# Patient Record
Sex: FEMALE | Race: WHITE | NOT HISPANIC OR LATINO | Employment: OTHER | URBAN - METROPOLITAN AREA
[De-identification: names, ages, dates, MRNs, and addresses within clinical notes are randomized per-mention and may not be internally consistent; named-entity substitution may affect disease eponyms.]

---

## 2018-03-18 ENCOUNTER — HOSPITAL ENCOUNTER (EMERGENCY)
Facility: HOSPITAL | Age: 43
Discharge: HOME | End: 2018-03-18
Attending: EMERGENCY MEDICINE
Payer: MEDICARE

## 2018-03-18 ENCOUNTER — APPOINTMENT (EMERGENCY)
Dept: RADIOLOGY | Facility: HOSPITAL | Age: 43
End: 2018-03-18
Payer: MEDICARE

## 2018-03-18 VITALS
OXYGEN SATURATION: 99 % | TEMPERATURE: 97.8 F | HEIGHT: 66 IN | RESPIRATION RATE: 16 BRPM | HEART RATE: 71 BPM | WEIGHT: 250 LBS | BODY MASS INDEX: 40.18 KG/M2 | SYSTOLIC BLOOD PRESSURE: 111 MMHG | DIASTOLIC BLOOD PRESSURE: 59 MMHG

## 2018-03-18 DIAGNOSIS — N39.0 URINARY TRACT INFECTION WITHOUT HEMATURIA, SITE UNSPECIFIED: Primary | ICD-10-CM

## 2018-03-18 LAB
ALBUMIN SERPL-MCNC: 4 G/DL (ref 3.4–5)
ALP SERPL-CCNC: 65 IU/L (ref 35–126)
ALT SERPL-CCNC: 17 IU/L (ref 11–54)
ANION GAP SERPL CALC-SCNC: 6 MEQ/L (ref 3–15)
AST SERPL-CCNC: 18 IU/L (ref 15–41)
B-HCG UR QL: NEGATIVE
BACTERIA URNS QL MICRO: 1 /UL
BASOPHILS # BLD: 0.04 K/UL (ref 0.01–0.1)
BASOPHILS NFR BLD: 0.7 %
BILIRUB SERPL-MCNC: 0.4 MG/DL (ref 0.3–1.2)
BILIRUB UR QL STRIP.AUTO: NEGATIVE MG/DL
BUN SERPL-MCNC: 13 MG/DL (ref 8–20)
CALCIUM SERPL-MCNC: 9.3 MG/DL (ref 8.9–10.3)
CHLORIDE SERPL-SCNC: 105 MMOL/L (ref 98–109)
CLARITY UR REFRACT.AUTO: ABNORMAL
CO2 SERPL-SCNC: 25 MMOL/L (ref 22–32)
COLOR UR AUTO: YELLOW
CREAT SERPL-MCNC: 0.7 MG/DL (ref 0.6–1.1)
EOSINOPHIL # BLD: 0.13 K/UL (ref 0.04–0.36)
EOSINOPHIL NFR BLD: 2.2 %
ERYTHROCYTE [DISTWIDTH] IN BLOOD BY AUTOMATED COUNT: 12.8 % (ref 11.7–14.4)
GFR SERPL CREATININE-BSD FRML MDRD: >60 ML/MIN/1.73M*2
GLUCOSE SERPL-MCNC: 88 MG/DL (ref 70–99)
GLUCOSE UR STRIP.AUTO-MCNC: NEGATIVE MG/DL
HCT VFR BLDCO AUTO: 43.8 % (ref 35–45)
HGB BLD-MCNC: 14.5 G/DL (ref 11.8–15.7)
HGB UR QL STRIP.AUTO: 1
HYALINE CASTS #/AREA URNS LPF: ABNORMAL /LPF
IMM GRANULOCYTES # BLD AUTO: 0.03 K/UL (ref 0–0.08)
IMM GRANULOCYTES NFR BLD AUTO: 0.5 %
KETONES UR STRIP.AUTO-MCNC: NEGATIVE MG/DL
LEUKOCYTE ESTERASE UR QL STRIP.AUTO: 1
LYMPHOCYTES # BLD: 1.68 K/UL (ref 1.2–3.5)
LYMPHOCYTES NFR BLD: 28.3 %
MCH RBC QN AUTO: 29.4 PG (ref 28–33.2)
MCHC RBC AUTO-ENTMCNC: 33.1 G/DL (ref 32.2–35.5)
MCV RBC AUTO: 88.8 FL (ref 83–98)
MONOCYTES # BLD: 0.5 K/UL (ref 0.28–0.8)
MONOCYTES NFR BLD: 8.4 %
NEUTROPHILS # BLD: 3.56 K/UL (ref 1.7–7)
NEUTS SEG NFR BLD: 59.9 %
NITRITE UR QL STRIP.AUTO: NEGATIVE
NRBC BLD-RTO: 0 %
PDW BLD AUTO: 10 FL (ref 9.4–12.3)
PH UR STRIP.AUTO: 6 [PH]
PLATELET # BLD AUTO: 200 K/UL (ref 150–369)
POTASSIUM SERPL-SCNC: 4.3 MMOL/L (ref 3.6–5.1)
PROT SERPL-MCNC: 7.3 G/DL (ref 6–8.2)
PROT UR QL STRIP.AUTO: ABNORMAL
RBC # BLD AUTO: 4.93 M/UL (ref 3.93–5.22)
RBC #/AREA URNS HPF: ABNORMAL /HPF
SODIUM SERPL-SCNC: 136 MMOL/L (ref 136–144)
SP GR UR REFRACT.AUTO: 1.03
SQUAMOUS URNS QL MICRO: 2 /HPF
UROBILINOGEN UR STRIP-ACNC: 0.2 EU/DL
WBC # BLD AUTO: 5.94 K/UL (ref 3.8–10.5)
WBC #/AREA URNS HPF: ABNORMAL /HPF

## 2018-03-18 PROCEDURE — 87077 CULTURE AEROBIC IDENTIFY: CPT | Performed by: PHYSICIAN ASSISTANT

## 2018-03-18 PROCEDURE — 96375 TX/PRO/DX INJ NEW DRUG ADDON: CPT

## 2018-03-18 PROCEDURE — 3E033GC INTRODUCTION OF OTHER THERAPEUTIC SUBSTANCE INTO PERIPHERAL VEIN, PERCUTANEOUS APPROACH: ICD-10-PCS | Performed by: EMERGENCY MEDICINE

## 2018-03-18 PROCEDURE — 87086 URINE CULTURE/COLONY COUNT: CPT | Performed by: PHYSICIAN ASSISTANT

## 2018-03-18 PROCEDURE — 36415 COLL VENOUS BLD VENIPUNCTURE: CPT | Performed by: PHYSICIAN ASSISTANT

## 2018-03-18 PROCEDURE — 81003 URINALYSIS AUTO W/O SCOPE: CPT | Performed by: PHYSICIAN ASSISTANT

## 2018-03-18 PROCEDURE — 63600000 HC DRUGS/DETAIL CODE: Performed by: PHYSICIAN ASSISTANT

## 2018-03-18 PROCEDURE — 25800000 HC PHARMACY IV SOLUTIONS: Performed by: PHYSICIAN ASSISTANT

## 2018-03-18 PROCEDURE — 99285 EMERGENCY DEPT VISIT HI MDM: CPT | Mod: 25

## 2018-03-18 PROCEDURE — 85025 COMPLETE CBC W/AUTO DIFF WBC: CPT | Performed by: PHYSICIAN ASSISTANT

## 2018-03-18 PROCEDURE — 80053 COMPREHEN METABOLIC PANEL: CPT | Performed by: PHYSICIAN ASSISTANT

## 2018-03-18 PROCEDURE — 74176 CT ABD & PELVIS W/O CONTRAST: CPT

## 2018-03-18 PROCEDURE — 96374 THER/PROPH/DIAG INJ IV PUSH: CPT | Mod: 59

## 2018-03-18 RX ORDER — ARIPIPRAZOLE 20 MG/1
30 TABLET ORAL DAILY
Refills: 1 | COMMUNITY
Start: 2018-03-06 | End: 2020-05-06 | Stop reason: HOSPADM

## 2018-03-18 RX ORDER — ONDANSETRON HYDROCHLORIDE 2 MG/ML
4 INJECTION, SOLUTION INTRAVENOUS ONCE
Status: COMPLETED | OUTPATIENT
Start: 2018-03-18 | End: 2018-03-18

## 2018-03-18 RX ORDER — KETOROLAC TROMETHAMINE 30 MG/ML
30 INJECTION, SOLUTION INTRAMUSCULAR; INTRAVENOUS ONCE
Status: COMPLETED | OUTPATIENT
Start: 2018-03-18 | End: 2018-03-18

## 2018-03-18 RX ORDER — DIVALPROEX SODIUM 250 MG/1
1000 TABLET, DELAYED RELEASE ORAL DAILY
Refills: 1 | COMMUNITY
Start: 2018-02-24 | End: 2020-05-06 | Stop reason: HOSPADM

## 2018-03-18 RX ORDER — SULFAMETHOXAZOLE AND TRIMETHOPRIM 800; 160 MG/1; MG/1
1 TABLET ORAL 2 TIMES DAILY
Qty: 20 TABLET | Refills: 0 | Status: SHIPPED | OUTPATIENT
Start: 2018-03-18 | End: 2018-03-28

## 2018-03-18 RX ORDER — ONDANSETRON 4 MG/1
4 TABLET, ORALLY DISINTEGRATING ORAL EVERY 8 HOURS PRN
Qty: 10 TABLET | Refills: 0 | Status: SHIPPED | OUTPATIENT
Start: 2018-03-18 | End: 2020-03-22

## 2018-03-18 RX ORDER — IBUPROFEN 600 MG/1
600 TABLET ORAL EVERY 6 HOURS PRN
Qty: 30 TABLET | Refills: 0 | Status: SHIPPED | OUTPATIENT
Start: 2018-03-18 | End: 2018-03-28

## 2018-03-18 RX ADMIN — SODIUM CHLORIDE 1000 ML: 9 INJECTION, SOLUTION INTRAVENOUS at 12:33

## 2018-03-18 RX ADMIN — ONDANSETRON 4 MG: 2 INJECTION INTRAMUSCULAR; INTRAVENOUS at 12:34

## 2018-03-18 RX ADMIN — KETOROLAC TROMETHAMINE 30 MG: 30 INJECTION, SOLUTION INTRAMUSCULAR at 12:34

## 2018-03-18 ASSESSMENT — ENCOUNTER SYMPTOMS
SHORTNESS OF BREATH: 0
ABDOMINAL PAIN: 1
FEVER: 0
LIGHT-HEADEDNESS: 0
FREQUENCY: 1
CHILLS: 0
NAUSEA: 0
DIARRHEA: 0
DYSURIA: 1
HEADACHES: 0
DIZZINESS: 0
BACK PAIN: 1
VOMITING: 0

## 2018-03-18 NOTE — ED ATTESTATION NOTE
The patient was evaluated and managed by the physician assistant / nurse practitioner.       Rex Mccord MD  03/18/18 6729

## 2018-03-18 NOTE — ED PROVIDER NOTES
HPI     Chief Complaint   Patient presents with   • Flank Pain       42-year-old female past medical history of bipolar, cholecystectomy presents emergency department for evaluation of right-sided low back pain.  Patient reports approximately 3 weeks ago she was started on Cipro for 5 days for urinary tract infection.  Patient reports she has not really had any improvements in urinary frequency and urgency since.  Patient reports she has some radiation of pain to suprapubic abdominal region and now feels pain in bilateral low back.  Patient endorses mild nausea.  Patient denies fevers, chills, vomiting, diarrhea, vaginal discharge, vaginal bleeding.  Patient has not attempted any palliative factor.             Patient History     Past Medical History:   Diagnosis Date   • Bipolar 1 disorder (CMS/HCC) (HCC)        Past Surgical History:   Procedure Laterality Date   • ANAL FISSURECTOMY     • CHOLECYSTECTOMY         History reviewed. No pertinent family history.    Social History   Substance Use Topics   • Smoking status: Current Every Day Smoker     Packs/day: 0.25     Types: Cigarettes   • Smokeless tobacco: Never Used   • Alcohol use No       Systems Reviewed from Nursing Triage:          Review of Systems     Review of Systems   Constitutional: Negative for chills and fever.   Respiratory: Negative for shortness of breath.    Gastrointestinal: Positive for abdominal pain. Negative for diarrhea, nausea and vomiting.   Genitourinary: Positive for dysuria, frequency and urgency. Negative for vaginal bleeding, vaginal discharge and vaginal pain.   Musculoskeletal: Positive for back pain.   Skin: Negative for rash.   Neurological: Negative for dizziness, light-headedness and headaches.        Physical Exam     ED Triage Vitals [03/18/18 1105]   Temp Heart Rate Resp BP SpO2   36.7 °C (98 °F) 97 16 125/72 100 %      Temp Source Heart Rate Source Patient Position BP Location FiO2 (%) (Set)   Oral -- Sitting Left upper arm  "--                     Patient Vitals for the past 24 hrs:   BP Temp Temp src Pulse Resp SpO2 Height Weight   03/18/18 1105 125/72 36.7 °C (98 °F) Oral 97 16 100 % 1.676 m (5' 6\") 113 kg (250 lb)           Physical Exam   Constitutional: She is oriented to person, place, and time. She appears well-developed and well-nourished.   HENT:   Head: Normocephalic.   Cardiovascular: Normal rate and regular rhythm.    Pulmonary/Chest: Effort normal and breath sounds normal.   Abdominal: Soft. There is tenderness (mild suprapubic).   Neurological: She is alert and oriented to person, place, and time.   Psychiatric: She has a normal mood and affect.            Procedures    ED Course & MDM     Labs Reviewed   UA REFLEX CULTURE (MACROSCOPIC) - Abnormal        Result Value    Clarity, Urine Cloudy (*)     Leukocyte Esterase +1 (*)     Protein, Urine Trace (*)     Blood, Urine +1 (*)     Color, Urine Yellow      Specific Gravity, Urine 1.026      pH, Urine 6.0      Nitrite, Urine Negative      Glucose, Urine Negative      Ketones, Urine Negative      Urobilinogen, Urine 0.2      Bilirubin, Urine Negative     UA MICROSCOPIC - Abnormal     RBC, Urine 10 TO 19 (*)     WBC, Urine 10 TO 20 (*)     Squamous Epithelial +2 (*)     Hyaline Cast 3 TO 9 (*)     Bacteria, Urine +1 (*)    CBC - Normal    WBC 5.94      RBC 4.93      Hemoglobin 14.5      Hematocrit 43.8      MCV 88.8      MCH 29.4      MCHC 33.1      RDW 12.8      Platelets 200      MPV 10.0     POCT BHCG, URINE, QUAL (BEAKER) - Normal    POCT BhCG, Urine Qual Negative     URINE CULTURE   URINALYSIS REFLEX CULTURE    Narrative:     The following orders were created for panel order Urinalysis with Reflex Culture.  Procedure                               Abnormality         Status                     ---------                               -----------         ------                     UA Reflex to Culture (Mac...[[id number]]  Abnormal            Final result               UA " Microscopic[[id number]]                Abnormal            Final result                 Please view results for these tests on the individual orders.   CBC AND DIFFERENTIAL    Narrative:     The following orders were created for panel order CBC and differential.  Procedure                               Abnormality         Status                     ---------                               -----------         ------                     CBC[[id number]]                           Normal              Final result               Diff Count[[id number]]                                        Final result                 Please view results for these tests on the individual orders.   DIFF COUNT    nRBC 0.0      Immature Granulocytes 0.5      Neutrophils 59.9      Lymphocytes 28.3      Monocytes 8.4      Eosinophils 2.2      Basophils 0.7      Immature Granulocytes, Absolute 0.03      Neutrophils, Absolute 3.56      Lymphocytes, Absolute 1.68      Monocytes, Absolute 0.50      Eosinophils, Absolute 0.13      Basophils, Absolute 0.04     COMPREHENSIVE METABOLIC PANEL   POCT PREGNANCY, URINE       No orders to display           MDM         ED Course as of Mar 18 1527   Sun Mar 18, 2018   1503 CT ABDOMEN PELVIS WITHOUT IV CONTRAST [SS]   1527 Reviewed results with patient, reports she is feeling good, will follow up with her doctor  [SS]      ED Course User Index  [SS] DELIA Davis         Clinical Impressions as of Mar 18 1527   Urinary tract infection without hematuria, site unspecified     Disposition:       DELIA Davis  03/18/18 1238       DELIA Davis  03/18/18 1513       DELIA Davis  03/18/18 1527
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
## 2018-03-20 LAB
BACTERIA UR CULT: ABNORMAL

## 2018-10-11 PROCEDURE — 88304 TISSUE EXAM BY PATHOLOGIST: CPT | Performed by: SURGERY

## 2018-10-12 ENCOUNTER — LAB REQUISITION (OUTPATIENT)
Dept: LAB | Facility: HOSPITAL | Age: 43
End: 2018-10-12
Attending: SURGERY
Payer: MEDICARE

## 2018-10-12 DIAGNOSIS — L72.3 SEBACEOUS CYST: ICD-10-CM

## 2018-10-15 LAB
CASE RPRT: NORMAL
CLINICAL INFO: NORMAL
PATH REPORT.FINAL DX SPEC: NORMAL
PATH REPORT.GROSS SPEC: NORMAL

## 2019-01-09 ENCOUNTER — APPOINTMENT (OUTPATIENT)
Dept: LAB | Facility: HOSPITAL | Age: 44
End: 2019-01-09
Attending: PSYCHIATRY & NEUROLOGY
Payer: MEDICARE

## 2019-01-09 ENCOUNTER — TRANSCRIBE ORDERS (OUTPATIENT)
Dept: LAB | Facility: HOSPITAL | Age: 44
End: 2019-01-09

## 2019-01-09 DIAGNOSIS — Z79.899 OTHER LONG TERM (CURRENT) DRUG THERAPY: ICD-10-CM

## 2019-01-09 DIAGNOSIS — Z79.899 OTHER LONG TERM (CURRENT) DRUG THERAPY: Primary | ICD-10-CM

## 2019-01-09 LAB
ALBUMIN SERPL-MCNC: 3.4 G/DL (ref 3.4–5)
ALP SERPL-CCNC: 67 IU/L (ref 35–126)
ALT SERPL-CCNC: 18 IU/L (ref 11–54)
ANION GAP SERPL CALC-SCNC: 8 MEQ/L (ref 3–15)
AST SERPL-CCNC: 16 IU/L (ref 15–41)
BASOPHILS # BLD: 0.03 K/UL (ref 0.01–0.1)
BASOPHILS NFR BLD: 0.5 %
BILIRUB SERPL-MCNC: 0.5 MG/DL (ref 0.3–1.2)
BUN SERPL-MCNC: 9 MG/DL (ref 8–20)
CALCIUM SERPL-MCNC: 9.1 MG/DL (ref 8.9–10.3)
CHLORIDE SERPL-SCNC: 107 MEQ/L (ref 98–109)
CHOLEST SERPL-MCNC: 263 MG/DL
CO2 SERPL-SCNC: 23 MEQ/L (ref 22–32)
CREAT SERPL-MCNC: 0.6 MG/DL
DIFFERENTIAL METHOD BLD: ABNORMAL
EOSINOPHIL # BLD: 0.13 K/UL (ref 0.04–0.36)
EOSINOPHIL NFR BLD: 2.1 %
ERYTHROCYTE [DISTWIDTH] IN BLOOD BY AUTOMATED COUNT: 13.4 % (ref 11.7–14.4)
EST. AVERAGE GLUCOSE BLD GHB EST-MCNC: 108 MG/DL
GFR SERPL CREATININE-BSD FRML MDRD: >60 ML/MIN/1.73M*2
GLUCOSE SERPL-MCNC: 87 MG/DL (ref 70–99)
HBA1C MFR BLD HPLC: 5.4 %
HCT VFR BLDCO AUTO: 41 %
HDLC SERPL-MCNC: 32 MG/DL
HDLC SERPL: 8.2 {RATIO}
HGB BLD-MCNC: 13.6 G/DL
IMM GRANULOCYTES # BLD AUTO: 0.03 K/UL (ref 0–0.08)
IMM GRANULOCYTES NFR BLD AUTO: 0.5 %
LDLC SERPL CALC-MCNC: 187 MG/DL
LYMPHOCYTES # BLD: 1.17 K/UL (ref 1.2–3.5)
LYMPHOCYTES NFR BLD: 19.1 %
MCH RBC QN AUTO: 28.5 PG (ref 28–33.2)
MCHC RBC AUTO-ENTMCNC: 33.2 G/DL (ref 32.2–35.5)
MCV RBC AUTO: 86 FL (ref 83–98)
MONOCYTES # BLD: 0.46 K/UL (ref 0.28–0.8)
MONOCYTES NFR BLD: 7.5 %
NEUTROPHILS # BLD: 4.3 K/UL (ref 1.7–7)
NEUTS SEG NFR BLD: 70.3 %
NONHDLC SERPL-MCNC: 231 MG/DL
NRBC BLD-RTO: 0 %
PDW BLD AUTO: 10.1 FL (ref 9.4–12.3)
PLATELET # BLD AUTO: 201 K/UL
POTASSIUM SERPL-SCNC: 4.6 MEQ/L (ref 3.6–5.1)
PROT SERPL-MCNC: 6.1 G/DL (ref 6–8.2)
RBC # BLD AUTO: 4.77 M/UL (ref 3.93–5.22)
SODIUM SERPL-SCNC: 138 MEQ/L (ref 136–144)
TRIGL SERPL-MCNC: 222 MG/DL (ref 30–149)
VALPROATE SERPL-MCNC: 43.5 UG/ML (ref 50–100)
WBC # BLD AUTO: 6.12 K/UL

## 2019-01-09 PROCEDURE — 80053 COMPREHEN METABOLIC PANEL: CPT

## 2019-01-09 PROCEDURE — 36415 COLL VENOUS BLD VENIPUNCTURE: CPT

## 2019-01-09 PROCEDURE — 80164 ASSAY DIPROPYLACETIC ACD TOT: CPT

## 2019-01-09 PROCEDURE — 83036 HEMOGLOBIN GLYCOSYLATED A1C: CPT

## 2019-01-09 PROCEDURE — 80061 LIPID PANEL: CPT

## 2019-01-09 PROCEDURE — 85025 COMPLETE CBC W/AUTO DIFF WBC: CPT

## 2019-01-10 PROCEDURE — 88304 TISSUE EXAM BY PATHOLOGIST: CPT | Performed by: SURGERY

## 2019-01-11 ENCOUNTER — LAB REQUISITION (OUTPATIENT)
Dept: LAB | Facility: HOSPITAL | Age: 44
End: 2019-01-11
Attending: SURGERY
Payer: MEDICARE

## 2019-01-11 DIAGNOSIS — D48.7 NEOPLASM OF UNCERTAIN BEHAVIOR OF OTHER SPECIFIED SITES: ICD-10-CM

## 2019-01-21 PROCEDURE — 88304 TISSUE EXAM BY PATHOLOGIST: CPT | Performed by: SURGERY

## 2019-01-22 ENCOUNTER — LAB REQUISITION (OUTPATIENT)
Dept: LAB | Facility: HOSPITAL | Age: 44
End: 2019-01-22
Attending: SURGERY
Payer: MEDICARE

## 2019-01-22 DIAGNOSIS — L72.3 SEBACEOUS CYST: ICD-10-CM

## 2019-02-13 PROCEDURE — 88305 TISSUE EXAM BY PATHOLOGIST: CPT | Performed by: SURGERY

## 2019-02-14 ENCOUNTER — LAB REQUISITION (OUTPATIENT)
Dept: LAB | Facility: HOSPITAL | Age: 44
End: 2019-02-14
Attending: SURGERY
Payer: MEDICARE

## 2019-02-14 DIAGNOSIS — D48.7 NEOPLASM OF UNCERTAIN BEHAVIOR OF OTHER SPECIFIED SITES: ICD-10-CM

## 2019-03-18 PROCEDURE — 88305 TISSUE EXAM BY PATHOLOGIST: CPT | Performed by: SURGERY

## 2019-03-19 ENCOUNTER — LAB REQUISITION (OUTPATIENT)
Dept: LAB | Facility: HOSPITAL | Age: 44
End: 2019-03-19
Attending: SURGERY
Payer: MEDICARE

## 2019-03-19 DIAGNOSIS — D48.7 NEOPLASM OF UNCERTAIN BEHAVIOR OF OTHER SPECIFIED SITES: ICD-10-CM

## 2020-03-22 ENCOUNTER — HOSPITAL ENCOUNTER (INPATIENT)
Facility: HOSPITAL | Age: 45
LOS: 45 days | Discharge: HOME | DRG: 885 | End: 2020-05-06
Attending: EMERGENCY MEDICINE | Admitting: PSYCHIATRY & NEUROLOGY
Payer: MEDICARE

## 2020-03-22 DIAGNOSIS — F31.9 BIPOLAR 1 DISORDER (CMS/HCC): Primary | ICD-10-CM

## 2020-03-22 DIAGNOSIS — F22 DELUSION (CMS/HCC): ICD-10-CM

## 2020-03-22 PROBLEM — R60.0 LOWER EXTREMITY EDEMA: Status: ACTIVE | Noted: 2020-03-22

## 2020-03-22 PROBLEM — G47.33 OSA (OBSTRUCTIVE SLEEP APNEA): Status: ACTIVE | Noted: 2020-03-22

## 2020-03-22 PROBLEM — F29 PSYCHOSIS (CMS/HCC): Status: ACTIVE | Noted: 2020-03-22

## 2020-03-22 PROBLEM — M54.50 CHRONIC LOW BACK PAIN: Status: ACTIVE | Noted: 2020-03-22

## 2020-03-22 PROBLEM — I48.0 PAROXYSMAL A-FIB (CMS/HCC): Status: ACTIVE | Noted: 2020-03-22

## 2020-03-22 PROBLEM — G89.29 CHRONIC LOW BACK PAIN: Status: ACTIVE | Noted: 2020-03-22

## 2020-03-22 LAB
AMPHET UR QL SCN: NORMAL
ANION GAP SERPL CALC-SCNC: 11 MEQ/L (ref 3–15)
APAP SERPL-MCNC: <10 UG/ML (ref 10–30)
APTT PPP: 29 SEC (ref 23–35)
BARBITURATES UR QL SCN: NORMAL
BENZODIAZ UR QL SCN: NORMAL
BUN SERPL-MCNC: 16 MG/DL (ref 8–20)
CALCIUM SERPL-MCNC: 9.3 MG/DL (ref 8.9–10.3)
CANNABINOIDS UR QL SCN: NORMAL
CHLORIDE SERPL-SCNC: 108 MEQ/L (ref 98–109)
CO2 SERPL-SCNC: 21 MEQ/L (ref 22–32)
COCAINE UR QL SCN: NORMAL
CREAT SERPL-MCNC: 0.7 MG/DL (ref 0.6–1.1)
ERYTHROCYTE [DISTWIDTH] IN BLOOD BY AUTOMATED COUNT: 13.7 % (ref 11.7–14.4)
ETHANOL SERPL-MCNC: <5 MG/DL
GFR SERPL CREATININE-BSD FRML MDRD: >60 ML/MIN/1.73M*2
GLUCOSE SERPL-MCNC: 118 MG/DL (ref 70–99)
HCG UR QL: NEGATIVE
HCT VFR BLDCO AUTO: 41.9 % (ref 35–45)
HGB BLD-MCNC: 13.8 G/DL (ref 11.8–15.7)
INR PPP: 1 INR
MCH RBC QN AUTO: 29.8 PG (ref 28–33.2)
MCHC RBC AUTO-ENTMCNC: 32.9 G/DL (ref 32.2–35.5)
MCV RBC AUTO: 90.5 FL (ref 83–98)
OPIATES UR QL SCN: NORMAL
PCP UR QL SCN: NORMAL
PDW BLD AUTO: 10.5 FL (ref 9.4–12.3)
PLATELET # BLD AUTO: 264 K/UL (ref 150–369)
POTASSIUM SERPL-SCNC: 3.9 MEQ/L (ref 3.6–5.1)
PROTHROMBIN TIME: 12.5 SEC (ref 12.2–14.5)
RBC # BLD AUTO: 4.63 M/UL (ref 3.93–5.22)
SALICYLATES SERPL-MCNC: <4 MG/DL
SODIUM SERPL-SCNC: 140 MEQ/L (ref 136–144)
TROPONIN I SERPL-MCNC: <0.02 NG/ML
WBC # BLD AUTO: 7.56 K/UL (ref 3.8–10.5)

## 2020-03-22 PROCEDURE — 83036 HEMOGLOBIN GLYCOSYLATED A1C: CPT | Performed by: PSYCHIATRY & NEUROLOGY

## 2020-03-22 PROCEDURE — 84703 CHORIONIC GONADOTROPIN ASSAY: CPT | Performed by: EMERGENCY MEDICINE

## 2020-03-22 PROCEDURE — 84443 ASSAY THYROID STIM HORMONE: CPT | Performed by: PSYCHIATRY & NEUROLOGY

## 2020-03-22 PROCEDURE — 200200 PR NO CHARGE: Performed by: HOSPITALIST

## 2020-03-22 PROCEDURE — 85610 PROTHROMBIN TIME: CPT | Performed by: EMERGENCY MEDICINE

## 2020-03-22 PROCEDURE — 80048 BASIC METABOLIC PNL TOTAL CA: CPT | Performed by: EMERGENCY MEDICINE

## 2020-03-22 PROCEDURE — 85027 COMPLETE CBC AUTOMATED: CPT | Performed by: EMERGENCY MEDICINE

## 2020-03-22 PROCEDURE — 84484 ASSAY OF TROPONIN QUANT: CPT | Performed by: EMERGENCY MEDICINE

## 2020-03-22 PROCEDURE — 12400000 HC ROOM AND CARE SEMIPRIVATE PSYCH

## 2020-03-22 PROCEDURE — 82248 BILIRUBIN DIRECT: CPT | Performed by: PSYCHIATRY & NEUROLOGY

## 2020-03-22 PROCEDURE — 36415 COLL VENOUS BLD VENIPUNCTURE: CPT | Performed by: PHYSICIAN ASSISTANT

## 2020-03-22 PROCEDURE — 80061 LIPID PANEL: CPT | Performed by: PSYCHIATRY & NEUROLOGY

## 2020-03-22 PROCEDURE — 82746 ASSAY OF FOLIC ACID SERUM: CPT | Performed by: PSYCHIATRY & NEUROLOGY

## 2020-03-22 PROCEDURE — 85730 THROMBOPLASTIN TIME PARTIAL: CPT | Performed by: EMERGENCY MEDICINE

## 2020-03-22 PROCEDURE — 63700000 HC SELF-ADMINISTRABLE DRUG: Performed by: PSYCHIATRY & NEUROLOGY

## 2020-03-22 PROCEDURE — 80307 DRUG TEST PRSMV CHEM ANLYZR: CPT | Performed by: EMERGENCY MEDICINE

## 2020-03-22 PROCEDURE — 99285 EMERGENCY DEPT VISIT HI MDM: CPT

## 2020-03-22 PROCEDURE — 93005 ELECTROCARDIOGRAM TRACING: CPT | Performed by: EMERGENCY MEDICINE

## 2020-03-22 PROCEDURE — 82607 VITAMIN B-12: CPT | Performed by: PSYCHIATRY & NEUROLOGY

## 2020-03-22 PROCEDURE — G0480 DRUG TEST DEF 1-7 CLASSES: HCPCS | Performed by: EMERGENCY MEDICINE

## 2020-03-22 RX ORDER — FLUTICASONE PROPIONATE 50 MCG
2 SPRAY, SUSPENSION (ML) NASAL DAILY
Status: DISCONTINUED | OUTPATIENT
Start: 2020-03-23 | End: 2020-05-06 | Stop reason: HOSPADM

## 2020-03-22 RX ORDER — IBUPROFEN 400 MG/1
400 TABLET ORAL EVERY 6 HOURS PRN
Status: DISCONTINUED | OUTPATIENT
Start: 2020-03-22 | End: 2020-04-30

## 2020-03-22 RX ORDER — HALOPERIDOL 5 MG/1
5 TABLET ORAL EVERY 6 HOURS PRN
Status: DISCONTINUED | OUTPATIENT
Start: 2020-03-22 | End: 2020-03-23

## 2020-03-22 RX ORDER — ACETAMINOPHEN 325 MG/1
650 TABLET ORAL EVERY 4 HOURS PRN
Status: DISCONTINUED | OUTPATIENT
Start: 2020-03-22 | End: 2020-05-06 | Stop reason: HOSPADM

## 2020-03-22 RX ORDER — ASPIRIN 81 MG/1
81 TABLET ORAL DAILY
Status: DISCONTINUED | OUTPATIENT
Start: 2020-03-23 | End: 2020-03-30

## 2020-03-22 RX ORDER — DIPHENHYDRAMINE HCL 25 MG
25 CAPSULE ORAL EVERY 6 HOURS PRN
Status: DISCONTINUED | OUTPATIENT
Start: 2020-03-22 | End: 2020-05-06 | Stop reason: HOSPADM

## 2020-03-22 RX ORDER — FLUTICASONE PROPIONATE 50 MCG
2 SPRAY, SUSPENSION (ML) NASAL DAILY
COMMUNITY
Start: 2019-11-11

## 2020-03-22 RX ORDER — LORAZEPAM 2 MG/ML
2 INJECTION INTRAMUSCULAR EVERY 4 HOURS PRN
Status: DISCONTINUED | OUTPATIENT
Start: 2020-03-22 | End: 2020-04-06

## 2020-03-22 RX ORDER — OLANZAPINE 10 MG/1
10 TABLET, ORALLY DISINTEGRATING ORAL ONCE
Status: DISPENSED | OUTPATIENT
Start: 2020-03-22 | End: 2020-03-23

## 2020-03-22 RX ORDER — ALUMINUM HYDROXIDE, MAGNESIUM HYDROXIDE, AND SIMETHICONE 1200; 120; 1200 MG/30ML; MG/30ML; MG/30ML
30 SUSPENSION ORAL EVERY 4 HOURS PRN
Status: DISCONTINUED | OUTPATIENT
Start: 2020-03-22 | End: 2020-05-06 | Stop reason: HOSPADM

## 2020-03-22 RX ORDER — ARIPIPRAZOLE 15 MG/1
30 TABLET ORAL NIGHTLY
Status: DISCONTINUED | OUTPATIENT
Start: 2020-03-22 | End: 2020-03-26

## 2020-03-22 RX ORDER — DILTIAZEM HYDROCHLORIDE 180 MG/1
180 CAPSULE, COATED, EXTENDED RELEASE ORAL DAILY
Status: DISCONTINUED | OUTPATIENT
Start: 2020-03-23 | End: 2020-05-03

## 2020-03-22 RX ORDER — DIVALPROEX SODIUM 250 MG/1
1000 TABLET, DELAYED RELEASE ORAL NIGHTLY
Status: DISCONTINUED | OUTPATIENT
Start: 2020-03-22 | End: 2020-03-23

## 2020-03-22 RX ORDER — FUROSEMIDE 40 MG/1
20 TABLET ORAL DAILY
Status: COMPLETED | OUTPATIENT
Start: 2020-03-23 | End: 2020-03-24

## 2020-03-22 RX ORDER — LORAZEPAM 1 MG/1
1 TABLET ORAL EVERY 6 HOURS PRN
Status: DISCONTINUED | OUTPATIENT
Start: 2020-03-22 | End: 2020-04-06

## 2020-03-22 RX ORDER — HALOPERIDOL 5 MG/ML
5 INJECTION INTRAMUSCULAR EVERY 4 HOURS PRN
Status: DISCONTINUED | OUTPATIENT
Start: 2020-03-22 | End: 2020-03-23

## 2020-03-22 RX ORDER — DIPHENHYDRAMINE HCL 50 MG/ML
50 VIAL (ML) INJECTION EVERY 4 HOURS PRN
Status: DISCONTINUED | OUTPATIENT
Start: 2020-03-22 | End: 2020-05-06 | Stop reason: HOSPADM

## 2020-03-22 RX ORDER — DILTIAZEM HYDROCHLORIDE 180 MG/1
CAPSULE, COATED, EXTENDED RELEASE ORAL
COMMUNITY
Start: 2020-03-18 | End: 2020-05-06 | Stop reason: HOSPADM

## 2020-03-22 RX ORDER — ASPIRIN 81 MG/1
TABLET ORAL
COMMUNITY
Start: 2020-02-21 | End: 2023-07-14 | Stop reason: HOSPADM

## 2020-03-22 RX ORDER — ONDANSETRON 4 MG/1
4 TABLET, ORALLY DISINTEGRATING ORAL EVERY 8 HOURS PRN
Status: DISCONTINUED | OUTPATIENT
Start: 2020-03-22 | End: 2020-05-06 | Stop reason: HOSPADM

## 2020-03-22 RX ORDER — DOCUSATE SODIUM 100 MG/1
100 CAPSULE, LIQUID FILLED ORAL 2 TIMES DAILY
Status: DISCONTINUED | OUTPATIENT
Start: 2020-03-22 | End: 2020-05-06 | Stop reason: HOSPADM

## 2020-03-22 RX ADMIN — ARIPIPRAZOLE 30 MG: 15 TABLET ORAL at 23:19

## 2020-03-22 RX ADMIN — DIVALPROEX SODIUM 1000 MG: 250 TABLET, DELAYED RELEASE ORAL at 23:19

## 2020-03-22 SDOH — HEALTH STABILITY: MENTAL HEALTH: HOW OFTEN DO YOU HAVE A DRINK CONTAINING ALCOHOL?: NEVER

## 2020-03-22 SDOH — HEALTH STABILITY: MENTAL HEALTH: HOW OFTEN DO YOU HAVE SIX OR MORE DRINKS ON ONE OCCASION?: NEVER

## 2020-03-22 ASSESSMENT — COGNITIVE AND FUNCTIONAL STATUS - GENERAL
JUDGEMENT: IMPAIRED, SEVERELY
IMPULSE CONTROL: NORMAL
PSYCHOMOTOR FUNCTIONING: RESTLESS
TOILETING: 4 - NONE
DRESSING REGULAR LOWER BODY CLOTHING: 4 - NONE
APPEARANCE: WELL GROOMED
HELP NEEDED FOR PERSONAL GROOMING: 4 - NONE
CLIMB 3 TO 5 STEPS WITH RAILING: 4 - NONE
MOVING TO AND FROM BED TO CHAIR: 4 - NONE
AFFECT: FLAT;TENSE
WALKING IN HOSPITAL ROOM: 4 - NONE
HELP NEEDED FOR BATHING: 4 - NONE
THOUGHT_CONTENT: OTHER:
MOOD: ANXIOUS
INSIGHT: IMPAIRED SEVERELY
DRESSING REGULAR UPPER BODY CLOTHING: 4 - NONE
EATING MEALS: 4 - NONE
ORIENTATION: FULLY ORIENTED
SPEECH: REGULAR
PERCEPTUAL FUNCTION: AUDITORY HALLUCINATIONS;VISUAL HALLUCINATIONS
STANDING UP FROM CHAIR USING ARMS: 4 - NONE

## 2020-03-22 ASSESSMENT — ENCOUNTER SYMPTOMS
HEADACHES: 0
COUGH: 0
FEVER: 0
NAUSEA: 0
DIZZINESS: 0
BACK PAIN: 0
WOUND: 0
BIZARRE BEHAVIOR: 1
SHORTNESS OF BREATH: 0
HALLUCINATIONS: 1
VOMITING: 0
ABDOMINAL PAIN: 0
CHILLS: 0

## 2020-03-22 NOTE — ED ATTESTATION NOTE
I have personally seen, evaluated,and participated in the management of Jillian Acosta.  I reviewed and agree with the PA/NP's assessment and plan of care with the following exceptions: None    My examination, assessment, and plan of care for Jillian Acosta:  44-year-old female with history of bipolar 1 disorder supposedly history of recent diagnosis of atrial fibrillation only on aspirin presented with worsening paranoia insomnia hyperactivity and delusional thoughts.  No SI but has had intermittent thoughts about being disgruntled and angry her dad.  Patient recently had Abilify increased 2 weeks ago from 20 mg a day to 30 mg a day.  Exam:  Aa, normal gait, no resp distress.  Moving all 4 extremities spontaneously and purposefully.  Intermittent inappropriate laughter.  Delusional.  Plan:  ekg  Labs  1:1  crisis       Silvio Vyas MD  03/22/20 5234
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
## 2020-03-23 LAB
ALBUMIN SERPL-MCNC: 4 G/DL (ref 3.4–5)
ALP SERPL-CCNC: 67 IU/L (ref 35–126)
ALT SERPL-CCNC: 28 IU/L (ref 11–54)
AST SERPL-CCNC: 25 IU/L (ref 15–41)
ATRIAL RATE: 84
BILIRUB DIRECT SERPL-MCNC: 0.1 MG/DL
BILIRUB SERPL-MCNC: 0.5 MG/DL (ref 0.3–1.2)
CHOLEST SERPL-MCNC: 236 MG/DL
FOLATE SERPL-MCNC: 14.2 NG/ML
HDLC SERPL-MCNC: 42 MG/DL
HDLC SERPL: 5.6 {RATIO}
LDLC SERPL CALC-MCNC: 156 MG/DL
NONHDLC SERPL-MCNC: 194 MG/DL
P AXIS: 70
PR INTERVAL: 160
PROT SERPL-MCNC: 7.3 G/DL (ref 6–8.2)
QRS DURATION: 86
QT INTERVAL: 386
QTC CALCULATION(BAZETT): 456
R AXIS: 56
T WAVE AXIS: 43
TRIGL SERPL-MCNC: 189 MG/DL (ref 30–149)
TSH SERPL DL<=0.05 MIU/L-ACNC: 1.77 MIU/L (ref 0.34–5.6)
VENTRICULAR RATE: 84
VIT B12 SERPL-MCNC: 414 PG/ML (ref 180–914)

## 2020-03-23 PROCEDURE — 63700000 HC SELF-ADMINISTRABLE DRUG: Performed by: PSYCHIATRY & NEUROLOGY

## 2020-03-23 PROCEDURE — 12400000 HC ROOM AND CARE SEMIPRIVATE PSYCH

## 2020-03-23 PROCEDURE — 63700000 HC SELF-ADMINISTRABLE DRUG: Performed by: HOSPITALIST

## 2020-03-23 PROCEDURE — 90792 PSYCH DIAG EVAL W/MED SRVCS: CPT | Performed by: PSYCHIATRY & NEUROLOGY

## 2020-03-23 RX ORDER — HALOPERIDOL 5 MG/1
10 TABLET ORAL EVERY 8 HOURS PRN
Status: DISCONTINUED | OUTPATIENT
Start: 2020-03-23 | End: 2020-04-08

## 2020-03-23 RX ORDER — DIVALPROEX SODIUM 250 MG/1
1250 TABLET, DELAYED RELEASE ORAL NIGHTLY
Status: DISCONTINUED | OUTPATIENT
Start: 2020-03-23 | End: 2020-03-24

## 2020-03-23 RX ORDER — HALOPERIDOL 5 MG/ML
10 INJECTION INTRAMUSCULAR EVERY 6 HOURS PRN
Status: DISCONTINUED | OUTPATIENT
Start: 2020-03-23 | End: 2020-04-08

## 2020-03-23 RX ADMIN — HALOPERIDOL 5 MG: 5 TABLET ORAL at 09:29

## 2020-03-23 RX ADMIN — DILTIAZEM HYDROCHLORIDE 180 MG: 180 CAPSULE, COATED, EXTENDED RELEASE ORAL at 11:21

## 2020-03-23 RX ADMIN — ARIPIPRAZOLE 30 MG: 15 TABLET ORAL at 21:14

## 2020-03-23 RX ADMIN — DIPHENHYDRAMINE HYDROCHLORIDE 25 MG: 25 CAPSULE ORAL at 09:29

## 2020-03-23 RX ADMIN — FUROSEMIDE 20 MG: 40 TABLET ORAL at 09:29

## 2020-03-23 RX ADMIN — DIVALPROEX SODIUM 1250 MG: 250 TABLET, DELAYED RELEASE ORAL at 21:14

## 2020-03-23 RX ADMIN — DOCUSATE SODIUM 100 MG: 100 CAPSULE, LIQUID FILLED ORAL at 09:29

## 2020-03-23 RX ADMIN — FLUTICASONE PROPIONATE 2 SPRAY: 50 SPRAY, METERED NASAL at 09:31

## 2020-03-23 RX ADMIN — ASPIRIN 81 MG: 81 TABLET, COATED ORAL at 09:29

## 2020-03-23 RX ADMIN — LORAZEPAM 1 MG: 1 TABLET ORAL at 09:29

## 2020-03-23 RX ADMIN — DOCUSATE SODIUM 100 MG: 100 CAPSULE, LIQUID FILLED ORAL at 17:32
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
## 2020-03-23 NOTE — NURSING NOTE
"Jillian did not want to be on the CPAP anymore so it was stopped at 0300.  Night supervisor aware.  Edema noted in bilateral hands, +1.   Jillian had lotion all over her hands and feet, not rubbed in.  Would not allow RN to wipe some of the lotion off her hands.  Asked RN to rub her foot and back.  Tangential, disorganized, FOI, laughing loudly at times.  Every time RN passed doing rounds she always said \"can I ask you a question?\"  Asked this RN if first to do an , who was the first to do an .  Stated her foot hurts, that it is broken.  Offered her prn Ativan, but she refused stating she can't take benzos, she had them when she had her hysterectomy.  Also offered prn for pain (foot, back) which she also refused.  Encouraged her to lie down and elevate her legs to which she complied.  Will continue to monitor.    "

## 2020-03-23 NOTE — PLAN OF CARE
Problem: Mood Impairment (Manic/Hypomanic Signs/Symptoms)  Goal: Improved Mood Symptoms (Manic/Hypomanic Signs/Symptoms)  Outcome: Progressing  Flowsheets (Taken 3/22/2020 2107)  Individual Goal: Patient will display increase in hours of sleep.  Short Term Goal Established: 3/25/2020  Goal Outcome: progressing  [place] Goal: identifies personal treatment goal

## 2020-03-23 NOTE — PLAN OF CARE
"  Problem: Adult Behavioral Health Plan of Care  Goal: Plan of Care Review  Outcome: Progressing  Flowsheets (Taken 3/23/2020 1440)  Progress: no change  Plan of Care Reviewed With: patient  Patient Agreement with Plan of Care: agrees  Outcome Summary: Jlilian is visible on the unit. Easily agitated. Labile mood. Religiously preoccupied. Given prns this am with some relief from agitation. No insight into illness. Spoke of not needing medications \" They don't help me\". Did accept meds when given. Needs a lot of reassurance. Will continue to monitor on q 15 minute checks.  Intervention(s): Monitor q 15 minute checks, Offer emotional support as needed, Monior effectiveness of medications.     Problem: Mood Impairment (Manic/Hypomanic Signs/Symptoms)  Goal: Improved Mood Symptoms (Manic/Hypomanic Signs/Symptoms)  Outcome: Progressing  Flowsheets (Taken 3/23/2020 1440)  Individual Goal: Pt will be compliant with medication.  Goal Outcome: progressing  [place] Goal: identifies personal treatment goal     "

## 2020-03-23 NOTE — ASSESSMENT & PLAN NOTE
Medical records from  outpatient reviewed and appears paitient has chronic low back pain  Recommend prn tylenol if needed for pain control   Consider heating pad to area if pain does not resolve or becomes not tolerable
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
## 2020-03-23 NOTE — ASSESSMENT & PLAN NOTE
Ordered lasix po for 2 days but no improvement in bl upper + lower ext edema.   Cardiology to eval pt. ? Need further lasix/duration/CV eval. Trend BMP

## 2020-03-24 PROBLEM — R60.9 EDEMA: Status: ACTIVE | Noted: 2020-03-22

## 2020-03-24 LAB
ATRIAL RATE: 74
EST. AVERAGE GLUCOSE BLD GHB EST-MCNC: 105 MG/DL
HBA1C MFR BLD HPLC: 5.3 %
P AXIS: 70
PR INTERVAL: 164
QRS DURATION: 102
QT INTERVAL: 402
QTC CALCULATION(BAZETT): 446
R AXIS: 42
T WAVE AXIS: 37
VALPROATE SERPL-MCNC: 73 UG/ML (ref 50–100)
VENTRICULAR RATE: 74

## 2020-03-24 PROCEDURE — 63700000 HC SELF-ADMINISTRABLE DRUG: Performed by: PSYCHIATRY & NEUROLOGY

## 2020-03-24 PROCEDURE — 63700000 HC SELF-ADMINISTRABLE DRUG: Performed by: HOSPITALIST

## 2020-03-24 PROCEDURE — 99232 SBSQ HOSP IP/OBS MODERATE 35: CPT | Performed by: HOSPITALIST

## 2020-03-24 PROCEDURE — 99231 SBSQ HOSP IP/OBS SF/LOW 25: CPT | Performed by: PSYCHIATRY & NEUROLOGY

## 2020-03-24 PROCEDURE — 36415 COLL VENOUS BLD VENIPUNCTURE: CPT | Performed by: PSYCHIATRY & NEUROLOGY

## 2020-03-24 PROCEDURE — 12400000 HC ROOM AND CARE SEMIPRIVATE PSYCH

## 2020-03-24 PROCEDURE — 80164 ASSAY DIPROPYLACETIC ACD TOT: CPT | Performed by: PSYCHIATRY & NEUROLOGY

## 2020-03-24 PROCEDURE — 93005 ELECTROCARDIOGRAM TRACING: CPT | Performed by: PSYCHIATRY & NEUROLOGY

## 2020-03-24 RX ORDER — QUETIAPINE FUMARATE 50 MG/1
50 TABLET, FILM COATED ORAL NIGHTLY
Status: DISCONTINUED | OUTPATIENT
Start: 2020-03-24 | End: 2020-03-25

## 2020-03-24 RX ORDER — DIVALPROEX SODIUM 250 MG/1
1500 TABLET, DELAYED RELEASE ORAL NIGHTLY
Status: DISCONTINUED | OUTPATIENT
Start: 2020-03-24 | End: 2020-03-26

## 2020-03-24 RX ADMIN — HALOPERIDOL 10 MG: 5 TABLET ORAL at 04:11

## 2020-03-24 RX ADMIN — QUETIAPINE FUMARATE 50 MG: 50 TABLET ORAL at 20:57

## 2020-03-24 RX ADMIN — DIVALPROEX SODIUM 1500 MG: 250 TABLET, DELAYED RELEASE ORAL at 20:57

## 2020-03-24 RX ADMIN — DILTIAZEM HYDROCHLORIDE 180 MG: 180 CAPSULE, COATED, EXTENDED RELEASE ORAL at 08:44

## 2020-03-24 RX ADMIN — DOCUSATE SODIUM 100 MG: 100 CAPSULE, LIQUID FILLED ORAL at 08:43

## 2020-03-24 RX ADMIN — ARIPIPRAZOLE 30 MG: 15 TABLET ORAL at 20:57

## 2020-03-24 RX ADMIN — ASPIRIN 81 MG: 81 TABLET, COATED ORAL at 08:43

## 2020-03-24 RX ADMIN — FUROSEMIDE 20 MG: 40 TABLET ORAL at 08:44

## 2020-03-24 RX ADMIN — DOCUSATE SODIUM 100 MG: 100 CAPSULE, LIQUID FILLED ORAL at 17:56

## 2020-03-24 RX ADMIN — LORAZEPAM 1 MG: 1 TABLET ORAL at 04:11

## 2020-03-24 NOTE — ASSESSMENT & PLAN NOTE
Currently in sinus rhythm, and sinus rhythm on admission EKG  [id number] of 1 for female (to our best knowledge)    PLAN:   Cardizem--> decreased to 120 daily for low bp   ASA increased to 325mg daily   Please call/page us if further questions, patient goes back into atrial fibrillation, or if needed

## 2020-03-24 NOTE — NURSING NOTE
ADD: Spoke HMS  at 5140 concerning B/L edema of hands and feet and they reported they are aware and no new orders.

## 2020-03-24 NOTE — PROGRESS NOTES
Hospital Medicine Service -  Daily Progress Note       SUBJECTIVE   Interval History: pt seen and examined. Asked to f/u w pt for edema. Resting in bed. No chest pain/dyspnea. B/l upper and lower ext edema not improved w lasix.      OBJECTIVE      Vital signs in last 24 hours:  Temp:  [36.6 °C (97.9 °F)-37.1 °C (98.7 °F)] 36.6 °C (97.9 °F)  Heart Rate:  [] 87  Resp:  [16] 16  BP: (113-140)/(56-77) 113/66  No intake or output data in the 24 hours ending 03/24/20 1402    PHYSICAL EXAMINATION      Physical Exam   Constitutional: No distress.   Eyes: EOM are normal. No scleral icterus.   Cardiovascular: Regular rhythm. Exam reveals no gallop and no friction rub.   Pulmonary/Chest: Effort normal and breath sounds normal. No stridor. No respiratory distress. She has no wheezes. She has no rales. She exhibits no tenderness.   Abdominal: Soft. Bowel sounds are normal. She exhibits no distension. There is no tenderness. There is no guarding.   Musculoskeletal: She exhibits edema.   B/l upper and lower extremity edema.    Neurological: She is alert.   Skin: Skin is warm. She is not diaphoretic.   Psychiatric: Judgment normal.      LINES, CATHETERS, DRAINS, AIRWAYS, AND WOUNDS   Lines, Drains, Airways, Wounds:       Comments:      LABS / IMAGING / TELE      Labs  Results from last 7 days   Lab Units 03/22/20  1708   WBC K/uL 7.56   HEMOGLOBIN g/dL 13.8   HEMATOCRIT % 41.9   PLATELETS K/uL 264     Results from last 7 days   Lab Units 03/22/20  1708   SODIUM mEQ/L 140   POTASSIUM mEQ/L 3.9   CHLORIDE mEQ/L 108   CO2 mEQ/L 21*   BUN mg/dL 16   CREATININE mg/dL 0.7   CALCIUM mg/dL 9.3   ALBUMIN g/dL 4.0   BILIRUBIN TOTAL mg/dL 0.5   ALK PHOS IU/L 67   ALT IU/L 28   AST IU/L 25   GLUCOSE mg/dL 118*       IMAGING  No results found.    ASSESSMENT AND PLAN      AMINA (obstructive sleep apnea)  Assessment & Plan  C/w home CPAP      Edema  Assessment & Plan  Ordered lasix po for 2 days but no improvement in bl upper + lower ext  edema.   Cardiology to eval pt. ? Need further lasix/duration/CV eval. Trend BMP      Paroxysmal A-fib (CMS/[facility])  Assessment & Plan  Currently in sinus rhythm  C/w cardizem  [id number]   C/w ASA  Cardiology to eval pt.     Bipolar 1 disorder (CMS/[facility])  Assessment & Plan  Plan as per psychiatry            Code Status: Full Code  Estimated Discharge Date: 3/24/2020     Mick Maciel DO  3/24/2020

## 2020-03-24 NOTE — ASSESSMENT & PLAN NOTE
Pt with b/l upper and lower extremity edema noted, did not respond to Lasix  Unclear how long it has been present  Could be multifactorial-obesity, poor diet, AMINA, R sided HF??    PLAN:  Needs outpatient echocardiogram with primary cardiologist, which is already ordered per their office  Compression stockings  Elevated LE as able  Continue low sodium diet  Bumex increased from 1 mg BID to 2 mg BID beginning 4/27 (labs reviewed 4/28) - 5/5- decrease to daily

## 2020-03-24 NOTE — NURSING NOTE
"Jillian to nurses station at approximately 0015 c/o eyes burning.  Eyes red.  She stated she put stuff from \"blue bottle\" in her eyes.  RN thought it was mouthwash, but 1:1 stated it was the liquid soap that is given to patients.  When asked why she put it in her eyes, she stated she was having an argument with others earlier mentioning God and the devil.  HMS on unit to see new admit so explained situation and asked for prn drops for eyes.  HMS also stated Jillian should flush eyes with water and RN should notify HMS if any changes.   Jillian was instructed to flush eyes with water and RN would be in with drops as soon as available.  Since that time Jillian has been asleep.  RN did not wake Jillian for administration.  Will continue to monitor.   "
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
## 2020-03-24 NOTE — PLAN OF CARE
Problem: Adult Behavioral Health Plan of Care  Goal: Plan of Care Review  Outcome: Progressing  Flowsheets  Taken 3/23/2020 2126  Progress: no change  Outcome Summary: Jillian has been visible on the unit. Mood is labile. Pt is +KAREEM,+FOI and disorganized. Very suspicious and guarded with staff and peers.  Easily agitated. First refused HS meds because meds were not Brand meds only generic. Pt with much encouragement accepted all meds. Pt is delusional and believes her father is the devil. Pt denies S/HI,A/VH and self harm. Pt contracts for safety at this time. Pt noted with B/L edema in hands and feet. Feet are +1-2. Hands are red and swollen. Pt will be monitored on Q15 checks.  Intervention(s): Encouraged pt to come to staff if feeling unsafe,increased anxiety and agitation. Provide therapeutic communication and emotional support.  Taken 3/23/2020 7284  Plan of Care Reviewed With: patient  Patient Agreement with Plan of Care: agrees

## 2020-03-24 NOTE — NURSING NOTE
Jillian awake in room, reading Bible out loud to 1:1.  She stated she did not want to go back to sleep.  Stated eyes are feeling better.  Religiously preoccupied, disorganized, loud.  Prn Haldol 10 mg and Ativan 1 mg administered.  Will continue to monitor.

## 2020-03-24 NOTE — PROGRESS NOTES
"Psychiatry Progress Note    Chief Complaint/Reason for follow-up:   BPAD, manic with psychosis    Interval History: Discussed treatment plan and patient's progress with nursing staff and allied therapists in treatment team meeting this morning.  Per nursing, patient was awake and singing loudly. Suspicious of staff. B/l edema in hands and feet. HMS aware. Patient also put liquid soap in her eyes  \"I don't want to see you\"  Patient was seen in her room. She was laying in bed, on her side, wearing a knit cap.  She pulled the cap over her eyes during the interview.  She reported sleeping ok and that she was having no physical complaints. She denied pain in her eyes.  She stated \"I don't feel like talking\" and ended the interview    Review of Systems:   Sleep:  Poor, Appetite: Fair and GI: No complaints    Vital Signs for the last 24 hours:  Temp:  [36.6 °C (97.9 °F)-37.1 °C (98.7 °F)] 36.6 °C (97.9 °F)  Heart Rate:  [] 101  Resp:  [16] 16  BP: (120-140)/(65-77) 140/77      Current Facility-Administered Medications:   •  acetaminophen (TYLENOL) tablet 650 mg, 650 mg, oral, q4h PRN, Mami Angela MD  •  alum-mag hydroxide-simeth (MAALOX) 200-200-20 mg/5 mL suspension 30 mL, 30 mL, oral, q4h PRN, Mami Angela MD  •  ARIPiprazole (ABILIFY) tablet 30 mg, 30 mg, oral, Nightly, Mami Angela MD, 30 mg at 03/23/20 2114  •  aspirin enteric coated tablet 81 mg, 81 mg, oral, Daily, Zoya Schmitz DO, 81 mg at 03/23/20 0929  •  dextran 70-hypromellose (NATURAL TEARS) 0.1-0.3 % ophthalmic drops 1 drop, 1 drop, Both Eyes, PRN, Nasrin Salamanca MD  •  dilTIAZem CD (CARDIZEM CD) 24 hr ER capsule 180 mg, 180 mg, oral, Daily, Zoya Schmitz DO, 180 mg at 03/23/20 1121  •  diphenhydrAMINE (BENADRYL) capsule 25 mg, 25 mg, oral, q6h PRN, Mami Angela MD, 25 mg at 03/23/20 0929  •  diphenhydrAMINE (BENADRYL) injection 50 mg, 50 mg, intramuscular, q4h PRN, Mami Angela MD  •  " "divalproex (DEPAKOTE) tablet,delayed release (DR/EC) 1,250 mg, 1,250 mg, oral, Nightly, Hanh Francois MD, 1,250 mg at 03/23/20 2114  •  docusate sodium (COLACE) capsule 100 mg, 100 mg, oral, BID, Mami Angela MD, 100 mg at 03/23/20 1732  •  fluticasone propionate (FLONASE) 50 mcg/actuation nasal spray 2 spray, 2 spray, Each Nostril, Daily, Mami Angela MD, 2 spray at 03/23/20 0931  •  furosemide (LASIX) tablet 20 mg, 20 mg, oral, Daily, Zoya Schmitz DO, 20 mg at 03/23/20 0929  •  haloperidoL (HALDOL) tablet 10 mg, 10 mg, oral, q8h PRN, 10 mg at 03/24/20 0411 **OR** haloperidol lactate (HALDOL) injection 10 mg, 10 mg, intramuscular, q6h PRN, Hanh Francois MD  •  ibuprofen (MOTRIN) tablet 400 mg, 400 mg, oral, q6h PRN, Mami Angela MD  •  LORazepam (ATIVAN) tablet 1 mg, 1 mg, oral, q6h PRN, 1 mg at 03/24/20 0411 **OR** LORazepam (ATIVAN) injection 2 mg, 2 mg, intramuscular, q4h PRN, Mami Angela MD  •  ondansetron ODT (ZOFRAN-ODT) disintegrating tablet 4 mg, 4 mg, oral, q8h PRN, Mami Angela MD    Labs:  Results from last 7 days   Lab Units 03/22/20  1708   HEMOGLOBIN g/dL 13.8   WBC K/uL 7.56   PLATELETS K/uL 264   POTASSIUM mEQ/L 3.9   SODIUM mEQ/L 140   CREATININE mg/dL 0.7     VPA: 73  repeat EKG: QTc: 446    Mental Status Evaluation:  Appearance: obese woman, laying in bed, wearing knit cap pulled over her eyes  Behavior: uncooperative, no eye contact, irritable  Speech: short answers  Mood: \"I don't feel like talking\"  Affect: irritable  Thought process: illogical  Thought content: short answers unable to assess paranoia/delusions, SI, HI and AVH  Insight: poor  Judgement: limited-poor        Assessment/Plan         Jillian Acosta is a 44 y.o. female with a history of bipolar disorder who was brought in to the [facility] ED by her sister for a manic episode. She was admitted on a 201.     BPAD, manic with psychosis     Continue abilify at 30mg qday  Increase depakote to " 1500mg qhs.   Start seroquel 50mg qhs, will likely need to increase to 100mg  Continue haldol PRN, ativan PRN    Hanh Francois MD  3/24/2020   7:36 AM  Time spent with patient: 15 minutes

## 2020-03-25 LAB
ANION GAP SERPL CALC-SCNC: 10 MEQ/L (ref 3–15)
BUN SERPL-MCNC: 14 MG/DL (ref 8–20)
CALCIUM SERPL-MCNC: 9 MG/DL (ref 8.9–10.3)
CHLORIDE SERPL-SCNC: 106 MEQ/L (ref 98–109)
CO2 SERPL-SCNC: 24 MEQ/L (ref 22–32)
CREAT SERPL-MCNC: 0.7 MG/DL (ref 0.6–1.1)
GFR SERPL CREATININE-BSD FRML MDRD: >60 ML/MIN/1.73M*2
GLUCOSE SERPL-MCNC: 121 MG/DL (ref 70–99)
POTASSIUM SERPL-SCNC: 4.1 MEQ/L (ref 3.6–5.1)
SODIUM SERPL-SCNC: 140 MEQ/L (ref 136–144)

## 2020-03-25 PROCEDURE — 12400000 HC ROOM AND CARE SEMIPRIVATE PSYCH

## 2020-03-25 PROCEDURE — 63700000 HC SELF-ADMINISTRABLE DRUG: Performed by: PSYCHIATRY & NEUROLOGY

## 2020-03-25 PROCEDURE — 99231 SBSQ HOSP IP/OBS SF/LOW 25: CPT | Performed by: PSYCHIATRY & NEUROLOGY

## 2020-03-25 PROCEDURE — 80048 BASIC METABOLIC PNL TOTAL CA: CPT | Performed by: HOSPITALIST

## 2020-03-25 PROCEDURE — 63700000 HC SELF-ADMINISTRABLE DRUG: Performed by: HOSPITALIST

## 2020-03-25 PROCEDURE — 63700000 HC SELF-ADMINISTRABLE DRUG: Performed by: PHYSICIAN ASSISTANT

## 2020-03-25 PROCEDURE — 36415 COLL VENOUS BLD VENIPUNCTURE: CPT | Performed by: HOSPITALIST

## 2020-03-25 PROCEDURE — 200200 PR NO CHARGE: Performed by: HOSPITALIST

## 2020-03-25 RX ORDER — QUETIAPINE FUMARATE 100 MG/1
100 TABLET, FILM COATED ORAL NIGHTLY
Status: DISCONTINUED | OUTPATIENT
Start: 2020-03-25 | End: 2020-03-26

## 2020-03-25 RX ORDER — FUROSEMIDE 40 MG/1
40 TABLET ORAL DAILY
Status: DISCONTINUED | OUTPATIENT
Start: 2020-03-25 | End: 2020-03-30

## 2020-03-25 RX ADMIN — ARIPIPRAZOLE 30 MG: 15 TABLET ORAL at 22:43

## 2020-03-25 RX ADMIN — DIVALPROEX SODIUM 1500 MG: 250 TABLET, DELAYED RELEASE ORAL at 22:43

## 2020-03-25 RX ADMIN — QUETIAPINE 100 MG: 100 TABLET, FILM COATED ORAL at 22:43

## 2020-03-25 RX ADMIN — LORAZEPAM 1 MG: 1 TABLET ORAL at 02:56

## 2020-03-25 RX ADMIN — FUROSEMIDE 40 MG: 40 TABLET ORAL at 08:41

## 2020-03-25 RX ADMIN — DILTIAZEM HYDROCHLORIDE 180 MG: 180 CAPSULE, COATED, EXTENDED RELEASE ORAL at 08:38

## 2020-03-25 RX ADMIN — DOCUSATE SODIUM 100 MG: 100 CAPSULE, LIQUID FILLED ORAL at 08:38

## 2020-03-25 RX ADMIN — FLUTICASONE PROPIONATE 2 SPRAY: 50 SPRAY, METERED NASAL at 08:43

## 2020-03-25 RX ADMIN — ASPIRIN 81 MG: 81 TABLET, COATED ORAL at 08:38

## 2020-03-25 RX ADMIN — DOCUSATE SODIUM 100 MG: 100 CAPSULE, LIQUID FILLED ORAL at 17:31

## 2020-03-25 NOTE — PROGRESS NOTES
"Psychiatry Progress Note    Chief Complaint/Reason for follow-up:   BPAD, manic with psychosis    Interval History: Discussed treatment plan and patient's progress with nursing staff and allied therapists in treatment team meeting this morning.  Per nursing, patient was awake at 2:45 AM and did not want to wear CPA. Religiously preoccupied. Remains labile, disorganized and intrusive. Bizarre appearance  Seen by cardiology  \"I feel fine\"  Patient was seen walking around the unit with headphones on.  She was elevated occasionally singing with the music on the headphones. She was not as irritable today and reported sleeping well. She did acknowledge the trouble with her mask last night.   She denied SI, HI and AVH.  She was somewhat religiously preoccupied but not as spontaneous about it. She talked about Sara cullen and said she prayed for her prior to her death.    Review of Systems:   Sleep:  Fair, Appetite: Fair and GI: No complaints    Vital Signs for the last 24 hours:  Temp:  [36.3 °C (97.4 °F)-36.8 °C (98.3 °F)] 36.3 °C (97.4 °F)  Heart Rate:  [] 84  Resp:  [18] 18  BP: (105-128)/(65-79) 128/65      Current Facility-Administered Medications:   •  acetaminophen (TYLENOL) tablet 650 mg, 650 mg, oral, q4h PRN, Mami Angela MD  •  alum-mag hydroxide-simeth (MAALOX) 200-200-20 mg/5 mL suspension 30 mL, 30 mL, oral, q4h PRN, Mami Angela MD  •  ARIPiprazole (ABILIFY) tablet 30 mg, 30 mg, oral, Nightly, Mami Angela MD, 30 mg at 03/24/20 2057  •  aspirin enteric coated tablet 81 mg, 81 mg, oral, Daily, Zoya Schmitz DO, 81 mg at 03/24/20 0843  •  dextran 70-hypromellose (NATURAL TEARS) 0.1-0.3 % ophthalmic drops 1 drop, 1 drop, Both Eyes, PRN, Nasrin Salamanca MD  •  dilTIAZem CD (CARDIZEM CD) 24 hr ER capsule 180 mg, 180 mg, oral, Daily, Zoya Schmitz, DO, 180 mg at 03/24/20 0844  •  diphenhydrAMINE (BENADRYL) capsule 25 mg, 25 mg, oral, q6h PRN, Mami Angela, " "MD, 25 mg at 03/23/20 0929  •  diphenhydrAMINE (BENADRYL) injection 50 mg, 50 mg, intramuscular, q4h PRN, Mami Angela MD  •  divalproex (DEPAKOTE) tablet,delayed release (DR/EC) 1,500 mg, 1,500 mg, oral, Nightly, Hanh Francois MD, 1,500 mg at 03/24/20 2057  •  docusate sodium (COLACE) capsule 100 mg, 100 mg, oral, BID, Mami Angela MD, 100 mg at 03/24/20 1756  •  fluticasone propionate (FLONASE) 50 mcg/actuation nasal spray 2 spray, 2 spray, Each Nostril, Daily, Mami Angela MD, 2 spray at 03/23/20 0931  •  haloperidoL (HALDOL) tablet 10 mg, 10 mg, oral, q8h PRN, 10 mg at 03/24/20 0411 **OR** haloperidol lactate (HALDOL) injection 10 mg, 10 mg, intramuscular, q6h PRN, Hanh Francois MD  •  ibuprofen (MOTRIN) tablet 400 mg, 400 mg, oral, q6h PRN, Mami Angela MD  •  LORazepam (ATIVAN) tablet 1 mg, 1 mg, oral, q6h PRN, 1 mg at 03/25/20 0256 **OR** LORazepam (ATIVAN) injection 2 mg, 2 mg, intramuscular, q4h PRN, Mami Angela MD  •  ondansetron ODT (ZOFRAN-ODT) disintegrating tablet 4 mg, 4 mg, oral, q8h PRN, Mami Angela MD  •  QUEtiapine (SEROquel) tablet 50 mg, 50 mg, oral, Nightly, Hanh Francois MD, 50 mg at 03/24/20 2057    Labs:  Results from last 7 days   Lab Units 03/22/20  1708   HEMOGLOBIN g/dL 13.8   WBC K/uL 7.56   PLATELETS K/uL 264   POTASSIUM mEQ/L 3.9   SODIUM mEQ/L 140   CREATININE mg/dL 0.7       Mental Status Evaluation:  Appearance: obese woman, disheveled, wearing knit cap and earphones  Behavior: elevated, cooperative, staring eye contact  Speech: talkative  Mood: \"I feel fine\"  Affect: elevated  Thought process: illogical  Thought content: denied SI, HI and AVH but remains religiously focused  Insight: poor  Judgement: limited-poor        Assessment/Plan         Jillian Acosta is a 44 y.o. female with a history of bipolar disorder who was brought in to the [facility] ED by her sister for a manic episode. She was admitted on a 201.     BPAD, manic with " psychosis     Continue abilify at 30mg qday  continue depakote to 1500mg qhs. will recheck level in 2 days  inicrease seroquel to 100mg qhs  Continue haldol PRN, ativan PRN  Recheck EKG    Hanh Francois MD  3/25/2020   7:25 AM  Time spent with patient: 15 minutes

## 2020-03-25 NOTE — PLAN OF CARE
Problem: Adult Behavioral Health Plan of Care  Goal: Plan of Care Review  Outcome: Progressing  Flowsheets  Taken 3/25/2020 1822  Progress: no change  Outcome Summary: Jillian was visible on unit this shift continuing to be intrusive with community members and dancing to music. Her appearance is bizarre and speech rambled. Her mood is labile/manic. Her thoughts continue to be disorganized and suspicious with Restorationist preoccupation. She attended dinner and ate 100% of her meal and then ate an extra tray as well. +2 edema in present in hands and feet. +1 edema present in wrists and ankles. Will continue to monitor and offer support on unit  Intervention(s): Monitoring patient q15min for safety, offering emotional support as needed,  and monitoring for treatment plan compliance/effectiveness.  Taken 3/25/2020 1642  Plan of Care Reviewed With: patient  Patient Agreement with Plan of Care: agrees

## 2020-03-25 NOTE — PLAN OF CARE
Problem: Adult Behavioral Health Plan of Care  Goal: Develops/Participates in Therapeutic [place] to Support Successful Transition  Outcome: Progressing  Flowsheets (Taken 3/25/2020 1127)  Individual Goal: Patient will attend social work group 1x per week to address d/c planning needs.     Problem: Adult Behavioral Health Plan of Care  Goal: Optimized Coping Skills in Response to Life Stressors  Outcome: Progressing  Flowsheets (Taken 3/25/2020 1127)  Individual Goal:  and patient will identify a partial progarm before d/c from unit.

## 2020-03-25 NOTE — NURSING NOTE
Patient up 0245, did not want to wear her mask for the c-pap because her face was getting big and small and that's what happens at home. It was God's way of telling her to take it off. C/o difficulty sleeping, had to void and did so. Given ativan 1 mg po at 0300 for anxiety. Explained the reasons it was important to wear her mask and she did comply. Quickly returned to sleep.

## 2020-03-25 NOTE — PROGRESS NOTES
Labs from today reviewed and looks stable.   Lasix adjusted per cardiology.   Monitor pt's response/edema w lasix.

## 2020-03-25 NOTE — PLAN OF CARE
Problem: Adult Behavioral Health Plan of Care  Goal: Plan of Care Review  Outcome: Progressing  Flowsheets  Taken 3/24/2020 2039  Progress: no change  Outcome Summary: Jillian was visible on unit this shift intrusive with community members and dancing to music. Her appearance is bizarre and speech rambled. Her mood is labile/manic. Her thoughts are disorganized and suspicious with [employer]ar[employer] preoccupation. She attended dinner and ate 100% of her meal. C/o feeling hungry and encouraged patient to drink fluids. She continues to have +2 edema in hands and feet. Education was provided on treatment. She attended wrap up group and participated appropriately. Will continue to monitor and offer support on unit  Intervention(s): Monitoring patient q15min for safety, offering emotional support as needed, administering prn medications and monitoring for treatment plan compliance/effectiveness.  Taken 3/24/2020 1942  Plan of Care Reviewed With: patient  Patient Agreement with Plan of Care: agrees

## 2020-03-25 NOTE — PLAN OF CARE
"  Problem: Adult Behavioral Health Plan of Care  Goal: Plan of Care Review  Outcome: Not progressing  Flowsheets (Taken 3/25/2020 1107)  Progress: no change  Plan of Care Reviewed With: patient  Patient Agreement with Plan of Care: agrees  Outcome Summary: Pt remains visible in the milieu, intrusive & poor boundaries, restless, labile, rambling, disorganized, religiously preoccupied \"did you find my blue Bible,\" suspicious, S/S mari. Pt seen throwing board game box across room; when asked why, pt states \"because I wanted to.\" Pt heard singing, using headphones/music, seeking staff frequently. Med compliant. BMP collected. BLLE & UE edema persists, unchanged; cards PA paged for 40mg lasix order, given, pt denies lightheadedness/dizziness, WCTM. Pt denies A/D/SI/AVH, CFS. Pt denies issues c sleep or appetite. Around 1245, pt appeared agitated, loud, attempting to throw marker at other pt; pt refused ativan, accepted meditation tape & calm now.  Intervention(s): Provide redirection, reassurance, and encouragement as needed.     Problem: Mood Impairment (Manic/Hypomanic Signs/Symptoms)  Goal: Improved Mood Symptoms (Manic/Hypomanic Signs/Symptoms)  Outcome: Progressing  Flowsheets (Taken 3/25/2020 1107)  Individual Goal: Jillian will remain medication compliant and verbalize one benefit of remaining in control this shift.  Short Term Goal Established: 3/25/2020  Goal Outcome: progressing  [place] Goal: identifies personal treatment goal     "

## 2020-03-26 PROCEDURE — 63700000 HC SELF-ADMINISTRABLE DRUG: Performed by: PSYCHIATRY & NEUROLOGY

## 2020-03-26 PROCEDURE — 63700000 HC SELF-ADMINISTRABLE DRUG: Performed by: PHYSICIAN ASSISTANT

## 2020-03-26 PROCEDURE — 63700000 HC SELF-ADMINISTRABLE DRUG: Performed by: HOSPITALIST

## 2020-03-26 PROCEDURE — 93005 ELECTROCARDIOGRAM TRACING: CPT | Performed by: PSYCHIATRY & NEUROLOGY

## 2020-03-26 PROCEDURE — 12400000 HC ROOM AND CARE SEMIPRIVATE PSYCH

## 2020-03-26 PROCEDURE — 99232 SBSQ HOSP IP/OBS MODERATE 35: CPT | Performed by: PSYCHIATRY & NEUROLOGY

## 2020-03-26 RX ORDER — DIVALPROEX SODIUM 250 MG/1
1500 TABLET, DELAYED RELEASE ORAL NIGHTLY
Status: DISCONTINUED | OUTPATIENT
Start: 2020-03-26 | End: 2020-05-06 | Stop reason: HOSPADM

## 2020-03-26 RX ORDER — DIVALPROEX SODIUM 250 MG/1
1750 TABLET, DELAYED RELEASE ORAL NIGHTLY
Status: DISCONTINUED | OUTPATIENT
Start: 2020-03-26 | End: 2020-03-26

## 2020-03-26 RX ORDER — QUETIAPINE FUMARATE 200 MG/1
200 TABLET, FILM COATED ORAL NIGHTLY
Status: DISCONTINUED | OUTPATIENT
Start: 2020-03-26 | End: 2020-03-26

## 2020-03-26 RX ORDER — LORAZEPAM 2 MG/1
2 TABLET ORAL ONCE
Status: COMPLETED | OUTPATIENT
Start: 2020-03-26 | End: 2020-03-27

## 2020-03-26 RX ORDER — LORAZEPAM 1 MG/1
1 TABLET ORAL NIGHTLY
Status: DISCONTINUED | OUTPATIENT
Start: 2020-03-26 | End: 2020-04-01

## 2020-03-26 RX ORDER — DIVALPROEX SODIUM 250 MG/1
250 TABLET, DELAYED RELEASE ORAL DAILY
Status: DISCONTINUED | OUTPATIENT
Start: 2020-03-26 | End: 2020-04-01

## 2020-03-26 RX ORDER — HALOPERIDOL 5 MG/1
10 TABLET ORAL NIGHTLY
Status: DISCONTINUED | OUTPATIENT
Start: 2020-03-26 | End: 2020-03-27

## 2020-03-26 RX ORDER — HALOPERIDOL 5 MG/1
5 TABLET ORAL NIGHTLY
Status: DISCONTINUED | OUTPATIENT
Start: 2020-03-26 | End: 2020-03-26

## 2020-03-26 RX ADMIN — DOCUSATE SODIUM 100 MG: 100 CAPSULE, LIQUID FILLED ORAL at 08:57

## 2020-03-26 RX ADMIN — LORAZEPAM 1 MG: 1 TABLET ORAL at 01:19

## 2020-03-26 RX ADMIN — LORAZEPAM 1 MG: 1 TABLET ORAL at 22:38

## 2020-03-26 RX ADMIN — DIVALPROEX SODIUM 250 MG: 250 TABLET, DELAYED RELEASE ORAL at 13:34

## 2020-03-26 RX ADMIN — DIPHENHYDRAMINE HYDROCHLORIDE 25 MG: 25 CAPSULE ORAL at 14:56

## 2020-03-26 RX ADMIN — LORAZEPAM 1 MG: 1 TABLET ORAL at 14:56

## 2020-03-26 RX ADMIN — DILTIAZEM HYDROCHLORIDE 180 MG: 180 CAPSULE, COATED, EXTENDED RELEASE ORAL at 08:57

## 2020-03-26 RX ADMIN — ARIPIPRAZOLE 25 MG: 20 TABLET ORAL at 22:37

## 2020-03-26 RX ADMIN — DOCUSATE SODIUM 100 MG: 100 CAPSULE, LIQUID FILLED ORAL at 16:56

## 2020-03-26 RX ADMIN — DIVALPROEX SODIUM 1500 MG: 250 TABLET, DELAYED RELEASE ORAL at 22:37

## 2020-03-26 RX ADMIN — ACETAMINOPHEN 650 MG: 325 TABLET ORAL at 01:24

## 2020-03-26 RX ADMIN — ASPIRIN 81 MG: 81 TABLET, COATED ORAL at 08:56

## 2020-03-26 RX ADMIN — HALOPERIDOL 10 MG: 5 TABLET ORAL at 14:56

## 2020-03-26 RX ADMIN — FUROSEMIDE 40 MG: 40 TABLET ORAL at 08:57

## 2020-03-26 NOTE — PLAN OF CARE
Problem: Adult Behavioral Health Plan of Care  Goal: Plan of Care Review  Outcome: Progressing  Flowsheets (Taken 3/25/2020 2152)  Progress: no change  Plan of Care Reviewed With: patient  Patient Agreement with Plan of Care: agrees  Outcome Summary: Jillian continues to be disorganized, labile, and manic. She remains religisouly preoccupied, still looking for her bible and makes bizzare comments about being on the cross. She used headphones and was dancing around for a majority of the later part of evening shift. Staff will conitnue to monitor.  Intervention(s): Conitnue to encourage patient to attend groups and comply with medication regimen
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
## 2020-03-26 NOTE — NURSING NOTE
"A loud noise heard earlier in the night around 1:30 a.m. Upon assessment, pt's CPAP was off and her CPAP machine was on the floor. Pt spoke of \"snake\" and wanting the mask to be off. She is disorganized with FOI. She spoke of \"my sisters put me here\" and sisters being \"little humans.\" She also states \"my family is out to get me!\" SPO2 checked 94%on RA. When RN returned to her room with medication, pt was easily startled and yelled out \"why are you yelling at me?!\" Pt was redirected. Pt kept yelling \"I don't wanna be strapped down\" and \"I'm not lying.\" Staffs reassured pt that she is not or will be strapped down and that staffs believes her. She also c/o  \"burning\" in her LEs. She eventually took PRN PO Ativan and Tylenol with encouragement. She was able to calm down, walked out to the DR/LR area. She asked this writer's name and responded \"I used to be Korina Lizama\" and that Korina Lizama taught \"Hailey Ruiz\" She eventually return to bed, compliant with reapplication of CPAP.     Around 5:45a.m. She woke up again, taking of her CPAP. She states she would like it to be off, despite her dozing off to sleep. RN reminded pt to reapply CPAP if pt would like to go back to sleep. A cup of ice water provided per pt's request 2/2 \"I have leg cramp! I need water and Potassium!\" Pt only took sips of water, requesting lemon in her water, and since the unit does not have one, pt returned to sleep with her CPAP back on.     B/L LE and UE edemas noted. Legs elevated in bed overnight. Will relay to day shift if fluid restriction should be ordered/follow up with HMS  "
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
## 2020-03-26 NOTE — PLAN OF CARE
"  Problem: Adult Behavioral Health Plan of Care  Goal: Plan of Care Review  Outcome: Progressing  Flowsheets (Taken 3/26/2020 1436)  Progress: no change  Plan of Care Reviewed With: patient  Patient Agreement with Plan of Care: agrees  Outcome Summary: Jillian continue to be disorganized. Attends groups. Singing out loud in hallway at times.. Bizarre dressed. Refused EKG. Pt stated my heart is\" fine\" and getting louder telling staff. Irritable, Able to maintain control. Needs a lot of reassurance. Will continue to monitor on q 15 minute checks.  Intervention(s): to express her feelings as needed, Comply with meds.     Problem: Mood Impairment (Manic/Hypomanic Signs/Symptoms)  Goal: Improved Mood Symptoms (Manic/Hypomanic Signs/Symptoms)  Outcome: Progressing  Flowsheets (Taken 3/26/2020 1436)  Individual Goal: Jillian will continue to accept meds.  Goal Outcome: progressing  [place] Goal: identifies personal treatment goal     "

## 2020-03-27 LAB
ALBUMIN SERPL-MCNC: 3.6 G/DL (ref 3.4–5)
ALP SERPL-CCNC: 66 IU/L (ref 35–126)
ALT SERPL-CCNC: 24 IU/L (ref 11–54)
AST SERPL-CCNC: 16 IU/L (ref 15–41)
BILIRUB DIRECT SERPL-MCNC: 0.1 MG/DL
BILIRUB SERPL-MCNC: 0.8 MG/DL (ref 0.3–1.2)
PROT SERPL-MCNC: 6.8 G/DL (ref 6–8.2)
VALPROATE SERPL-MCNC: 98.8 UG/ML (ref 50–100)

## 2020-03-27 PROCEDURE — 36415 COLL VENOUS BLD VENIPUNCTURE: CPT | Performed by: PSYCHIATRY & NEUROLOGY

## 2020-03-27 PROCEDURE — 63700000 HC SELF-ADMINISTRABLE DRUG: Performed by: HOSPITALIST

## 2020-03-27 PROCEDURE — 63700000 HC SELF-ADMINISTRABLE DRUG: Performed by: PSYCHIATRY & NEUROLOGY

## 2020-03-27 PROCEDURE — 80076 HEPATIC FUNCTION PANEL: CPT | Performed by: PSYCHIATRY & NEUROLOGY

## 2020-03-27 PROCEDURE — 99231 SBSQ HOSP IP/OBS SF/LOW 25: CPT | Performed by: PSYCHIATRY & NEUROLOGY

## 2020-03-27 PROCEDURE — 63700000 HC SELF-ADMINISTRABLE DRUG: Performed by: PHYSICIAN ASSISTANT

## 2020-03-27 PROCEDURE — 12400000 HC ROOM AND CARE SEMIPRIVATE PSYCH

## 2020-03-27 PROCEDURE — 80164 ASSAY DIPROPYLACETIC ACD TOT: CPT | Performed by: PSYCHIATRY & NEUROLOGY

## 2020-03-27 RX ORDER — ARIPIPRAZOLE 20 MG/1
20 TABLET ORAL NIGHTLY
Status: DISCONTINUED | OUTPATIENT
Start: 2020-03-27 | End: 2020-04-01

## 2020-03-27 RX ORDER — SENNOSIDES 8.6 MG/1
1 TABLET ORAL 2 TIMES DAILY PRN
Status: DISCONTINUED | OUTPATIENT
Start: 2020-03-27 | End: 2020-05-01

## 2020-03-27 RX ORDER — HALOPERIDOL 5 MG/1
10 TABLET ORAL EVERY 12 HOURS
Status: COMPLETED | OUTPATIENT
Start: 2020-03-28 | End: 2020-03-30

## 2020-03-27 RX ORDER — POLYETHYLENE GLYCOL 3350 17 G/17G
17 POWDER, FOR SOLUTION ORAL DAILY
Status: DISCONTINUED | OUTPATIENT
Start: 2020-03-27 | End: 2020-05-06 | Stop reason: HOSPADM

## 2020-03-27 RX ADMIN — ARIPIPRAZOLE 20 MG: 20 TABLET ORAL at 21:57

## 2020-03-27 RX ADMIN — LORAZEPAM 2 MG: 2 TABLET ORAL at 00:16

## 2020-03-27 RX ADMIN — LORAZEPAM 1 MG: 1 TABLET ORAL at 13:54

## 2020-03-27 RX ADMIN — ASPIRIN 81 MG: 81 TABLET, COATED ORAL at 08:09

## 2020-03-27 RX ADMIN — HALOPERIDOL 10 MG: 5 TABLET ORAL at 16:34

## 2020-03-27 RX ADMIN — DIVALPROEX SODIUM 250 MG: 250 TABLET, DELAYED RELEASE ORAL at 08:43

## 2020-03-27 RX ADMIN — DOCUSATE SODIUM 100 MG: 100 CAPSULE, LIQUID FILLED ORAL at 08:09

## 2020-03-27 RX ADMIN — POLYETHYLENE GLYCOL 3350 17 G: 17 POWDER, FOR SOLUTION ORAL at 08:10

## 2020-03-27 RX ADMIN — LORAZEPAM 1 MG: 1 TABLET ORAL at 21:56

## 2020-03-27 RX ADMIN — HALOPERIDOL 10 MG: 5 TABLET ORAL at 00:16

## 2020-03-27 RX ADMIN — DILTIAZEM HYDROCHLORIDE 180 MG: 180 CAPSULE, COATED, EXTENDED RELEASE ORAL at 08:09

## 2020-03-27 RX ADMIN — DIVALPROEX SODIUM 1500 MG: 250 TABLET, DELAYED RELEASE ORAL at 21:56

## 2020-03-27 RX ADMIN — FUROSEMIDE 40 MG: 40 TABLET ORAL at 08:09

## 2020-03-27 RX ADMIN — HALOPERIDOL 10 MG: 5 TABLET ORAL at 08:12
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
## 2020-03-27 NOTE — PROGRESS NOTES
"Daily Progress Note    Subjective    Interval History: Patient has no complaints regarding her breathing. States legs are really swollen \"above where I was singing Radha's girl\". Extremely tangential today. However, says she is doing \"with the lasix\".    Objective  Vital signs in last 24 hours:  Temp:  [36.4 °C (97.5 °F)-36.4 °C (97.6 °F)] 36.4 °C (97.5 °F)  Heart Rate:  [] 100  Resp:  [16-20] 16  BP: (115-140)/(65-73) 115/73    No intake or output data in the 24 hours ending 03/27/20 1517    Physical Exam:  Visit Vitals  /73 (Patient Position: Standing)   Pulse 100   Temp 36.4 °C (97.5 °F) (Oral)   Resp 16   Ht 1.664 m (5' 5.5\")   Wt 120 kg (264 lb 6.4 oz)   LMP  (LMP Unknown)   SpO2 98%   Breastfeeding No   BMI 43.33 kg/m²           Gen:  HEENT: AA, NAD     No JVD, No carotid bruits, No obvious thyromegaly     Lungs:   Clear to auscultation bilaterally, respirations unlabored   Chest wall:  No tenderness or deformity   Heart:  Regular rate and rhythm, S1 and S2 normal, no murmur, rub   or gallop   Abdomen:   Extremities: Soft, non-tender, normal bowel sounds  2+ B/L LE and UE edema, unchanged, perhaps some slight tenderness, though difficult to evaluate with patient being tangential, warm    Neuro:  Grossly nonfocal           Scheduled Inpatient Medications:    • ARIPiprazole  20 mg oral Nightly   • aspirin  81 mg oral Daily   • dilTIAZem CD  180 mg oral Daily   • divalproex  1,500 mg oral Nightly   • divalproex  250 mg oral Daily   • docusate sodium  100 mg oral BID   • fluticasone propionate  2 spray Each Nostril Daily   • furosemide  40 mg oral Daily   • [START ON 3/28/2020] haloperidoL  10 mg oral q12h AVIS   • LORazepam  1 mg oral Nightly   • polyethylene glycol  17 g oral Daily       Scheduled Inpatient Infusions:          PRN Medications    •  acetaminophen  •  alum-mag hydroxide-simeth  •  diphenhydrAMINE  •  diphenhydrAMINE  •  haloperidoL **OR** haloperidol lactate  •  ibuprofen  •  LORazepam " **OR** LORazepam  •  ondansetron ODT  •  senna    Labs   Results from last 7 days   Lab Units 03/22/20  1708   WBC K/uL 7.56   HEMOGLOBIN g/dL 13.8   HEMATOCRIT % 41.9   PLATELETS K/uL 264     Results from last 7 days   Lab Units 03/27/20  0842 03/25/20  1015 03/22/20  1708   SODIUM mEQ/L  --  140 140   POTASSIUM mEQ/L  --  4.1 3.9   CHLORIDE mEQ/L  --  106 108   CO2 mEQ/L  --  24 21*   BUN mg/dL  --  14 16   CREATININE mg/dL  --  0.7 0.7   CALCIUM mg/dL  --  9.0 9.3   ALBUMIN g/dL 3.6  --  4.0   BILIRUBIN TOTAL mg/dL 0.8  --  0.5   ALK PHOS IU/L 66  --  67   ALT IU/L 24  --  28   AST IU/L 16  --  25   GLUCOSE mg/dL  --  121* 118*     Results from last 7 days   Lab Units 03/22/20  1708   TROPONIN I ng/mL <0.02     Results from last 7 days   Lab Units 03/22/20  1708   INR INR 1.0         Results from last 7 days   Lab Units 03/22/20  1708   CHOLESTEROL mg/dL 236*   TRIGLYCERIDES mg/dL 189*   HDL mg/dL 42*   LDL CALC mg/dL 156*       Imaging  No results found.    ECG   I have personally reviewed ECG      Telemetry      Echocardiogram       Assessment & Plan    Edema  Assessment & Plan  Pt with b/l upper and lower extremity edema noted, unimproved with lasix 20 x2. No change since earlier in the week.  Non tender edema, and unclear how long it has been present  Could be multifactorial-obesity, poor diet, AMINA, R sided HF??  Now on low sodium diet per Dr. Francois    PLAN:  Needs outpatient echocardiogram with primary cardiologist, which is already ordered per their office  Continue Lasix 40mg PO daily  Continue low sodium diet  Monitor labs, e-lytes  Please call or page with any questions or if further needed    Paroxysmal A-fib (CMS/HCC)  Assessment & Plan  Currently in sinus rhythm, and sinus rhythm on admission EKG  [id number] of 1 for female (to our best knowledge)    PLAN:  Continue cardizem 180  Would increase ASA to 325mg daily   Please call/page us if further questions, patient goes back into atrial fibrillation, or  if needed        This is Sherrell Benavides PA-C acting as scribe for Dr. Kareem Sofia.  DELIA Perla  3/27/2020  Pager [phone]  Office [phone]

## 2020-03-27 NOTE — PLAN OF CARE
"  Problem: Adult Behavioral Health Plan of Care  Goal: Plan of Care Review  Outcome: Progressing  Flowsheets  Taken 3/26/2020 2049  Progress: declining  Outcome Summary: Jillian was increasingly agitated, suspicious, delusional, religiously and sexually preoccupied as well as disorganized at beginning of shift. She was loud and threatening towards staff. She also threatened to elope. Security was contacted for assistance. Her appearance is bizarre and speech loud and rambling. She attended dinner and ate 100% of her meal. Refused EKG stating \"I'm good\" and would not elaborate. She continues to have +2 edema in hands and feet and +1 in wrists and ankles. She slept from after dinner until 2030, then utilized the head phones for music. Will continue to monitor and offer support on unit  Intervention(s): Monitoring patient q15min for safety, offering emotional support as needed, and monitoring for treatment plan compliance/effectiveness.  Taken 3/26/2020 1900  Plan of Care Reviewed With: patient  Patient Agreement with Plan of Care: disagrees (describe) (wants to leave)     "

## 2020-03-27 NOTE — PLAN OF CARE
"  Problem: Adult Behavioral Health Plan of Care  Goal: Plan of Care Review  Outcome: Progressing  Flowsheets (Taken 3/27/2020 1357)  Progress: no change  Outcome Summary: Jillian was visible on the unit. She utilized the headphones and listened to music for the majority of the shift. She continues to have disorganized thought pattern. She was open to taking prn haldol in the beginning of the shift and prn ativan towards the end of the shift when he behavior became somewhat more labile. She had no violent outbursts this shift. She denies SI/HI. She states \"I hear multiple voices. But they're not voices in my head\". Staff will continue to offer support as needed.  Intervention(s): Staff monitored for safety with Q15 minute observations, notating any marked changes in behavior. Staff offered 1:1 emotional     Problem: Mood Impairment (Manic/Hypomanic Signs/Symptoms)  Goal: Improved Mood Symptoms (Manic/Hypomanic Signs/Symptoms)  Outcome: Progressing  Flowsheets (Taken 3/27/2020 1357)  Individual Goal: Jillian will show improved mood as evidenced by compliance with medication regimen.  Goal Outcome: progressing  [place] Goal: identifies thought distortion; verbalizes increased insight     "

## 2020-03-28 PROCEDURE — 99232 SBSQ HOSP IP/OBS MODERATE 35: CPT | Performed by: PSYCHIATRY & NEUROLOGY

## 2020-03-28 PROCEDURE — 63700000 HC SELF-ADMINISTRABLE DRUG: Performed by: PSYCHIATRY & NEUROLOGY

## 2020-03-28 PROCEDURE — 63700000 HC SELF-ADMINISTRABLE DRUG: Performed by: HOSPITALIST

## 2020-03-28 PROCEDURE — 99232 SBSQ HOSP IP/OBS MODERATE 35: CPT | Performed by: HOSPITALIST

## 2020-03-28 PROCEDURE — 12400000 HC ROOM AND CARE SEMIPRIVATE PSYCH

## 2020-03-28 PROCEDURE — 63700000 HC SELF-ADMINISTRABLE DRUG: Performed by: PHYSICIAN ASSISTANT

## 2020-03-28 RX ADMIN — FUROSEMIDE 40 MG: 40 TABLET ORAL at 08:47

## 2020-03-28 RX ADMIN — DIVALPROEX SODIUM 250 MG: 250 TABLET, DELAYED RELEASE ORAL at 08:47

## 2020-03-28 RX ADMIN — ASPIRIN 81 MG: 81 TABLET, COATED ORAL at 08:47

## 2020-03-28 RX ADMIN — DILTIAZEM HYDROCHLORIDE 180 MG: 180 CAPSULE, COATED, EXTENDED RELEASE ORAL at 08:47

## 2020-03-28 RX ADMIN — FLUTICASONE PROPIONATE 2 SPRAY: 50 SPRAY, METERED NASAL at 08:48

## 2020-03-28 RX ADMIN — DIPHENHYDRAMINE HYDROCHLORIDE 25 MG: 25 CAPSULE ORAL at 14:30

## 2020-03-28 RX ADMIN — ARIPIPRAZOLE 20 MG: 20 TABLET ORAL at 21:24

## 2020-03-28 RX ADMIN — LORAZEPAM 1 MG: 1 TABLET ORAL at 14:30

## 2020-03-28 RX ADMIN — DOCUSATE SODIUM 100 MG: 100 CAPSULE, LIQUID FILLED ORAL at 08:47

## 2020-03-28 RX ADMIN — HALOPERIDOL 10 MG: 5 TABLET ORAL at 21:24

## 2020-03-28 RX ADMIN — HALOPERIDOL 10 MG: 5 TABLET ORAL at 14:30

## 2020-03-28 RX ADMIN — LORAZEPAM 1 MG: 1 TABLET ORAL at 10:04

## 2020-03-28 RX ADMIN — DOCUSATE SODIUM 100 MG: 100 CAPSULE, LIQUID FILLED ORAL at 16:55

## 2020-03-28 RX ADMIN — POLYETHYLENE GLYCOL 3350 17 G: 17 POWDER, FOR SOLUTION ORAL at 08:47

## 2020-03-28 RX ADMIN — HALOPERIDOL 10 MG: 5 TABLET ORAL at 08:47

## 2020-03-28 RX ADMIN — DIVALPROEX SODIUM 1500 MG: 250 TABLET, DELAYED RELEASE ORAL at 21:24

## 2020-03-28 NOTE — ASSESSMENT & PLAN NOTE
bl upper + lower ext edema somewhat improved. Cardiology eval + follow up noted.   Continuing w higher dose lasix.   Check BMP soon.   [place] [facility] [place] or [place].

## 2020-03-28 NOTE — PROGRESS NOTES
Hospital Medicine Service -  Daily Progress Note       SUBJECTIVE   Interval History: pt seen and examined. Pt currently denies any chest pain/dyspnea. No HEBERT.      OBJECTIVE      Vital signs in last 24 hours:  Temp:  [36.4 °C (97.6 °F)-36.7 °C (98 °F)] 36.7 °C (98 °F)  Heart Rate:  [80-95] 95  Resp:  [18] 18  BP: (110-126)/(57-67) 110/64  No intake or output data in the 24 hours ending 03/28/20 1355    PHYSICAL EXAMINATION      Physical Exam   Constitutional: She is oriented to person, place, and time.   Eyes: EOM are normal.   Cardiovascular: Regular rhythm. Exam reveals no gallop and no friction rub.   Pulmonary/Chest: Effort normal and breath sounds normal. No stridor. No respiratory distress. She has no wheezes. She has no rales. She exhibits no tenderness.   Abdominal: Soft. Bowel sounds are normal. She exhibits no distension. There is no tenderness. There is no guarding.   Musculoskeletal: She exhibits edema.   +1 b/l lower and upper extremity edema. Looks improved compared to previous exam.   Neurological: She is alert and oriented to person, place, and time.      LINES, CATHETERS, DRAINS, AIRWAYS, AND WOUNDS   Lines, Drains, Airways, Wounds:       Comments:      LABS / IMAGING / TELE      Labs  Results from last 7 days   Lab Units 03/22/20  1708   WBC K/uL 7.56   HEMOGLOBIN g/dL 13.8   HEMATOCRIT % 41.9   PLATELETS K/uL 264     Results from last 7 days   Lab Units 03/27/20  0842 03/25/20  1015   SODIUM mEQ/L  --  140   POTASSIUM mEQ/L  --  4.1   CHLORIDE mEQ/L  --  106   CO2 mEQ/L  --  24   BUN mg/dL  --  14   CREATININE mg/dL  --  0.7   CALCIUM mg/dL  --  9.0   ALBUMIN g/dL 3.6  --    BILIRUBIN TOTAL mg/dL 0.8  --    ALK PHOS IU/L 66  --    ALT IU/L 24  --    AST IU/L 16  --    GLUCOSE mg/dL  --  121*       IMAGING  No results found.    ASSESSMENT AND PLAN      Psychosis (CMS/HCC)  Assessment & Plan  Further management per psych    Edema  Assessment & Plan  bl upper + lower ext edema somewhat improved.  Cardiology eval + follow up noted.   Continuing w higher dose lasix.   Check BMP soon.   [place] [facility] [place] or [place].     Paroxysmal A-fib (CMS/HCC)  Assessment & Plan  C/w cardizem  [id number]   C/w ASA  Cardiology on board.          Code Status: Full Code  Estimated Discharge Date: 3/31/2020     Mick Maciel DO  3/28/2020

## 2020-03-28 NOTE — PLAN OF CARE
Problem: Adult Behavioral Health Plan of Care  Goal: Plan of Care Review  Flowsheets (Taken 3/28/2020 1434)  Progress: no change  Plan of Care Reviewed With: patient  Patient Agreement with Plan of Care: agrees  Outcome Summary: Rec'd Jillian visible in the community, wearing a hat, listening to music via blue tooth headphones, Jillian is hyperverbal & pressured, nonsensical at times. Compliant with meds & meals, unable to sit through groups due to her loud rambling disorganized TP, + KAREEM, + FOI, no insight into illness.  1015-Medicated with Ativan 1 mg po with calming effect. Jillian is extremely labile, swings between being calm to becoming loud & agitated needing PRN medication, difficult to re-direct at times.  Frequently at nurses station, intrusive & needy.   1300-Seen by [facility] [place], pt with generalized edema, face, hands & feet, Will have BMP draw on 3/30, staff to notify HMS with abnormal results.   1430-Spoke with Dr. butler aloud that she wants to see a therapists b/c no one listens to her, continued yelling needing, threatening to hit the wall, unable to focus on staff redirection, offered PRN po medication, yelling & refusing medication. Security called for [employer], pt complied with po Haldol 10 mg/ Ativan 1 mg/Benadryl 25 mg po after placing the med's in the cup of water then drinking them. Focused on discharge. 1500-signed a 72 hr notice at 1500 hours, placed on chart.   Will continue to offer Jillian support & monitor her on Q 15 min safety checks.     Intervention(s): Jillian was given meds as per orders, encouraged to attend groups, alert staff when feeling anxious.     Problem: Mood Impairment (Manic/Hypomanic Signs/Symptoms)  Goal: Improved Mood Symptoms (Manic/Hypomanic Signs/Symptoms)  Flowsheets (Taken 3/28/2020 1434)  Individual Goal: Jillian will maintain control and attend groups when in control.  Goal Outcome: progressing  [place] Goal: identifies thought distortion

## 2020-03-28 NOTE — PROGRESS NOTES
"Psychiatry Progress Note    Chief Complaint: \" I am fine, I want to go home\"    Interval History: Nursing reports that patient was putting color ointment onto her face yesterday, she has childish behaviors, redirectable.  She wants to go home to be with her dog, \" I need to leave, I am more intelligent than others in the hospital, I took meds because of devil\"    Vital Signs for the last 24 hours:  Temp:  [36.4 °C (97.6 °F)-36.7 °C (98 °F)] 36.7 °C (98 °F)  Heart Rate:  [80-95] 95  Resp:  [18] 18  BP: (110-126)/(57-67) 110/64    Labs:   No new lab test results today    Mental Status Exam:  Appearance: overweight, lying in bed  Body movements: no EPS noticed  Speech: clear fluent, normal tone  Mood: \" I am fine\"  Affect: labile  Associations: illogical  Thought Process: tangential  Thought Content: + delusional, no active hallucinations  Suicidality/Homicidality: denied  Judgement/Insight: limited, but takes medications, no agitation today  Orientation: x 3  Memory: recent memory intact  Attention: fair  Knowledge: average  Language: no word finding difficulties       Assessment and Plan: Patient still has delusion, poor insight, no agitation today, we do not know her baseline function as she has borderline intellectual functioning with childish behaviors someitmes, will continue current medications, monitor mood and behaviors.      Kareem Padilla MD  [phone]  "

## 2020-03-29 PROCEDURE — 12400000 HC ROOM AND CARE SEMIPRIVATE PSYCH

## 2020-03-29 PROCEDURE — 63700000 HC SELF-ADMINISTRABLE DRUG: Performed by: PSYCHIATRY & NEUROLOGY

## 2020-03-29 PROCEDURE — 99232 SBSQ HOSP IP/OBS MODERATE 35: CPT | Performed by: PSYCHIATRY & NEUROLOGY

## 2020-03-29 PROCEDURE — 63700000 HC SELF-ADMINISTRABLE DRUG: Performed by: HOSPITALIST

## 2020-03-29 PROCEDURE — 63700000 HC SELF-ADMINISTRABLE DRUG: Performed by: PHYSICIAN ASSISTANT

## 2020-03-29 RX ADMIN — LORAZEPAM 1 MG: 1 TABLET ORAL at 13:04

## 2020-03-29 RX ADMIN — DIVALPROEX SODIUM 250 MG: 250 TABLET, DELAYED RELEASE ORAL at 08:17

## 2020-03-29 RX ADMIN — ARIPIPRAZOLE 20 MG: 20 TABLET ORAL at 20:04

## 2020-03-29 RX ADMIN — HALOPERIDOL 10 MG: 5 TABLET ORAL at 20:05

## 2020-03-29 RX ADMIN — LORAZEPAM 1 MG: 1 TABLET ORAL at 20:06

## 2020-03-29 RX ADMIN — POLYETHYLENE GLYCOL 3350 17 G: 17 POWDER, FOR SOLUTION ORAL at 08:17

## 2020-03-29 RX ADMIN — ASPIRIN 81 MG: 81 TABLET, COATED ORAL at 08:16

## 2020-03-29 RX ADMIN — DILTIAZEM HYDROCHLORIDE 180 MG: 180 CAPSULE, COATED, EXTENDED RELEASE ORAL at 08:16

## 2020-03-29 RX ADMIN — DOCUSATE SODIUM 100 MG: 100 CAPSULE, LIQUID FILLED ORAL at 08:18

## 2020-03-29 RX ADMIN — HALOPERIDOL 10 MG: 5 TABLET ORAL at 13:55

## 2020-03-29 RX ADMIN — FUROSEMIDE 40 MG: 40 TABLET ORAL at 08:17

## 2020-03-29 RX ADMIN — DIVALPROEX SODIUM 1500 MG: 250 TABLET, DELAYED RELEASE ORAL at 20:04

## 2020-03-29 RX ADMIN — DOCUSATE SODIUM 100 MG: 100 CAPSULE, LIQUID FILLED ORAL at 16:18

## 2020-03-29 RX ADMIN — HALOPERIDOL 10 MG: 5 TABLET ORAL at 08:17

## 2020-03-29 RX ADMIN — FLUTICASONE PROPIONATE 2 SPRAY: 50 SPRAY, METERED NASAL at 08:16

## 2020-03-29 NOTE — PROGRESS NOTES
"Psychiatry Progress Note    Chief Complaint/ Reason for inpatient treatment: psychosis    Interval History: Nursing reports that patient was intrusive and loud yesterday afternoon, he securities were called in, patient calm down after PRN medications were given. This morning, she is better, listening to music through headphone, she still has poor insight and disorganized thoughts, unpredictable behavior.    Today she talked about [employer] music, and singers she liked, she was smiling, she had inappropriate behavior by trying to sitting next to a patient when the doctor is interviewing that patient. She is redirectable, Ativan PRN was given after lunch time as she become demanding and loud.        Vital Signs for the last 24 hours:  Temp:  [36.5 °C (97.7 °F)-36.9 °C (98.5 °F)] 36.9 °C (98.5 °F)  Heart Rate:  [87-98] 98  Resp:  [16-18] 18  BP: ()/(57-78) 95/57    Labs:  No new lab test results today.    Mental Status Exam:  Appearance: overweight, sitting in a chair  Body movements: no EPS noticed  Speech: clear fluent, normal tone  Mood: \" I am fine\"  Affect: labile  Associations: more logical better than yesterday  Thought Process: tangential  Thought Content: + delusional, no active hallucinations  Suicidality/Homicidality: denied  Judgement/Insight: limited, but takes medications, no agitation today  Orientation: x 3  Memory: recent memory intact  Attention: fair  Knowledge: average  Language: no word finding difficulties       Assessment and Plan: Patient is improving today slightly, still has intrusive, inappropriate behavior, Patient is still delusional, poor insight, no agitation today, will continue current medications, monitor mood and behaviors.         Kareem Padilla MD  [phone]  "

## 2020-03-29 NOTE — PLAN OF CARE
"  Problem: Adult Behavioral Health Plan of Care  Goal: Plan of Care Review  Flowsheets (Taken 3/29/2020 0143)  Plan of Care Reviewed With: patient  Patient Agreement with Plan of Care: agrees  Outcome Summary: Jillian has been visable on the unit this evening mostly listening to music on the headphones. Pt occasionally singing or talking loudly. Pt childlike with disorganized and rambling speech. Pt was agreeable to to take HS but refused scheduled ativan. Stated, \"I dont want to take benzos. You can get addicted\". Pt on 1:1 at night for CPAP   Intervention(s): Jillian will reamin in control and take all prescibed medications.     "

## 2020-03-29 NOTE — PLAN OF CARE
Problem: Adult Behavioral Health Plan of Care  Goal: Plan of Care Review  Outcome: Progressing  Flowsheets (Taken 3/29/2020 1209)  Progress: improving  Plan of Care Reviewed With: patient  Patient Agreement with Plan of Care: agrees  Outcome Summary: Jillian was visible, bright & pleasant today. Wearing hat and headphones listening to music, at times singing out loud, compliant with meds, meals & groups. Appears a little more organized, less KAREEM, still hyperverbal & pressured. In control & easily redirectable today. Denies AH/SI & + CFS. Continues with generalized edema, unable to wear ID bands due to edema, 8 am VS sitting- 98.5-95-/64, no c/o CP or SOB. Encouraged to allow EKG, refused. Continue to offer support & monitor on Q 15 min safety checks/Fall prec. (72 hr notice still in place 3 pm 3/28)  Intervention(s): 1:1 with RN, encouraged to attend groups, medicaton administration,   Q 15 min safety checks.  1300-Pt became more disorganized and loud, Pt encouraged to comply with Ativan prn, given without incident for increased psychosis with + calming effect.  1400- Pt continues with psychotic behavior, poor boundaries, irritating other patients, given Haldol 10 mg po with calming effect.     Problem: Mood Impairment (Manic/Hypomanic Signs/Symptoms)  Goal: Improved Mood Symptoms (Manic/Hypomanic Signs/Symptoms)  Outcome: Progressing  Flowsheets (Taken 3/29/2020 1209)  Individual Goal: Jillian will maintain control and attend groups wihout disrupting others.  Short Term Goal Established: 3/29/2020  Goal Outcome: progressing  [place] Goal: engages in physical activity

## 2020-03-30 LAB
ANION GAP SERPL CALC-SCNC: 10 MEQ/L (ref 3–15)
ATRIAL RATE: 84
BUN SERPL-MCNC: 15 MG/DL (ref 8–20)
CALCIUM SERPL-MCNC: 9.2 MG/DL (ref 8.9–10.3)
CHLORIDE SERPL-SCNC: 104 MEQ/L (ref 98–109)
CO2 SERPL-SCNC: 25 MEQ/L (ref 22–32)
CREAT SERPL-MCNC: 0.7 MG/DL (ref 0.6–1.1)
GFR SERPL CREATININE-BSD FRML MDRD: >60 ML/MIN/1.73M*2
GLUCOSE SERPL-MCNC: 117 MG/DL (ref 70–99)
P AXIS: 69
POTASSIUM SERPL-SCNC: 4.5 MEQ/L (ref 3.6–5.1)
PR INTERVAL: 152
QRS DURATION: 96
QT INTERVAL: 386
QTC CALCULATION(BAZETT): 456
R AXIS: 41
SODIUM SERPL-SCNC: 139 MEQ/L (ref 136–144)
T WAVE AXIS: 30
VENTRICULAR RATE: 84

## 2020-03-30 PROCEDURE — 99232 SBSQ HOSP IP/OBS MODERATE 35: CPT | Performed by: PSYCHIATRY & NEUROLOGY

## 2020-03-30 PROCEDURE — 12400000 HC ROOM AND CARE SEMIPRIVATE PSYCH

## 2020-03-30 PROCEDURE — 63700000 HC SELF-ADMINISTRABLE DRUG: Performed by: PSYCHIATRY & NEUROLOGY

## 2020-03-30 PROCEDURE — 80048 BASIC METABOLIC PNL TOTAL CA: CPT | Performed by: HOSPITALIST

## 2020-03-30 PROCEDURE — 63700000 HC SELF-ADMINISTRABLE DRUG: Performed by: PHYSICIAN ASSISTANT

## 2020-03-30 PROCEDURE — 63700000 HC SELF-ADMINISTRABLE DRUG: Performed by: HOSPITALIST

## 2020-03-30 PROCEDURE — 200200 PR NO CHARGE: Performed by: HOSPITALIST

## 2020-03-30 PROCEDURE — 36415 COLL VENOUS BLD VENIPUNCTURE: CPT | Performed by: HOSPITALIST

## 2020-03-30 RX ORDER — HALOPERIDOL 5 MG/1
10 TABLET ORAL 3 TIMES DAILY
Status: DISCONTINUED | OUTPATIENT
Start: 2020-03-31 | End: 2020-04-02

## 2020-03-30 RX ORDER — HALOPERIDOL 5 MG/1
5 TABLET ORAL DAILY
Status: DISCONTINUED | OUTPATIENT
Start: 2020-03-30 | End: 2020-03-30

## 2020-03-30 RX ORDER — ASPIRIN 325 MG
325 TABLET, DELAYED RELEASE (ENTERIC COATED) ORAL DAILY
Status: DISCONTINUED | OUTPATIENT
Start: 2020-03-30 | End: 2020-05-06 | Stop reason: HOSPADM

## 2020-03-30 RX ORDER — BUMETANIDE 1 MG/1
1 TABLET ORAL
Status: DISCONTINUED | OUTPATIENT
Start: 2020-03-30 | End: 2020-04-27

## 2020-03-30 RX ORDER — HALOPERIDOL 5 MG/1
10 TABLET ORAL DAILY
Status: DISCONTINUED | OUTPATIENT
Start: 2020-03-31 | End: 2020-03-30

## 2020-03-30 RX ADMIN — HALOPERIDOL 10 MG: 5 TABLET ORAL at 14:13

## 2020-03-30 RX ADMIN — DOCUSATE SODIUM 100 MG: 100 CAPSULE, LIQUID FILLED ORAL at 08:24

## 2020-03-30 RX ADMIN — HALOPERIDOL 10 MG: 5 TABLET ORAL at 08:25

## 2020-03-30 RX ADMIN — ASPIRIN 325 MG: 325 TABLET, DELAYED RELEASE ORAL at 08:24

## 2020-03-30 RX ADMIN — HALOPERIDOL 10 MG: 5 TABLET ORAL at 21:55

## 2020-03-30 RX ADMIN — ARIPIPRAZOLE 20 MG: 20 TABLET ORAL at 21:55

## 2020-03-30 RX ADMIN — DIVALPROEX SODIUM 250 MG: 250 TABLET, DELAYED RELEASE ORAL at 08:24

## 2020-03-30 RX ADMIN — LORAZEPAM 1 MG: 1 TABLET ORAL at 14:12

## 2020-03-30 RX ADMIN — DOCUSATE SODIUM 100 MG: 100 CAPSULE, LIQUID FILLED ORAL at 16:51

## 2020-03-30 RX ADMIN — FUROSEMIDE 40 MG: 40 TABLET ORAL at 08:24

## 2020-03-30 RX ADMIN — LORAZEPAM 1 MG: 1 TABLET ORAL at 21:55

## 2020-03-30 RX ADMIN — FLUTICASONE PROPIONATE 2 SPRAY: 50 SPRAY, METERED NASAL at 08:26

## 2020-03-30 RX ADMIN — BUMETANIDE 1 MG: 1 TABLET ORAL at 16:50

## 2020-03-30 RX ADMIN — DILTIAZEM HYDROCHLORIDE 180 MG: 180 CAPSULE, COATED, EXTENDED RELEASE ORAL at 08:24

## 2020-03-30 RX ADMIN — DIVALPROEX SODIUM 1500 MG: 250 TABLET, DELAYED RELEASE ORAL at 21:55

## 2020-03-30 NOTE — PROGRESS NOTES
Bmp reviewed. Bmp stable on current dose of lasix. Cont w current dose of lasix. Please notify cardiology or hms if any further questions regarding lasix dose.

## 2020-03-30 NOTE — PROGRESS NOTES
"Psychiatry Progress Note    Chief Complaint/Reason for follow-up:   BPAD, manic with psychosis    Interval History: Discussed treatment plan and patient's progress with nursing staff and allied therapists in treatment team meeting this morning.  Per nursing and weekend coverage, patient remains disorganized, intrusive and labile. At times, easier to redirect. Continued to refuse EKG  \"I'm feeling much better\"  Patient had signed a 72 hour notice on 3/28/2020.  We discussed her changing medication regimen and that in this writers opinion, she was not ready for discharge yet.  The patient was agreeable to rescinding her 72 hour notice and did so.  Patient was more pleasant and agreeable this AM. She remains disorganized and loose.  She jumped from topic to topic talking about her sister being jealous of her to Dr. Paul being \"my uncle but he does not know it yet\" to loving Dr. Paul or the patient in the room next door. She was able to stay on topic a little better and was interruptable.   She expressed some paranoia that the other patients might not like her but we discussed her speaking more loudly at them when she is wearing headphones and that it may be upsetting to them.  She accepted that.  We discussed her trial of Lithium in the past and she stated she \"escalated\" as well as \"I drank a lot of water all the time. Juice, water, everything. I was so thirsty\".  She denied SI, HI.  She denied AVH but stated \"god is in me\" as well as \"I have really good vision and I can see things\".  She did not share what she could see but denied seeing spirits, the devil or god.  She also referenced the unit being \"the [place]\" and began to quote the lyrics of the soundtrack.   Spoke with patient about her foot and ankle swelling.  She denied pain and said she was seen by cardiology.  Patient was told she was on a low sodium diet and then asked if she could have [employer].  She did get an EKG.    Review of Systems: "   Sleep:  Fair, Appetite: Good and GI: No complaints    Vital Signs for the last 24 hours:  Temp:  [36.6 °C (97.8 °F)-36.9 °C (98.5 °F)] 36.6 °C (97.8 °F)  Heart Rate:  [91-98] 96  Resp:  [18] 18  BP: ()/(47-64) 113/47      Current Facility-Administered Medications:   •  acetaminophen (TYLENOL) tablet 650 mg, 650 mg, oral, q4h PRN, Mami Angela MD, 650 mg at 03/26/20 0124  •  alum-mag hydroxide-simeth (MAALOX) 200-200-20 mg/5 mL suspension 30 mL, 30 mL, oral, q4h PRN, Mami Angela MD  •  ARIPiprazole (ABILIFY) tablet 20 mg, 20 mg, oral, Nightly, Hanh Francois MD, 20 mg at 03/29/20 2004  •  aspirin enteric coated tablet 81 mg, 81 mg, oral, Daily, Zoya Schmitz DO, 81 mg at 03/29/20 0816  •  dilTIAZem CD (CARDIZEM CD) 24 hr ER capsule 180 mg, 180 mg, oral, Daily, Zoya Schmitz DO, 180 mg at 03/29/20 0816  •  diphenhydrAMINE (BENADRYL) capsule 25 mg, 25 mg, oral, q6h PRN, Mami Angela MD, 25 mg at 03/28/20 1430  •  diphenhydrAMINE (BENADRYL) injection 50 mg, 50 mg, intramuscular, q4h PRN, Mami Angela MD  •  divalproex (DEPAKOTE) tablet,delayed release (DR/EC) 1,500 mg, 1,500 mg, oral, Nightly, Hanh Francois MD, 1,500 mg at 03/29/20 2004  •  divalproex (DEPAKOTE) tablet,delayed release (DR/EC) 250 mg, 250 mg, oral, Daily, Hanh Francois MD, 250 mg at 03/29/20 0817  •  docusate sodium (COLACE) capsule 100 mg, 100 mg, oral, BID, Mami Angela MD, 100 mg at 03/29/20 1618  •  fluticasone propionate (FLONASE) 50 mcg/actuation nasal spray 2 spray, 2 spray, Each Nostril, Daily, Mami Angela MD, 2 spray at 03/29/20 0816  •  furosemide (LASIX) tablet 40 mg, 40 mg, oral, Daily, Sherrell Benavides PA C, 40 mg at 03/29/20 0817  •  haloperidoL (HALDOL) tablet 10 mg, 10 mg, oral, q8h PRN, 10 mg at 03/29/20 1355 **OR** haloperidol lactate (HALDOL) injection 10 mg, 10 mg, intramuscular, q6h PRN, Hanh Francois MD  •  haloperidoL (HALDOL) tablet 10 mg, 10 mg, oral, q12h  "AVIS, Hanh Francois MD, 10 mg at 03/29/20 2005  •  ibuprofen (MOTRIN) tablet 400 mg, 400 mg, oral, q6h PRN, Mami Angela MD  •  LORazepam (ATIVAN) tablet 1 mg, 1 mg, oral, q6h PRN, 1 mg at 03/29/20 1304 **OR** LORazepam (ATIVAN) injection 2 mg, 2 mg, intramuscular, q4h PRN, Mami Angela MD  •  LORazepam (ATIVAN) tablet 1 mg, 1 mg, oral, Nightly, Hanh Francois MD, 1 mg at 03/29/20 2006  •  ondansetron ODT (ZOFRAN-ODT) disintegrating tablet 4 mg, 4 mg, oral, q8h PRN, Mami Agnela MD  •  polyethylene glycol (MIRALAX) 17 gram packet 17 g, 17 g, oral, Daily, Hanh Francois MD, 17 g at 03/29/20 0817  •  senna (SENOKOT) tablet 1 tablet, 1 tablet, oral, 2x daily PRN, Hanh Francois MD    Labs:  Results from last 7 days   Lab Units 03/25/20  1015   POTASSIUM mEQ/L 4.1   SODIUM mEQ/L 140   CREATININE mg/dL 0.7     EKG: NSR, QTc: 456    Mental Status Evaluation:  Appearance: obese woman, disheveled, wearing knit cap and earphones  Behavior: calm, cooperative, staring eye contact  Speech: talkative, pressured but interruptable  Mood: \"I feel much better\"  Affect: elevated but calmer  Thought process: illogical, disorganized, loose. Is able to stay on topic more  Thought content: denied SI, HI and AVH but remains religiously focused. ? AVH  Insight: poor  Judgement: limited-poor        Assessment/Plan         Jillian Acosta is a 44 y.o. female with a history of bipolar disorder who was brought in to the [facility] ED by her sister for a manic episode. She was admitted on a 201.     BPAD, manic with psychosis     Patient rescinded 72 hour notice  continue abilify 20mg qday  Continue depakote to 250mg qam, 1500mg qhs VPA: 98.8  Continue haldol 10mg BID  Start haldol 5mg qPM  continue ativan 1mg qhs  Continue haldol PRN, ativan PRN  appreciate cardiology input; changed ASA to 325mg daily per recommedations/      Hanh Francois MD  3/30/2020   7:38 AM  Time spent with patient: 25 minutes with >50% of time in " "counseling, discussing treatment plan  Spoke with sister for 15 minutes.  She felt the patient was doing better but not at baseline.  Patient has not been on haldol before and sister felt it has been helpful. She agrees the patient's mood continues to fluctuate but overall she has been calling family less and has not been saying as much \"out of reality\".   "

## 2020-03-30 NOTE — PLAN OF CARE
"  Problem: Adult Behavioral Health Plan of Care  Goal: Plan of Care Review  Flowsheets (Taken 3/30/2020 1200)  Progress: improving  Plan of Care Reviewed With: patient  Patient Agreement with Plan of Care: agrees  Outcome Summary: Jillian has been visible on unit. Remains labile but in control. Uses headphones, singing and dancing. Remains religiously preoccupied, did get upset when told to lower her voice during group, \"You cant tell me how to prayer to my Lord\".  This slight outburst was short lived, was able to get back in control within a minute. Denies SI, anxiety and depression. Rescinded 72 hour notice this AM without issue. Med compliant. Walks around unit, keeps to self mostly. Will continue to monitor and offer support when agitated. Pt at 1400 started to rev up, threatening to throw up on Pharmacy tech, arguing with unit clerk stating she was her hairdresser and she stole money from her, angry tone. Haldol 10mg, ativan 1mg given, pt took without issue, resting in bed. Asked if MD would be coming to look at her edema. Edema BLLE, legs tight, pitting, pt called out in discomfort when assessed for pitting. Pt tearful about legs. Will continue to offer support. Dr Chaudhry responded through Secure Chat, Dr Maciel would be calling the unit for follow-up since he has knowledge of this pt per last weeks assessment. Spoke with Dr. Maciel regarding edema, Dr Maciel stated that Cardiology will be following pt and making changes to her diuretics  Intervention(s): Encouraged pt to remain in control offered headphones     "

## 2020-03-30 NOTE — PLAN OF CARE
Problem: Adult Behavioral Health Plan of Care  Goal: Plan of Care Review  Flowsheets  Taken 3/29/2020 1209 by Yudelka Deng RN  Progress: improving  Taken 3/30/2020 0000 by Sarah Beth Govea RN  Plan of Care Reviewed With: patient  Patient Agreement with Plan of Care: agrees  Taken 3/30/2020 0051 by Sarah Beth Govea RN  Outcome Summary: Pt visible on the unit. Pt listening to music and pacing the unit.  Pt agitated suddenly slapping the walls, RN gave pt hs meds.  Pt did begin to calm down after that. Pt behavior unpredictable.  Pt with 1:1 while wearing cpap at night.    Problem: Adult Behavioral Health Plan of Care  Goal: Plan of Care Review  Flowsheets  Taken 3/29/2020 1209 by Yudelka Deng RN  Progress: improving  Taken 3/30/2020 0000 by Sarah Beth Govea RN  Plan of Care Reviewed With: patient  Patient Agreement with Plan of Care: agrees  Taken 3/30/2020 0051 by Sarah Beth Govea RN  Outcome Summary: Pt  Intervention(s): Encouraged pt to listen to music     Intervention(s): Encouraged pt to listen to music

## 2020-03-31 LAB
ATRIAL RATE: 78
P AXIS: 63
PR INTERVAL: 148
QRS DURATION: 102
QT INTERVAL: 386
QTC CALCULATION(BAZETT): 440
R AXIS: 45
T WAVE AXIS: 29
VENTRICULAR RATE: 78

## 2020-03-31 PROCEDURE — 63700000 HC SELF-ADMINISTRABLE DRUG: Performed by: PHYSICIAN ASSISTANT

## 2020-03-31 PROCEDURE — 99231 SBSQ HOSP IP/OBS SF/LOW 25: CPT | Performed by: PSYCHIATRY & NEUROLOGY

## 2020-03-31 PROCEDURE — 63700000 HC SELF-ADMINISTRABLE DRUG: Performed by: PSYCHIATRY & NEUROLOGY

## 2020-03-31 PROCEDURE — 93005 ELECTROCARDIOGRAM TRACING: CPT | Performed by: PSYCHIATRY & NEUROLOGY

## 2020-03-31 PROCEDURE — 12400000 HC ROOM AND CARE SEMIPRIVATE PSYCH

## 2020-03-31 PROCEDURE — 63700000 HC SELF-ADMINISTRABLE DRUG: Performed by: HOSPITALIST

## 2020-03-31 RX ORDER — LORAZEPAM 2 MG/1
2 TABLET ORAL ONCE
Status: COMPLETED | OUTPATIENT
Start: 2020-03-31 | End: 2020-03-31

## 2020-03-31 RX ORDER — LORAZEPAM 2 MG/1
TABLET ORAL
Status: DISPENSED
Start: 2020-03-31 | End: 2020-04-01

## 2020-03-31 RX ORDER — CALCIUM CARBONATE 200(500)MG
500 TABLET,CHEWABLE ORAL DAILY PRN
Status: DISCONTINUED | OUTPATIENT
Start: 2020-03-31 | End: 2020-05-06 | Stop reason: HOSPADM

## 2020-03-31 RX ADMIN — DILTIAZEM HYDROCHLORIDE 180 MG: 180 CAPSULE, COATED, EXTENDED RELEASE ORAL at 08:22

## 2020-03-31 RX ADMIN — DIVALPROEX SODIUM 1500 MG: 250 TABLET, DELAYED RELEASE ORAL at 21:58

## 2020-03-31 RX ADMIN — HALOPERIDOL 10 MG: 5 TABLET ORAL at 08:22

## 2020-03-31 RX ADMIN — DOCUSATE SODIUM 100 MG: 100 CAPSULE, LIQUID FILLED ORAL at 08:22

## 2020-03-31 RX ADMIN — ARIPIPRAZOLE 20 MG: 20 TABLET ORAL at 21:58

## 2020-03-31 RX ADMIN — BUMETANIDE 1 MG: 1 TABLET ORAL at 08:27

## 2020-03-31 RX ADMIN — LORAZEPAM 2 MG: 2 TABLET ORAL at 12:14

## 2020-03-31 RX ADMIN — FLUTICASONE PROPIONATE 2 SPRAY: 50 SPRAY, METERED NASAL at 08:22

## 2020-03-31 RX ADMIN — ASPIRIN 325 MG: 325 TABLET, DELAYED RELEASE ORAL at 08:22

## 2020-03-31 RX ADMIN — DIVALPROEX SODIUM 250 MG: 250 TABLET, DELAYED RELEASE ORAL at 08:23

## 2020-03-31 NOTE — PROGRESS NOTES
Jillian was awake a couple of times throughout the night.  Disorganized.  Continues to have ideas of reference.  Rambling on to this writer, as if writer is a long time friend.  Talking loudly and laughing inappropriately.  Awake for the day at approximately 0530, yelling at times at her 1:1 sitter.  Pt out to nurses station and requested headphones.  Singing. Easy to redirect.

## 2020-03-31 NOTE — PROGRESS NOTES
"Psychiatry Progress Note    Chief Complaint/Reason for follow-up:   BPAD, manic with psychosis    Interval History: Discussed treatment plan and patient's progress with nursing staff and allied therapists in treatment team meeting this morning.  Per nursing, patient was awake periodically through the night and disorganize. Yelling at 1:1. Easy to redirect  \"I'm good\"  Patient reported cardiology changed her medications and that she thinks she is going to the bathroom more. Her hands are still swollen but she did not complain of pain in her hands and feet.  She was more organized at times but then decompensated.  She was loose talking about her crushes and then about her sister.  She became tearful when talking about her sister and the stress her sister was under financially. She was redirectable.  She had moments of clarity in which she said she did not know what had happened but then spoke of connections between people who are unrelated (Bharath in the ED and Bharath the SW on the unit being connected).   She denied AVH but stated \"I am aware of the devil's schemes\". When asked about what that meant, she derailed into talking about food.  No SI, no HI, denied AVH but may be having them. Did not appear to be RIS during interview.    Review of Systems:   Sleep:  Fair, Appetite: Good and GI: No complaints    Vital Signs for the last 24 hours:  Temp:  [36.6 °C (97.9 °F)-36.9 °C (98.4 °F)] 36.9 °C (98.4 °F)  Heart Rate:  [85-94] 85  Resp:  [18] 18  BP: (105-140)/(56-81) 105/56      Current Facility-Administered Medications:   •  acetaminophen (TYLENOL) tablet 650 mg, 650 mg, oral, q4h PRN, Mami Angela MD, 650 mg at 03/26/20 0124  •  alum-mag hydroxide-simeth (MAALOX) 200-200-20 mg/5 mL suspension 30 mL, 30 mL, oral, q4h PRN, Mami Angela MD  •  ARIPiprazole (ABILIFY) tablet 20 mg, 20 mg, oral, Nightly, Hanh Francois MD, 20 mg at 03/30/20 2355  •  aspirin EC tablet 325 mg, 325 mg, oral, Daily, Hanh Francois " MD NO, 325 mg at 03/30/20 0824  •  bumetanide (BUMEX) tablet 1 mg, 1 mg, oral, BID (am, 4p), Sherrell Benavides PA C, 1 mg at 03/30/20 1650  •  dilTIAZem CD (CARDIZEM CD) 24 hr ER capsule 180 mg, 180 mg, oral, Daily, Zoya Schmitz DO, 180 mg at 03/30/20 0824  •  diphenhydrAMINE (BENADRYL) capsule 25 mg, 25 mg, oral, q6h PRN, Mami Angela MD, 25 mg at 03/28/20 1430  •  diphenhydrAMINE (BENADRYL) injection 50 mg, 50 mg, intramuscular, q4h PRN, Mami Angela MD  •  divalproex (DEPAKOTE) tablet,delayed release (DR/EC) 1,500 mg, 1,500 mg, oral, Nightly, Hanh Francois MD, 1,500 mg at 03/30/20 2155  •  divalproex (DEPAKOTE) tablet,delayed release (DR/EC) 250 mg, 250 mg, oral, Daily, Hanh Francois MD, 250 mg at 03/30/20 0824  •  docusate sodium (COLACE) capsule 100 mg, 100 mg, oral, BID, Mami Angela MD, 100 mg at 03/30/20 1651  •  fluticasone propionate (FLONASE) 50 mcg/actuation nasal spray 2 spray, 2 spray, Each Nostril, Daily, Mami Angela MD, 2 spray at 03/30/20 0826  •  haloperidoL (HALDOL) tablet 10 mg, 10 mg, oral, q8h PRN, 10 mg at 03/30/20 1413 **OR** haloperidol lactate (HALDOL) injection 10 mg, 10 mg, intramuscular, q6h PRN, Hanh Francois MD  •  haloperidoL (HALDOL) tablet 10 mg, 10 mg, oral, TID, Hanh Francois MD  •  ibuprofen (MOTRIN) tablet 400 mg, 400 mg, oral, q6h PRN, Mami Angela MD  •  LORazepam (ATIVAN) tablet 1 mg, 1 mg, oral, q6h PRN, 1 mg at 03/30/20 1412 **OR** LORazepam (ATIVAN) injection 2 mg, 2 mg, intramuscular, q4h PRN, Mami Angela MD  •  LORazepam (ATIVAN) tablet 1 mg, 1 mg, oral, Nightly, Hanh Francois MD, 1 mg at 03/30/20 2155  •  ondansetron ODT (ZOFRAN-ODT) disintegrating tablet 4 mg, 4 mg, oral, q8h PRN, Mami Angela MD  •  polyethylene glycol (MIRALAX) 17 gram packet 17 g, 17 g, oral, Daily, Hanh Francois MD, 17 g at 03/29/20 0817  •  senna (SENOKOT) tablet 1 tablet, 1 tablet, oral, 2x daily PRN, Hanh Francois,  "MD    Labs:  Results from last 7 days   Lab Units 03/30/20  0815 03/25/20  1015   POTASSIUM mEQ/L 4.5 4.1   SODIUM mEQ/L 139 140   CREATININE mg/dL 0.7 0.7       Mental Status Evaluation:  Appearance: obese woman, disheveled, wearing knit cap and earphones  Behavior: calm, cooperative, staring eye contact  Speech: less pressured  Mood: \"I'm good\"  Affect: elevated but calmer, irritable at times  Thought process: illogical, disorganized, loose. Is able to stay on topic more  Thought content: denied SI, HI and AVH but remains religiously focused. ? AVH  Insight: poor  Judgement: limited-poor        Assessment/Plan         Jillian Acosta is a 44 y.o. female with a history of bipolar disorder who was brought in to the [facility] ED by her sister for a manic episode. She was admitted on a 201.     BPAD, manic with psychosis     Patient rescinded 72 hour notice  continue abilify 20mg qday  Continue depakote to 250mg qam, 1500mg qhs VPA: 98.8  increased haldol 10mg TID as patient becomes more irritable in evenings  continue ativan 1mg qhs  Continue haldol PRN, ativan PRN  Will contact cardiology re: edema    Hanh Francois MD  3/31/2020   7:35 AM  Time spent with patient: 15 minutes  "

## 2020-03-31 NOTE — PROGRESS NOTES
"[facility] DAILY SOAP NOTE    SUBJECTIVE  Jillian Acosta is a 44 y.o. year-old female admitted on 3/22/2020 with Delusion (CMS/[facility]) [F22]  Bipolar 1 disorder (CMS/HCC) [F31.9]  Psychosis (CMS/HCC) [F29].     Patient urinating more with diuretic use. No pain in extremities, though they remain edematous.       OBJECTIVE  Vitals:   Visit Vitals  /72 (BP Location: Right upper arm, Patient Position: Standing)   Pulse 89   Temp 36.4 °C (97.6 °F) (Oral)   Resp 16   Ht 1.664 m (5' 5.5\")   Wt 120 kg (264 lb 6.4 oz)   LMP  (LMP Unknown)   SpO2 98%   Breastfeeding No   BMI 43.33 kg/m²     Tmax (24h): Temp (24hrs), Av.7 °C (98 °F), Min:36.4 °C (97.6 °F), Max:36.9 °C (98.4 °F)    I/Os:No intake or output data in the 24 hours ending 20 1142    Physical Exam:  Physical Exam  Gen: NAD  Extremities: 2+ pitting edema in b/l LE. UE remain edematous. No tenderness to palpation. Erythematous b/l shins.  Neurologic: Grossly intact, patient moves all four extremities  Psych: tangential     DATA  Imaging personally reviewed (does not include unread studies):  No results found.    Telemetry/ECGs: Personally reviewed    Labs (personally reviewed):  Results from last 7 days   Lab Units 20  0815   SODIUM mEQ/L 139   POTASSIUM mEQ/L 4.5   CHLORIDE mEQ/L 104   CO2 mEQ/L 25   BUN mg/dL 15   CREATININE mg/dL 0.7   GLUCOSE mg/dL 117*   CALCIUM mg/dL 9.2                         Microbiology Data (personally reviewed):  Microbiology Results     ** No results found for the last 720 hours. **          ASSESSMENT AND PLAN  Edema  Assessment & Plan  Pt with b/l upper and lower extremity edema noted, unimproved with lasix 20 x2. Unimproved with lasix 40mg.  Non tender edema, and unclear how long it has been present  Could be multifactorial-obesity, poor diet, AMINA, R sided HF??  Now on low sodium diet per Dr. Francois  Potassium WNL    PLAN:  Needs outpatient echocardiogram with primary cardiologist, which is already ordered " per their office  Switched to Bumex 1mg PO BID, continue   Continue low sodium diet  Monitor labs, e-lytes  Please call or page with any questions or if further needed    Paroxysmal A-fib (CMS/HCC)  Assessment & Plan  Currently in sinus rhythm, and sinus rhythm on admission EKG  [id number] of 1 for female (to our best knowledge)    PLAN:  Continue cardizem 180  Would increase ASA to 325mg daily   Please call/page us if further questions, patient goes back into atrial fibrillation, or if needed        Code Status: Full Code  VTE Assessment: Padua    VTE Prophylaxis:   Estimated discharge date: 3/31/2020      This is Sherrell Benavides PA-C, acting as dictating scribe for Dr. Chelsea Birmingham.  DELIA Perla  3/31/2020 11:42 AM   Pager [phone]  Office [phone]

## 2020-03-31 NOTE — PLAN OF CARE
Problem: Adult Behavioral Health Plan of Care  Goal: Plan of Care Review  Outcome: Progressing  Flowsheets (Taken 3/30/2020 2146)  Progress: improving  Plan of Care Reviewed With: patient  Patient Agreement with Plan of Care: agrees  Outcome Summary: Jillian has been visible on the unit and social with peers. Denies SI/HI and auditory hallucinations. Remains religiously preoccupied. Pt is disorganized, tangential, and hyperverbal. Has some flight of ideas and ideas of reference. She is restless. Pt less labile and remained in control. Listened to headphones this shift. Does have edema in b/l LE. She went to wrap up group. Staff will continue to monitor for safety and provide pt with support.  Intervention(s): Jillian was encouraged to use positive coping skills this shift.

## 2020-03-31 NOTE — PLAN OF CARE
"  Problem: Adult Behavioral Health Plan of Care  Goal: Plan of Care Review  Outcome: Progressing  Flowsheets (Taken 3/31/2020 1413)  Progress: improving  Outcome Summary: Jillian was visible on the unit today, utilizing the headphones for the majority of the shift. She made some delusional comments such as stating \"I saw your Facebook post on depression cause you're friends with my mom\" and thinking that the unit clerk was her hairdresser. She continues to make [employer] comments to staff. She was hyperfocused on another peer, knocking on her door and giving her mean faces and making threatening comments. Staff provided redirection numerous times. She ate well and has a bizarre appearance wearing numerous layers of clothes and a winter hat. She did not attend unit activities today, Staff will continue to monitor for safety.  Intervention(s): Staff provided redirection when needed. Staff encouraged Jillian to attend unit activities.     Problem: Mood Impairment (Manic/Hypomanic Signs/Symptoms)  Goal: Improved Mood Symptoms (Manic/Hypomanic Signs/Symptoms)  Outcome: Progressing  Flowsheets (Taken 3/31/2020 1413)  Individual Goal: Jillian will attend one of the groups today.  Goal Outcome: progressing  [place] Goal: verbalizes increased insight; engages in physical activity     "

## 2020-04-01 LAB — VALPROATE SERPL-MCNC: 100.5 UG/ML (ref 50–100)

## 2020-04-01 PROCEDURE — 63700000 HC SELF-ADMINISTRABLE DRUG: Performed by: PHYSICIAN ASSISTANT

## 2020-04-01 PROCEDURE — 80164 ASSAY DIPROPYLACETIC ACD TOT: CPT | Performed by: PSYCHIATRY & NEUROLOGY

## 2020-04-01 PROCEDURE — 12400000 HC ROOM AND CARE SEMIPRIVATE PSYCH

## 2020-04-01 PROCEDURE — 63700000 HC SELF-ADMINISTRABLE DRUG: Performed by: PSYCHIATRY & NEUROLOGY

## 2020-04-01 PROCEDURE — 99233 SBSQ HOSP IP/OBS HIGH 50: CPT | Performed by: PSYCHIATRY & NEUROLOGY

## 2020-04-01 PROCEDURE — 63700000 HC SELF-ADMINISTRABLE DRUG: Performed by: HOSPITALIST

## 2020-04-01 PROCEDURE — 36415 COLL VENOUS BLD VENIPUNCTURE: CPT | Performed by: PSYCHIATRY & NEUROLOGY

## 2020-04-01 RX ORDER — ARIPIPRAZOLE 15 MG/1
30 TABLET ORAL NIGHTLY
Status: DISCONTINUED | OUTPATIENT
Start: 2020-04-01 | End: 2020-04-05

## 2020-04-01 RX ORDER — CHLORPROMAZINE HYDROCHLORIDE 50 MG/1
50 TABLET, FILM COATED ORAL 3 TIMES DAILY PRN
Status: DISCONTINUED | OUTPATIENT
Start: 2020-04-01 | End: 2020-04-01

## 2020-04-01 RX ORDER — DIVALPROEX SODIUM 125 MG/1
125 TABLET, DELAYED RELEASE ORAL DAILY
Status: DISCONTINUED | OUTPATIENT
Start: 2020-04-02 | End: 2020-05-06 | Stop reason: HOSPADM

## 2020-04-01 RX ADMIN — HALOPERIDOL 10 MG: 5 TABLET ORAL at 21:17

## 2020-04-01 RX ADMIN — DOCUSATE SODIUM 100 MG: 100 CAPSULE, LIQUID FILLED ORAL at 08:24

## 2020-04-01 RX ADMIN — HALOPERIDOL 10 MG: 5 TABLET ORAL at 13:17

## 2020-04-01 RX ADMIN — DOCUSATE SODIUM 100 MG: 100 CAPSULE, LIQUID FILLED ORAL at 17:32

## 2020-04-01 RX ADMIN — ARIPIPRAZOLE 30 MG: 15 TABLET ORAL at 21:02

## 2020-04-01 RX ADMIN — ANTACID TABLETS 500 MG: 500 TABLET, CHEWABLE ORAL at 21:02

## 2020-04-01 RX ADMIN — DIVALPROEX SODIUM 250 MG: 250 TABLET, DELAYED RELEASE ORAL at 09:30

## 2020-04-01 RX ADMIN — DILTIAZEM HYDROCHLORIDE 180 MG: 180 CAPSULE, COATED, EXTENDED RELEASE ORAL at 11:28

## 2020-04-01 RX ADMIN — DIVALPROEX SODIUM 1500 MG: 250 TABLET, DELAYED RELEASE ORAL at 21:02

## 2020-04-01 RX ADMIN — IBUPROFEN 400 MG: 400 TABLET ORAL at 12:24

## 2020-04-01 RX ADMIN — BUMETANIDE 1 MG: 1 TABLET ORAL at 11:28

## 2020-04-01 RX ADMIN — ASPIRIN 325 MG: 325 TABLET, DELAYED RELEASE ORAL at 08:22

## 2020-04-01 NOTE — PROGRESS NOTES
"Jillian refused HS ativan and haldol stating \"I didn't take those medicines before coming, I'm not taking them now.\" Medication education provided. Patient adamantly refused. Will continue to monitor.   "

## 2020-04-01 NOTE — PROGRESS NOTES
"Psychiatry Progress Note    Chief Complaint/Reason for follow-up:   BPAD, manic with psychosis    Interval History: Discussed treatment plan and patient's progress with nursing staff and allied therapists in treatment team meeting this morning.  Per nursing, patient was labile and disorganized. She remained religiously preoccupied as well as increasingly agitated.  \"antihistamines make me heart race\"  Patient was irritable and loud this AM.  She was pressured and spoke about calling the pharmacist and yelling at him. She stated she would not be taking haldol or ativan anymore \"because I don't want to. I was on it a [facility]any and it made me crazy. I will take the depakote and abilify but nothing else.  No risperdal, no zyprexa, no geodon. Nothing else.\"  She then ended the interview  Called sister and Left message  Spoke with sister for 25 minutes. Explained to sister that patient has refused haldol and ativan and that the patient is more irritable and agitated.  She said yesterday AM patient was happier sounding though \"still not in reality\". However, by 7:30 PM, the patient called their mother and was agitated and cursing. We discussed at length the involuntary commitment process and medication over objection.  She was concerned about the patient's escalating irritability and her aggressive behaviors. She also understood the possible need for IM medications as well as the use of restraints should the patient become a danger to herself or others and not be redirectable.  She hoped this would not happen with the patient but feels this may have been the situation at  when the patient had been put in restraints.  Sister said she would call their other sister and both would try to encourage patient to take medications.  She did understand that involuntary commitment would be likely given the patient's non compliance and decompensation.    Review of Systems:   Sleep:  Fair, Appetite: Good and GI: No " complaints    Vital Signs for the last 24 hours:  Temp:  [36.8 °C (98.2 °F)-37.1 °C (98.7 °F)] 37.1 °C (98.7 °F)  Heart Rate:  [86-91] 91  Resp:  [18] 18  BP: (110-115)/(53-58) 115/58      Current Facility-Administered Medications:   •  acetaminophen (TYLENOL) tablet 650 mg, 650 mg, oral, q4h PRN, Mami Angela MD, 650 mg at 03/26/20 0124  •  alum-mag hydroxide-simeth (MAALOX) 200-200-20 mg/5 mL suspension 30 mL, 30 mL, oral, q4h PRN, Mami Angela MD  •  ARIPiprazole (ABILIFY) tablet 20 mg, 20 mg, oral, Nightly, Hanh Francois MD, 20 mg at 03/31/20 2158  •  aspirin EC tablet 325 mg, 325 mg, oral, Daily, Hanh Francois MD, 325 mg at 03/31/20 0822  •  bumetanide (BUMEX) tablet 1 mg, 1 mg, oral, BID (am, 4p), Sherrell Benavides PA C, 1 mg at 03/31/20 0827  •  calcium carbonate (TUMS) chewable tablet 500 mg, 500 mg, oral, Daily PRN, Hanh Francois MD  •  dilTIAZem CD (CARDIZEM CD) 24 hr ER capsule 180 mg, 180 mg, oral, Daily, Zoya Schmitz DO, 180 mg at 03/31/20 0822  •  diphenhydrAMINE (BENADRYL) capsule 25 mg, 25 mg, oral, q6h PRN, Mami Angela MD, 25 mg at 03/28/20 1430  •  diphenhydrAMINE (BENADRYL) injection 50 mg, 50 mg, intramuscular, q4h PRN, Mami Angela MD  •  divalproex (DEPAKOTE) tablet,delayed release (DR/EC) 1,500 mg, 1,500 mg, oral, Nightly, aHnh Francois MD, 1,500 mg at 03/31/20 2158  •  divalproex (DEPAKOTE) tablet,delayed release (DR/EC) 250 mg, 250 mg, oral, Daily, Hanh Francois MD, 250 mg at 03/31/20 0823  •  docusate sodium (COLACE) capsule 100 mg, 100 mg, oral, BID, Mami Angela MD, 100 mg at 03/31/20 0822  •  fluticasone propionate (FLONASE) 50 mcg/actuation nasal spray 2 spray, 2 spray, Each Nostril, Daily, Mami Angela MD, 2 spray at 03/31/20 0822  •  haloperidoL (HALDOL) tablet 10 mg, 10 mg, oral, q8h PRN, 10 mg at 03/30/20 1413 **OR** haloperidol lactate (HALDOL) injection 10 mg, 10 mg, intramuscular, q6h PRN, Hanh Francois MD  •   "haloperidoL (HALDOL) tablet 10 mg, 10 mg, oral, TID, Hanh Francois MD, 10 mg at 03/31/20 0822  •  ibuprofen (MOTRIN) tablet 400 mg, 400 mg, oral, q6h PRN, Mami Angela MD  •  LORazepam (ATIVAN) tablet 1 mg, 1 mg, oral, q6h PRN, 1 mg at 03/30/20 1412 **OR** LORazepam (ATIVAN) injection 2 mg, 2 mg, intramuscular, q4h PRN, Mami Angela MD  •  LORazepam (ATIVAN) tablet 1 mg, 1 mg, oral, Nightly, Hanh Francois MD, 1 mg at 03/30/20 2155  •  ondansetron ODT (ZOFRAN-ODT) disintegrating tablet 4 mg, 4 mg, oral, q8h PRN, Mami Angela MD  •  polyethylene glycol (MIRALAX) 17 gram packet 17 g, 17 g, oral, Daily, Hanh Francois MD, 17 g at 03/29/20 0817  •  senna (SENOKOT) tablet 1 tablet, 1 tablet, oral, 2x daily PRN, Hanh Francois MD    Labs:  Results from last 7 days   Lab Units 03/30/20  0815 03/25/20  1015   POTASSIUM mEQ/L 4.5 4.1   SODIUM mEQ/L 139 140   CREATININE mg/dL 0.7 0.7     VPA: 100.5 (H)      Mental Status Evaluation:  Appearance: obese woman, disheveled  Behavior: irritable, loud, staring eye contact.  Speech: loud, pressured  Mood: \"I'm not taking any haldol or ativan\"  Affect: irritable  Thought process: illogical, disorganized, loose. Perseverative on meds  Thought content: medication focused  Insight: poor  Judgement: poor        Assessment/Plan         Jillian Acosta is a 44 y.o. female with a history of bipolar disorder who was brought in to the [facility] ED by her sister for a manic episode. She was admitted on a 201.     BPAD, manic with psychosis     To consider involuntarily committing patient as she is refusing medications and becoming less stable. Will discuss with sister and see if sister can encourage medication compliance  increased abilify to 30mg qday as patient is currently refusing haldol  decrease depakote to 150mg qam and continue 1500mg qhs as VPA level was slightly supratherapeutic at 100.5  continue haldol 10mg TID as patient becomes more irritable in " evenings  continue ativan 1mg qhs  Continue haldol PRN, ativan PRN  Appreciate cardiology input    Hanh Francois MD  4/1/2020   7:43 AM  Time spent with patient: 15 minutes  Time spent on phone with sister: 25 minutes    Met with patient to discuss treatment plan. Patient stated she had spoken with her sisters and that she knew she should take her medications.  She did not want to take ativan and was agreeable to taking the haldol three times a day without ativan. We discussed the possibility of an involuntary commitment because she had been non compliant and she said she did not want that and would take the medication. Will continue increased dose of abilify for now as patient is very willing to take it; if patient takes haldol consistently will decreased dose.

## 2020-04-01 NOTE — PLAN OF CARE
Problem: Adult Behavioral Health Plan of Care  Goal: Develops/Participates in Therapeutic Curtice to Support Successful Transition  Outcome: Progressing  Flowsheets (Taken 4/1/2020 0941)  Individual Goal: Patient will continue to attend social work group 1x per week to address d/c planning needs.     Problem: Adult Behavioral Health Plan of Care  Goal: Optimized Coping Skills in Response to Life Stressors  Outcome: Progressing  Flowsheets (Taken 4/1/2020 0941)  Individual Goal:  and patient will identify two strategies to assist with compliance with outpatient follow-up.

## 2020-04-01 NOTE — PLAN OF CARE
Problem: Adult Behavioral Health Plan of Care  Goal: Plan of Care Review  Outcome: Progressing  Flowsheets (Taken 4/1/2020 1407)  Progress: no change  Plan of Care Reviewed With: patient  Patient Agreement with Plan of Care: agrees  Outcome Summary: Jillian is visible on the unit. No insight into her illness. Refused Haldol this am. Religiously and sexually preoccupied. Did accept Haldol 10 mg po at 1300 and stated she was on the phone but wouldn't specify who she was talking to. Rambling speech. Denies si. Psychotic. Spoke of this nurse being a . Will continue to monitor on q 15 minute checks.  Intervention(s): Offer emotional support as needed, encourage pt to express her feelings to staff.     Problem: Mood Impairment (Manic/Hypomanic Signs/Symptoms)  Goal: Improved Mood Symptoms (Manic/Hypomanic Signs/Symptoms)  Outcome: Progressing  Flowsheets (Taken 4/1/2020 1407)  Individual Goal: Jillian will be compliant with all her medications.  Goal Outcome: progressing  [place] Goal: verbalizes increased insight

## 2020-04-01 NOTE — PLAN OF CARE
"  Problem: Adult Behavioral Health Plan of Care  Goal: Plan of Care Review  Outcome: Progressing  Flowsheets  Taken 4/1/2020 1855  Progress: no change  Outcome Summary: Jillian was visible among community members this shift. Her mood is elevated, suspicious, delusional, and religiously preoccupied. She is disorganized in her thought process. She was loud and rambling at times as well; appearance bizarre. She attended dinner and ate 100% of her meal. Refused 1700 Haldol stating she spoke with psychiatrist and agreed upon am and pm dosing only. Blood pressure was low and patient was encouraged to increase fluids, Bumex held as well. Signed a 72 hour notice at 1855 stating she is \"ready to go.\" Will continue to monitor and offer support on unit  Intervention(s): Monitoring patient q15min for safety, offering emotional support as needed, and monitoring for treatment plan compliance/effectiveness.  Taken 4/1/2020 1850  Plan of Care Reviewed With: patient  Patient Agreement with Plan of Care: agrees     "

## 2020-04-01 NOTE — PLAN OF CARE
Problem: Adult Behavioral Health Plan of Care  Goal: Plan of Care Review  Outcome: Progressing  Flowsheets  Taken 3/31/2020 2115  Progress: no change  Outcome Summary: Jillian was visible among community members this shift. Her mood is labile, becoming increasingly agitated at times, suspicious, delusional, and religiously  preoccupied. She is disorganized in her though process. She was loud and rambling at times as well; appearance bizarre. She attended dinner and ate 100% of her meal. Refused 1700 medications stating she was not taking any more medications after prn Ativan this afternoon being too sedating. Blood pressure was low and patient was encouraged to increase fluids. Will continue to monitor and offer support on unit  Intervention(s): Monitoring patient q15min for safety, offering emotional support as needed, and monitoring for treatment plan compliance/effectiveness.  Taken 3/31/2020 1918  Plan of Care Reviewed With: patient  Patient Agreement with Plan of Care: agrees

## 2020-04-02 PROCEDURE — 63700000 HC SELF-ADMINISTRABLE DRUG: Performed by: HOSPITALIST

## 2020-04-02 PROCEDURE — 12400000 HC ROOM AND CARE SEMIPRIVATE PSYCH

## 2020-04-02 PROCEDURE — 63700000 HC SELF-ADMINISTRABLE DRUG: Performed by: PSYCHIATRY & NEUROLOGY

## 2020-04-02 PROCEDURE — 63700000 HC SELF-ADMINISTRABLE DRUG: Performed by: PHYSICIAN ASSISTANT

## 2020-04-02 PROCEDURE — 99232 SBSQ HOSP IP/OBS MODERATE 35: CPT | Performed by: PSYCHIATRY & NEUROLOGY

## 2020-04-02 RX ORDER — HALOPERIDOL 5 MG/1
15 TABLET ORAL 2 TIMES DAILY
Status: DISCONTINUED | OUTPATIENT
Start: 2020-04-02 | End: 2020-04-07

## 2020-04-02 RX ADMIN — ANTACID TABLETS 500 MG: 500 TABLET, CHEWABLE ORAL at 12:47

## 2020-04-02 RX ADMIN — DILTIAZEM HYDROCHLORIDE 180 MG: 180 CAPSULE, COATED, EXTENDED RELEASE ORAL at 08:29

## 2020-04-02 RX ADMIN — DIVALPROEX SODIUM 125 MG: 125 TABLET, DELAYED RELEASE ORAL at 08:29

## 2020-04-02 RX ADMIN — HALOPERIDOL 15 MG: 5 TABLET ORAL at 17:14

## 2020-04-02 RX ADMIN — ASPIRIN 325 MG: 325 TABLET, DELAYED RELEASE ORAL at 08:29

## 2020-04-02 RX ADMIN — BUMETANIDE 1 MG: 1 TABLET ORAL at 08:29

## 2020-04-02 RX ADMIN — DIVALPROEX SODIUM 1500 MG: 250 TABLET, DELAYED RELEASE ORAL at 21:18

## 2020-04-02 RX ADMIN — ARIPIPRAZOLE 30 MG: 15 TABLET ORAL at 21:18

## 2020-04-02 RX ADMIN — IBUPROFEN 400 MG: 400 TABLET ORAL at 00:58

## 2020-04-02 RX ADMIN — BUMETANIDE 1 MG: 1 TABLET ORAL at 17:14

## 2020-04-02 RX ADMIN — DOCUSATE SODIUM 100 MG: 100 CAPSULE, LIQUID FILLED ORAL at 08:29

## 2020-04-02 RX ADMIN — HALOPERIDOL 15 MG: 5 TABLET ORAL at 08:29
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
## 2020-04-02 NOTE — PLAN OF CARE
"  Problem: Adult Behavioral Health Plan of Care  Goal: Plan of Care Review  Flowsheets (Taken 4/1/2020 2028)  Plan of Care Reviewed With: patient  Patient Agreement with Plan of Care: agrees    Outcome Summary: Jillian is visible on the unit and somewhat engaged with peers. Frequently up at the nursing station this shift. Mood is less labile but elevated. Appears suspicious, paranoid at times and religiously preoccupied. B/l edema +1 in hands and feet. C/o of pain in L foot. Listening to Scientologist music throughout the shift.  Med compliant.     2045: pt was irritable, appeared paranoid of other patients. Stated \"I ain't fucking with this shit.\" When this RN asked pt what happened, pt stated, \"I know what's going on.\"  Provided pt with music. Will ctm.      Intervention(s): Encourage pt to be safe/visible on the unit and compliant with treatment plan this shift.     "
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
## 2020-04-02 NOTE — PLAN OF CARE
Problem: Adult Behavioral Health Plan of Care  Goal: Plan of Care Review  Flowsheets (Taken 4/2/2020 142)  Progress: no change  Plan of Care Reviewed With: other (see comments)  Outcome Summary: Spoke to nursing and pt is doing good with eating, no concerns at this time.     Problem: Adult Behavioral Health Plan of Care  Goal: Plan of Care Review  Flowsheets (Taken 4/2/2020 1425)  Progress: no change  Plan of Care Reviewed With: other (see comments)  Outcome Summary: Per [facility] COVID pt visitation restrictions, RD not able to enter room.  Spoke to nursing and pt is in her room most of the time. Pt is doing good with eating and with no nutrition concerns.  Goals:  Po intake will continue to meet % of needs  Recommendations:  Agree with diet as ordered  Will remain available as needed

## 2020-04-02 NOTE — NURSING NOTE
"01:00: pt woke up and refused CPAP despite encouragement. 1:1 removed from bedside. Pt appears religiously preoccupied, sitting up in bed reading bible passages to this RN. Reads a passage and then states, \"I'm  to God.\" Pt also points to stain on bed and states, \"See, I had a baby.\"  Pt also c/o of pain in R foot, given PRN motrin.  Will ctm.   "

## 2020-04-03 PROCEDURE — 99232 SBSQ HOSP IP/OBS MODERATE 35: CPT | Performed by: PSYCHIATRY & NEUROLOGY

## 2020-04-03 PROCEDURE — 12400000 HC ROOM AND CARE SEMIPRIVATE PSYCH

## 2020-04-03 PROCEDURE — 63700000 HC SELF-ADMINISTRABLE DRUG: Performed by: PSYCHIATRY & NEUROLOGY

## 2020-04-03 PROCEDURE — 63700000 HC SELF-ADMINISTRABLE DRUG: Performed by: HOSPITALIST

## 2020-04-03 PROCEDURE — 63700000 HC SELF-ADMINISTRABLE DRUG: Performed by: PHYSICIAN ASSISTANT

## 2020-04-03 RX ORDER — HALOPERIDOL 5 MG/ML
5 INJECTION INTRAMUSCULAR ONCE AS NEEDED
Status: DISCONTINUED | OUTPATIENT
Start: 2020-04-03 | End: 2020-04-09

## 2020-04-03 RX ORDER — LORAZEPAM 2 MG/1
2 TABLET ORAL ONCE AS NEEDED
Status: COMPLETED | OUTPATIENT
Start: 2020-04-03 | End: 2020-04-05

## 2020-04-03 RX ORDER — HALOPERIDOL 5 MG/1
5 TABLET ORAL EVERY EVENING
Status: DISCONTINUED | OUTPATIENT
Start: 2020-04-03 | End: 2020-04-05

## 2020-04-03 RX ADMIN — ARIPIPRAZOLE 30 MG: 15 TABLET ORAL at 22:29

## 2020-04-03 RX ADMIN — DOCUSATE SODIUM 100 MG: 100 CAPSULE, LIQUID FILLED ORAL at 08:09

## 2020-04-03 RX ADMIN — DIPHENHYDRAMINE HYDROCHLORIDE 25 MG: 25 CAPSULE ORAL at 11:01

## 2020-04-03 RX ADMIN — ASPIRIN 325 MG: 325 TABLET, DELAYED RELEASE ORAL at 08:09

## 2020-04-03 RX ADMIN — HALOPERIDOL 10 MG: 5 TABLET ORAL at 11:01

## 2020-04-03 RX ADMIN — DIVALPROEX SODIUM 125 MG: 125 TABLET, DELAYED RELEASE ORAL at 08:10

## 2020-04-03 RX ADMIN — LORAZEPAM 1 MG: 1 TABLET ORAL at 11:01

## 2020-04-03 RX ADMIN — HALOPERIDOL 15 MG: 5 TABLET ORAL at 17:06

## 2020-04-03 RX ADMIN — DILTIAZEM HYDROCHLORIDE 180 MG: 180 CAPSULE, COATED, EXTENDED RELEASE ORAL at 08:10

## 2020-04-03 RX ADMIN — BUMETANIDE 1 MG: 1 TABLET ORAL at 08:09

## 2020-04-03 RX ADMIN — DIVALPROEX SODIUM 1500 MG: 250 TABLET, DELAYED RELEASE ORAL at 22:30

## 2020-04-03 RX ADMIN — HALOPERIDOL 15 MG: 5 TABLET ORAL at 08:09

## 2020-04-03 RX ADMIN — BUMETANIDE 1 MG: 1 TABLET ORAL at 17:05

## 2020-04-03 RX ADMIN — SENNOSIDES 1 TABLET: 8.6 TABLET, FILM COATED ORAL at 04:36
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
## 2020-04-03 NOTE — PLAN OF CARE
"  Problem: Adult Behavioral Health Plan of Care  Goal: Plan of Care Review  Outcome: Not progressing  Flowsheets (Taken 4/3/2020 1113)  Progress: no change  Plan of Care Reviewed With: patient  Patient Agreement with Plan of Care: agrees  Outcome Summary: Pt is visible on the unit. Compliant with meals and meds. + ah Latter-day and paranoid. Felt the devil was in the corner talking to her. Easily agitated and restless. Throwing this in there air at times and objects falling on the floor. Did go for 303 hearing today with security present. Able to maintain control during hearing and was inappropriately laughing after hearing. Pt was standing behind another male pt and was posturing. Was holding her arms out. Pt stated she \"was going to get him before he gets her\". Bizarre intense stare. Security was notified and pt was given Haldol, Ativan and Benadryl po without resistance. Explained to pt that we are trying to help her to come to staff when feeling agitated. Stomping her foot in recliner at times. Given a lot of reassurance and support. Will continue to monitor.   Intervention(s): Oriented pt to reality based thinking, Monitor for safety and agitation, offer support and reassurance.     Problem: Mood Impairment (Manic/Hypomanic Signs/Symptoms)  Goal: Improved Mood Symptoms (Manic/Hypomanic Signs/Symptoms)  Outcome: Not progressing  Flowsheets (Taken 4/3/2020 1113)  Individual Goal: Jillian will continue taking her medications and prns when offered.  Goal Outcome: not progressing  [place] Goal: other (see comments) (pt is having Latter-day and paranoid halluciantions.)     "

## 2020-04-03 NOTE — PLAN OF CARE
Problem: Adult Behavioral Health Plan of Care  Goal: Plan of Care Review  Outcome: Progressing  Flowsheets (Taken 4/3/2020 1044)  Plan of Care Reviewed With: patient  Patient Agreement with Plan of Care: agrees  Outcome Summary: Patient had 302 303 with [facility] mr on 4/3/20.  Patient was given up to 20 days of inpatient treatment.
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
## 2020-04-03 NOTE — PLAN OF CARE
Problem: Adult Behavioral Health Plan of Care  Goal: Plan of Care Review  Flowsheets (Taken 4/2/2020 1970)  Progress: improving  Plan of Care Reviewed With: patient  Patient Agreement with Plan of Care: agrees  Outcome Summary: Jillian is visible in the milieu and compliant with medications. She remains very labile. She is quick to anger with frequent misperceptions. Banged trash can, attempted to lift dining room table, and threw paper into nurses station. She is easily redirectable, and responds quickly to interventions. She remains religiously preoccupied, delusional and tangential. She endorses feeling sad and homesick. Tearful at times. Denies SI.   Intervention(s): Offered reassurance; Oriented to reality.

## 2020-04-03 NOTE — NURSING NOTE
Patient given senekot 1 tab po at 0430 at her request for hard stools.     [] : No [0] : 2) Feeling down, depressed, or hopeless: Not at all (0) [de-identified] : endocrino [[id number]] : o

## 2020-04-04 PROCEDURE — 63700000 HC SELF-ADMINISTRABLE DRUG: Performed by: PSYCHIATRY & NEUROLOGY

## 2020-04-04 PROCEDURE — 63700000 HC SELF-ADMINISTRABLE DRUG: Performed by: PHYSICIAN ASSISTANT

## 2020-04-04 PROCEDURE — 99232 SBSQ HOSP IP/OBS MODERATE 35: CPT | Performed by: PSYCHIATRY & NEUROLOGY

## 2020-04-04 PROCEDURE — 12400000 HC ROOM AND CARE SEMIPRIVATE PSYCH

## 2020-04-04 PROCEDURE — 63700000 HC SELF-ADMINISTRABLE DRUG: Performed by: HOSPITALIST

## 2020-04-04 RX ADMIN — ARIPIPRAZOLE 30 MG: 15 TABLET ORAL at 21:52

## 2020-04-04 RX ADMIN — DILTIAZEM HYDROCHLORIDE 180 MG: 180 CAPSULE, COATED, EXTENDED RELEASE ORAL at 08:28

## 2020-04-04 RX ADMIN — DIVALPROEX SODIUM 1500 MG: 250 TABLET, DELAYED RELEASE ORAL at 21:53

## 2020-04-04 RX ADMIN — BUMETANIDE 1 MG: 1 TABLET ORAL at 08:28

## 2020-04-04 RX ADMIN — HALOPERIDOL 5 MG: 5 TABLET ORAL at 15:01

## 2020-04-04 RX ADMIN — BUMETANIDE 1 MG: 1 TABLET ORAL at 15:01

## 2020-04-04 RX ADMIN — HALOPERIDOL 15 MG: 5 TABLET ORAL at 08:28

## 2020-04-04 RX ADMIN — DOCUSATE SODIUM 100 MG: 100 CAPSULE, LIQUID FILLED ORAL at 17:19

## 2020-04-04 RX ADMIN — HALOPERIDOL 15 MG: 5 TABLET ORAL at 17:19

## 2020-04-04 RX ADMIN — DOCUSATE SODIUM 100 MG: 100 CAPSULE, LIQUID FILLED ORAL at 08:28

## 2020-04-04 RX ADMIN — DIVALPROEX SODIUM 125 MG: 125 TABLET, DELAYED RELEASE ORAL at 08:28

## 2020-04-04 RX ADMIN — ASPIRIN 325 MG: 325 TABLET, DELAYED RELEASE ORAL at 08:28

## 2020-04-04 NOTE — PLAN OF CARE
Problem: Adult Behavioral Health Plan of Care  Goal: Plan of Care Review  Flowsheets (Taken 4/3/2020 2121)  Progress: no change  Plan of Care Reviewed With: patient  Patient Agreement with Plan of Care: agrees    Outcome Summary: Pt is visible on the unit and frequently up at the nursing station.  Labile, elevated and excitable. Laughing and giggling inappropriately. Disorganized, tangential and rambling at times. Did not endorse Hinduism/paranoid delusions at this time but appears preoccupied at times.  Listening to music.  Will ctm.   Intervention(s): Encouraged pt to be visible and to practice coping skills.
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
## 2020-04-04 NOTE — PLAN OF CARE
Problem: Adult Behavioral Health Plan of Care  Goal: Plan of Care Review  Flowsheets (Taken 4/4/2020 1627)  Progress: improving  Plan of Care Reviewed With: patient  Patient Agreement with Plan of Care: agrees  Outcome Summary: Jillian was visible and social this shift.  Accepted medications as ordered.  No aggressive outbursts this shift.  Remains bizarre and delusional.  Continues to states this writer and other patients on the unit are people she associates with outside of the hospital  Laughing loudly.  Edema in bilateral hands and feet noted.  Intervention(s): 1:1 support given, encouraged Jillian to accept medications     Problem: Mood Impairment (Manic/Hypomanic Signs/Symptoms)  Goal: Improved Mood Symptoms (Manic/Hypomanic Signs/Symptoms)  Flowsheets (Taken 4/4/2020 1622)  Individual Goal: Jillian will accept medications as scheduled  Goal Outcome: progressing  [place] Goal: verbalizes increased insight     Problem: Mood Impairment (Manic/Hypomanic Signs/Symptoms)  Goal: Improved Mood Symptoms (Manic/Hypomanic Signs/Symptoms)  Flowsheets (Taken 4/4/2020 1622)  Individual Goal: Jillian will accept medications as scheduled  Goal Outcome: progressing  [place] Goal: verbalizes increased insight
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
## 2020-04-05 PROCEDURE — 63700000 HC SELF-ADMINISTRABLE DRUG: Performed by: PSYCHIATRY & NEUROLOGY

## 2020-04-05 PROCEDURE — 63700000 HC SELF-ADMINISTRABLE DRUG: Performed by: HOSPITALIST

## 2020-04-05 PROCEDURE — 12400000 HC ROOM AND CARE SEMIPRIVATE PSYCH

## 2020-04-05 PROCEDURE — 99232 SBSQ HOSP IP/OBS MODERATE 35: CPT | Performed by: PSYCHIATRY & NEUROLOGY

## 2020-04-05 PROCEDURE — 63700000 HC SELF-ADMINISTRABLE DRUG: Performed by: PHYSICIAN ASSISTANT

## 2020-04-05 RX ORDER — HALOPERIDOL 5 MG/1
10 TABLET ORAL EVERY EVENING
Status: DISCONTINUED | OUTPATIENT
Start: 2020-04-05 | End: 2020-04-07

## 2020-04-05 RX ORDER — ARIPIPRAZOLE 20 MG/1
20 TABLET ORAL NIGHTLY
Status: DISCONTINUED | OUTPATIENT
Start: 2020-04-05 | End: 2020-04-06

## 2020-04-05 RX ADMIN — HALOPERIDOL 15 MG: 5 TABLET ORAL at 08:18

## 2020-04-05 RX ADMIN — BUMETANIDE 1 MG: 1 TABLET ORAL at 17:02

## 2020-04-05 RX ADMIN — HALOPERIDOL 10 MG: 5 TABLET ORAL at 17:02

## 2020-04-05 RX ADMIN — ARIPIPRAZOLE 20 MG: 20 TABLET ORAL at 23:48

## 2020-04-05 RX ADMIN — DILTIAZEM HYDROCHLORIDE 180 MG: 180 CAPSULE, COATED, EXTENDED RELEASE ORAL at 08:18

## 2020-04-05 RX ADMIN — DOCUSATE SODIUM 100 MG: 100 CAPSULE, LIQUID FILLED ORAL at 08:18

## 2020-04-05 RX ADMIN — DOCUSATE SODIUM 100 MG: 100 CAPSULE, LIQUID FILLED ORAL at 17:02

## 2020-04-05 RX ADMIN — DIVALPROEX SODIUM 125 MG: 125 TABLET, DELAYED RELEASE ORAL at 08:18

## 2020-04-05 RX ADMIN — HALOPERIDOL 15 MG: 5 TABLET ORAL at 23:47

## 2020-04-05 RX ADMIN — LORAZEPAM 2 MG: 2 TABLET ORAL at 08:18

## 2020-04-05 RX ADMIN — BUMETANIDE 1 MG: 1 TABLET ORAL at 08:19

## 2020-04-05 RX ADMIN — ASPIRIN 325 MG: 325 TABLET, DELAYED RELEASE ORAL at 08:18

## 2020-04-05 RX ADMIN — DIVALPROEX SODIUM 1500 MG: 250 TABLET, DELAYED RELEASE ORAL at 23:47
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
## 2020-04-05 NOTE — PLAN OF CARE
Problem: Adult Behavioral Health Plan of Care  Goal: Plan of Care Review  Outcome: Progressing  Flowsheets (Taken 4/5/2020 1856)  Progress: improving  Plan of Care Reviewed With: patient  Patient Agreement with Plan of Care: agrees  Outcome Summary: Jillian remains labile, had no loud outburst from 3p-7p, laughs to self, RIS, disorganized, requires frequent verbal redirection, lacks insight, impulsive, took med's with a great deal of encouragement, insists the med's are making her drool (no drooling noted).  Ate 100% of her dinner.  Will continue to monitor and offer support.  Intervention(s): Jillian will comply with taking prescribed medications and remain in control.     Problem: Mood Impairment (Manic/Hypomanic Signs/Symptoms)  Goal: Improved Mood Symptoms (Manic/Hypomanic Signs/Symptoms)  Outcome: Progressing  Flowsheets (Taken 4/5/2020 1856)  Goal Outcome: progressing  [place] Goal: acknowledges progress

## 2020-04-05 NOTE — PLAN OF CARE
Problem: Adult Behavioral Health Plan of Care  Goal: Plan of Care Review  Outcome: Progressing  Flowsheets (Taken 4/4/2020 2305)  Progress: improving  Plan of Care Reviewed With: patient  Patient Agreement with Plan of Care: agrees  Outcome Summary: Jillian was visible on the unit, frequently seeking out staff, still very disorganized and labile. She was med compliant worried about the 1:1 that has been here before who she has talked at length too before. She was sound asleep and did become upset when her cpap was brought in and began to cry and carry on bc I did not knock on her door first. Patient was able to calm down. She showered tonight and was med compliant and stated she was tired and going to bed. Will continue to monitor for safety and needs.  Intervention(s): Jillian encouraged to let her needs be known to nursing and to get a good night of sleep.

## 2020-04-05 NOTE — PLAN OF CARE
Problem: Adult Behavioral Health Plan of Care  Goal: Plan of Care Review  Flowsheets (Taken 4/5/2020 1420)  Progress: no change  Plan of Care Reviewed With: patient  Patient Agreement with Plan of Care: agrees  Outcome Summary: Jillian was visible this shift.  She was highly labile, threatening peers, and posturing toward them this AM.  Delusional thinking.  Calling peers her family members and yelling at them about issues she has with these family members.  Cursing.  Restless and Difficult to redirect.  Making phone calls, yelling at her parents.  Accepted all scheduled medications.  PRN Ativan given with good result.  Jillian slept off and off this shift, irritable when she would awaken.  Eating full meals.  No complaints of pain.  Denies SI  Intervention(s): 1:1 support given, encouraged patient to remain in control, encouraged to rest in room     Problem: Mood Impairment (Manic/Hypomanic Signs/Symptoms)  Goal: Improved Mood Symptoms (Manic/Hypomanic Signs/Symptoms)  Flowsheets  Taken 4/4/2020 1622  Individual Goal: Jillian will accept medications as scheduled  Taken 4/5/2020 1420  Goal Outcome: progressing  [place] Goal: identifies thought distortion
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
## 2020-04-05 NOTE — NURSING NOTE
Jillian complained many times tonight about smelling smoke and mariajuana and saying that she could not breathe if there was a fire or mariajuana she was assured that there was neither on the unit.

## 2020-04-06 PROCEDURE — 63700000 HC SELF-ADMINISTRABLE DRUG: Performed by: PSYCHIATRY & NEUROLOGY

## 2020-04-06 PROCEDURE — 12400000 HC ROOM AND CARE SEMIPRIVATE PSYCH

## 2020-04-06 PROCEDURE — 63700000 HC SELF-ADMINISTRABLE DRUG: Performed by: HOSPITALIST

## 2020-04-06 PROCEDURE — 93005 ELECTROCARDIOGRAM TRACING: CPT | Performed by: STUDENT IN AN ORGANIZED HEALTH CARE EDUCATION/TRAINING PROGRAM

## 2020-04-06 PROCEDURE — 63700000 HC SELF-ADMINISTRABLE DRUG: Performed by: STUDENT IN AN ORGANIZED HEALTH CARE EDUCATION/TRAINING PROGRAM

## 2020-04-06 PROCEDURE — 63700000 HC SELF-ADMINISTRABLE DRUG: Performed by: PHYSICIAN ASSISTANT

## 2020-04-06 RX ORDER — LORAZEPAM 2 MG/1
2 TABLET ORAL EVERY 6 HOURS PRN
Status: DISCONTINUED | OUTPATIENT
Start: 2020-04-06 | End: 2020-05-06 | Stop reason: HOSPADM

## 2020-04-06 RX ORDER — ZIPRASIDONE HYDROCHLORIDE 20 MG/1
20 CAPSULE ORAL 2 TIMES DAILY WITH MEALS
Status: DISCONTINUED | OUTPATIENT
Start: 2020-04-06 | End: 2020-04-07

## 2020-04-06 RX ORDER — LORAZEPAM 2 MG/ML
2 INJECTION INTRAMUSCULAR EVERY 4 HOURS PRN
Status: DISCONTINUED | OUTPATIENT
Start: 2020-04-06 | End: 2020-05-06 | Stop reason: HOSPADM

## 2020-04-06 RX ADMIN — ASPIRIN 325 MG: 325 TABLET, DELAYED RELEASE ORAL at 08:27

## 2020-04-06 RX ADMIN — BUMETANIDE 1 MG: 1 TABLET ORAL at 08:27

## 2020-04-06 RX ADMIN — LORAZEPAM 2 MG: 2 TABLET ORAL at 23:33

## 2020-04-06 RX ADMIN — IBUPROFEN 400 MG: 400 TABLET ORAL at 03:40

## 2020-04-06 RX ADMIN — LORAZEPAM 2 MG: 2 TABLET ORAL at 11:53

## 2020-04-06 RX ADMIN — DIVALPROEX SODIUM 125 MG: 125 TABLET, DELAYED RELEASE ORAL at 08:27

## 2020-04-06 RX ADMIN — DILTIAZEM HYDROCHLORIDE 180 MG: 180 CAPSULE, COATED, EXTENDED RELEASE ORAL at 08:27

## 2020-04-06 RX ADMIN — ZIPRASIDONE HYDROCHLORIDE 20 MG: 20 CAPSULE ORAL at 17:58

## 2020-04-06 RX ADMIN — DIVALPROEX SODIUM 1500 MG: 250 TABLET, DELAYED RELEASE ORAL at 20:46

## 2020-04-06 RX ADMIN — BUMETANIDE 1 MG: 1 TABLET ORAL at 17:25

## 2020-04-06 RX ADMIN — HALOPERIDOL 15 MG: 5 TABLET ORAL at 08:27

## 2020-04-06 RX ADMIN — HALOPERIDOL 15 MG: 5 TABLET ORAL at 23:39

## 2020-04-06 RX ADMIN — FLUTICASONE PROPIONATE 2 SPRAY: 50 SPRAY, METERED NASAL at 08:30

## 2020-04-06 NOTE — PROGRESS NOTES
"Psychiatry Progress Note    Chief Complaint/Reason for follow-up: Psychosis    Interval History: Still irritable. Poor sleep, 1.5 hours. Complains of drooling but refuses cogentin. Altercations and intrusive relationships with other patients. Required PRN ativan due to intrusiveness, attempting to elope from unit.     On exam, approaches me and asks \"are you my new psychiatrist?.\" Odd affect, disorganized behavior. Tries to do interview with karely noise-cancelling headphones on with music playing. Does not seem to understand that this could be a distraction. Says she is doing better. When asked how she says she wants to switch to Geodon because Abilify \"makes me slobber.\" Rambles with loose associations \"skittles, blue sugar antioxidants, blueberries.\"  Endorses that things are good. Talks about another patient, who she said she got confused with a best friend at home. Denies medication side effects.    I called patient's sisters and mother to update them, no response. Left message for call back.,     Review of Systems:   Sleep:  Fair, Appetite: Fair and GI: No complaints    Vital Signs for the last 24 hours:  Temp:  [36.3 °C (97.3 °F)-36.8 °C (98.2 °F)] 36.8 °C (98.2 °F)  Heart Rate:  [62-93] 93  Resp:  [18-20] 18  BP: (109-120)/(58-72) 109/72    Labs:  I have reviewed the patient's labs.  Current labs are within normal limits.  .5     Mental Status Exam:  Appearance: unkempt, disheveled, inappropriate attire and obese  Gait and Motor: no abnormal movements  Speech: excessive  Mood: 'good'  Affect: expansive and incongruent  Associations: illogical and loose  Thought Process: flight of ideas, tangential and circumstantial  Thought Content: grandiosity, hyperreligiosity and delusions  Suicidality/Homicidality: denies  Judgement/Insight: minimizes severity level of illness and poor barriers  Orientation: month, year and type of place  Memory: recalls recent events and recalls remote events  Attention: " distracted  Knowledge: normal  Language: normal    Assessment/Plan      45 y/o female pphx: bipolar disorder who was admitted through the ED in manic episode. Admitted on a 201, now on a 303 commitment. Continues to have reduced need for sleep, mood lability, delusional thinking and limited insight/judgement.     Bipolar Disorder, current episode manic w/ psychosis  -D/C abilify 20 mg daily- cross-tapering from Abilify to Geodon  -Start Geodon 20 mg BID w/ meals  -Continue depakote to 150mg qam and continue 1500mg qhs. Level is 100.5 on 4/6/20  -Continue haldol 15mg Qam and 25 mg QHS  -Continue haldol PRN, ativan PRN increased to 2 mg   - Will recheck QTc given need for 2 antipsychotics, T/C thorazine PRN if continued agitation  Appreciate cardiology input

## 2020-04-06 NOTE — NURSING NOTE
Jillian decided to sleep in the day room this evening and refused her CPAP. Will continue to monitor.

## 2020-04-06 NOTE — PLAN OF CARE
Problem: Adult Behavioral Health Plan of Care  Goal: Plan of Care Review  Flowsheets (Taken 4/6/2020 6052)  Progress: improving  Plan of Care Reviewed With: patient  Patient Agreement with Plan of Care: agrees  Outcome Summary: Jillian has been visible on unit. First half of shift Jillian was able to keep in control. At 1150 Jillian began getting increasingly agitated, banging things, yelling out. Pt was given Ativan 2mg at this time. Security was called just for presence on unit while encouraging pt to take po meds. Jillian was compliant with the Ativan. Remains labile but redirectable. Attended about 20 minutes of afternoon group. Showered. Pleasant cooperative  Intervention(s): Encouraged pt to remain medciatins compliant

## 2020-04-06 NOTE — PLAN OF CARE
Problem: Adult Behavioral Health Plan of Care  Goal: Plan of Care Review  Outcome: Progressing  Flowsheets  Taken 4/6/2020 1948  Progress: improving  Outcome Summary: Jillian was visible among community members this earlier shift. Her mood is labile. She remains suspicious, delusional, and religiously preoccupied. She also continues to be disorganized in her thought process. She was loud and hyper verbal with slurred speech. Her appearance is bizarre. She attended dinner and ate 100% of her meal. Refused 1700 Haldol stating she was too sedated. Also refused Colace. Cooperative with staff this evening. Went to room to sleep early. Will continue to monitor and offer support on unit  Intervention(s): Monitoring patient q15min for safety, offering emotional support as needed, and monitoring for treatment plan compliance/effectiveness.  Taken 4/6/2020 1827  Plan of Care Reviewed With: patient  Patient Agreement with Plan of Care: agrees

## 2020-04-06 NOTE — PLAN OF CARE
Problem: Adult Behavioral Health Plan of Care  Goal: Plan of Care Review  Outcome: Progressing  Flowsheets (Taken 4/5/2020 2156)  Progress: improving  Plan of Care Reviewed With: patient  Patient Agreement with Plan of Care: agrees  Outcome Summary: Jillian is visible on the unit, remains labile, loud, disorganized and still constantly seeking out staff. She has been relatively calm so far this shift and seems to be intrigued by new admit rm 13. She is med compliant. Will continue to monitor for safety and needs.  Intervention(s): Jillian was encouraged to enjoy her music without signing loudly and disrupting the rest of the community.

## 2020-04-07 LAB
ANION GAP SERPL CALC-SCNC: 9 MEQ/L (ref 3–15)
ATRIAL RATE: 84
BUN SERPL-MCNC: 16 MG/DL (ref 8–20)
CALCIUM SERPL-MCNC: 8.8 MG/DL (ref 8.9–10.3)
CHLORIDE SERPL-SCNC: 103 MEQ/L (ref 98–109)
CO2 SERPL-SCNC: 28 MEQ/L (ref 22–32)
CREAT SERPL-MCNC: 0.7 MG/DL (ref 0.6–1.1)
GFR SERPL CREATININE-BSD FRML MDRD: >60 ML/MIN/1.73M*2
GLUCOSE SERPL-MCNC: 108 MG/DL (ref 70–99)
MAGNESIUM SERPL-MCNC: 2 MG/DL (ref 1.8–2.5)
P AXIS: 58
POTASSIUM SERPL-SCNC: 4.2 MEQ/L (ref 3.6–5.1)
PR INTERVAL: 158
QRS DURATION: 98
QT INTERVAL: 390
QTC CALCULATION(BAZETT): 460
R AXIS: 36
SODIUM SERPL-SCNC: 140 MEQ/L (ref 136–144)
T WAVE AXIS: 32
VALPROATE SERPL-MCNC: 80.3 UG/ML (ref 50–100)
VENTRICULAR RATE: 84

## 2020-04-07 PROCEDURE — 63700000 HC SELF-ADMINISTRABLE DRUG: Performed by: STUDENT IN AN ORGANIZED HEALTH CARE EDUCATION/TRAINING PROGRAM

## 2020-04-07 PROCEDURE — 63700000 HC SELF-ADMINISTRABLE DRUG: Performed by: PSYCHIATRY & NEUROLOGY

## 2020-04-07 PROCEDURE — 83735 ASSAY OF MAGNESIUM: CPT | Performed by: PHYSICIAN ASSISTANT

## 2020-04-07 PROCEDURE — 80164 ASSAY DIPROPYLACETIC ACD TOT: CPT | Performed by: PSYCHIATRY & NEUROLOGY

## 2020-04-07 PROCEDURE — 36415 COLL VENOUS BLD VENIPUNCTURE: CPT | Performed by: PSYCHIATRY & NEUROLOGY

## 2020-04-07 PROCEDURE — 63700000 HC SELF-ADMINISTRABLE DRUG: Performed by: HOSPITALIST

## 2020-04-07 PROCEDURE — 63700000 HC SELF-ADMINISTRABLE DRUG: Performed by: PHYSICIAN ASSISTANT

## 2020-04-07 PROCEDURE — 80048 BASIC METABOLIC PNL TOTAL CA: CPT | Performed by: PHYSICIAN ASSISTANT

## 2020-04-07 PROCEDURE — 99232 SBSQ HOSP IP/OBS MODERATE 35: CPT | Mod: 95 | Performed by: PSYCHIATRY & NEUROLOGY

## 2020-04-07 PROCEDURE — 12400000 HC ROOM AND CARE SEMIPRIVATE PSYCH

## 2020-04-07 RX ORDER — HALOPERIDOL 5 MG/1
10 TABLET ORAL 2 TIMES DAILY
Status: DISCONTINUED | OUTPATIENT
Start: 2020-04-07 | End: 2020-04-09

## 2020-04-07 RX ORDER — HYDROCORTISONE 1 %
CREAM (GRAM) TOPICAL 2 TIMES DAILY PRN
Status: DISCONTINUED | OUTPATIENT
Start: 2020-04-07 | End: 2020-05-06 | Stop reason: HOSPADM

## 2020-04-07 RX ORDER — LORAZEPAM 2 MG/1
2 TABLET ORAL NIGHTLY
Status: DISCONTINUED | OUTPATIENT
Start: 2020-04-07 | End: 2020-04-10

## 2020-04-07 RX ORDER — ZIPRASIDONE HYDROCHLORIDE 40 MG/1
40 CAPSULE ORAL 2 TIMES DAILY WITH MEALS
Status: DISCONTINUED | OUTPATIENT
Start: 2020-04-07 | End: 2020-04-09

## 2020-04-07 RX ADMIN — DOCUSATE SODIUM 100 MG: 100 CAPSULE, LIQUID FILLED ORAL at 08:32

## 2020-04-07 RX ADMIN — HALOPERIDOL 10 MG: 5 TABLET ORAL at 22:49

## 2020-04-07 RX ADMIN — DIVALPROEX SODIUM 1500 MG: 250 TABLET, DELAYED RELEASE ORAL at 22:10

## 2020-04-07 RX ADMIN — DILTIAZEM HYDROCHLORIDE 180 MG: 180 CAPSULE, COATED, EXTENDED RELEASE ORAL at 08:32

## 2020-04-07 RX ADMIN — ASPIRIN 325 MG: 325 TABLET, DELAYED RELEASE ORAL at 08:32

## 2020-04-07 RX ADMIN — BUMETANIDE 1 MG: 1 TABLET ORAL at 17:47

## 2020-04-07 RX ADMIN — ZIPRASIDONE HYDROCHLORIDE 20 MG: 20 CAPSULE ORAL at 08:32

## 2020-04-07 RX ADMIN — HALOPERIDOL 15 MG: 5 TABLET ORAL at 08:31

## 2020-04-07 RX ADMIN — LORAZEPAM 2 MG: 2 TABLET ORAL at 22:49

## 2020-04-07 RX ADMIN — DIVALPROEX SODIUM 125 MG: 125 TABLET, DELAYED RELEASE ORAL at 09:00

## 2020-04-07 RX ADMIN — ZIPRASIDONE HYDROCHLORIDE 40 MG: 40 CAPSULE ORAL at 16:16

## 2020-04-07 RX ADMIN — BUMETANIDE 1 MG: 1 TABLET ORAL at 08:36

## 2020-04-07 NOTE — NURSING NOTE
Patient up at start of shift, refusing to wear C-pap, overly bright,, manic, asked for something to sleep, given haldol 15mg po her HS dose she refused and ativan 2 mg po at 2340. Patient calmed down and did let c-pap to be put on about 0015.

## 2020-04-07 NOTE — ASSESSMENT & PLAN NOTE
D/C geodon 80 mg BID for ineffectiveness  Lithium 300 md BID  Seroquel 200 mg HS doer mood stability  Depakote 1625mg for mood stability.  D/C Tegretol 200 mg BID for concern for worsening edema  Ativan for anxiety.

## 2020-04-07 NOTE — PROGRESS NOTES
"Psychiatry Progress Note    Chief Complaint/Reason for follow-up: Manic Episode w/ psychosis    Interval History: Mood labile. Remains suspicious. Loud and hyperverbal. Good appetite. Refused 1700 Haldol. Ativan 2 mg PO at 2340. Refused CPAP then resumed 0015.     On exam, initially sleeping in her room. Did not want to be disturbed. Says she is feeling better because she is resting more. Less delusional content during conversation. More logical discussion about getting better so she is able to return to caring for her parents. Worries about them while she is in the hospital. Happy with change in medications to reduce haldol/abilify and start Geodon.     Review of Systems:   Sleep:  Good, Appetite: Fair and GI: No complaints    Vital Signs for the last 24 hours:  Temp:  [36.3 °C (97.4 °F)-36.8 °C (98.2 °F)] 36.6 °C (97.8 °F)  Heart Rate:  [85-96] 94  Resp:  [16-18] 16  BP: (101-148)/(54-72) 101/54    Labs:  I have reviewed the patient's labs.  Current labs are within normal limits.     EKG 4/6/20: QTc 460 w/ HR 84    Mental Status Exam:  Appearance: unkempt, disheveled, inappropriate attire and obese  Gait and Motor: no abnormal movements  Speech: normal rate, slightly slurred  Mood: \"better\"  Affect: mood congruent, bizarre at times  Associations: illogical   Thought Process: tangential and circumstantial  Thought Content:  hyperreligiosity and delusions  Suicidality/Homicidality: denies  Judgement/Insight: minimizes severity level of illness and poor barriers  Orientation: month, year and type of place  Memory: recalls recent events and recalls remote events  Attention: distracted  Knowledge: normal  Language: normal    VTE Assessment: I have reassessed and the patient's VTE risk and treatment plan is appropriate.    Assessment/Plan    43 y/o female pphx: bipolar disorder who was admitted through the ED in manic episode. Admitted on a 201, now on a 303 commitment. Still with mood lability, delusional thinking and " limited insight/judgement. Less intrusive and disorganized since change from abilify to geodon. Sleep is improved and calmer on unit.       Bipolar Disorder, current episode manic w/ psychosis  -D/C abilify 20 mg daily- cross-tapering from Abilify to Geodon  -Increase Geodon to 40 mg BID with meals  -Continue depakote to 150mg qam and continue 1500mg qhs. Level is 100.5 on 4/6/20  -Decrease Haldol to 10 mg BID  -Start ativan 2 mg QHS for sleep  -Continue haldol PRN, ativan PRN increased to 2 mg   - QTc 460 with HR of 84 on 4/6/20 , T/C thorazine PRN if continued agitation  Appreciate cardiology input    Disposition  -on a 20 day commitment from 4/3/20  -will work with family for ultimate disposition, independent at baseline  -would benefit from family involvement for support

## 2020-04-07 NOTE — PLAN OF CARE
Problem: Adult Behavioral Health Plan of Care  Goal: Plan of Care Review  Outcome: Progressing  Flowsheets (Taken 4/7/2020 1518)  Plan of Care Reviewed With: patient  Patient Agreement with Plan of Care: agrees  Outcome Summary: Jillian is visible on the unit. Pressured and loud at times but less irritable. Spoke of sleeping better. Compliant with meds and meals. Resting in room at times. Adl's need improvement but bizarre dress. Religiously preoccupied. Will continue to monitor on q 15 minute checks.   Intervention(s): Offer emotional support as needed, Monitor for sfatey.     Problem: Mood Impairment (Manic/Hypomanic Signs/Symptoms)  Goal: Improved Mood Symptoms (Manic/Hypomanic Signs/Symptoms)  Outcome: Progressing  Flowsheets (Taken 4/7/2020 1518)  Individual Goal: Jillian will be compliant with medications.  Goal Outcome: progressing  [place] Goal: acknowledges progress

## 2020-04-07 NOTE — PROGRESS NOTES
TELEHEALTH SERVICE  This exam was done through synchronous audio-video services.  The patient consented to the telehealth exam.  The provider was located in [place], [place] and the patient was located in : [facility]  Dept: [phone]  The following other people were present during the exam: LESLEY Greco      PSYCHIATRIC PROGRESS NOTE    Chief Complaint/Reason for follow-up: Radha w/ psychosis    Interval History: Dr. Leon asked that patient be seen for another opinion regarding medications over objection if needed. Pt admitted on 3/22/2020. Pt now on Haldol 10 BID, VPA 1625, Ativan 2mg HS, Geodon 40mg BID w/ meals. Pt was on Abilify but no response to this medication.  She has at times refused medications, and is currently on 303.  She said her mood is good. TP - disorganzied. She had delusions about the devil, and told story about buying a carton on cigarettes.  Worried about her parents. She denies any side effects to medications. She feels Geodon has been helpful. She denied SI/HI. Fair sleep.     Vital Signs for the last 24 hours:  Temp:  [36.3 °C (97.4 °F)-36.6 °C (97.8 °F)] 36.3 °C (97.4 °F)  Heart Rate:  [82-96] 87  Resp:  [16] 16  BP: (101-148)/(52-73) 124/73    Scheduled Meds:  • aspirin  325 mg oral Daily   • bumetanide  1 mg oral BID (am, 4p)   • dilTIAZem CD  180 mg oral Daily   • divalproex  1,500 mg oral Nightly   • divalproex  125 mg oral Daily   • docusate sodium  100 mg oral BID   • fluticasone propionate  2 spray Each Nostril Daily   • haloperidoL  10 mg oral BID   • LORazepam  2 mg oral Nightly   • polyethylene glycol  17 g oral Daily   • ziprasidone  40 mg oral BID with meals       Labs:  Results from last 7 days   Lab Units 04/07/20  0842   POTASSIUM mEQ/L 4.2   SODIUM mEQ/L 140   CREATININE mg/dL 0.7       MENTAL STATUS EXAM  Pt w/ fair grooming, obese, causal dress. Speech was fluent and nl volume. Mood 'good'. Affect - smiles throughout interview in nervous way.  TP -  disorganziation.  TC - talked about devils, Caodaism topics, authority.  Denies SI/HI.  Oriented to hospital.    Assessment/Plan  * Bipolar 1 disorder (CMS/[facility])  Assessment & Plan  Agree w Attending/Resident plan as below  -D/C abilify 20 mg daily- cross-tapering from Abilify to Geodon  -Increase Geodon to 40 mg BID with meals  -Continue depakote to 150mg qam and continue 1500mg qhs. Level is 100.5 on 4/6/20  -Decrease Haldol to 10 mg BID  -Start ativan 2 mg QHS for sleep  -Continue haldol PRN, ativan PRN increased to 2 mg   - QTc 460 with HR of 84 on 4/6/20 , T/C thorazine PRN if continued agitation  Appreciate cardiology input.  Follow QTc    If patient refuses to take medications, recommend staff encouragement and education about treatment.  IF patient continues to refuse, then Haldol and/or Geodon can be given IM; monitor for excess sedation if given and follow QTc as appropriate.

## 2020-04-08 LAB
ATRIAL RATE: 82
P AXIS: 70
PR INTERVAL: 156
QRS DURATION: 106
QT INTERVAL: 382
QTC CALCULATION(BAZETT): 446
R AXIS: 37
T WAVE AXIS: 34
VENTRICULAR RATE: 82

## 2020-04-08 PROCEDURE — 63700000 HC SELF-ADMINISTRABLE DRUG: Performed by: HOSPITALIST

## 2020-04-08 PROCEDURE — 63700000 HC SELF-ADMINISTRABLE DRUG: Performed by: PHYSICIAN ASSISTANT

## 2020-04-08 PROCEDURE — 93005 ELECTROCARDIOGRAM TRACING: CPT | Performed by: PSYCHIATRY & NEUROLOGY

## 2020-04-08 PROCEDURE — 99232 SBSQ HOSP IP/OBS MODERATE 35: CPT | Performed by: PSYCHIATRY & NEUROLOGY

## 2020-04-08 PROCEDURE — 63700000 HC SELF-ADMINISTRABLE DRUG: Performed by: PSYCHIATRY & NEUROLOGY

## 2020-04-08 PROCEDURE — 12400000 HC ROOM AND CARE SEMIPRIVATE PSYCH

## 2020-04-08 PROCEDURE — 63700000 HC SELF-ADMINISTRABLE DRUG: Performed by: STUDENT IN AN ORGANIZED HEALTH CARE EDUCATION/TRAINING PROGRAM

## 2020-04-08 RX ORDER — CHLORPROMAZINE HCI 25 MG/ML
12.5 INJECTION INTRAMUSCULAR 2 TIMES DAILY PRN
Status: DISCONTINUED | OUTPATIENT
Start: 2020-04-08 | End: 2020-04-10

## 2020-04-08 RX ORDER — ZIPRASIDONE MESYLATE 20 MG/ML
10 INJECTION, POWDER, LYOPHILIZED, FOR SOLUTION INTRAMUSCULAR 2 TIMES DAILY PRN
Status: DISCONTINUED | OUTPATIENT
Start: 2020-04-08 | End: 2020-04-10

## 2020-04-08 RX ORDER — CHLORPROMAZINE HYDROCHLORIDE 50 MG/1
25 TABLET, FILM COATED ORAL 2 TIMES DAILY PRN
Status: DISCONTINUED | OUTPATIENT
Start: 2020-04-08 | End: 2020-04-10

## 2020-04-08 RX ADMIN — DOCUSATE SODIUM 100 MG: 100 CAPSULE, LIQUID FILLED ORAL at 09:06

## 2020-04-08 RX ADMIN — BUMETANIDE 1 MG: 1 TABLET ORAL at 16:32

## 2020-04-08 RX ADMIN — ZIPRASIDONE HYDROCHLORIDE 40 MG: 40 CAPSULE ORAL at 16:53

## 2020-04-08 RX ADMIN — DIVALPROEX SODIUM 1500 MG: 250 TABLET, DELAYED RELEASE ORAL at 22:03

## 2020-04-08 RX ADMIN — ANTACID TABLETS 500 MG: 500 TABLET, CHEWABLE ORAL at 11:56

## 2020-04-08 RX ADMIN — DOCUSATE SODIUM 100 MG: 100 CAPSULE, LIQUID FILLED ORAL at 16:32

## 2020-04-08 RX ADMIN — DIVALPROEX SODIUM 125 MG: 125 TABLET, DELAYED RELEASE ORAL at 09:05

## 2020-04-08 RX ADMIN — ZIPRASIDONE HYDROCHLORIDE 40 MG: 40 CAPSULE ORAL at 09:04

## 2020-04-08 RX ADMIN — HALOPERIDOL 10 MG: 5 TABLET ORAL at 09:04

## 2020-04-08 RX ADMIN — BUMETANIDE 1 MG: 1 TABLET ORAL at 09:19

## 2020-04-08 RX ADMIN — DILTIAZEM HYDROCHLORIDE 180 MG: 180 CAPSULE, COATED, EXTENDED RELEASE ORAL at 09:19

## 2020-04-08 RX ADMIN — ASPIRIN 325 MG: 325 TABLET, DELAYED RELEASE ORAL at 09:05

## 2020-04-08 RX ADMIN — HALOPERIDOL 10 MG: 5 TABLET ORAL at 13:15

## 2020-04-08 NOTE — NURSING NOTE
0400: pt woke up and refused CPAP for the rest of the night. Pt went back to bed at 0430. Will ctm.

## 2020-04-08 NOTE — ASSESSMENT & PLAN NOTE
geodon 80 mg BID for psychosis and mood stability.  Increase as tolerated and necessary.  Haldol 7.5 mg BID for psychosis and mood stability.  Depakote 1625mg for mood stability.
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
## 2020-04-08 NOTE — PLAN OF CARE
Problem: Adult Behavioral Health Plan of Care  Goal: Develops/Participates in Therapeutic [place] to Support Successful Transition  Outcome: Progressing  Flowsheets (Taken 4/8/2020 1053)  Individual Goal: Patient will continue to attend social work group 1x per week to address d/c plannig needs.     Problem: Adult Behavioral Health Plan of Care  Goal: Optimized Coping Skills in Response to Life Stressors  Outcome: Progressing  Flowsheets (Taken 4/8/2020 1053)  Individual Goal:  and patient will identify two strategies to assist with compliance with medication after d/c from unit.

## 2020-04-08 NOTE — PROGRESS NOTES
"Psychiatry Progress Note    Chief Complaint/Reason for follow-up: Radha with psychosis.      Interval History: Patient appears calm and more organized in the morning, talks about her craving for salty food and her own belief that applying mustard to her skin is a good thing.  She appears to have slept reasonably well last night and was calm for much of the day until she became disruptive and agitated in group and slammed her door.  She was given as needed medication of Ativan and Haldol.  I would increase Geodon to 60 mg twice daily beginning tomorrow as the patient has had limited response to Haldol and is already receiving 20 mg total daily dose.    Review of Systems:   Sleep:  Good, Appetite: Good and GI: No complaints    Vital Signs for the last 24 hours:  Temp:  [36.4 °C (97.6 °F)-36.6 °C (97.9 °F)] 36.6 °C (97.9 °F)  Heart Rate:  [] 91  Resp:  [18] 18  BP: (114-155)/(47-90) 140/79    Medications  Scheduled  • aspirin  325 mg oral Daily   • bumetanide  1 mg oral BID (am, 4p)   • dilTIAZem CD  180 mg oral Daily   • divalproex  1,500 mg oral Nightly   • divalproex  125 mg oral Daily   • docusate sodium  100 mg oral BID   • fluticasone propionate  2 spray Each Nostril Daily   • haloperidoL  10 mg oral BID   • LORazepam  2 mg oral Nightly   • polyethylene glycol  17 g oral Daily   • ziprasidone  40 mg oral BID with meals     PRN  •  acetaminophen  •  alum-mag hydroxide-simeth  •  calcium carbonate  •  chlorproMAZINE  •  chlorproMAZINE  •  diphenhydrAMINE  •  diphenhydrAMINE  •  haloperidol lactate  •  hydrocortisone  •  ibuprofen  •  LORazepam **OR** LORazepam  •  ondansetron ODT  •  senna  •  ziprasidone    Mental Status Exam:  Appearance: unkempt, disheveled, inappropriate attire and obese  Gait and Motor: no abnormal movements  Speech: normal rate, slightly slurred  Mood: \"ok\"  Affect: smiling, mood congruent, bizarre at times  Associations: illogical   Thought Process: tangential and " circumstantial  Thought Content:  hyperreligiosity and delusions  Suicidality/Homicidality: denies  Judgement/Insight: minimizes severity level of illness and poor barriers  Orientation: month, year and type of place  Memory: recalls recent events and recalls remote events  Attention: distracted  Knowledge: normal  Language: normal      Assessment/Plan  * Bipolar 1 disorder (CMS/[facility])  Assessment & Plan  Agree w Attending/Resident plan as below  -D/C abilify 20 mg daily- cross-tapering from Abilify to Geodon  -Increase Geodon to 40 mg BID with meals  -Continue depakote to 150mg qam and continue 1500mg qhs. Level is 100.5 on 4/6/20  -Decrease Haldol to 10 mg BID  -Start ativan 2 mg QHS for sleep  -Continue haldol PRN, ativan PRN increased to 2 mg   - QTc 460 with HR of 84 on 4/6/20 , T/C thorazine PRN if continued agitation  Appreciate cardiology input.  Follow QTc    If patient refuses to take medications, recommend staff encouragement and education about treatment.  IF patient continues to refuse, then Haldol and/or Geodon can be given IM; monitor for excess sedation if given and follow QTc as appropriate.    I discussed the treatment plan and the patient's progress with nursing staff and Allied therapists in the treatment team meeting this morning.  Patient is seen and evaluated for approximately 25 minutes in therapy with greater than 50% of time spent in direct face-to-face counseling, coordination of care and review of the medical record.    Kareem Leon MD  4/8/2020

## 2020-04-08 NOTE — PLAN OF CARE
Problem: Adult Behavioral Health Plan of Care  Goal: Plan of Care Review  Flowsheets (Taken 4/8/2020 0247)  Progress: improving  Plan of Care Reviewed With: patient  Patient Agreement with Plan of Care: agrees  Outcome Summary: Jillian is visible on the unit.  Loud, pressured and elevated at times. Less irritable.  More organized.  Religiously preoccupied and appears paranoid at times. Has +AH at times. Med compliant this shift.  Became visibly upset at about 2300 and went to bed with CPAP 0030. Will ctm.   Intervention(s): Encouraged pt to be visible on the unit and med compliant this shift.

## 2020-04-08 NOTE — PLAN OF CARE
This SW put together a packet of therapuetic materials for Jillian to work on as an alternative to Jillian going to group. SW provided the packet to Jillian's nurses to give to her during group time. Jillian has been exhibiting disruptive behavior during group (interrupting speaker, tangential, loud, distracting other group members, sometimes making inappropriate/aggressive comments) due to mari. As such she still remains too disorganized to participate in group at the moment. Packet materials including meditative adult coloring pages, mental health word searches, journaling prompts, and worksheets regarding mental health recovery.

## 2020-04-09 PROCEDURE — 99232 SBSQ HOSP IP/OBS MODERATE 35: CPT | Performed by: PSYCHIATRY & NEUROLOGY

## 2020-04-09 PROCEDURE — 63700000 HC SELF-ADMINISTRABLE DRUG: Performed by: PSYCHIATRY & NEUROLOGY

## 2020-04-09 PROCEDURE — 63700000 HC SELF-ADMINISTRABLE DRUG: Performed by: FAMILY MEDICINE

## 2020-04-09 PROCEDURE — 93005 ELECTROCARDIOGRAM TRACING: CPT | Performed by: PSYCHIATRY & NEUROLOGY

## 2020-04-09 PROCEDURE — 63700000 HC SELF-ADMINISTRABLE DRUG: Performed by: PHYSICIAN ASSISTANT

## 2020-04-09 PROCEDURE — 63700000 HC SELF-ADMINISTRABLE DRUG: Performed by: HOSPITALIST

## 2020-04-09 PROCEDURE — 12400000 HC ROOM AND CARE SEMIPRIVATE PSYCH

## 2020-04-09 PROCEDURE — 63700000 HC SELF-ADMINISTRABLE DRUG: Performed by: STUDENT IN AN ORGANIZED HEALTH CARE EDUCATION/TRAINING PROGRAM

## 2020-04-09 RX ORDER — HALOPERIDOL 5 MG/1
5 TABLET ORAL 2 TIMES DAILY
Status: DISCONTINUED | OUTPATIENT
Start: 2020-04-09 | End: 2020-04-10

## 2020-04-09 RX ORDER — CHLORPROMAZINE HYDROCHLORIDE 50 MG/1
50 TABLET, FILM COATED ORAL 2 TIMES DAILY
Status: DISCONTINUED | OUTPATIENT
Start: 2020-04-09 | End: 2020-04-10

## 2020-04-09 RX ORDER — LORAZEPAM 1 MG/1
1 TABLET ORAL
Status: DISCONTINUED | OUTPATIENT
Start: 2020-04-09 | End: 2020-04-10

## 2020-04-09 RX ADMIN — ZIPRASIDONE HYDROCHLORIDE 40 MG: 40 CAPSULE ORAL at 08:38

## 2020-04-09 RX ADMIN — BUMETANIDE 1 MG: 1 TABLET ORAL at 16:32

## 2020-04-09 RX ADMIN — ZIPRASIDONE HYDROCHLORIDE 60 MG: 20 CAPSULE ORAL at 17:13

## 2020-04-09 RX ADMIN — DIVALPROEX SODIUM 1500 MG: 250 TABLET, DELAYED RELEASE ORAL at 22:56

## 2020-04-09 RX ADMIN — DOCUSATE SODIUM 100 MG: 100 CAPSULE, LIQUID FILLED ORAL at 16:32

## 2020-04-09 RX ADMIN — DOCUSATE SODIUM 100 MG: 100 CAPSULE, LIQUID FILLED ORAL at 08:38

## 2020-04-09 RX ADMIN — BUMETANIDE 1 MG: 1 TABLET ORAL at 08:39

## 2020-04-09 RX ADMIN — DIVALPROEX SODIUM 125 MG: 125 TABLET, DELAYED RELEASE ORAL at 08:39

## 2020-04-09 RX ADMIN — DILTIAZEM HYDROCHLORIDE 180 MG: 180 CAPSULE, COATED, EXTENDED RELEASE ORAL at 08:39

## 2020-04-09 RX ADMIN — FLUTICASONE PROPIONATE 2 SPRAY: 50 SPRAY, METERED NASAL at 08:38

## 2020-04-09 RX ADMIN — ASPIRIN 325 MG: 325 TABLET, DELAYED RELEASE ORAL at 08:40

## 2020-04-09 RX ADMIN — HYDROCORTISONE: 1 CREAM TOPICAL at 06:38

## 2020-04-09 RX ADMIN — LORAZEPAM 1 MG: 1 TABLET ORAL at 13:41

## 2020-04-09 RX ADMIN — HALOPERIDOL 10 MG: 5 TABLET ORAL at 08:38

## 2020-04-09 NOTE — PLAN OF CARE
"  Problem: Adult Behavioral Health Plan of Care  Goal: Plan of Care Review  Flowsheets (Taken 4/9/2020 1451)  Progress: no change  Plan of Care Reviewed With: patient  Patient Agreement with Plan of Care: agrees  Outcome Summary: Jillian was visible this shift.  Slightly less labile and elevated.  Med compliant in the AM.  Remains delusional.  Caling staff and other patients different names and believing they are different people. She was confused and questioning new afternoon med orders. Refused Thorazine.  Continues to state \"I don't want anything that is going tomake me sleep.\"  Pleasant at times.  Listening to music on headphones.  Denies SI.  Intervention(s): 1:1 support given, encouraged to inform staff of increased anxiety and agitation     Problem: Mood Impairment (Manic/Hypomanic Signs/Symptoms)  Goal: Improved Mood Symptoms (Manic/Hypomanic Signs/Symptoms)  Flowsheets (Taken 4/9/2020 1451)  Individual Goal: Jillian will inform staff of increased agitation  Goal Outcome: progressing  [place] Goal: acknowledges progress     "

## 2020-04-09 NOTE — PLAN OF CARE
"  Problem: Adult Behavioral Health Plan of Care  Goal: Plan of Care Review  Outcome: Progressing  Flowsheets  Taken 4/8/2020 2105  Progress: no change  Outcome Summary: Jillian was visible among community members this shift, socially engaged. Her mood is remains labile. She is suspicious of new admissions calling them rapists. She is sexually and religiously preoccupied. She continues to be disorganized in her thought process. She was hyper verbal and rambling in her speech. Medication had patient mildly sedated so she was encouraged to sit in Veronika-chair and listen to music. Her appearance is bizarre and unclean. She attended dinner and ate 100% of her meal. Attended wrap up group.  Cooperative with staff this evening. Refused HS Ativan and Haldol, stating \"I can't take that, I already had that today.\" Education provided to patient, but still declined. Will continue to monitor and offer support on unit.  Intervention(s): Monitoring patient q15min for safety, offering emotional support as needed, and monitoring for treatment plan compliance/effectiveness.  Taken 4/8/2020 7190  Plan of Care Reviewed With: patient  Patient Agreement with Plan of Care: agrees     "

## 2020-04-09 NOTE — PLAN OF CARE
Problem: Adult Behavioral Health Plan of Care  Goal: Plan of Care Review  Outcome: Progressing  Flowsheets  Taken 4/9/2020 1918  Progress: no change  Outcome Summary: Jillian was visible among community members this shift, socially engaged. Her mood is remains labile. She is sexually preoccupied by male staff. She continues to be disorganized in her thought process. She was hyper verbal and rambling in her speech. She showered and washed her clothing this evening. She attended dinner and ate 100% of her meal plus additional snacks. Attended wrap up group.  Cooperative with staff this evening. EKG completed and qtc = 473. Reported to psychiatrist who advised holding scheduled Haldol and Thorazine this evening. Will continue to monitor and offer support on unit.  Intervention(s): Monitoring patient q15min for safety, offering emotional support as needed, and monitoring for treatment plan compliance/effectiveness.  Taken 4/9/2020 1742  Plan of Care Reviewed With: patient  Patient Agreement with Plan of Care: agrees

## 2020-04-09 NOTE — PROGRESS NOTES
"Psychiatry Progress Note    Chief Complaint/Reason for follow-up: Manic Episode w/ psychosis    Interval History: Visible among community members. Socially engaged. Delusional thoughts about other patients. Disorganized thought process. Bizarre and unclean appearance. Refused HS ativan.     On exam, wandering milieu and seems slightly psychomotor agitated. Intruding into boundaries of other patients. Ok with the meeting, initially hesitates to sit down. Calms down during conversation, however slightly distracted. Mentions that she ordered \"lunch for my father because I love him.\" Hyper-Mandaen, referring to me as if I'm a biblical character. States \" I know how to fight the devil to win for my dad.\"     Ok with addition of Geodon, states \"I'm more logical.\" Reports that lithium in the past caused her to have increased H2O intake. Understands ultimate goal of reducing Haldol, increasing Geodon and starting thorazine. Denies medication side effects.     Review of Systems:   Sleep:  Good, Appetite: Fair and GI: No complaints    Vital Signs for the last 24 hours:  Temp:  [36.6 °C (97.9 °F)-36.9 °C (98.4 °F)] 36.9 °C (98.4 °F)  Heart Rate:  [90-98] 90  Resp:  [18] 18  BP: (121-146)/(57-90) 146/70    Labs:  I have reviewed the patient's labs.  Current labs are within normal limits.     EKG 4/8/20: QTc 446 w/ HR 82    Mental Status Exam:  Appearance: unkempt, disheveled, inappropriate attire and obese  Gait and Motor: no abnormal movements, slightly agitated  Speech: normal rate, slightly slurred  Mood: \"more logical\"  Affect: mood congruent, bizarre at times  Associations: illogical   Thought Process: tangential and circumstantial  Thought Content:  hyperreligiosity and delusions  Suicidality/Homicidality: denies  Judgement/Insight: minimizes severity level of illness and poor barriers  Orientation: month, year and type of place  Memory: recalls recent events and recalls remote events  Attention: distracted  Knowledge: " normal  Language: normal    VTE Assessment: I have reassessed and the patient's VTE risk and treatment plan is appropriate.    Assessment/Plan    45 y/o female pphx: bipolar disorder who was admitted through the ED in manic episode. Admitted on a 201, now on a 303 commitment. Still with mood lability, delusional thinking and limited insight/judgement. Less disorganized since change from abilify to geodon. Sleep is improved and calmer on unit. Still becomes agitated in early afternoon and intrusive with other patients.     Bipolar Disorder, current episode manic w/ psychosis  -involuntary on 303 with [facility] second opinion as of 4/7/20  -D/C abilify 20 mg daily- cross-tapering from Abilify to Geodon  -Increase Geodon to 60 mg BID with meals  -Continue depakote to 150mg qam and continue 1500mg qhs. Level is 100.5 on 4/6/20  -Decrease Haldol to 10 mg BID -> 5 mg BID  -Start ativan 2 mg QHS for sleep and add 1 mg   -Start Thorazine 50 mg  and   -Continue Thorazine PRN, ativan PRN increased to 2 mg   - QTc 446 with HR of 82 on 4/8/20 ,   Appreciate cardiology input, will follow QTc    Disposition  -on a 20 day commitment from 4/3/20  -will work with family for ultimate disposition, independent at baseline  -would benefit from family involvement for support

## 2020-04-10 LAB
ATRIAL RATE: 82
P AXIS: 62
PR INTERVAL: 154
QRS DURATION: 94
QT INTERVAL: 394
QTC CALCULATION(BAZETT): 460
R AXIS: 27
T WAVE AXIS: 27
VENTRICULAR RATE: 82

## 2020-04-10 PROCEDURE — 12400000 HC ROOM AND CARE SEMIPRIVATE PSYCH

## 2020-04-10 PROCEDURE — 63700000 HC SELF-ADMINISTRABLE DRUG: Performed by: PSYCHIATRY & NEUROLOGY

## 2020-04-10 PROCEDURE — 93005 ELECTROCARDIOGRAM TRACING: CPT | Performed by: PSYCHIATRY & NEUROLOGY

## 2020-04-10 PROCEDURE — 99232 SBSQ HOSP IP/OBS MODERATE 35: CPT | Performed by: PSYCHIATRY & NEUROLOGY

## 2020-04-10 PROCEDURE — 63700000 HC SELF-ADMINISTRABLE DRUG: Performed by: PHYSICIAN ASSISTANT

## 2020-04-10 PROCEDURE — 63700000 HC SELF-ADMINISTRABLE DRUG: Performed by: HOSPITALIST

## 2020-04-10 PROCEDURE — 63700000 HC SELF-ADMINISTRABLE DRUG: Performed by: STUDENT IN AN ORGANIZED HEALTH CARE EDUCATION/TRAINING PROGRAM

## 2020-04-10 RX ORDER — HALOPERIDOL 5 MG/1
10 TABLET ORAL EVERY 12 HOURS
Status: DISCONTINUED | OUTPATIENT
Start: 2020-04-10 | End: 2020-04-13

## 2020-04-10 RX ORDER — LORAZEPAM 1 MG/1
1 TABLET ORAL
Status: DISCONTINUED | OUTPATIENT
Start: 2020-04-10 | End: 2020-04-28

## 2020-04-10 RX ORDER — LORAZEPAM 2 MG/1
2 TABLET ORAL NIGHTLY
Status: DISCONTINUED | OUTPATIENT
Start: 2020-04-10 | End: 2020-05-01

## 2020-04-10 RX ORDER — HALOPERIDOL 5 MG/1
10 TABLET ORAL 2 TIMES DAILY
Status: DISCONTINUED | OUTPATIENT
Start: 2020-04-10 | End: 2020-04-10

## 2020-04-10 RX ORDER — ZIPRASIDONE MESYLATE 20 MG/ML
20 INJECTION, POWDER, LYOPHILIZED, FOR SOLUTION INTRAMUSCULAR 2 TIMES DAILY PRN
Status: DISCONTINUED | OUTPATIENT
Start: 2020-04-10 | End: 2020-04-10

## 2020-04-10 RX ORDER — LORAZEPAM 2 MG/ML
2 INJECTION INTRAMUSCULAR NIGHTLY
Status: DISCONTINUED | OUTPATIENT
Start: 2020-04-10 | End: 2020-05-01

## 2020-04-10 RX ORDER — LORAZEPAM 2 MG/ML
1 INJECTION INTRAMUSCULAR
Status: DISCONTINUED | OUTPATIENT
Start: 2020-04-10 | End: 2020-04-28

## 2020-04-10 RX ORDER — HALOPERIDOL 5 MG/ML
10 INJECTION INTRAMUSCULAR EVERY 12 HOURS
Status: DISCONTINUED | OUTPATIENT
Start: 2020-04-10 | End: 2020-04-13

## 2020-04-10 RX ADMIN — ZIPRASIDONE HYDROCHLORIDE 60 MG: 20 CAPSULE ORAL at 16:20

## 2020-04-10 RX ADMIN — ZIPRASIDONE HYDROCHLORIDE 60 MG: 20 CAPSULE ORAL at 08:22

## 2020-04-10 RX ADMIN — HYDROCORTISONE: 1 CREAM TOPICAL at 00:06

## 2020-04-10 RX ADMIN — DIVALPROEX SODIUM 1500 MG: 250 TABLET, DELAYED RELEASE ORAL at 21:53

## 2020-04-10 RX ADMIN — HALOPERIDOL 10 MG: 5 TABLET ORAL at 21:52

## 2020-04-10 RX ADMIN — LORAZEPAM 2 MG: 2 TABLET ORAL at 12:24

## 2020-04-10 RX ADMIN — DILTIAZEM HYDROCHLORIDE 180 MG: 180 CAPSULE, COATED, EXTENDED RELEASE ORAL at 08:21

## 2020-04-10 RX ADMIN — ASPIRIN 325 MG: 325 TABLET, DELAYED RELEASE ORAL at 08:24

## 2020-04-10 RX ADMIN — BUMETANIDE 1 MG: 1 TABLET ORAL at 08:22

## 2020-04-10 RX ADMIN — LORAZEPAM 2 MG: 2 TABLET ORAL at 21:53

## 2020-04-10 RX ADMIN — DIVALPROEX SODIUM 125 MG: 125 TABLET, DELAYED RELEASE ORAL at 08:21

## 2020-04-10 RX ADMIN — BUMETANIDE 1 MG: 1 TABLET ORAL at 16:19

## 2020-04-10 RX ADMIN — HALOPERIDOL 10 MG: 5 TABLET ORAL at 10:45

## 2020-04-10 RX ADMIN — DOCUSATE SODIUM 100 MG: 100 CAPSULE, LIQUID FILLED ORAL at 08:21

## 2020-04-10 RX ADMIN — FLUTICASONE PROPIONATE 2 SPRAY: 50 SPRAY, METERED NASAL at 07:12

## 2020-04-10 NOTE — PROGRESS NOTES
"Daily Progress Note    Subjective    Interval History: recalled to see PT re QTc-  D/w  Nursing . On Geodon(conditional risk TdP) and Haldol (known risk)- standing dose.  Has not been getting prn.  QTC  Yesterday 470, standing haldol held last night, this am qtc 460 . Meds reviewed, not on any other QTc prolonging meds.     Pt currently without complaint.  Still with edema- on bumex 1 bid (since at least 4/1), lytes checked 4/7-  k 4.7, mg 2.    Objective  Vital signs in last 24 hours:  Temp:  [36.5 °C (97.7 °F)-37 °C (98.6 °F)] 36.5 °C (97.7 °F)  Heart Rate:  [80-91] 88  Resp:  [17-20] 20  BP: (110-140)/(66-74) 140/70    No intake or output data in the 24 hours ending 04/10/20 1621    Physical Exam:  Visit Vitals  /70 (Patient Position: Standing)   Pulse 88   Temp 36.5 °C (97.7 °F) (Oral)   Resp 20   Ht 1.664 m (5' 5.5\")   Wt 120 kg (265 lb 3.2 oz)   LMP  (LMP Unknown)   SpO2 98%   Breastfeeding No   BMI 43.46 kg/m²           Gen:  HEENT: AAO x3, NAD     No JVD, No carotid bruits, No obvious thyromegaly     Lungs:   Clear to auscultation bilaterally, respirations unlabored   Chest wall:  No tenderness or deformity   Heart:  Regular rate and rhythm, S1 and S2 normal, no murmur, rub   or gallop   Abdomen:   Extremities: Soft, non-tender, normal bowel sounds  ++ edema seen bilaterally, warm    Neuro:  Grossly nonfocal           Scheduled Inpatient Medications:    • aspirin  325 mg oral Daily   • bumetanide  1 mg oral BID (am, 4p)   • dilTIAZem CD  180 mg oral Daily   • divalproex  1,500 mg oral Nightly   • divalproex  125 mg oral Daily   • docusate sodium  100 mg oral BID   • fluticasone propionate  2 spray Each Nostril Daily   • haloperidoL  10 mg oral q12h AVIS    Or   • haloperidol lactate  10 mg intramuscular q12h AVIS   • LORazepam  1 mg oral Daily (1p)    Or   • LORazepam  1 mg intramuscular Daily (1p)   • LORazepam  2 mg oral Nightly    Or   • LORazepam  2 mg intramuscular Nightly   • polyethylene glycol  " 17 g oral Daily   • ziprasidone  60 mg oral BID with meals       Scheduled Inpatient Infusions:          PRN Medications    •  acetaminophen  •  alum-mag hydroxide-simeth  •  calcium carbonate  •  diphenhydrAMINE  •  diphenhydrAMINE  •  hydrocortisone  •  ibuprofen  •  LORazepam **OR** LORazepam  •  ondansetron ODT  •  senna    Labs       Results from last 7 days   Lab Units 04/07/20  0842   SODIUM mEQ/L 140   POTASSIUM mEQ/L 4.2   CHLORIDE mEQ/L 103   CO2 mEQ/L 28   BUN mg/dL 16   CREATININE mg/dL 0.7   CALCIUM mg/dL 8.8*   GLUCOSE mg/dL 108*                       Imaging  No results found.    ECG   I have personally reviewed ECG  From 4/9 and 4/10     Telemetry  I have personally reviewed telemetry  No tele     Echocardiogram  Na      Assessment & Plan    * Bipolar 1 disorder (CMS/HCC)  Assessment & Plan  Per psych  QTC top normal on geodon and haldol- if these medicines are of benefit reasonable to continue.      Follow Qtc with weekly EKG, check lytes (BMP and Mg) weekls   Avoid other QTc prolonging meds     Edema  Assessment & Plan  Pt with b/l upper and lower extremity edema noted, unimproved with lasix 20 x2. Unimproved with lasix 40mg.  Non tender edema, and unclear how long it has been present  Could be multifactorial-obesity, poor diet, AMINA, R sided HF??  Now on low sodium diet per Dr. Francois  Potassium WNL    PLAN:  Needs outpatient echocardiogram with primary cardiologist, which is already ordered per their office  Bumex, elevate as able     Paroxysmal A-fib (CMS/[facility])  Assessment & Plan  Currently in sinus rhythm, and sinus rhythm on admission EKG  [id number] of 1 for female (to our best knowledge)    PLAN:  Continue cardizem 180  Would increase ASA to 325mg daily   Please call/page us if further questions, patient goes back into atrial fibrillation, or if needed        This is Abena Armstrong PA-C acting as scribe for DELIA Deleon  4/10/2020

## 2020-04-10 NOTE — NURSING NOTE
At request of Dr. Leon, Notified by text page Cardiology (DELIA Boykin) of prolonged QT and asked about  Recommendation regarding high dose antipsychotics. Waiting for call back.

## 2020-04-10 NOTE — ASSESSMENT & PLAN NOTE
Per psych  QTC top normal on geodon and haldol- if these medicines are of benefit reasonable to continue.      Follow Qtc with weekly EKG, check lytes (BMP and Mg) weeks- PENDING TODAY   Avoid other QTc prolonging meds

## 2020-04-10 NOTE — PLAN OF CARE
"  Problem: Adult Behavioral Health Plan of Care  Goal: Plan of Care Review  Flowsheets  Taken 4/10/2020 1452 by Adrianne Siddiqi RN  Progress: no change  Plan of Care Reviewed With: patient  Patient Agreement with Plan of Care: agrees  Outcome Summary: Jillian was visible and social in the beginning of the shift.  Remains delusional and paranoid.  Suspicious of staff and patients.  Became more agitated and labile as the day went own.  Screaming loudly at some patients and staff, threatening.  Telling people she \"was going to take them down.\"  Difficult to redirect.  Was med compliant.  Required PRN Ativan.  She slept briefly.  Awoke and was irritable.  Demanding to have own cell phone and take it to her room.  Continues to be loud, intrusive and have poor boundaries with peers.  Taken 4/9/2020 1918 by Petra Tran, RN  Intervention(s): Monitoring patient q15min for safety, offering emotional support as needed, and monitoring for treatment plan compliance/effectiveness.     Problem: Mood Impairment (Manic/Hypomanic Signs/Symptoms)  Goal: Improved Mood Symptoms (Manic/Hypomanic Signs/Symptoms)  Flowsheets (Taken 4/10/2020 1452)  Individual Goal: jillian will attend groups and inform staff of increased agitation  Goal Outcome: not progressing  [place] Goal: identifies personal treatment goal     "

## 2020-04-10 NOTE — NURSING NOTE
Pt labile, crying then laughing, religiously preoccupied. Staff observed & heard pt hit DR table c hand, becoming increasingly irritable and argumentative c other pts. Pt offered & compliant c PRN ativan when offered c snack/candy.

## 2020-04-10 NOTE — PROGRESS NOTES
"Psychiatry Progress Note    Chief Complaint/Reason for follow-up: Manic Episode w/ psychosis    Interval History: Hyperverbal, sexually preoccupied. QTc 473, evening thorazine and haldol held.     On exam, intrusive in the am. Religiously preoccupied and not respecting personal boundaries. Disorganized, stating \"plenty of sunshine\" and says to turn off lights then decides to keep them on. States \"the devil is still about.\" Refers to other patient as her boyfriend and when I tell her that everyone here is either patients or staff, she states \"oh that's why I may have been confused.\" Denies medication side effects, does endorse some ankle pain/swelling. Advised to elevate legs and wear shoes.     Review of Systems:   Sleep:  Good, Appetite: Fair and GI: No complaints    Vital Signs for the last 24 hours:  Temp:  [36.8 °C (98.3 °F)-37 °C (98.6 °F)] 37 °C (98.6 °F)  Heart Rate:  [81-91] 86  Resp:  [16-17] 17  BP: (110-140)/(53-74) 137/70    Labs:  I have reviewed the patient's labs.  Current labs are within normal limits.     EKG 4/8/20: QTc 446 w/ HR 82    Mental Status Exam:  Appearance: unkempt, disheveled, inappropriate attire and obese  Gait and Motor: no abnormal movements, slightly agitated, pacing a lot on unit.   Speech: normal rate, slightly slurred, yelling at times  Mood: \"plenty of sunshine\"  Affect: labile, bizarre at times  Associations: illogical, some clanging  Thought Process: tangential and circumstantial  Thought Content:  hyperreligiosity and delusions  Suicidality/Homicidality: denies  Judgement/Insight: minimizes severity level of illness and poor barriers  Orientation: month, year and type of place  Memory: recalls recent events and recalls remote events  Attention: distracted  Knowledge: normal  Language: normal    VTE Assessment: I have reassessed and the patient's VTE risk and treatment plan is appropriate.    Assessment/Plan    45 y/o female pphx: bipolar disorder who was admitted through the " ED in manic episode. Admitted on a 201, now on a 303 commitment. Still with mood lability, delusional thinking and limited insight/judgement. Unable to get evening medications due to worry about prolonged QTc. More intrusive today, disorganized. Required PRN ativan for episode of yelling/paranoia about other patient's in the milieu.     Bipolar Disorder, current episode manic w/ psychosis  -involuntary on 303 with [facility] second opinion as of 4/7/20  -D/C abilify 20 mg daily- cross-tapering from Abilify to Geodon  -Continue Geodon to 60 mg BID with meals  -Continue depakote to 150mg qam and continue 1500mg qhs. Level is 100.5 on 4/6/20  -Haldol back to 10 mg BID from 5 mg BID  -D/C Thorazine 50 mg  and  as patient refusing and seemed better on more haldol  -Start ativan 2 mg QHS for sleep and add 1 mg   -Ativan PRN increased to 2 mg, will prioritize this over antipsychotics due to QTc  - QTc 460 with HR of 82 on 4/10/20 ,   Appreciate cardiology input, will follow QTc    LE Edema  -Bumex 1 mg BID  -worse in last 1-2 days, will monitor  -Management per medicine, appreciate recs    Disposition  -on a 20 day commitment from 4/3/20  -will work with family for ultimate disposition, independent at baseline  -would benefit from family involvement for support

## 2020-04-11 PROCEDURE — 63700000 HC SELF-ADMINISTRABLE DRUG: Performed by: PSYCHIATRY & NEUROLOGY

## 2020-04-11 PROCEDURE — 63700000 HC SELF-ADMINISTRABLE DRUG: Performed by: HOSPITALIST

## 2020-04-11 PROCEDURE — 63700000 HC SELF-ADMINISTRABLE DRUG: Performed by: STUDENT IN AN ORGANIZED HEALTH CARE EDUCATION/TRAINING PROGRAM

## 2020-04-11 PROCEDURE — 63700000 HC SELF-ADMINISTRABLE DRUG: Performed by: PHYSICIAN ASSISTANT

## 2020-04-11 PROCEDURE — 99231 SBSQ HOSP IP/OBS SF/LOW 25: CPT | Mod: 95 | Performed by: PSYCHIATRY & NEUROLOGY

## 2020-04-11 PROCEDURE — 12400000 HC ROOM AND CARE SEMIPRIVATE PSYCH

## 2020-04-11 RX ADMIN — FLUTICASONE PROPIONATE 2 SPRAY: 50 SPRAY, METERED NASAL at 07:11

## 2020-04-11 RX ADMIN — LORAZEPAM 2 MG: 2 TABLET ORAL at 21:19

## 2020-04-11 RX ADMIN — DOCUSATE SODIUM 100 MG: 100 CAPSULE, LIQUID FILLED ORAL at 09:51

## 2020-04-11 RX ADMIN — BUMETANIDE 1 MG: 1 TABLET ORAL at 09:49

## 2020-04-11 RX ADMIN — ASPIRIN 325 MG: 325 TABLET, DELAYED RELEASE ORAL at 09:49

## 2020-04-11 RX ADMIN — LORAZEPAM 1 MG: 1 TABLET ORAL at 13:06

## 2020-04-11 RX ADMIN — IBUPROFEN 400 MG: 400 TABLET ORAL at 11:36

## 2020-04-11 RX ADMIN — BUMETANIDE 1 MG: 1 TABLET ORAL at 16:54

## 2020-04-11 RX ADMIN — DIVALPROEX SODIUM 125 MG: 125 TABLET, DELAYED RELEASE ORAL at 09:50

## 2020-04-11 RX ADMIN — HYDROCORTISONE 1 APPLICATION.: 1 CREAM TOPICAL at 03:48

## 2020-04-11 RX ADMIN — HALOPERIDOL 10 MG: 5 TABLET ORAL at 09:57

## 2020-04-11 RX ADMIN — DIVALPROEX SODIUM 1500 MG: 250 TABLET, DELAYED RELEASE ORAL at 21:18

## 2020-04-11 RX ADMIN — ZIPRASIDONE HYDROCHLORIDE 60 MG: 20 CAPSULE ORAL at 09:51

## 2020-04-11 RX ADMIN — DILTIAZEM HYDROCHLORIDE 180 MG: 180 CAPSULE, COATED, EXTENDED RELEASE ORAL at 09:50

## 2020-04-11 RX ADMIN — ZIPRASIDONE HYDROCHLORIDE 60 MG: 20 CAPSULE ORAL at 16:56

## 2020-04-11 RX ADMIN — HALOPERIDOL 10 MG: 5 TABLET ORAL at 21:20

## 2020-04-11 RX ADMIN — DOCUSATE SODIUM 100 MG: 100 CAPSULE, LIQUID FILLED ORAL at 16:54

## 2020-04-11 NOTE — NURSING NOTE
Jillian to nurses station, appeared very somnolent, stating that she was still mad about being given Haldol and Ativan earlier. Mentioned how she almost was in restraints with IM's.   Upset because psychiatrist stated he would stop the meds.  RN encouraged her to revisit topic with psychiatrist.  She stated he doesn't listen to me.  She stated something like he wants me, but he can't have me.  She does not like the way the medications make her feel.  Will continue to monitor.

## 2020-04-11 NOTE — PLAN OF CARE
"  Problem: Adult Behavioral Health Plan of Care  Goal: Plan of Care Review  Flowsheets (Taken 4/10/2020 9415)  Progress: no change  Plan of Care Reviewed With: patient  Patient Agreement with Plan of Care: agrees with comment (describe)  Outcome Summary: Jillian was visible in the milieu all shift, listening to headphones, engaged with staff members. Minimal interactions with peers, remaining in control. Still quite loose, disorganized and delusional. Showered and ate well. She reports increased appetite. Later in the evening Jillian requested her HS medications. Inquired about the haldol and ativan, became upset, and refused both medications. Jillian became increasingly agitated when it was explained she would receive IM injection for PO refusal. She began pushing on door, yelling and cursing. Security called for support. Escorted Jillian to her room where she remained angry and agitated, posturing. She eventually agreed to take all the medications by mouth. She was very tearful and apologetic, continuing to state the MD told her the haldol and ativan would be discontinued. She states the haldol \" doesn't work\" and that ativan has a paradoxical effect \"Makes me feel more agitated \" Jillian was reassured that her concerns would be relayed to MD, and she would have opportunity to discuss in the morning.   Intervention(s)  Offered reassurance.   "

## 2020-04-11 NOTE — PROGRESS NOTES
Psychiatry Progress Note  This was done due to telemedicine, confidentially reviewed  Patient is at [facility] psychiatry  Provider is off-site of [facility] psych unit  Dr. Deion Avery is the psychiatrist and Jillian Acosta is the patient, nurse/presenter was also present    Chief Complaint/Reason for follow-up:   Manic with psychosis    Interval History:   Yesterday evening she was agitated on the unit and initially refused her po medication when informed that she would be given IM if she continued to be agitated as she was yellling and pushing the door and cursing.  She agreed to taker her  meds orally.     She saw Cardiologist, which recommended to minimize prn doses of haldol due to qtc.  Recommended to continue oral bumex twice a day for LE edema, qtc was 473ms when cardiology was consulted by psych. Concern for geodon and haldol.     She discussed not liking the medications. She stated that she uses a cpap.  She denies si/hi/plan/intent.       Review of Systems:   Appetite is good, no gi complaints, sleep is poor to fair    Vital Signs for the last 24 hours:  Temp:  [36.4 °C (97.5 °F)-36.7 °C (98.1 °F)] 36.7 °C (98.1 °F)  Heart Rate:  [] 101  Resp:  [18-20] 20  BP: (113-140)/(60-70) 131/68       Mental Status Exam:   Appearance: appropriately dressed and groomed, obese   Speech: normal rate,  No pressure and no paucity  Mood: agitated  Affect: mildly exansive  Associations: illogical, some clanging  Thought Process: tangential and circumstantial  Thought Content:  hyperreligiosity and delusions  Suicidality/Homicidality: denies  Judgement/Insight: minimizes severity level of illness and poor barriers  Orientation: month, year and type of place  Memory: recalls recent events and recalls remote events  Attention: distracted  Knowledge: normal  Language: normal     Assessment/Plan   45 y/o female pphx: bipolar disorder who was admitted through the ED in manic episode. Admitted on a 201, now on a 303 commitment. Still  with mood lability, delusional thinking and limited insight/judgement. cardiology consulted due to elevated QTc. Initially last evening refused meds during agitation,, yelling and cursing on the unit and then took them orally.         Bipolar Disorder, current episode manic w/ psychosis  -involuntary on 303 with [facility] second opinion as of 4/7/20   -Continue Geodon to 60 mg BID with meals  -Continue depakote to 150mg qam and continue 1500mg qhs. Level is 100.5 on 4/6/20  -Haldol back to 10 mg BID from 5 mg BID,  Limit any prn haldol for agitation per cardiology due to qtc prolngation   -continue  ativan 2 mg QHS for sleep and add 1 mg   -Ativan PRN increased to 2 mg, will prioritize this over antipsychotics due to QTc  - QTc 460 with HR of 82 on 4/10/20 ,   Appreciate cardiology input, will follow QTc, consider change of geodon to another atypical antipsychotic that would not affect qtc as much like latuda     LE Edema  -Bumex 1 mg BID  -worse in last 1-2 days, will monitor  -Management per medicine, appreciate recs     Disposition  -on a 20 day commitment from 4/3/20  -will work with family for ultimate disposition, independent at baseline  -would benefit from family involvement for support

## 2020-04-11 NOTE — PLAN OF CARE
"  Problem: Adult Behavioral Health Plan of Care  Goal: Plan of Care Review  Outcome: Progressing  Flowsheets (Taken 4/11/2020 1443)  Progress: improving  Plan of Care Reviewed With: patient  Patient Agreement with Plan of Care: agrees  Outcome Summary: Jillian is labile, disorganized, flirtatious with males, denied SI/HI and hallucinations, made threats to \"break out of here\" this am and was cursing loudly, she is receptive to staff with verbal redirection but can be argumentative.  She did speak to the resident MD regarding her medications.  Jillian came up to RN and asked for her medications, took them willingly this am without any issues.  Jillian was calmer this afternoon.  Religiously preoccupied-reading scripture, delusional, fixated on looking for her sister, listens to music.  Will continue to monitor and offer support.   Intervention(s): Jillian was encouraged to remain in control and comply with taking prescribed medications.     "

## 2020-04-12 PROCEDURE — 12400000 HC ROOM AND CARE SEMIPRIVATE PSYCH

## 2020-04-12 PROCEDURE — 63700000 HC SELF-ADMINISTRABLE DRUG: Performed by: HOSPITALIST

## 2020-04-12 PROCEDURE — 63700000 HC SELF-ADMINISTRABLE DRUG: Performed by: PSYCHIATRY & NEUROLOGY

## 2020-04-12 PROCEDURE — 63700000 HC SELF-ADMINISTRABLE DRUG: Performed by: STUDENT IN AN ORGANIZED HEALTH CARE EDUCATION/TRAINING PROGRAM

## 2020-04-12 PROCEDURE — 63700000 HC SELF-ADMINISTRABLE DRUG: Performed by: PHYSICIAN ASSISTANT

## 2020-04-12 PROCEDURE — 99231 SBSQ HOSP IP/OBS SF/LOW 25: CPT | Mod: 95 | Performed by: PSYCHIATRY & NEUROLOGY

## 2020-04-12 RX ADMIN — LORAZEPAM 2 MG: 2 TABLET ORAL at 21:38

## 2020-04-12 RX ADMIN — ZIPRASIDONE HYDROCHLORIDE 60 MG: 20 CAPSULE ORAL at 17:01

## 2020-04-12 RX ADMIN — LORAZEPAM 1 MG: 1 TABLET ORAL at 13:25

## 2020-04-12 RX ADMIN — BUMETANIDE 1 MG: 1 TABLET ORAL at 17:00

## 2020-04-12 RX ADMIN — HALOPERIDOL 10 MG: 5 TABLET ORAL at 21:35

## 2020-04-12 RX ADMIN — ASPIRIN 325 MG: 325 TABLET, DELAYED RELEASE ORAL at 10:03

## 2020-04-12 RX ADMIN — DOCUSATE SODIUM 100 MG: 100 CAPSULE, LIQUID FILLED ORAL at 17:01

## 2020-04-12 RX ADMIN — DOCUSATE SODIUM 100 MG: 100 CAPSULE, LIQUID FILLED ORAL at 10:04

## 2020-04-12 RX ADMIN — DIVALPROEX SODIUM 1500 MG: 250 TABLET, DELAYED RELEASE ORAL at 21:39

## 2020-04-12 RX ADMIN — DIVALPROEX SODIUM 125 MG: 125 TABLET, DELAYED RELEASE ORAL at 10:04

## 2020-04-12 RX ADMIN — DILTIAZEM HYDROCHLORIDE 180 MG: 180 CAPSULE, COATED, EXTENDED RELEASE ORAL at 10:04

## 2020-04-12 RX ADMIN — ANTACID TABLETS 500 MG: 500 TABLET, CHEWABLE ORAL at 17:02

## 2020-04-12 RX ADMIN — ZIPRASIDONE HYDROCHLORIDE 60 MG: 20 CAPSULE ORAL at 06:56

## 2020-04-12 RX ADMIN — HALOPERIDOL 10 MG: 5 TABLET ORAL at 10:06

## 2020-04-12 RX ADMIN — FLUTICASONE PROPIONATE 2 SPRAY: 50 SPRAY, METERED NASAL at 10:07

## 2020-04-12 RX ADMIN — SENNOSIDES 1 TABLET: 8.6 TABLET, FILM COATED ORAL at 17:01

## 2020-04-12 RX ADMIN — BUMETANIDE 1 MG: 1 TABLET ORAL at 10:20

## 2020-04-12 NOTE — NURSING NOTE
RN Note (3p-7p)- Jillian became agitated while on the phone, slammed the  down, started yelling and cursing about her sister being abused by a boyfriend-paranoid her sister's life is in jeopardy, she threw the headphones into the LR area, also attempted to flip a table in the DR.  Staff intervened and was able to verbally redirect her.  She was able to calm down as she processed through her thoughts with her nurse.  Will continue to monitor and offer support.

## 2020-04-12 NOTE — PLAN OF CARE
"  Problem: Adult Behavioral Health Plan of Care  Goal: Plan of Care Review  Outcome: Progressing  Flowsheets (Taken 4/12/2020 1204)  Progress: improving  Plan of Care Reviewed With: patient  Patient Agreement with Plan of Care: agrees  Outcome Summary: Jillian is still labile, easier to verbally redirect, less agitated, denied SI/HI, hasn't had any loud outbursts so far today, still is delusional and religiously preoccupied, rambles about God, stated,\"Rebel Roach is coming back as a roaring lion...Benedict got saved and I prayed to the Lord,\" told RN another patient on the unit is the devil, disorganized, showers and changes her clothing frequently, compliant with taking prescribed medications, gets distracted and leaves groups early.  Will continue to monitor and offer support.   Intervention(s): Jillian will attend at least 2 groups without any disruptions and comply with taking prescribed medications.     Problem: Mood Impairment (Manic/Hypomanic Signs/Symptoms)  Goal: Improved Mood Symptoms (Manic/Hypomanic Signs/Symptoms)  Outcome: Progressing  Flowsheets (Taken 4/12/2020 1204)  Individual Goal: Jillian will attend at least 2 groups without any disruptions and comply with taking prescribed medications.  Short Term Goal Established: 4/12/2020  Goal Outcome: progressing  [place] Goal: acknowledges progress     "

## 2020-04-12 NOTE — PLAN OF CARE
Problem: Adult Behavioral Health Plan of Care  Goal: Plan of Care Review  Outcome: Not progressing  Flowsheets  Taken 4/11/2020 2315  Progress: no change  Outcome Summary: Jillian has been visible on the unit. Mood is labile. Pt is easily agitated and lacks reasoning skills. No insight. Hostile and very paranoid. Speech is loud and pressured. Flirtatious with male staff and select peers. Grandiose and intrusive. Hard to redirect. Denies S/HI,A/VH and feelings of self -harm. Accepted meds with much encouragement. Will cont to monitor on q15 checks with assault awareness.  Intervention(s): Encouraged Jillian to express feelings and thoughts and thoughts. Report to staff increased anxiety and anger.  Taken 4/11/2020 2200  Plan of Care Reviewed With: patient  Patient Agreement with Plan of Care: agrees

## 2020-04-12 NOTE — PROGRESS NOTES
"Psychiatry Progress Note  This was done due to telemedicine, confidentially reviewed  Patient is at [facility] psychiatry  Provider is off-site of [facility] psych unit  Dr. Deion Avery is the psychiatrist and Jillian Acosta is the patient, nurse/presenter was also present     Chief Complaint/Reason for follow-up:   Manic with psychosis    Interval History  She just woke up prior to being seen.  She took medications, discussed how another patient claimed to be the messiah and how another pt she was glad that she prayed for \"and did not have sex with\"  She denies si/hi/plan/intent, \"I think Dr. Leon is the devil.\"  Has delusions of reference from other people, no ah/vh    She saw Cardiologist  2 days ago, which recommended to minimize prn doses of haldol due to qtc.  Recommended to continue oral bumex twice a day for LE edema, qtc was 473ms when cardiology was consulted by psych. Concern for geodon and haldol.        Review of Systems:   Sleep is improved and appetite is good, no gi complaits    Vital Signs for the last 24 hours:  Temp:  [36.4 °C (97.6 °F)-36.8 °C (98.3 °F)] 36.8 °C (98.3 °F)  Heart Rate:  [] 95  Resp:  [18] 18  BP: (111-134)/(65-83) 126/65      Mental Status Exam:  Appearance: appropriately dressed and groomed, obese   Speech: normal rate,  No pressure and no paucity  Behavior: mildly sedate  Mood: ok  Affect: mildly exansive  Associations: illogical, perservative  Thought Process: tangential and circumstantial  Thought Content:  hyperreligiosity and delusions of reference  Suicidality/Homicidality: denies  Judgement/Insight: minimizes severity level of illness and poor barriers  Orientation: month, year and type of place  Memory: recalls recent events and recalls remote events  Attention: distracted  Knowledge: normal  Language: normal     Assessment/Plan  45 y/o female pphx: bipolar disorder who was admitted through the ED in manic episode. Admitted on a 201, now on a 303 commitment. Still with mood " lability, delusional thinking and limited insight/judgement. cardiology consulted due to elevated QTc.       Bipolar Disorder, current episode manic w/ psychosis  -involuntary on 303 with MOO second opinion as of 4/7/20   -Continue Geodon to 60 mg BID with meals  -Continue depakote to 150mg qam and continue 1500mg qhs. Level is 100.5 on 4/6/20  -Haldol back to 10 mg BID from 5 mg BID,  Limit any prn haldol for agitation per cardiology due to qtc prolngation   -continue  ativan 2 mg QHS for sleep and add 1 mg   -Ativan PRN increased to 2 mg, will prioritize this over antipsychotics due to QTc  - QTc 460 with HR of 82 on 4/10/20 ,   Appreciate cardiology input, will follow QTc, consider change of geodon to another atypical antipsychotic that would not affect qtc as much like latuda     LE Edema  -Bumex 1 mg BID  -worse in last 1-2 days, will monitor  -Management per medicine, appreciate recs     Disposition  -on a 20 day commitment from 4/3/20  -will work with family for ultimate disposition, independent at baseline  -would benefit from family involvement for support

## 2020-04-13 PROCEDURE — 93005 ELECTROCARDIOGRAM TRACING: CPT | Performed by: STUDENT IN AN ORGANIZED HEALTH CARE EDUCATION/TRAINING PROGRAM

## 2020-04-13 PROCEDURE — 99232 SBSQ HOSP IP/OBS MODERATE 35: CPT | Performed by: PSYCHIATRY & NEUROLOGY

## 2020-04-13 PROCEDURE — 63700000 HC SELF-ADMINISTRABLE DRUG: Performed by: PSYCHIATRY & NEUROLOGY

## 2020-04-13 PROCEDURE — 12400000 HC ROOM AND CARE SEMIPRIVATE PSYCH

## 2020-04-13 PROCEDURE — 63700000 HC SELF-ADMINISTRABLE DRUG: Performed by: STUDENT IN AN ORGANIZED HEALTH CARE EDUCATION/TRAINING PROGRAM

## 2020-04-13 PROCEDURE — 63700000 HC SELF-ADMINISTRABLE DRUG: Performed by: HOSPITALIST

## 2020-04-13 PROCEDURE — 63700000 HC SELF-ADMINISTRABLE DRUG: Performed by: PHYSICIAN ASSISTANT

## 2020-04-13 RX ORDER — LORAZEPAM 1 MG/1
1 TABLET ORAL
Status: DISCONTINUED | OUTPATIENT
Start: 2020-04-14 | End: 2020-04-20

## 2020-04-13 RX ORDER — ZIPRASIDONE HYDROCHLORIDE 80 MG/1
80 CAPSULE ORAL 2 TIMES DAILY WITH MEALS
Status: DISCONTINUED | OUTPATIENT
Start: 2020-04-13 | End: 2020-04-29

## 2020-04-13 RX ORDER — LORAZEPAM 1 MG/1
1 TABLET ORAL ONCE
Status: COMPLETED | OUTPATIENT
Start: 2020-04-13 | End: 2020-04-13

## 2020-04-13 RX ORDER — HALOPERIDOL 5 MG/ML
7.5 INJECTION INTRAMUSCULAR EVERY 12 HOURS
Status: DISCONTINUED | OUTPATIENT
Start: 2020-04-13 | End: 2020-04-16

## 2020-04-13 RX ADMIN — LORAZEPAM 2 MG: 2 TABLET ORAL at 12:47

## 2020-04-13 RX ADMIN — DIVALPROEX SODIUM 125 MG: 125 TABLET, DELAYED RELEASE ORAL at 08:04

## 2020-04-13 RX ADMIN — ASPIRIN 325 MG: 325 TABLET, DELAYED RELEASE ORAL at 08:04

## 2020-04-13 RX ADMIN — FLUTICASONE PROPIONATE 2 SPRAY: 50 SPRAY, METERED NASAL at 08:06

## 2020-04-13 RX ADMIN — HALOPERIDOL 7.5 MG: 5 TABLET ORAL at 22:40

## 2020-04-13 RX ADMIN — LORAZEPAM 2 MG: 2 TABLET ORAL at 22:40

## 2020-04-13 RX ADMIN — LORAZEPAM 1 MG: 1 TABLET ORAL at 11:15

## 2020-04-13 RX ADMIN — DIVALPROEX SODIUM 1500 MG: 250 TABLET, DELAYED RELEASE ORAL at 22:40

## 2020-04-13 RX ADMIN — ZIPRASIDONE HYDROCHLORIDE 80 MG: 80 CAPSULE ORAL at 16:58

## 2020-04-13 RX ADMIN — DOCUSATE SODIUM 100 MG: 100 CAPSULE, LIQUID FILLED ORAL at 16:58

## 2020-04-13 RX ADMIN — BUMETANIDE 1 MG: 1 TABLET ORAL at 08:04

## 2020-04-13 RX ADMIN — DOCUSATE SODIUM 100 MG: 100 CAPSULE, LIQUID FILLED ORAL at 08:06

## 2020-04-13 RX ADMIN — BUMETANIDE 1 MG: 1 TABLET ORAL at 16:58

## 2020-04-13 RX ADMIN — ZIPRASIDONE HYDROCHLORIDE 60 MG: 20 CAPSULE ORAL at 08:06

## 2020-04-13 RX ADMIN — DILTIAZEM HYDROCHLORIDE 180 MG: 180 CAPSULE, COATED, EXTENDED RELEASE ORAL at 08:06

## 2020-04-13 RX ADMIN — HALOPERIDOL 10 MG: 5 TABLET ORAL at 08:04

## 2020-04-13 NOTE — PLAN OF CARE
Problem: Adult Behavioral Health Plan of Care  Goal: Plan of Care Review  Flowsheets (Taken 4/12/2020 7335)  Plan of Care Reviewed With: patient  Patient Agreement with Plan of Care: agrees  Outcome Summary: Jillian remains labile and intrusive with both staff and peers. Was more easily redirected tonight. Mood is elevated. Was compliant with all scheduled medication. She attend evening group activity and displayed appropriate behavior.  Intervention(s): Jillian will take all scheduled medications

## 2020-04-13 NOTE — PLAN OF CARE
"  Problem: Adult Behavioral Health Plan of Care  Goal: Plan of Care Review  Outcome: Progressing  Flowsheets (Taken 4/13/2020 1430)  Progress: improving  Plan of Care Reviewed With: patient  Patient Agreement with Plan of Care: agrees  Outcome Summary: Jillian is visible on the unit. Compliant with meals and meds. Loud at times. Religiously preoccupied. Easily agitated and irritable. Poor boundaries and needs frequent redirection. Pt did have an outburst and threw a bible on the floor. Able to get pt to accept Ativan 2 mg po with some resistance. Pt was brought to her room to talk. Inappropriately focused on discharge. Spoke of being angry with her sister. Feels everyone is jealous of her on the unit. \" I just want to be able to go to heaven\". Very preoccupied. Encouraging pt to stay in better control. Pt did rest and take a nap in her room. Will continue to monitor on q 15 minute checks.   Intervention(s): Jillian will express concerns to staff, get adequate sleep, able to maintain control.     Problem: Mood Impairment (Manic/Hypomanic Signs/Symptoms)  Goal: Improved Mood Symptoms (Manic/Hypomanic Signs/Symptoms)  Outcome: Progressing  Flowsheets (Taken 4/13/2020 1430)  Individual Goal: Jillian will contniue to be compliant with medications.  Goal Outcome: progressing  [place] Goal: acknowledges progress     "

## 2020-04-13 NOTE — PROGRESS NOTES
"Psychiatry Progress Note    Chief Complaint/Reason for follow-up: Manic Episode w/ psychosis    Interval History: Medication Compliant. Agitated on 4/12; slamming phone and attempting to flip table. Still with delusional content.    On exam, focused on discharge. Minimizes severity of illness and states she needs to get her medications to go home today. Still hyper-Oriental orthodox and illogical with statements. Ok with current medications but would like to stop the haldol. Understands that medication changes take time and that is why we would like her to stay in the hospital longer.     Denies physical complaints.     I spoke with DELIA Mata from Cardiology. Ok to increase Geodon to 80 mg BID and decrease Haldol to 7.5 mg BID. Check EKG daily.      Review of Systems:   Sleep:  Good, Appetite: Fair and GI: No complaints    Vital Signs for the last 24 hours:  Temp:  [36.5 °C (97.7 °F)-36.6 °C (97.8 °F)] 36.6 °C (97.8 °F)  Heart Rate:  [] 68  Resp:  [16] 16  BP: (118-155)/(63-71) 118/63    Labs:  I have reviewed the patient's labs.  Current labs are within normal limits.     EKG 4/8/20: QTc 446 w/ HR 82    Mental Status Exam:  Appearance: unkempt, disheveled, inappropriate attire and obese  Gait and Motor: PMAed wandering, pacing in room when talking to her  Speech: faster than usual rate.  Mood: \"Dr. Leon has to send me home\"  Affect: labile, bizarre at times, irritable  Associations: illogical  Thought Process: tangential and circumstantial  Thought Content:  hyperreligiosity and delusions  Suicidality/Homicidality: denies  Judgement/Insight: minimizes severity level of illness and poor barriers  Orientation: month, year and type of place  Memory: recalls recent events and recalls remote events  Attention: distracted  Knowledge: normal  Language: normal    VTE Assessment: I have reassessed and the patient's VTE risk and treatment plan is appropriate.    Assessment/Plan    43 y/o female pphx: bipolar disorder " who was admitted through the ED in manic episode. Admitted on a 201, now on a 303 commitment. Still with mood lability, delusional thinking and limited insight/judgement. Agitated at times over the weekend. Still not at baseline with delusional content and would benefit from further medication changes     Bipolar Disorder, current episode manic w/ psychosis  -involuntary on 303 with MOO second opinion as of 4/7/20  -D/C abilify 20 mg daily- cross-tapering from Abilify to Geodon  -Increase Geodon to 80 mg BID with meals   -Continue depakote to 150mg qam and continue 1500mg qhs. Level is 100.5 on 4/6/20  -Haldol back to 7.5 mg BID  -D/C Thorazine 50 mg  and  as patient refusing and seemed better on more haldol  -Start ativan 2 mg QHS for sleep and add 1 mg   -Ativan PRN increased to 2 mg, will prioritize this over antipsychotics due to QTc  - QTc 460 with HR of 82 on 4/10/20 ,   Appreciate cardiology input, will follow QTc Daily while increasing Geodon    LE Edema  -Bumex 1 mg BID  -appears to have plateaued, will monitor  -Management per medicine, appreciate recs    Disposition  -on a 20 day commitment from 4/3/20  -will work with family for ultimate disposition, independent at baseline  -would benefit from family involvement for support

## 2020-04-14 PROCEDURE — 99232 SBSQ HOSP IP/OBS MODERATE 35: CPT | Performed by: PSYCHIATRY & NEUROLOGY

## 2020-04-14 PROCEDURE — 12400000 HC ROOM AND CARE SEMIPRIVATE PSYCH

## 2020-04-14 PROCEDURE — 63700000 HC SELF-ADMINISTRABLE DRUG: Performed by: HOSPITALIST

## 2020-04-14 PROCEDURE — 63700000 HC SELF-ADMINISTRABLE DRUG: Performed by: PSYCHIATRY & NEUROLOGY

## 2020-04-14 PROCEDURE — 63700000 HC SELF-ADMINISTRABLE DRUG: Performed by: PHYSICIAN ASSISTANT

## 2020-04-14 PROCEDURE — 63700000 HC SELF-ADMINISTRABLE DRUG: Performed by: STUDENT IN AN ORGANIZED HEALTH CARE EDUCATION/TRAINING PROGRAM

## 2020-04-14 PROCEDURE — 93005 ELECTROCARDIOGRAM TRACING: CPT | Performed by: STUDENT IN AN ORGANIZED HEALTH CARE EDUCATION/TRAINING PROGRAM

## 2020-04-14 RX ADMIN — HYDROCORTISONE: 1 CREAM TOPICAL at 05:31

## 2020-04-14 RX ADMIN — LORAZEPAM 2 MG: 2 TABLET ORAL at 22:04

## 2020-04-14 RX ADMIN — LORAZEPAM 1 MG: 1 TABLET ORAL at 08:09

## 2020-04-14 RX ADMIN — LORAZEPAM 1 MG: 1 TABLET ORAL at 13:48

## 2020-04-14 RX ADMIN — HALOPERIDOL 7.5 MG: 5 TABLET ORAL at 08:09

## 2020-04-14 RX ADMIN — DIVALPROEX SODIUM 125 MG: 125 TABLET, DELAYED RELEASE ORAL at 08:08

## 2020-04-14 RX ADMIN — DOCUSATE SODIUM 100 MG: 100 CAPSULE, LIQUID FILLED ORAL at 08:09

## 2020-04-14 RX ADMIN — DIVALPROEX SODIUM 1500 MG: 250 TABLET, DELAYED RELEASE ORAL at 22:03

## 2020-04-14 RX ADMIN — ZIPRASIDONE HYDROCHLORIDE 80 MG: 80 CAPSULE ORAL at 08:09

## 2020-04-14 RX ADMIN — DILTIAZEM HYDROCHLORIDE 180 MG: 180 CAPSULE, COATED, EXTENDED RELEASE ORAL at 08:07

## 2020-04-14 RX ADMIN — BUMETANIDE 1 MG: 1 TABLET ORAL at 16:36

## 2020-04-14 RX ADMIN — DOCUSATE SODIUM 100 MG: 100 CAPSULE, LIQUID FILLED ORAL at 16:36

## 2020-04-14 RX ADMIN — SENNOSIDES 1 TABLET: 8.6 TABLET, FILM COATED ORAL at 04:50

## 2020-04-14 RX ADMIN — ZIPRASIDONE HYDROCHLORIDE 80 MG: 80 CAPSULE ORAL at 16:36

## 2020-04-14 RX ADMIN — BUMETANIDE 1 MG: 1 TABLET ORAL at 08:09

## 2020-04-14 RX ADMIN — ASPIRIN 325 MG: 325 TABLET, DELAYED RELEASE ORAL at 08:09

## 2020-04-14 RX ADMIN — HALOPERIDOL 7.5 MG: 5 TABLET ORAL at 22:03

## 2020-04-14 NOTE — PLAN OF CARE
Problem: Adult Behavioral Health Plan of Care  Goal: Plan of Care Review  Outcome: Progressing  Flowsheets (Taken 4/14/2020 4056)  Plan of Care Reviewed With: patient; sibling  Patient Agreement with Plan of Care: agrees  Outcome Summary:  discussed d/c planning with patients sister.  Reviewed treatment team recommendations for partial program.

## 2020-04-14 NOTE — PLAN OF CARE
Problem: Adult Behavioral Health Plan of Care  Goal: Plan of Care Review  Outcome: Progressing  Flowsheets (Taken 4/14/2020 1303)  Progress: improving  Plan of Care Reviewed With: patient  Patient Agreement with Plan of Care: agrees  Outcome Summary: Pt is visible on the unit. Compliant with meals and meds. Less irritable today. Did take naps at times. EKG completed this am. Religiously preoccupied. Singing loudly when listening to headphones. Poor boundaries. Need reassurance and redirection at times. Will continue to monitor on q 15 minute checks.  Intervention(s): Monitor for safety, Encourage adequate sleep and rest, compliance with medication.     Problem: Mood Impairment (Manic/Hypomanic Signs/Symptoms)  Goal: Improved Mood Symptoms (Manic/Hypomanic Signs/Symptoms)  Outcome: Progressing  Flowsheets (Taken 4/14/2020 1303)  Individual Goal: Jillian will utilize coping mechanisms when she starts feeling agitated.  Goal Outcome: progressing  [place] Goal: acknowledges progress

## 2020-04-14 NOTE — PROGRESS NOTES
"Psychiatry Progress Note    Chief Complaint/Reason for follow-up: Manic Episode w/ psychosis    Interval History: Compliant with meals and meds. Labile at times but cooperative and in control. Religiously preoccupied.     On exam, patient very sleep after lunch. Stating \"I think I'm coming out of my mari.\" Reports wanting to go home and go to sleep. Spoke again after lunch, patient had just showered. Labile affect and appeared distressed over some religiously motivated idea about Rebel and her sins. Became tearful, but easily redirected and regained composure. Hopeful about going home.     Less focused on discharge and not wanting to take the ativan. Says she feels much better on the Geodon instead of the Abilify.     Review of Systems:   Sleep:  Good, Appetite: Fair and GI: No complaints    Vital Signs for the last 24 hours:  Temp:  [36.4 °C (97.5 °F)-36.6 °C (97.8 °F)] 36.4 °C (97.5 °F)  Heart Rate:  [88-97] 93  Resp:  [18-20] 20  BP: (124-146)/(52-83) 132/52    Labs:  I have reviewed the patient's labs.  Current labs are within normal limits.     EKG 4/14/20: QTc 466 w/ HR 85    Mental Status Exam:  Appearance: Better ADLs,  appropriate attire and obese  Gait and Motor: normal gait, no abnormalities  Speech: normal rate.  Mood: \"Bharath is Rebel\"  Affect: labile, bizarre at times,   Associations: illogical  Thought Process: tangential and circumstantial   Thought Content:  hyperreligiosity and delusions  Suicidality/Homicidality: denies  Judgement/Insight: minimizes severity level of illness, help accepting  Orientation: month, year and type of place  Memory: recalls recent events and recalls remote events  Attention: distracted  Knowledge: normal  Language: normal    VTE Assessment: I have reassessed and the patient's VTE risk and treatment plan is appropriate.    Assessment/Plan    45 y/o female pphx: bipolar disorder who was admitted through the ED in manic episode. Admitted on a 201, now on a 303 commitment. " Less labile today and no outbursts. Still religiously pre-occupied with delusional content present. Calmer on current medication regimen and sleeping more. Making progress.      Bipolar Disorder, current episode manic w/ psychosis  -involuntary on 303 with MOO second opinion as of 4/7/20  -D/C abilify 20 mg daily- cross-tapering from Abilify to Geodon  -Continue Geodon to 80 mg BID with meals   -Continue depakote to 150mg qam and continue 1500mg qhs. Level is 100.5 on 4/6/20  -Haldol back to 7.5 mg BID  -D/C Thorazine 50 mg  and  as patient refusing and seemed better on more haldol  -Start ativan 2 mg QHS for sleep and add 1 mg   -Ativan PRN increased to 2 mg, will prioritize this over antipsychotics due to QTc  - QTc 46 with HR of 85 on 4/14/20 ,   Appreciate cardiology input, will follow QTc Daily while increasing Geodon    LE Edema  -Bumex 1 mg BID  -appears to have plateaued, will monitor  -Management per medicine, appreciate recs    Disposition  -on a 20 day commitment from 4/3/20  -will work with family for ultimate disposition, independent at baseline  -would benefit from family involvement for support

## 2020-04-14 NOTE — PLAN OF CARE
Problem: Adult Behavioral Health Plan of Care  Goal: Plan of Care Review  Flowsheets (Taken 4/13/2020 2110)  Progress: improving  Plan of Care Reviewed With: patient  Patient Agreement with Plan of Care: agrees    Outcome Summary: Jillian is visible on the unit and engaged with peers appropriately this shift. Pt is labile at times but cooperative and remains in control. Religiously preoccupied.  Med compliant. Will ctm.    Intervention(s): Encouraged pt to be safe/visible, to get adequate sleep and be med compliant this shift.

## 2020-04-15 PROBLEM — F29 PSYCHOSIS (CMS/HCC): Status: RESOLVED | Noted: 2020-03-22 | Resolved: 2020-04-15

## 2020-04-15 LAB
ATRIAL RATE: 82
ATRIAL RATE: 85
ATRIAL RATE: 86
P AXIS: 48
P AXIS: 59
P AXIS: 61
PR INTERVAL: 156
PR INTERVAL: 162
PR INTERVAL: 164
QRS DURATION: 106
QRS DURATION: 96
QRS DURATION: 98
QT INTERVAL: 386
QT INTERVAL: 390
QT INTERVAL: 392
QTC CALCULATION(BAZETT): 455
QTC CALCULATION(BAZETT): 461
QTC CALCULATION(BAZETT): 466
R AXIS: 30
R AXIS: 37
R AXIS: 47
T WAVE AXIS: 21
T WAVE AXIS: 23
T WAVE AXIS: 25
VENTRICULAR RATE: 82
VENTRICULAR RATE: 85
VENTRICULAR RATE: 86

## 2020-04-15 PROCEDURE — 63700000 HC SELF-ADMINISTRABLE DRUG: Performed by: STUDENT IN AN ORGANIZED HEALTH CARE EDUCATION/TRAINING PROGRAM

## 2020-04-15 PROCEDURE — 63600000 HC DRUGS/DETAIL CODE: Performed by: PSYCHIATRY & NEUROLOGY

## 2020-04-15 PROCEDURE — 99232 SBSQ HOSP IP/OBS MODERATE 35: CPT | Performed by: PSYCHIATRY & NEUROLOGY

## 2020-04-15 PROCEDURE — 63700000 HC SELF-ADMINISTRABLE DRUG: Performed by: PSYCHIATRY & NEUROLOGY

## 2020-04-15 PROCEDURE — 12400000 HC ROOM AND CARE SEMIPRIVATE PSYCH

## 2020-04-15 PROCEDURE — 93005 ELECTROCARDIOGRAM TRACING: CPT | Performed by: STUDENT IN AN ORGANIZED HEALTH CARE EDUCATION/TRAINING PROGRAM

## 2020-04-15 PROCEDURE — 63700000 HC SELF-ADMINISTRABLE DRUG: Performed by: PHYSICIAN ASSISTANT

## 2020-04-15 PROCEDURE — 63700000 HC SELF-ADMINISTRABLE DRUG: Performed by: HOSPITALIST

## 2020-04-15 PROCEDURE — 63600000 HC DRUGS/DETAIL CODE: Performed by: STUDENT IN AN ORGANIZED HEALTH CARE EDUCATION/TRAINING PROGRAM

## 2020-04-15 RX ADMIN — BUMETANIDE 1 MG: 1 TABLET ORAL at 17:00

## 2020-04-15 RX ADMIN — HALOPERIDOL LACTATE 7.5 MG: 5 INJECTION, SOLUTION INTRAMUSCULAR at 21:45

## 2020-04-15 RX ADMIN — ZIPRASIDONE HYDROCHLORIDE 80 MG: 80 CAPSULE ORAL at 08:23

## 2020-04-15 RX ADMIN — DILTIAZEM HYDROCHLORIDE 180 MG: 180 CAPSULE, COATED, EXTENDED RELEASE ORAL at 08:23

## 2020-04-15 RX ADMIN — LORAZEPAM 2 MG: 2 INJECTION INTRAMUSCULAR; INTRAVENOUS at 21:45

## 2020-04-15 RX ADMIN — ASPIRIN 325 MG: 325 TABLET, DELAYED RELEASE ORAL at 08:23

## 2020-04-15 RX ADMIN — BUMETANIDE 1 MG: 1 TABLET ORAL at 08:26

## 2020-04-15 RX ADMIN — POLYETHYLENE GLYCOL 3350 17 G: 17 POWDER, FOR SOLUTION ORAL at 08:24

## 2020-04-15 RX ADMIN — DOCUSATE SODIUM 100 MG: 100 CAPSULE, LIQUID FILLED ORAL at 08:23

## 2020-04-15 RX ADMIN — HYDROCORTISONE: 1 CREAM TOPICAL at 02:24

## 2020-04-15 RX ADMIN — FLUTICASONE PROPIONATE 2 SPRAY: 50 SPRAY, METERED NASAL at 08:26

## 2020-04-15 RX ADMIN — DIVALPROEX SODIUM 1500 MG: 250 TABLET, DELAYED RELEASE ORAL at 22:18

## 2020-04-15 RX ADMIN — LORAZEPAM 1 MG: 1 TABLET ORAL at 08:23

## 2020-04-15 RX ADMIN — ZIPRASIDONE HYDROCHLORIDE 80 MG: 80 CAPSULE ORAL at 18:03

## 2020-04-15 RX ADMIN — DIVALPROEX SODIUM 125 MG: 125 TABLET, DELAYED RELEASE ORAL at 08:23

## 2020-04-15 RX ADMIN — HALOPERIDOL 7.5 MG: 5 TABLET ORAL at 08:22

## 2020-04-15 RX ADMIN — DIPHENHYDRAMINE HYDROCHLORIDE 50 MG: 50 INJECTION INTRAMUSCULAR; INTRAVENOUS at 21:44

## 2020-04-15 RX ADMIN — LORAZEPAM 1 MG: 1 TABLET ORAL at 14:11

## 2020-04-15 RX ADMIN — DOCUSATE SODIUM 100 MG: 100 CAPSULE, LIQUID FILLED ORAL at 18:00

## 2020-04-15 NOTE — PROGRESS NOTES
"Psychiatry Progress Note    Chief Complaint/Reason for follow-up: Radha with psychosis    Interval History: The patient is overall smiling, pleasant.  Less Roman Catholic, says she would not push or injure her father.  Appears to be focused on discharge.  Did not appear to be agitated or as intrusive today.    Review of Systems:   Sleep:  Good, Appetite: Good and GI: No complaints    Vital Signs for the last 24 hours:  Temp:  [36.3 °C (97.4 °F)-36.8 °C (98.3 °F)] 36.8 °C (98.3 °F)  Heart Rate:  [85-98] 97  Resp:  [17-20] 18  BP: (117-147)/(53-82) 135/53    Medications  Scheduled  • aspirin  325 mg oral Daily   • bumetanide  1 mg oral BID (am, 4p)   • dilTIAZem CD  180 mg oral Daily   • divalproex  1,500 mg oral Nightly   • divalproex  125 mg oral Daily   • docusate sodium  100 mg oral BID   • fluticasone propionate  2 spray Each Nostril Daily   • haloperidoL  7.5 mg oral q12h AVIS    Or   • haloperidol lactate  7.5 mg intramuscular q12h AVIS   • LORazepam  1 mg oral Daily (1p)    Or   • LORazepam  1 mg intramuscular Daily (1p)   • LORazepam  2 mg oral Nightly    Or   • LORazepam  2 mg intramuscular Nightly   • LORazepam  1 mg oral Daily before breakfast   • polyethylene glycol  17 g oral Daily   • ziprasidone  80 mg oral BID with meals     PRN  •  acetaminophen  •  alum-mag hydroxide-simeth  •  calcium carbonate  •  diphenhydrAMINE  •  diphenhydrAMINE  •  hydrocortisone  •  ibuprofen  •  LORazepam **OR** LORazepam  •  ondansetron ODT  •  senna    Mental Status Exam:  Appearance: Better ADLs,  appropriate attire and obese  Gait and Motor: normal gait, no abnormalities  Speech: normal rate.  Mood: \"i'm fine\"  Affect: smiling,   Associations: illogical  Thought Process: tangential and circumstantial   Thought Content:  some hyperreligiosity and delusions, less focused on Roman Catholic thoughts  Suicidality/Homicidality: denies  Judgement/Insight: minimizes severity level of illness, help accepting  Orientation: month, year and " type of place  Memory: recalls recent events and recalls remote events  Attention: distracted  Knowledge: normal  Language: normal      Assessment/Plan  * Bipolar 1 disorder (CMS/HCC)  Assessment & Plan  geodon 80 mg BID for psychosis and mood stability.  Increase as tolerated and necessary.  Haldol 7.5 mg BID for psychosis and mood stability.  Depakote 1625mg for mood stability.      I discussed the treatment plan and the patient's progress with nursing staff and Allied therapists in the treatment team meeting this morning.  Patient is seen and evaluated for approximately 25 minutes in therapy with greater than 50% of time spent in direct face-to-face counseling, coordination of care and review of the medical record.    Kareem Leon MD  4/15/2020

## 2020-04-15 NOTE — PLAN OF CARE
"  Problem: Adult Behavioral Health Plan of Care  Goal: Plan of Care Review  Flowsheets (Taken 4/14/2020 2226)  Progress: improving  Plan of Care Reviewed With: patient  Patient Agreement with Plan of Care: agrees    Outcome Summary: Jillian is visible on the unit. Less irritable but still labile. Religiously preoccupied and paranoid at times. Pt endorsing bizarre delusions that this RN is \"my baby and daughter\" and believes that one RN (Leia) is her actual cat. EKG completed today. Med compliant. Will ctm.     Intervention(s): Encouraged Jillian to be med compliant and get adequate rest/sleep this shift.     "

## 2020-04-15 NOTE — PLAN OF CARE
Problem: Adult Behavioral Health Plan of Care  Goal: Plan of Care Review  Outcome: Progressing  Flowsheets (Taken 4/15/2020 1530)  Progress: improving  Plan of Care Reviewed With: patient  Patient Agreement with Plan of Care: agrees  Outcome Summary: Jillian is improving.  Less irritable throughout the day.  One minor setback after taking a nap after lunch.  After waking was irritable and began yelling at male peer.  Appeared to be disoriented and misinterpreting what was going on.  Able to be quickly deescalated and redirected.  Compliant with medications. Able to attend part of one group today.  Napped through second group.  Denies si/hi.  Remains paranoid and delusional, but more able to be redirected, and rationalize , preventing escalation.  Intervention(s): Continue to encourage med compliance and techniques to deescalate her own anger reactions

## 2020-04-15 NOTE — PLAN OF CARE
Problem: Adult Behavioral Health Plan of Care  Goal: Develops/Participates in Therapeutic [place] to Support Successful Transition  Outcome: Progressing  Flowsheets (Taken 4/15/2020 1442)  Individual Goal: Patient will continue to attend social work group 1x per week to address d/c planning needs.     Problem: Adult Behavioral Health Plan of Care  Goal: Optimized Coping Skills in Response to Life Stressors  Outcome: Progressing  Flowsheets (Taken 4/15/2020 1442)  Individual Goal:  and patient will colloaborate with patients sister to assist with support in the community after d/c from unit.

## 2020-04-16 PROCEDURE — 63700000 HC SELF-ADMINISTRABLE DRUG: Performed by: STUDENT IN AN ORGANIZED HEALTH CARE EDUCATION/TRAINING PROGRAM

## 2020-04-16 PROCEDURE — 63700000 HC SELF-ADMINISTRABLE DRUG: Performed by: PSYCHIATRY & NEUROLOGY

## 2020-04-16 PROCEDURE — 63700000 HC SELF-ADMINISTRABLE DRUG: Performed by: PHYSICIAN ASSISTANT

## 2020-04-16 PROCEDURE — 93005 ELECTROCARDIOGRAM TRACING: CPT | Performed by: STUDENT IN AN ORGANIZED HEALTH CARE EDUCATION/TRAINING PROGRAM

## 2020-04-16 PROCEDURE — 12400000 HC ROOM AND CARE SEMIPRIVATE PSYCH

## 2020-04-16 PROCEDURE — 99232 SBSQ HOSP IP/OBS MODERATE 35: CPT | Performed by: PSYCHIATRY & NEUROLOGY

## 2020-04-16 PROCEDURE — 63700000 HC SELF-ADMINISTRABLE DRUG: Performed by: HOSPITALIST

## 2020-04-16 RX ORDER — HALOPERIDOL 5 MG/1
10 TABLET ORAL EVERY 12 HOURS
Status: DISCONTINUED | OUTPATIENT
Start: 2020-04-16 | End: 2020-04-17

## 2020-04-16 RX ORDER — HALOPERIDOL 5 MG/ML
10 INJECTION INTRAMUSCULAR EVERY 12 HOURS
Status: DISCONTINUED | OUTPATIENT
Start: 2020-04-16 | End: 2020-04-17

## 2020-04-16 RX ADMIN — ZIPRASIDONE HYDROCHLORIDE 80 MG: 80 CAPSULE ORAL at 17:03

## 2020-04-16 RX ADMIN — ASPIRIN 325 MG: 325 TABLET, DELAYED RELEASE ORAL at 08:27

## 2020-04-16 RX ADMIN — ZIPRASIDONE HYDROCHLORIDE 80 MG: 80 CAPSULE ORAL at 08:28

## 2020-04-16 RX ADMIN — LORAZEPAM 2 MG: 2 TABLET ORAL at 21:35

## 2020-04-16 RX ADMIN — BUMETANIDE 1 MG: 1 TABLET ORAL at 08:27

## 2020-04-16 RX ADMIN — DOCUSATE SODIUM 100 MG: 100 CAPSULE, LIQUID FILLED ORAL at 08:28

## 2020-04-16 RX ADMIN — DIVALPROEX SODIUM 125 MG: 125 TABLET, DELAYED RELEASE ORAL at 08:28

## 2020-04-16 RX ADMIN — HALOPERIDOL 10 MG: 5 TABLET ORAL at 21:35

## 2020-04-16 RX ADMIN — LORAZEPAM 1 MG: 1 TABLET ORAL at 08:27

## 2020-04-16 RX ADMIN — FLUTICASONE PROPIONATE 2 SPRAY: 50 SPRAY, METERED NASAL at 08:44

## 2020-04-16 RX ADMIN — BUMETANIDE 1 MG: 1 TABLET ORAL at 17:03

## 2020-04-16 RX ADMIN — DILTIAZEM HYDROCHLORIDE 180 MG: 180 CAPSULE, COATED, EXTENDED RELEASE ORAL at 08:28

## 2020-04-16 RX ADMIN — HALOPERIDOL 7.5 MG: 5 TABLET ORAL at 08:27

## 2020-04-16 RX ADMIN — DIVALPROEX SODIUM 1500 MG: 250 TABLET, DELAYED RELEASE ORAL at 21:35

## 2020-04-16 NOTE — NURSING NOTE
Pt up again at 0115 starting to become agitated again, stating she did not need injections she received earlier. She was upset with her 1:1 sitter. Continues to be delusional stating staff is her family members. No longer wanted to be on a CPAP because she did not trust sitter. Encouraged to continue to utilize CPAP but Jillian refused. Staff will continue to monitor.

## 2020-04-16 NOTE — NURSING NOTE
Patient continued to escalate, beginning to throw objects around like the TV remote, punching walls, screaming and accusing staff of being affiliated with a doctor who did an operation on her stating that she almost . Security was called and patient was talked to. She was escorted back to her room and became tearful, and eventually became agreeable to getting IM Haldol, Ativan, and Benadryl. She calmed down afterwards. Staff will continue to monitor.

## 2020-04-16 NOTE — NURSING NOTE
Patient up at 0517 again, much more calm and relaxed. Apologized to RN for cursing but continues to be delusional saying that she just was upset because RN is her son and as a mother, she wants to best for RN. Given a snack and patient returned to room.

## 2020-04-16 NOTE — PLAN OF CARE
Problem: Adult Behavioral Health Plan of Care  Goal: Plan of Care Review  Outcome: Progressing  Flowsheets (Taken 4/15/2020 2121)  Progress: improving  Plan of Care Reviewed With: patient  Patient Agreement with Plan of Care: agrees  Outcome Summary: Jillian continues to be irritable and labile but is improving. She remains religiously preoccupied and hyperverbal. She was seen talking to other peers but other times appeared suspicous of certain staff. Around 2130, she began following another patient around and began getting agitated. She was threatening to punch staff and calling staff names. She did attend wrap up group. Staff will continue to [facility].  Intervention(s): Continue to encourage medication compliance

## 2020-04-16 NOTE — PROGRESS NOTES
Cardiology being asked to comment on patient's QTc and increasing Haldol, which can effect the QTc.     We have seen the patient's initial ECG and it is okay to use/increase Haldol for now with daily ECGs.  Reduce Haldol if QTc is over 500.     Please feel free to reach out with any further questions or concerns.    Sherrell Benavides PA-C  Pager [phone]  Office [phone]

## 2020-04-16 NOTE — PLAN OF CARE
Problem: Adult Behavioral Health Plan of Care  Goal: Plan of Care Review  Outcome: Progressing  Flowsheets  Taken 4/16/2020 1008 by Zuleyma Angela RN  Progress: improving  Plan of Care Reviewed With: patient  Patient Agreement with Plan of Care: agrees  Outcome Summary: Jillian has been calm so far this shift.  Continues to have limited insight into behaviors and has limited ability to reflect on behaviors.  Restless, at times.  And, at times religiously preoccupied and flirty with male peer.  Redirected as needed. No dangerous behavior observed so far this shift. Currently denies any urge to harm anyone or herself.    Addendum: 1700: Patient slept from 3116-6354.  Slept through 1300 dose of lorazepam.  Upon awakening was calm and cooperative.  Continues to have odd comments (thinking that a nurse whose name is Leia is her cat)Eating dinner. Still flirty with male staff, but appropriate.  Taken 4/15/2020 2121 by Narinder Lynn, RN  Intervention(s): Continue to encourage medication compliance
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
## 2020-04-17 LAB
ATRIAL RATE: 88
P AXIS: 64
PR INTERVAL: 164
QRS DURATION: 92
QT INTERVAL: 390
QTC CALCULATION(BAZETT): 471
R AXIS: 45
T WAVE AXIS: 31
VENTRICULAR RATE: 88

## 2020-04-17 PROCEDURE — 99232 SBSQ HOSP IP/OBS MODERATE 35: CPT | Performed by: PSYCHIATRY & NEUROLOGY

## 2020-04-17 PROCEDURE — 63700000 HC SELF-ADMINISTRABLE DRUG: Performed by: PSYCHIATRY & NEUROLOGY

## 2020-04-17 PROCEDURE — 63700000 HC SELF-ADMINISTRABLE DRUG: Performed by: HOSPITALIST

## 2020-04-17 PROCEDURE — 63700000 HC SELF-ADMINISTRABLE DRUG: Performed by: PHYSICIAN ASSISTANT

## 2020-04-17 PROCEDURE — 12400000 HC ROOM AND CARE SEMIPRIVATE PSYCH

## 2020-04-17 PROCEDURE — 93005 ELECTROCARDIOGRAM TRACING: CPT | Performed by: STUDENT IN AN ORGANIZED HEALTH CARE EDUCATION/TRAINING PROGRAM

## 2020-04-17 PROCEDURE — 63700000 HC SELF-ADMINISTRABLE DRUG

## 2020-04-17 PROCEDURE — 63700000 HC SELF-ADMINISTRABLE DRUG: Performed by: STUDENT IN AN ORGANIZED HEALTH CARE EDUCATION/TRAINING PROGRAM

## 2020-04-17 RX ORDER — LORAZEPAM 2 MG/1
TABLET ORAL
Status: COMPLETED
Start: 2020-04-17 | End: 2020-04-17

## 2020-04-17 RX ORDER — HALOPERIDOL 5 MG/ML
5 INJECTION INTRAMUSCULAR ONCE
Status: ACTIVE | OUTPATIENT
Start: 2020-04-17 | End: 2020-04-18

## 2020-04-17 RX ORDER — HALOPERIDOL 5 MG/ML
INJECTION INTRAMUSCULAR
Status: DISPENSED
Start: 2020-04-17 | End: 2020-04-18

## 2020-04-17 RX ORDER — HALOPERIDOL 5 MG/ML
10 INJECTION INTRAMUSCULAR NIGHTLY
Status: DISCONTINUED | OUTPATIENT
Start: 2020-04-17 | End: 2020-04-20

## 2020-04-17 RX ORDER — HALOPERIDOL 5 MG/1
5 TABLET ORAL ONCE
Status: COMPLETED | OUTPATIENT
Start: 2020-04-17 | End: 2020-04-17

## 2020-04-17 RX ORDER — HALOPERIDOL 5 MG/1
15 TABLET ORAL DAILY
Status: DISCONTINUED | OUTPATIENT
Start: 2020-04-18 | End: 2020-04-23

## 2020-04-17 RX ORDER — LORAZEPAM 2 MG/1
2 TABLET ORAL ONCE
Status: COMPLETED | OUTPATIENT
Start: 2020-04-17 | End: 2020-04-17

## 2020-04-17 RX ORDER — HALOPERIDOL 5 MG/ML
15 INJECTION INTRAMUSCULAR DAILY
Status: DISCONTINUED | OUTPATIENT
Start: 2020-04-18 | End: 2020-04-23

## 2020-04-17 RX ORDER — HALOPERIDOL 5 MG/1
10 TABLET ORAL NIGHTLY
Status: DISCONTINUED | OUTPATIENT
Start: 2020-04-17 | End: 2020-04-20

## 2020-04-17 RX ORDER — LORAZEPAM 2 MG/ML
2 INJECTION INTRAMUSCULAR ONCE
Status: DISCONTINUED | OUTPATIENT
Start: 2020-04-17 | End: 2020-04-17

## 2020-04-17 RX ADMIN — FLUTICASONE PROPIONATE 2 SPRAY: 50 SPRAY, METERED NASAL at 08:51

## 2020-04-17 RX ADMIN — ZIPRASIDONE HYDROCHLORIDE 80 MG: 80 CAPSULE ORAL at 08:47

## 2020-04-17 RX ADMIN — DOCUSATE SODIUM 100 MG: 100 CAPSULE, LIQUID FILLED ORAL at 08:47

## 2020-04-17 RX ADMIN — BUMETANIDE 1 MG: 1 TABLET ORAL at 08:47

## 2020-04-17 RX ADMIN — LORAZEPAM 2 MG: 2 TABLET ORAL at 12:53

## 2020-04-17 RX ADMIN — HALOPERIDOL 10 MG: 5 TABLET ORAL at 22:33

## 2020-04-17 RX ADMIN — LORAZEPAM 2 MG: 2 TABLET ORAL at 22:32

## 2020-04-17 RX ADMIN — HALOPERIDOL 5 MG: 5 TABLET ORAL at 12:44

## 2020-04-17 RX ADMIN — ASPIRIN 325 MG: 325 TABLET, DELAYED RELEASE ORAL at 08:47

## 2020-04-17 RX ADMIN — DIVALPROEX SODIUM 125 MG: 125 TABLET, DELAYED RELEASE ORAL at 08:47

## 2020-04-17 RX ADMIN — DIVALPROEX SODIUM 1500 MG: 250 TABLET, DELAYED RELEASE ORAL at 22:32

## 2020-04-17 RX ADMIN — ZIPRASIDONE HYDROCHLORIDE 80 MG: 80 CAPSULE ORAL at 17:39

## 2020-04-17 RX ADMIN — DILTIAZEM HYDROCHLORIDE 180 MG: 180 CAPSULE, COATED, EXTENDED RELEASE ORAL at 08:47

## 2020-04-17 RX ADMIN — HALOPERIDOL 10 MG: 5 TABLET ORAL at 08:47

## 2020-04-17 RX ADMIN — LORAZEPAM 2 MG: 2 TABLET ORAL at 13:08

## 2020-04-17 RX ADMIN — LORAZEPAM 1 MG: 1 TABLET ORAL at 08:45

## 2020-04-17 RX ADMIN — BUMETANIDE 1 MG: 1 TABLET ORAL at 17:39

## 2020-04-17 NOTE — PLAN OF CARE
"  Problem: Adult Behavioral Health Plan of Care  Goal: Plan of Care Review  Outcome: Not progressing  Flowsheets  Taken 4/17/2020 1335 by Zuleyma Angela RN  Progress: no change  Plan of Care Reviewed With: patient  Patient Agreement with Plan of Care: disagrees (describe)  Outcome Summary: Jillian was having a good day until discussed increased medication dose with MD.  Became very angry, yelling that she \"had to be her own advocate\" and would not take haloperidol.  Attempted to encourage her compliance.  At first very agitated, banging on MD's door and confrontational.  Eventually able to agree to take PO meds with help of additional staff person.  Given haldol 5mg and lorazepam 2mg po.  Patient now asleep in room.  Taken 4/16/2020 2103 by Narinder Lynn, RN  Intervention(s): Continue to encourage medication compliance     "

## 2020-04-17 NOTE — PROGRESS NOTES
"Psychiatry Progress Note    Chief Complaint/Reason for follow-up: Manic Episode w/ psychosis    Interval History: Calmer, limited insight. Restless at times. Less irritable. Dancing and Singing. Religiously preoccupied.     Still religiously preoccupied and concerned that staff/patients are family members. Brighter on approach but labile and easy to slide into defiant/argumentative mood. Does not want to take haldol, denies that she is taking the medication. Yelling, and lacking insight into her need for medications and the seriousness of her illness. Required staff to come out and talk to her in the milieu because she was banging on Dr. Leon's door.     Review of Systems:   Sleep:  Good, Appetite: Fair and GI: No complaints    Vital Signs for the last 24 hours:  Temp:  [36.6 °C (97.9 °F)-37.1 °C (98.7 °F)] 37.1 °C (98.7 °F)  Heart Rate:  [] 108  Resp:  [16-18] 18  BP: (126-139)/(67-73) 136/73    Labs:  I have reviewed the patient's labs.  Current labs are within normal limits.     EKG 4/15/20: QTc 461 w/ HR 86    Mental Status Exam:  Appearance: Better ADLs,  appropriate attire and obese  Gait and Motor: normal gait, no abnormalities  Speech: loud, hard to interrupt  Mood: \"My dad is the archangel\"  Affect: labile, bizarre at times,   Associations: illogical, loose  Thought Process: tangential and circumstantial   Thought Content:  hyperreligiosity and delusions  Suicidality/Homicidality: denies  Judgement/Insight: minimizes severity level of illness, rejecting today. Poor judgement as she is agitated.  Orientation: month, year and type of place  Memory: confusion about recent events and recalls remote events  Attention: distracted  Knowledge: normal  Language: normal    VTE Assessment: I have reassessed and the patient's VTE risk and treatment plan is appropriate.    Assessment/Plan    43 y/o female pphx: bipolar disorder who was admitted through the ED in manic episode. Admitted on a 201, now on a 303 " commitment. Outburst yesterday, punching walls and threatening staff. Still religiously pre-occupied with delusional content present. Increased Haldol to 10 mg BID yesterday, still with significant delusions. Will require further antipsychotic medication due to disturbance she is making in the milieu and to try and break entrenched delusions.      Bipolar Disorder, current episode manic w/ psychosis  -involuntary on 303 with MOO second opinion as of 4/7/20  -Continue Geodon to 80 mg BID with meals   -Increased Haldol to 15 mg Qam and 10 mg QHS to reduce PRNs/agitation  -Continue depakote to 150mg qam and continue 1500mg qhs. Level is 100.5 on 4/6/20  -D/C Thorazine 50 mg  and  as patient refusing and seemed better on more haldol  -Start ativan 2 mg QHS for sleep and add 1 mg   -Ativan PRN increased to 2 mg, will prioritize this over antipsychotics due to QTc  - QTc 467 with HR of 92 on 4/15/20 ,   Appreciate cardiology input, will follow QTc Daily while changing antipsychotic regimen    LE Edema  -Bumex 1 mg BID  -appears to have plateaued, will monitor  -Management per medicine, appreciate recs    Disposition  -on a 20 day commitment from 4/3/20  -will work with family for ultimate disposition, independent at baseline  -would benefit from family involvement for support

## 2020-04-17 NOTE — PLAN OF CARE
Problem: Adult Behavioral Health Plan of Care  Goal: Plan of Care Review  Outcome: Progressing  Flowsheets (Taken 4/16/2020 2103)  Progress: improving  Plan of Care Reviewed With: patient  Patient Agreement with Plan of Care: agrees  Outcome Summary: Jillian has been less irritable this shift so far and her mood has been elevated. She was seen dancing and singing music while listening to headphones and had a bright affect. She continues to be delusional and religously preoccupied stating she knows some family members and that god has told things about them. Staff will continue to monitor.  Intervention(s): Continue to encourage medication compliance

## 2020-04-18 LAB
ATRIAL RATE: 89
ATRIAL RATE: 92
P AXIS: 56
P AXIS: 62
PR INTERVAL: 152
PR INTERVAL: 162
QRS DURATION: 92
QRS DURATION: 94
QT INTERVAL: 378
QT INTERVAL: 384
QTC CALCULATION(BAZETT): 467
QTC CALCULATION(BAZETT): 467
R AXIS: 44
R AXIS: 50
T WAVE AXIS: 30
T WAVE AXIS: 34
VENTRICULAR RATE: 89
VENTRICULAR RATE: 92

## 2020-04-18 PROCEDURE — 63700000 HC SELF-ADMINISTRABLE DRUG: Performed by: STUDENT IN AN ORGANIZED HEALTH CARE EDUCATION/TRAINING PROGRAM

## 2020-04-18 PROCEDURE — 99232 SBSQ HOSP IP/OBS MODERATE 35: CPT | Performed by: PSYCHIATRY & NEUROLOGY

## 2020-04-18 PROCEDURE — 12400000 HC ROOM AND CARE SEMIPRIVATE PSYCH

## 2020-04-18 PROCEDURE — 93005 ELECTROCARDIOGRAM TRACING: CPT | Performed by: STUDENT IN AN ORGANIZED HEALTH CARE EDUCATION/TRAINING PROGRAM

## 2020-04-18 PROCEDURE — 63700000 HC SELF-ADMINISTRABLE DRUG: Performed by: PSYCHIATRY & NEUROLOGY

## 2020-04-18 PROCEDURE — 63700000 HC SELF-ADMINISTRABLE DRUG: Performed by: HOSPITALIST

## 2020-04-18 PROCEDURE — 63700000 HC SELF-ADMINISTRABLE DRUG: Performed by: PHYSICIAN ASSISTANT

## 2020-04-18 RX ADMIN — DIVALPROEX SODIUM 125 MG: 125 TABLET, DELAYED RELEASE ORAL at 08:51

## 2020-04-18 RX ADMIN — ZIPRASIDONE HYDROCHLORIDE 80 MG: 80 CAPSULE ORAL at 08:51

## 2020-04-18 RX ADMIN — LORAZEPAM 1 MG: 1 TABLET ORAL at 14:00

## 2020-04-18 RX ADMIN — HALOPERIDOL 15 MG: 5 TABLET ORAL at 08:51

## 2020-04-18 RX ADMIN — BUMETANIDE 1 MG: 1 TABLET ORAL at 17:01

## 2020-04-18 RX ADMIN — ZIPRASIDONE HYDROCHLORIDE 80 MG: 80 CAPSULE ORAL at 17:01

## 2020-04-18 RX ADMIN — DIVALPROEX SODIUM 1500 MG: 250 TABLET, DELAYED RELEASE ORAL at 22:21

## 2020-04-18 RX ADMIN — HALOPERIDOL 10 MG: 5 TABLET ORAL at 22:21

## 2020-04-18 RX ADMIN — DOCUSATE SODIUM 100 MG: 100 CAPSULE, LIQUID FILLED ORAL at 08:51

## 2020-04-18 RX ADMIN — DOCUSATE SODIUM 100 MG: 100 CAPSULE, LIQUID FILLED ORAL at 17:01

## 2020-04-18 RX ADMIN — BUMETANIDE 1 MG: 1 TABLET ORAL at 08:54

## 2020-04-18 RX ADMIN — ASPIRIN 325 MG: 325 TABLET, DELAYED RELEASE ORAL at 08:51

## 2020-04-18 RX ADMIN — SENNOSIDES 1 TABLET: 8.6 TABLET, FILM COATED ORAL at 08:54

## 2020-04-18 RX ADMIN — LORAZEPAM 2 MG: 2 TABLET ORAL at 22:21

## 2020-04-18 RX ADMIN — DILTIAZEM HYDROCHLORIDE 180 MG: 180 CAPSULE, COATED, EXTENDED RELEASE ORAL at 08:51

## 2020-04-18 RX ADMIN — LORAZEPAM 1 MG: 1 TABLET ORAL at 08:52

## 2020-04-18 NOTE — PLAN OF CARE
Problem: Adult Behavioral Health Plan of Care  Goal: Plan of Care Review  Flowsheets  Taken 4/18/2020 1426 by Adrianne Siddiqi, RN  Plan of Care Reviewed With: patient  Patient Agreement with Plan of Care: agrees  Outcome Summary: Jillian was visible this shift, but did retire to her room and rest at times.  She was med compliant.  irritable at times, remains suspicious and delusional.  Religiously preoccupied.  Minimal peer interactions.  Eating full meals.  C/o feeling tired this shift.  Making multiple phone calls, laughing hysterically one minute and crying the next.  Taken 4/17/2020 7075 by Narinder Lynn, RN  Intervention(s): Encourage medication compliance     Problem: Mood Impairment (Manic/Hypomanic Signs/Symptoms)  Goal: Improved Mood Symptoms (Manic/Hypomanic Signs/Symptoms)  Flowsheets (Taken 4/18/2020 1426)  Individual Goal: Jillian will accept medications as order  Goal Outcome: progressing  [place] Goal: acknowledges progress

## 2020-04-18 NOTE — NURSING NOTE
Pt woke up at 2315 stating she is going to bed but did not want her CPAP. She returned to her room. Pulse ox was 96% and patient sleeping currently. Staff will continue to monitor.
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
## 2020-04-18 NOTE — PLAN OF CARE
Problem: Adult Behavioral Health Plan of Care  Goal: Plan of Care Review  Outcome: Progressing  Flowsheets (Taken 4/17/2020 6719)  Progress: improving  Plan of Care Reviewed With: patient  Patient Agreement with Plan of Care: agrees  Outcome Summary: Jillian was calm throughout the shift and did not have any outbursts of agitation. She was watching a movie amongst her peers in the living room and fell asleep. She woke up and was compliant with her medications without difficulty. She returned to the living room and went back to bed. Staff will conitnue to monitor.  Intervention(s): Encourage medication compliance

## 2020-04-19 LAB
ATRIAL RATE: 85
ATRIAL RATE: 85
P AXIS: 69
P AXIS: 69
PR INTERVAL: 164
PR INTERVAL: 164
QRS DURATION: 88
QRS DURATION: 88
QT INTERVAL: 368
QT INTERVAL: 368
QTC CALCULATION(BAZETT): 437
QTC CALCULATION(BAZETT): 437
R AXIS: 44
R AXIS: 44
T WAVE AXIS: 32
T WAVE AXIS: 32
VENTRICULAR RATE: 85
VENTRICULAR RATE: 85

## 2020-04-19 PROCEDURE — 63700000 HC SELF-ADMINISTRABLE DRUG: Performed by: PHYSICIAN ASSISTANT

## 2020-04-19 PROCEDURE — 63700000 HC SELF-ADMINISTRABLE DRUG: Performed by: PSYCHIATRY & NEUROLOGY

## 2020-04-19 PROCEDURE — 99232 SBSQ HOSP IP/OBS MODERATE 35: CPT | Performed by: PSYCHIATRY & NEUROLOGY

## 2020-04-19 PROCEDURE — 63700000 HC SELF-ADMINISTRABLE DRUG: Performed by: STUDENT IN AN ORGANIZED HEALTH CARE EDUCATION/TRAINING PROGRAM

## 2020-04-19 PROCEDURE — 63700000 HC SELF-ADMINISTRABLE DRUG: Performed by: HOSPITALIST

## 2020-04-19 PROCEDURE — 93005 ELECTROCARDIOGRAM TRACING: CPT | Performed by: STUDENT IN AN ORGANIZED HEALTH CARE EDUCATION/TRAINING PROGRAM

## 2020-04-19 PROCEDURE — 12400000 HC ROOM AND CARE SEMIPRIVATE PSYCH

## 2020-04-19 RX ADMIN — LORAZEPAM 1 MG: 1 TABLET ORAL at 08:18

## 2020-04-19 RX ADMIN — HALOPERIDOL 15 MG: 5 TABLET ORAL at 08:36

## 2020-04-19 RX ADMIN — DIVALPROEX SODIUM 125 MG: 125 TABLET, DELAYED RELEASE ORAL at 08:36

## 2020-04-19 RX ADMIN — HALOPERIDOL 10 MG: 5 TABLET ORAL at 21:57

## 2020-04-19 RX ADMIN — DOCUSATE SODIUM 100 MG: 100 CAPSULE, LIQUID FILLED ORAL at 17:24

## 2020-04-19 RX ADMIN — ZIPRASIDONE HYDROCHLORIDE 80 MG: 80 CAPSULE ORAL at 08:36

## 2020-04-19 RX ADMIN — FLUTICASONE PROPIONATE 2 SPRAY: 50 SPRAY, METERED NASAL at 08:36

## 2020-04-19 RX ADMIN — BUMETANIDE 1 MG: 1 TABLET ORAL at 17:24

## 2020-04-19 RX ADMIN — ASPIRIN 325 MG: 325 TABLET, DELAYED RELEASE ORAL at 08:36

## 2020-04-19 RX ADMIN — DIVALPROEX SODIUM 1500 MG: 250 TABLET, DELAYED RELEASE ORAL at 21:56

## 2020-04-19 RX ADMIN — DOCUSATE SODIUM 100 MG: 100 CAPSULE, LIQUID FILLED ORAL at 08:36

## 2020-04-19 RX ADMIN — SENNOSIDES 1 TABLET: 8.6 TABLET, FILM COATED ORAL at 17:24

## 2020-04-19 RX ADMIN — LORAZEPAM 2 MG: 2 TABLET ORAL at 21:56

## 2020-04-19 RX ADMIN — ZIPRASIDONE HYDROCHLORIDE 80 MG: 80 CAPSULE ORAL at 17:24

## 2020-04-19 RX ADMIN — LORAZEPAM 2 MG: 2 TABLET ORAL at 11:47

## 2020-04-19 RX ADMIN — DILTIAZEM HYDROCHLORIDE 180 MG: 180 CAPSULE, COATED, EXTENDED RELEASE ORAL at 08:36

## 2020-04-19 RX ADMIN — BUMETANIDE 1 MG: 1 TABLET ORAL at 08:36

## 2020-04-19 NOTE — NURSING NOTE
Jillian refused sleeping with the CPAP tonight.  Complaining about the medications she is taking, how they make her feel.  RN encouraged her to speak with psychiatrist.  Will continue to monitor.
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
## 2020-04-19 NOTE — PLAN OF CARE
Problem: Adult Behavioral Health Plan of Care  Goal: Plan of Care Review  Flowsheets (Taken 4/19/2020 1421)  Progress: improving  Plan of Care Reviewed With: patient  Patient Agreement with Plan of Care: agrees  Outcome Summary: Jillian was visible this shift.  She was med compliant.  She Denied SI.  Remains delusional, verbally aggressive toward select peers.  Listening to headphones.  Labile, ripped poster off the wall, loud, becoming agitated.  PRN Ativan given with good result.  Pt was unable to verbalize what was bothering her.  Hand and feet edema appears the same.  No complaints of pain.  Minimal social interaction and frequently at the nurses station.  Intervention(s): 1:1 support given, encouraged Jillian to attend groups and to inform staff if feeling increased agitation     Problem: Mood Impairment (Manic/Hypomanic Signs/Symptoms)  Goal: Improved Mood Symptoms (Manic/Hypomanic Signs/Symptoms)  Flowsheets (Taken 4/19/2020 1421)  Individual Goal: Jillian will accept medications and inform staff of increased agitation  Goal Outcome: progressing  [place] Goal: acknowledges progress

## 2020-04-19 NOTE — PLAN OF CARE
Problem: Adult Behavioral Health Plan of Care  Goal: Plan of Care Review  Outcome: Progressing  Flowsheets  Taken 4/18/2020 2031  Progress: improving  Outcome Summary: Jillian was visible among community members this shift, socially engaged. Her mood remains labile.  She continues to be disorganized in her thought process, reamining religiously preoccupied. Her speech is loud. She showered and washed her clothing this evening. She ate 100% of her dinner and asked for snacks frequently afterward. Attended wrap up group. Medication compliant. Cooperative with staff this evening.  Will continue to monitor and offer support on unit.  Intervention(s): Monitoring patient q15min for safety, offering emotional support as needed, and monitoring for treatment plan compliance/effectiveness.  Taken 4/18/2020 8236  Plan of Care Reviewed With: patient  Patient Agreement with Plan of Care: agrees

## 2020-04-20 LAB
ATRIAL RATE: 85
P AXIS: 62
PR INTERVAL: 156
QRS DURATION: 102
QT INTERVAL: 390
QTC CALCULATION(BAZETT): 464
R AXIS: 51
T WAVE AXIS: 41
VENTRICULAR RATE: 85

## 2020-04-20 PROCEDURE — 63700000 HC SELF-ADMINISTRABLE DRUG: Performed by: STUDENT IN AN ORGANIZED HEALTH CARE EDUCATION/TRAINING PROGRAM

## 2020-04-20 PROCEDURE — 99232 SBSQ HOSP IP/OBS MODERATE 35: CPT | Mod: 95 | Performed by: PSYCHIATRY & NEUROLOGY

## 2020-04-20 PROCEDURE — 93005 ELECTROCARDIOGRAM TRACING: CPT | Performed by: STUDENT IN AN ORGANIZED HEALTH CARE EDUCATION/TRAINING PROGRAM

## 2020-04-20 PROCEDURE — 63700000 HC SELF-ADMINISTRABLE DRUG: Performed by: PSYCHIATRY & NEUROLOGY

## 2020-04-20 PROCEDURE — 12400000 HC ROOM AND CARE SEMIPRIVATE PSYCH

## 2020-04-20 PROCEDURE — 63700000 HC SELF-ADMINISTRABLE DRUG: Performed by: HOSPITALIST

## 2020-04-20 PROCEDURE — 63700000 HC SELF-ADMINISTRABLE DRUG: Performed by: PHYSICIAN ASSISTANT

## 2020-04-20 RX ORDER — HALOPERIDOL 5 MG/1
15 TABLET ORAL NIGHTLY
Status: DISCONTINUED | OUTPATIENT
Start: 2020-04-20 | End: 2020-04-23

## 2020-04-20 RX ORDER — LORAZEPAM 0.5 MG/1
0.5 TABLET ORAL
Status: DISCONTINUED | OUTPATIENT
Start: 2020-04-20 | End: 2020-04-28

## 2020-04-20 RX ORDER — HALOPERIDOL 5 MG/ML
15 INJECTION INTRAMUSCULAR NIGHTLY
Status: DISCONTINUED | OUTPATIENT
Start: 2020-04-20 | End: 2020-04-23

## 2020-04-20 RX ADMIN — BUMETANIDE 1 MG: 1 TABLET ORAL at 17:00

## 2020-04-20 RX ADMIN — DOCUSATE SODIUM 100 MG: 100 CAPSULE, LIQUID FILLED ORAL at 10:00

## 2020-04-20 RX ADMIN — FLUTICASONE PROPIONATE 2 SPRAY: 50 SPRAY, METERED NASAL at 09:59

## 2020-04-20 RX ADMIN — ZIPRASIDONE HYDROCHLORIDE 80 MG: 80 CAPSULE ORAL at 09:59

## 2020-04-20 RX ADMIN — DILTIAZEM HYDROCHLORIDE 180 MG: 180 CAPSULE, COATED, EXTENDED RELEASE ORAL at 10:00

## 2020-04-20 RX ADMIN — LORAZEPAM 2 MG: 2 TABLET ORAL at 22:50

## 2020-04-20 RX ADMIN — DIVALPROEX SODIUM 1500 MG: 250 TABLET, DELAYED RELEASE ORAL at 22:49

## 2020-04-20 RX ADMIN — ASPIRIN 325 MG: 325 TABLET, DELAYED RELEASE ORAL at 09:59

## 2020-04-20 RX ADMIN — ZIPRASIDONE HYDROCHLORIDE 80 MG: 80 CAPSULE ORAL at 17:06

## 2020-04-20 RX ADMIN — DIVALPROEX SODIUM 125 MG: 125 TABLET, DELAYED RELEASE ORAL at 10:00

## 2020-04-20 RX ADMIN — BUMETANIDE 1 MG: 1 TABLET ORAL at 10:12

## 2020-04-20 RX ADMIN — HALOPERIDOL 15 MG: 5 TABLET ORAL at 09:59

## 2020-04-20 RX ADMIN — HALOPERIDOL 15 MG: 5 TABLET ORAL at 22:49

## 2020-04-20 RX ADMIN — LORAZEPAM 0.5 MG: 0.5 TABLET ORAL at 12:11

## 2020-04-20 NOTE — NURSING NOTE
Jillian asleep at start of shift.  Evening shift RN stated that she refused to sleep with the CPAP tonight.

## 2020-04-20 NOTE — PLAN OF CARE
Problem: Adult Behavioral Health Plan of Care  Goal: Plan of Care Review  Outcome: Progressing  Flowsheets (Taken 4/20/2020 8111)  Progress: improving  Plan of Care Reviewed With: patient  Patient Agreement with Plan of Care: agrees  Outcome Summary: Jillian continues to be mildly irritable throughout the day.  More irritable in early am, stating that she didn't want to take her ativan.  Able to be redirected to discuss with MD, and was compliant with other medications.  After talking with Dr. Leon, was much less irritable and seemed to have some insight into medication plan.  Compliant with lower dose of lorazepam  Intervention(s): Encourage compliance and help patient use distraction techniques when feeling irritable or suspicious.

## 2020-04-20 NOTE — PLAN OF CARE
"  Problem: Adult Behavioral Health Plan of Care  Goal: Plan of Care Review  Outcome: Progressing  Flowsheets  Taken 4/19/2020 2002  Progress: improving  Outcome Summary: Jillian remained in room most of shift sleeping. Her mood remains labile. Tearful at times stating \"I'm the most drugged up one on the unit. It's not fair\" Encouragement was provided.  She continues to be disorganized in her thought process, remaining religiously preoccupied. She showered this evening, appearance is bizarre in her clothing choices, and speech is loud at times. She ate 100% of her dinner. Attended wrap up group. Medication compliant. Requested saline for nasal congestion. [facility] was paged for medication order. Cooperative with staff this evening.  Will continue to monitor and offer support on unit.  Intervention(s): Monitoring patient q15min for safety, offering emotional support as needed, and monitoring for treatment plan compliance/effectiveness.  Taken 4/19/2020 1940  Plan of Care Reviewed With: patient  Patient Agreement with Plan of Care: agrees     "

## 2020-04-21 LAB
ATRIAL RATE: 93
P AXIS: 69
PR INTERVAL: 150
QRS DURATION: 88
QT INTERVAL: 386
QTC CALCULATION(BAZETT): 479
R AXIS: 58
T WAVE AXIS: 48
VALPROATE SERPL-MCNC: 80.7 UG/ML (ref 50–100)
VENTRICULAR RATE: 93

## 2020-04-21 PROCEDURE — 93005 ELECTROCARDIOGRAM TRACING: CPT | Performed by: STUDENT IN AN ORGANIZED HEALTH CARE EDUCATION/TRAINING PROGRAM

## 2020-04-21 PROCEDURE — 12400000 HC ROOM AND CARE SEMIPRIVATE PSYCH

## 2020-04-21 PROCEDURE — 63700000 HC SELF-ADMINISTRABLE DRUG: Performed by: HOSPITALIST

## 2020-04-21 PROCEDURE — 63700000 HC SELF-ADMINISTRABLE DRUG: Performed by: STUDENT IN AN ORGANIZED HEALTH CARE EDUCATION/TRAINING PROGRAM

## 2020-04-21 PROCEDURE — 63700000 HC SELF-ADMINISTRABLE DRUG: Performed by: PHYSICIAN ASSISTANT

## 2020-04-21 PROCEDURE — 80164 ASSAY DIPROPYLACETIC ACD TOT: CPT | Performed by: STUDENT IN AN ORGANIZED HEALTH CARE EDUCATION/TRAINING PROGRAM

## 2020-04-21 PROCEDURE — 63700000 HC SELF-ADMINISTRABLE DRUG: Performed by: PSYCHIATRY & NEUROLOGY

## 2020-04-21 PROCEDURE — 99232 SBSQ HOSP IP/OBS MODERATE 35: CPT | Performed by: PSYCHIATRY & NEUROLOGY

## 2020-04-21 PROCEDURE — 36415 COLL VENOUS BLD VENIPUNCTURE: CPT | Performed by: STUDENT IN AN ORGANIZED HEALTH CARE EDUCATION/TRAINING PROGRAM

## 2020-04-21 RX ORDER — LAMOTRIGINE 25 MG/1
50 TABLET ORAL NIGHTLY
Status: DISCONTINUED | OUTPATIENT
Start: 2020-04-21 | End: 2020-04-22

## 2020-04-21 RX ADMIN — LORAZEPAM 0.5 MG: 0.5 TABLET ORAL at 08:13

## 2020-04-21 RX ADMIN — BUMETANIDE 1 MG: 1 TABLET ORAL at 08:13

## 2020-04-21 RX ADMIN — ZIPRASIDONE HYDROCHLORIDE 80 MG: 80 CAPSULE ORAL at 19:15

## 2020-04-21 RX ADMIN — DOCUSATE SODIUM 100 MG: 100 CAPSULE, LIQUID FILLED ORAL at 08:13

## 2020-04-21 RX ADMIN — HALOPERIDOL 15 MG: 5 TABLET ORAL at 22:27

## 2020-04-21 RX ADMIN — SENNOSIDES 1 TABLET: 8.6 TABLET, FILM COATED ORAL at 05:52

## 2020-04-21 RX ADMIN — LORAZEPAM 2 MG: 2 TABLET ORAL at 12:34

## 2020-04-21 RX ADMIN — DIVALPROEX SODIUM 1500 MG: 250 TABLET, DELAYED RELEASE ORAL at 22:28

## 2020-04-21 RX ADMIN — DOCUSATE SODIUM 100 MG: 100 CAPSULE, LIQUID FILLED ORAL at 19:15

## 2020-04-21 RX ADMIN — ZIPRASIDONE HYDROCHLORIDE 80 MG: 80 CAPSULE ORAL at 08:13

## 2020-04-21 RX ADMIN — ASPIRIN 325 MG: 325 TABLET, DELAYED RELEASE ORAL at 08:13

## 2020-04-21 RX ADMIN — BUMETANIDE 1 MG: 1 TABLET ORAL at 19:14

## 2020-04-21 RX ADMIN — LORAZEPAM 2 MG: 2 TABLET ORAL at 22:27

## 2020-04-21 RX ADMIN — HALOPERIDOL 15 MG: 5 TABLET ORAL at 08:13

## 2020-04-21 RX ADMIN — DIVALPROEX SODIUM 125 MG: 125 TABLET, DELAYED RELEASE ORAL at 08:13

## 2020-04-21 RX ADMIN — DILTIAZEM HYDROCHLORIDE 180 MG: 180 CAPSULE, COATED, EXTENDED RELEASE ORAL at 08:13
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
## 2020-04-21 NOTE — NURSING NOTE
Jillian refused to sleep with her CPAP tonight.  Pulse ox room air prior to going to bed was 98%.  Will continue to monitor.

## 2020-04-21 NOTE — PLAN OF CARE
Problem: Adult Behavioral Health Plan of Care  Goal: Plan of Care Review  Outcome: Progressing  Flowsheets (Taken 4/20/2020 2105)  Progress: improving  Plan of Care Reviewed With: patient  Patient Agreement with Plan of Care: agrees  Outcome Summary: Jillian continues to be labile and at times delusional. She began the shift tearful stating that her parents thought she was crazy and did not want her to come home but then proceeded to say that a staff member was her mother. She was not irritable. She returned to bed for some time. Staff will continue to monitor.  Intervention(s): Continue to encourage patient to comply with medication regimen

## 2020-04-21 NOTE — PLAN OF CARE
Problem: Adult Behavioral Health Plan of Care  Goal: Plan of Care Review  Outcome: Progressing  Flowsheets (Taken 4/21/2020 1151)  Plan of Care Reviewed With: patient  Patient Agreement with Plan of Care: refuses to participate  Outcome Summary:  faxed 304 b paperwork to arabella beatty mr for 304 b hearing on 4/23/20.
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
## 2020-04-22 PROCEDURE — 12400000 HC ROOM AND CARE SEMIPRIVATE PSYCH

## 2020-04-22 PROCEDURE — 63700000 HC SELF-ADMINISTRABLE DRUG: Performed by: PSYCHIATRY & NEUROLOGY

## 2020-04-22 PROCEDURE — 63700000 HC SELF-ADMINISTRABLE DRUG: Performed by: HOSPITALIST

## 2020-04-22 PROCEDURE — 63700000 HC SELF-ADMINISTRABLE DRUG: Performed by: STUDENT IN AN ORGANIZED HEALTH CARE EDUCATION/TRAINING PROGRAM

## 2020-04-22 PROCEDURE — 99232 SBSQ HOSP IP/OBS MODERATE 35: CPT | Mod: 95 | Performed by: PSYCHIATRY & NEUROLOGY

## 2020-04-22 PROCEDURE — 63700000 HC SELF-ADMINISTRABLE DRUG: Performed by: PHYSICIAN ASSISTANT

## 2020-04-22 PROCEDURE — 93005 ELECTROCARDIOGRAM TRACING: CPT | Performed by: STUDENT IN AN ORGANIZED HEALTH CARE EDUCATION/TRAINING PROGRAM

## 2020-04-22 RX ORDER — CARBAMAZEPINE 200 MG/1
200 TABLET ORAL 2 TIMES DAILY
Status: DISCONTINUED | OUTPATIENT
Start: 2020-04-22 | End: 2020-04-29

## 2020-04-22 RX ADMIN — ZIPRASIDONE HYDROCHLORIDE 80 MG: 80 CAPSULE ORAL at 16:57

## 2020-04-22 RX ADMIN — ASPIRIN 325 MG: 325 TABLET, DELAYED RELEASE ORAL at 08:06

## 2020-04-22 RX ADMIN — CARBAMAZEPINE 200 MG: 200 TABLET ORAL at 09:31

## 2020-04-22 RX ADMIN — DOCUSATE SODIUM 100 MG: 100 CAPSULE, LIQUID FILLED ORAL at 08:05

## 2020-04-22 RX ADMIN — DIVALPROEX SODIUM 125 MG: 125 TABLET, DELAYED RELEASE ORAL at 08:06

## 2020-04-22 RX ADMIN — SENNOSIDES 1 TABLET: 8.6 TABLET, FILM COATED ORAL at 05:50

## 2020-04-22 RX ADMIN — HALOPERIDOL 15 MG: 5 TABLET ORAL at 21:28

## 2020-04-22 RX ADMIN — HALOPERIDOL 15 MG: 5 TABLET ORAL at 08:05

## 2020-04-22 RX ADMIN — CARBAMAZEPINE 200 MG: 200 TABLET ORAL at 16:57

## 2020-04-22 RX ADMIN — DOCUSATE SODIUM 100 MG: 100 CAPSULE, LIQUID FILLED ORAL at 16:57

## 2020-04-22 RX ADMIN — LORAZEPAM 0.5 MG: 0.5 TABLET ORAL at 08:06

## 2020-04-22 RX ADMIN — BUMETANIDE 1 MG: 1 TABLET ORAL at 08:06

## 2020-04-22 RX ADMIN — DIVALPROEX SODIUM 1500 MG: 250 TABLET, DELAYED RELEASE ORAL at 21:28

## 2020-04-22 RX ADMIN — BUMETANIDE 1 MG: 1 TABLET ORAL at 15:54

## 2020-04-22 RX ADMIN — ZIPRASIDONE HYDROCHLORIDE 80 MG: 80 CAPSULE ORAL at 08:06

## 2020-04-22 RX ADMIN — DILTIAZEM HYDROCHLORIDE 180 MG: 180 CAPSULE, COATED, EXTENDED RELEASE ORAL at 08:06
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
## 2020-04-22 NOTE — PLAN OF CARE
Problem: Adult Behavioral Health Plan of Care  Goal: Develops/Participates in Therapeutic [place] to Support Successful Transition  Outcome: Progressing  Flowsheets (Taken 4/22/2020 0950)  Individual Goal: Patient will continue to attend social work group to address d/c planning needs.     Problem: Adult Behavioral Health Plan of Care  Goal: Optimized Coping Skills in Response to Life Stressors  Outcome: Progressing  Flowsheets (Taken 4/22/2020 0950)  Individual Goal:  and patient will continue to discuss d/c planning with patients family.

## 2020-04-22 NOTE — PLAN OF CARE
"  Problem: Adult Behavioral Health Plan of Care  Goal: Plan of Care Review  Outcome: Progressing  Flowsheets (Taken 4/21/2020 2239)  Progress: no change  Plan of Care Reviewed With: patient  Patient Agreement with Plan of Care: agrees with comment (describe) (Needs a lot of encouragement)  Outcome Summary: Jillian was asleep for most of the shift but woke up at 2215 \"still mad.\" She initially was stating that she does not want to go to [facility] and that she does not deserve that. It was explained to her that she will not be going to that hospital, but she remained angry at the doctor. She needed encouragment but did take her medications eventually. She refused to take her Lamictal, but was agreeable to taking everything else. Staff will continue to monitor.   Intervention(s): Continue to encourage patient to comply with medication regimen     "
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
## 2020-04-23 PROCEDURE — 63700000 HC SELF-ADMINISTRABLE DRUG: Performed by: HOSPITALIST

## 2020-04-23 PROCEDURE — 99232 SBSQ HOSP IP/OBS MODERATE 35: CPT | Mod: 95 | Performed by: PSYCHIATRY & NEUROLOGY

## 2020-04-23 PROCEDURE — 63700000 HC SELF-ADMINISTRABLE DRUG: Performed by: PHYSICIAN ASSISTANT

## 2020-04-23 PROCEDURE — 93005 ELECTROCARDIOGRAM TRACING: CPT | Performed by: STUDENT IN AN ORGANIZED HEALTH CARE EDUCATION/TRAINING PROGRAM

## 2020-04-23 PROCEDURE — 63700000 HC SELF-ADMINISTRABLE DRUG: Performed by: PSYCHIATRY & NEUROLOGY

## 2020-04-23 PROCEDURE — 12400000 HC ROOM AND CARE SEMIPRIVATE PSYCH

## 2020-04-23 PROCEDURE — 63700000 HC SELF-ADMINISTRABLE DRUG: Performed by: STUDENT IN AN ORGANIZED HEALTH CARE EDUCATION/TRAINING PROGRAM

## 2020-04-23 RX ORDER — ARIPIPRAZOLE 5 MG/1
5 TABLET ORAL 2 TIMES DAILY
Status: DISCONTINUED | OUTPATIENT
Start: 2020-04-23 | End: 2020-04-24

## 2020-04-23 RX ORDER — HALOPERIDOL 5 MG/1
10 TABLET ORAL NIGHTLY
Status: DISCONTINUED | OUTPATIENT
Start: 2020-04-23 | End: 2020-04-24

## 2020-04-23 RX ORDER — HALOPERIDOL 5 MG/ML
10 INJECTION INTRAMUSCULAR DAILY
Status: DISCONTINUED | OUTPATIENT
Start: 2020-04-24 | End: 2020-04-24

## 2020-04-23 RX ORDER — HALOPERIDOL 5 MG/1
10 TABLET ORAL DAILY
Status: DISCONTINUED | OUTPATIENT
Start: 2020-04-24 | End: 2020-04-24

## 2020-04-23 RX ORDER — HALOPERIDOL 5 MG/ML
10 INJECTION INTRAMUSCULAR NIGHTLY
Status: DISCONTINUED | OUTPATIENT
Start: 2020-04-23 | End: 2020-04-24

## 2020-04-23 RX ADMIN — CARBAMAZEPINE 200 MG: 200 TABLET ORAL at 08:22

## 2020-04-23 RX ADMIN — DOCUSATE SODIUM 100 MG: 100 CAPSULE, LIQUID FILLED ORAL at 08:22

## 2020-04-23 RX ADMIN — BUMETANIDE 1 MG: 1 TABLET ORAL at 17:46

## 2020-04-23 RX ADMIN — DOCUSATE SODIUM 100 MG: 100 CAPSULE, LIQUID FILLED ORAL at 17:46

## 2020-04-23 RX ADMIN — CARBAMAZEPINE 200 MG: 200 TABLET ORAL at 17:46

## 2020-04-23 RX ADMIN — ASPIRIN 325 MG: 325 TABLET, DELAYED RELEASE ORAL at 08:23

## 2020-04-23 RX ADMIN — DILTIAZEM HYDROCHLORIDE 180 MG: 180 CAPSULE, COATED, EXTENDED RELEASE ORAL at 08:22

## 2020-04-23 RX ADMIN — HALOPERIDOL 10 MG: 5 TABLET ORAL at 22:49

## 2020-04-23 RX ADMIN — ZIPRASIDONE HYDROCHLORIDE 80 MG: 80 CAPSULE ORAL at 08:22

## 2020-04-23 RX ADMIN — ARIPIPRAZOLE 5 MG: 5 TABLET ORAL at 17:46

## 2020-04-23 RX ADMIN — DIVALPROEX SODIUM 1500 MG: 250 TABLET, DELAYED RELEASE ORAL at 22:49

## 2020-04-23 RX ADMIN — LORAZEPAM 1 MG: 1 TABLET ORAL at 17:46

## 2020-04-23 RX ADMIN — SENNOSIDES 1 TABLET: 8.6 TABLET, FILM COATED ORAL at 17:46

## 2020-04-23 RX ADMIN — ZIPRASIDONE HYDROCHLORIDE 80 MG: 80 CAPSULE ORAL at 17:46

## 2020-04-23 RX ADMIN — ACETAMINOPHEN 650 MG: 325 TABLET ORAL at 21:45

## 2020-04-23 RX ADMIN — LORAZEPAM 2 MG: 2 TABLET ORAL at 06:34

## 2020-04-23 RX ADMIN — HALOPERIDOL 15 MG: 5 TABLET ORAL at 06:34

## 2020-04-23 RX ADMIN — DIVALPROEX SODIUM 125 MG: 125 TABLET, DELAYED RELEASE ORAL at 08:22

## 2020-04-23 RX ADMIN — BUMETANIDE 1 MG: 1 TABLET ORAL at 08:23
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
## 2020-04-23 NOTE — NURSING NOTE
Pt woke up at 2330, upset, crying stating that somebody gave her an injection believing it was two staff members. No injection was given, however, Jillian stated there is a bump on her back from the injection. Pt continuing to get more agitated with RN, posturing and verbally abusive. Another RN spoke with her and calmed her down. Staff will continue to monitor.

## 2020-04-23 NOTE — PLAN OF CARE
Problem: Adult Behavioral Health Plan of Care  Goal: Plan of Care Review  Outcome: Progressing  Flowsheets (Taken 4/23/2020 1354)  Progress: no change  Plan of Care Reviewed With: patient  Patient Agreement with Plan of Care: agrees  Outcome Summary: Jillian was loud and pressured the being of the shift. Compliant with meds and meals. Religiously preoccupied. Did fall asleep and sleeping long intervals today. EKG done. Will continue to monitor on q 15 minute checks.  Intervention(s): Encourage sleep at night, To remain in control, Utilize coping mechanisms.     Problem: Mood Impairment (Manic/Hypomanic Signs/Symptoms)  Goal: Improved Mood Symptoms (Manic/Hypomanic Signs/Symptoms)  Outcome: Progressing  Flowsheets (Taken 4/23/2020 1354)  Individual Goal: Jillian will be compliant with medications.  Goal Outcome: progressing  [place] Goal: identifies thought distortion

## 2020-04-23 NOTE — PLAN OF CARE
Problem: Adult Behavioral Health Plan of Care  Goal: Plan of Care Review  Outcome: Progressing  Flowsheets (Taken 4/22/2020 2052)  Progress: no change  Plan of Care Reviewed With: patient  Patient Agreement with Plan of Care: agrees  Outcome Summary: Jillian was asleep for the start of the shift but when she woke up she was bright, not irritable, and pleasant. She reported that is was a good day for her and she did not have any outbursts. However, she continues to have delusions that she knows staff members from outside of the hospital. Her appetite is increased, but her behavior remains in control. Staff terence continue to monitor.  Intervention(s): Continue to encourage patient to comply with medication regimen

## 2020-04-24 LAB
ATRIAL RATE: 82
ATRIAL RATE: 87
P AXIS: 56
P AXIS: 67
PR INTERVAL: 152
PR INTERVAL: 160
QRS DURATION: 94
QRS DURATION: 94
QT INTERVAL: 380
QT INTERVAL: 388
QTC CALCULATION(BAZETT): 453
QTC CALCULATION(BAZETT): 457
R AXIS: 35
R AXIS: 58
T WAVE AXIS: 21
T WAVE AXIS: 46
VENTRICULAR RATE: 82
VENTRICULAR RATE: 87

## 2020-04-24 PROCEDURE — 99232 SBSQ HOSP IP/OBS MODERATE 35: CPT | Mod: 95 | Performed by: PSYCHIATRY & NEUROLOGY

## 2020-04-24 PROCEDURE — 12400000 HC ROOM AND CARE SEMIPRIVATE PSYCH

## 2020-04-24 PROCEDURE — 63700000 HC SELF-ADMINISTRABLE DRUG: Performed by: STUDENT IN AN ORGANIZED HEALTH CARE EDUCATION/TRAINING PROGRAM

## 2020-04-24 PROCEDURE — 63700000 HC SELF-ADMINISTRABLE DRUG: Performed by: HOSPITALIST

## 2020-04-24 PROCEDURE — 63700000 HC SELF-ADMINISTRABLE DRUG: Performed by: PSYCHIATRY & NEUROLOGY

## 2020-04-24 PROCEDURE — 63700000 HC SELF-ADMINISTRABLE DRUG: Performed by: PHYSICIAN ASSISTANT

## 2020-04-24 RX ORDER — HALOPERIDOL 5 MG/1
5 TABLET ORAL DAILY
Status: COMPLETED | OUTPATIENT
Start: 2020-04-25 | End: 2020-04-25

## 2020-04-24 RX ORDER — HALOPERIDOL 5 MG/1
5 TABLET ORAL NIGHTLY
Status: COMPLETED | OUTPATIENT
Start: 2020-04-24 | End: 2020-04-26

## 2020-04-24 RX ORDER — HALOPERIDOL 5 MG/ML
5 INJECTION INTRAMUSCULAR NIGHTLY
Status: COMPLETED | OUTPATIENT
Start: 2020-04-24 | End: 2020-04-26

## 2020-04-24 RX ORDER — HALOPERIDOL 5 MG/ML
5 INJECTION INTRAMUSCULAR DAILY
Status: DISCONTINUED | OUTPATIENT
Start: 2020-04-24 | End: 2020-04-24

## 2020-04-24 RX ORDER — HALOPERIDOL 5 MG/ML
5 INJECTION INTRAMUSCULAR DAILY
Status: COMPLETED | OUTPATIENT
Start: 2020-04-25 | End: 2020-04-25

## 2020-04-24 RX ORDER — HALOPERIDOL 5 MG/1
5 TABLET ORAL NIGHTLY
Status: DISCONTINUED | OUTPATIENT
Start: 2020-04-24 | End: 2020-04-24

## 2020-04-24 RX ORDER — HALOPERIDOL 5 MG/ML
5 INJECTION INTRAMUSCULAR NIGHTLY
Status: DISCONTINUED | OUTPATIENT
Start: 2020-04-24 | End: 2020-04-24

## 2020-04-24 RX ORDER — HALOPERIDOL 5 MG/1
5 TABLET ORAL DAILY
Status: DISCONTINUED | OUTPATIENT
Start: 2020-04-24 | End: 2020-04-24

## 2020-04-24 RX ORDER — ARIPIPRAZOLE 10 MG/1
10 TABLET ORAL 2 TIMES DAILY
Status: DISCONTINUED | OUTPATIENT
Start: 2020-04-24 | End: 2020-04-27

## 2020-04-24 RX ADMIN — BUMETANIDE 1 MG: 1 TABLET ORAL at 08:46

## 2020-04-24 RX ADMIN — LORAZEPAM 2 MG: 2 TABLET ORAL at 01:06

## 2020-04-24 RX ADMIN — BUMETANIDE 1 MG: 1 TABLET ORAL at 17:35

## 2020-04-24 RX ADMIN — ARIPIPRAZOLE 10 MG: 10 TABLET ORAL at 08:45

## 2020-04-24 RX ADMIN — LORAZEPAM 2 MG: 2 TABLET ORAL at 22:39

## 2020-04-24 RX ADMIN — ZIPRASIDONE HYDROCHLORIDE 80 MG: 80 CAPSULE ORAL at 17:37

## 2020-04-24 RX ADMIN — LORAZEPAM 0.5 MG: 0.5 TABLET ORAL at 08:45

## 2020-04-24 RX ADMIN — CARBAMAZEPINE 200 MG: 200 TABLET ORAL at 08:45

## 2020-04-24 RX ADMIN — HALOPERIDOL 5 MG: 5 TABLET ORAL at 22:39

## 2020-04-24 RX ADMIN — ZIPRASIDONE HYDROCHLORIDE 80 MG: 80 CAPSULE ORAL at 08:46

## 2020-04-24 RX ADMIN — LORAZEPAM 1 MG: 1 TABLET ORAL at 14:42

## 2020-04-24 RX ADMIN — DIVALPROEX SODIUM 1500 MG: 250 TABLET, DELAYED RELEASE ORAL at 22:39

## 2020-04-24 RX ADMIN — ARIPIPRAZOLE 10 MG: 10 TABLET ORAL at 17:35

## 2020-04-24 RX ADMIN — CARBAMAZEPINE 200 MG: 200 TABLET ORAL at 17:35

## 2020-04-24 RX ADMIN — HALOPERIDOL 5 MG: 5 TABLET ORAL at 08:49

## 2020-04-24 RX ADMIN — ASPIRIN 325 MG: 325 TABLET, DELAYED RELEASE ORAL at 08:46

## 2020-04-24 RX ADMIN — DOCUSATE SODIUM 100 MG: 100 CAPSULE, LIQUID FILLED ORAL at 08:46

## 2020-04-24 RX ADMIN — DIVALPROEX SODIUM 125 MG: 125 TABLET, DELAYED RELEASE ORAL at 08:45

## 2020-04-24 RX ADMIN — DOCUSATE SODIUM 100 MG: 100 CAPSULE, LIQUID FILLED ORAL at 17:35

## 2020-04-24 RX ADMIN — DILTIAZEM HYDROCHLORIDE 180 MG: 180 CAPSULE, COATED, EXTENDED RELEASE ORAL at 08:46

## 2020-04-24 NOTE — PLAN OF CARE
Problem: Adult Behavioral Health Plan of Care  Goal: Plan of Care Review  Outcome: Progressing  Flowsheets (Taken 4/24/2020 1647)  Progress: improving  Plan of Care Reviewed With: patient  Patient Agreement with Plan of Care: agrees  Outcome Summary: Pt is visible on the unit. Compliant with meals and meds. Less irritable today. Able to maintain control. Religiously preoccupied. Needs a lot of reassurance. Will contniue to monitor on q 15 minute checks.  Intervention(s): Encourage sleep at night, Compliance in medication, To remain in control,  Encourage pt express her feelings when having increased anxiety to staff.     Problem: Mood Impairment (Manic/Hypomanic Signs/Symptoms)  Goal: Improved Mood Symptoms (Manic/Hypomanic Signs/Symptoms)  Outcome: Progressing  Flowsheets (Taken 4/24/2020 1647)  Individual Goal: Jillian will come to staff when feeling agitated.  Goal Outcome: progressing  [place] Goal: identifies thought distortion; acknowledges progress

## 2020-04-24 NOTE — PLAN OF CARE
Problem: Adult Behavioral Health Plan of Care  Goal: Plan of Care Review  Flowsheets  Taken 4/24/2020 0236 by Zuleyma Carpenter, RN  Progress: no change  Plan of Care Reviewed With: patient  Patient Agreement with Plan of Care: agrees    Outcome Summary: Jillian was visible on the unit at times but slept in her room throughout the shift. Pt maintained control. Initially held HS ativan for sedation. Pt woke up and given ativan around 0100. Pt intially refused CPAP but then was compliant to use it tonight. Endorses paranoid/Methodist delusions.  Pt reported some abdominal pain and diarrhea in the evening.  Given PRN tylenol.  Will ctm.    Intervention(s): Encourage sleep at night, To remain in control, Utilize coping mechanisms.
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
## 2020-04-25 PROCEDURE — 63700000 HC SELF-ADMINISTRABLE DRUG: Performed by: STUDENT IN AN ORGANIZED HEALTH CARE EDUCATION/TRAINING PROGRAM

## 2020-04-25 PROCEDURE — 63700000 HC SELF-ADMINISTRABLE DRUG: Performed by: PSYCHIATRY & NEUROLOGY

## 2020-04-25 PROCEDURE — 12400000 HC ROOM AND CARE SEMIPRIVATE PSYCH

## 2020-04-25 PROCEDURE — 63700000 HC SELF-ADMINISTRABLE DRUG: Performed by: PHYSICIAN ASSISTANT

## 2020-04-25 PROCEDURE — 99233 SBSQ HOSP IP/OBS HIGH 50: CPT | Mod: 95 | Performed by: PSYCHIATRY & NEUROLOGY

## 2020-04-25 RX ORDER — ZIPRASIDONE HYDROCHLORIDE 80 MG/1
CAPSULE ORAL
Status: DISPENSED
Start: 2020-04-25 | End: 2020-04-25

## 2020-04-25 RX ADMIN — BUMETANIDE 1 MG: 1 TABLET ORAL at 16:54

## 2020-04-25 RX ADMIN — DOCUSATE SODIUM 100 MG: 100 CAPSULE, LIQUID FILLED ORAL at 09:15

## 2020-04-25 RX ADMIN — DIVALPROEX SODIUM 125 MG: 125 TABLET, DELAYED RELEASE ORAL at 09:17

## 2020-04-25 RX ADMIN — ZIPRASIDONE HYDROCHLORIDE 80 MG: 80 CAPSULE ORAL at 09:15

## 2020-04-25 RX ADMIN — LORAZEPAM 2 MG: 2 TABLET ORAL at 06:45

## 2020-04-25 RX ADMIN — FLUTICASONE PROPIONATE 2 SPRAY: 50 SPRAY, METERED NASAL at 09:18

## 2020-04-25 RX ADMIN — CARBAMAZEPINE 200 MG: 200 TABLET ORAL at 16:54

## 2020-04-25 RX ADMIN — DOCUSATE SODIUM 100 MG: 100 CAPSULE, LIQUID FILLED ORAL at 16:53

## 2020-04-25 RX ADMIN — ARIPIPRAZOLE 10 MG: 10 TABLET ORAL at 09:15

## 2020-04-25 RX ADMIN — CARBAMAZEPINE 200 MG: 200 TABLET ORAL at 09:16

## 2020-04-25 RX ADMIN — ARIPIPRAZOLE 10 MG: 10 TABLET ORAL at 16:53

## 2020-04-25 RX ADMIN — BUMETANIDE 1 MG: 1 TABLET ORAL at 09:17

## 2020-04-25 RX ADMIN — POLYETHYLENE GLYCOL 3350 17 G: 17 POWDER, FOR SOLUTION ORAL at 09:16

## 2020-04-25 RX ADMIN — HALOPERIDOL 5 MG: 5 TABLET ORAL at 09:18

## 2020-04-25 RX ADMIN — ASPIRIN 325 MG: 325 TABLET, DELAYED RELEASE ORAL at 09:16

## 2020-04-25 RX ADMIN — HALOPERIDOL 5 MG: 5 TABLET ORAL at 23:28

## 2020-04-25 RX ADMIN — DIVALPROEX SODIUM 1500 MG: 250 TABLET, DELAYED RELEASE ORAL at 23:27

## 2020-04-25 NOTE — NURSING NOTE
Pt refusing 1300 ativan dose. Pt does appear sedated, slept through AM after morning dose. Dr Padilla aware. Pt able to refuse at this time.

## 2020-04-25 NOTE — PLAN OF CARE
Problem: Adult Behavioral Health Plan of Care  Goal: Plan of Care Review  Flowsheets (Taken 4/24/2020 2136)  Progress: improving  Plan of Care Reviewed With: patient  Patient Agreement with Plan of Care: agrees    Outcome Summary: Jillian is visible on the unit and engaged with peers. Can be intrusive at times but remains in control.  Elevated and labile. Less irritable. Remains religiously preoccupied.  Using music as coping strategy.  Less sedated today. Attended wrap up group. Med compliant and agreed to use CPAP tonight. Will ctm.     Intervention(s): Encourage pt to be med compliant, sleep with CPAP at night and to verbalize anxiety to staff.

## 2020-04-25 NOTE — NURSING NOTE
"0330: pt woke up, ate a snack and then refused CPAP for the rest of the night. Stated taking off the CPAP was related to \"overcoming sin.\" Endorsing paranoid thoughts related to another patient (Rm 12) awake stating, \"I don't like him, he better not be talking about me.\" Will ctm.   "

## 2020-04-25 NOTE — PROGRESS NOTES
"Psychiatry Progress Note    Chief Complaint/Reason for inpatient treatment: psychosis and agitation    Interval History: Nursing report that last night she refused CPAP since 330AM after she woke up. Stated taking off the CPAP was related to \"overcoming sin.\" Endorsing paranoid thoughts related to another patient (Rm 12) awake stating, \"I don't like him, he better not be talking about me.\"   Today patient complaints of she is taking too many medications, \" tired of being here, tired of taking too much medications\", she feels drowsy this morning, and asked to skip the Ativan 1mg dose at 1PM.  Patient had no agitation today, she attends the group,  Talked to her mom Janna over the phone ([phone]) to discusses patient 's condition and current treatment.      Vital Signs for the last 24 hours:  Temp:  [36.4 °C (97.5 °F)-36.8 °C (98.3 °F)] 36.6 °C (97.8 °F)  Heart Rate:  [] 97  Resp:  [16-18] 18  BP: ()/(52-78) 95/61    Labs:  No new lab test results today.    Mental Status Exam:  Appearance: overweight, well groom, standing, suspicios  Body movements: No EPS or hand tremors noticed  Speech: clear fluent, soft  Mood: \" I am irritable\"  Affect: flat  Associations: illogical  Thought Process: tangential  Thought Content: + paranoid  Suicidality/Homicidality: no  Judgement/Insight: poor due to current illness  Orientation: x 3  Memory: recent memory intact  Attention: fair  Knowledge: average  Language: no word finding difficulties       Assessment and Plan: Patient had no agitation since Friday, takes meds, baseline functioning, she complaints of sedation today, asked to skip Ativan at 1PM today, will monitor her behavior, continue medications as scheduled.      Kareem Padilla MD  [phone]  "

## 2020-04-26 PROCEDURE — 63700000 HC SELF-ADMINISTRABLE DRUG: Performed by: HOSPITALIST

## 2020-04-26 PROCEDURE — 63700000 HC SELF-ADMINISTRABLE DRUG: Performed by: PHYSICIAN ASSISTANT

## 2020-04-26 PROCEDURE — 99233 SBSQ HOSP IP/OBS HIGH 50: CPT | Performed by: PSYCHIATRY & NEUROLOGY

## 2020-04-26 PROCEDURE — 63700000 HC SELF-ADMINISTRABLE DRUG: Performed by: STUDENT IN AN ORGANIZED HEALTH CARE EDUCATION/TRAINING PROGRAM

## 2020-04-26 PROCEDURE — 63700000 HC SELF-ADMINISTRABLE DRUG: Performed by: PSYCHIATRY & NEUROLOGY

## 2020-04-26 PROCEDURE — 12400000 HC ROOM AND CARE SEMIPRIVATE PSYCH

## 2020-04-26 PROCEDURE — 93005 ELECTROCARDIOGRAM TRACING: CPT | Performed by: STUDENT IN AN ORGANIZED HEALTH CARE EDUCATION/TRAINING PROGRAM

## 2020-04-26 RX ADMIN — DIVALPROEX SODIUM 125 MG: 125 TABLET, DELAYED RELEASE ORAL at 09:12

## 2020-04-26 RX ADMIN — IBUPROFEN 400 MG: 400 TABLET ORAL at 14:25

## 2020-04-26 RX ADMIN — ZIPRASIDONE HYDROCHLORIDE 80 MG: 80 CAPSULE ORAL at 16:31

## 2020-04-26 RX ADMIN — DOCUSATE SODIUM 100 MG: 100 CAPSULE, LIQUID FILLED ORAL at 09:12

## 2020-04-26 RX ADMIN — SENNOSIDES 1 TABLET: 8.6 TABLET, FILM COATED ORAL at 19:16

## 2020-04-26 RX ADMIN — ZIPRASIDONE HYDROCHLORIDE 80 MG: 80 CAPSULE ORAL at 09:12

## 2020-04-26 RX ADMIN — CARBAMAZEPINE 200 MG: 200 TABLET ORAL at 09:11

## 2020-04-26 RX ADMIN — DOCUSATE SODIUM 100 MG: 100 CAPSULE, LIQUID FILLED ORAL at 16:31

## 2020-04-26 RX ADMIN — HALOPERIDOL 5 MG: 5 TABLET ORAL at 21:15

## 2020-04-26 RX ADMIN — DILTIAZEM HYDROCHLORIDE 180 MG: 180 CAPSULE, COATED, EXTENDED RELEASE ORAL at 09:12

## 2020-04-26 RX ADMIN — BUMETANIDE 1 MG: 1 TABLET ORAL at 16:31

## 2020-04-26 RX ADMIN — LORAZEPAM 1 MG: 1 TABLET ORAL at 14:27

## 2020-04-26 RX ADMIN — FLUTICASONE PROPIONATE 2 SPRAY: 50 SPRAY, METERED NASAL at 09:14

## 2020-04-26 RX ADMIN — CARBAMAZEPINE 200 MG: 200 TABLET ORAL at 16:32

## 2020-04-26 RX ADMIN — BUMETANIDE 1 MG: 1 TABLET ORAL at 09:18

## 2020-04-26 RX ADMIN — LORAZEPAM 2 MG: 2 TABLET ORAL at 21:15

## 2020-04-26 RX ADMIN — DIVALPROEX SODIUM 1500 MG: 250 TABLET, DELAYED RELEASE ORAL at 21:16

## 2020-04-26 RX ADMIN — ARIPIPRAZOLE 10 MG: 10 TABLET ORAL at 16:31

## 2020-04-26 RX ADMIN — ARIPIPRAZOLE 10 MG: 10 TABLET ORAL at 09:12

## 2020-04-26 RX ADMIN — ASPIRIN 325 MG: 325 TABLET, DELAYED RELEASE ORAL at 09:12

## 2020-04-26 NOTE — PLAN OF CARE
"  Problem: Adult Behavioral Health Plan of Care  Goal: Plan of Care Review  Outcome: Progressing  Flowsheets (Taken 4/26/2020 1800)  Progress: improving  Plan of Care Reviewed With: patient  Patient Agreement with Plan of Care: agrees  Outcome Summary: Pt. was pleasant this morning. She was hyperverbal, tangential, and intrusive. Pt. was frequently seeking staff and not distancing herself from other patients. She was redirected multiple times. Pt. became agitated around lunch time. She was out in the milieu yelling and cursing. Pt. was redirected to her room. This writer attempted to verbally de-escalate her. She was then presented with her lunch tray. She became even more agitated, refusing her food and demanding to get new items. She then postured and stated \"You better give me what I want.\" Pt. was redirected and eventually calmed down. She was educated on the importance of controlling her agitation and ways in which to do so. Pt. refused her a.m. dose of Ativan 0.5 mg PO, as well as her Miralax this morning. However, she was agreeable to taking Ativan 1 mg PO at 1300 due to IM for PO refusal order. Pt. Was compliant with meals. Will continue to monitor and provide support.   Intervention(s): Pt. educated on coping skills she can use to manage anxiety.     "

## 2020-04-26 NOTE — NURSING NOTE
"Discussed with Jillian that she needs to take her PO ativan tonight or I will have to give her an IM and she said, \"I know\". Relayed to her the need to sleep and if she isnt sleeping she wont be able to be discharged.  "

## 2020-04-26 NOTE — PLAN OF CARE
"  Problem: Adult Behavioral Health Plan of Care  Goal: Plan of Care Review  Outcome: Progressing  Flowsheets (Taken 4/26/2020 0022)  Progress: improving  Plan of Care Reviewed With: patient  Patient Agreement with Plan of Care: agrees  Outcome Summary: Jillian appears very sedated, speech is slow. She was restless, using headphones, then went to take a nap around 20:30 and had to be woken for her night time medications. She looked through them and was adament about not taking the ativan saying, \"I am being good and not bothering anyone why do they keep trying to give me the ativan?\" Will continue to momitor for safety and needs.  Intervention(s): Jillian encouraged to let her needs be known to nursing.     "

## 2020-04-26 NOTE — PROGRESS NOTES
"Psychiatry Progress Note    Chief Complaint: \" I feel good today\"    Interval History: Per nursing report, yesterday Jillian appears very sedated, speech is slow. She refused Ativan at 1PM. She was restless, using headphones, then went to take a nap around 20:30 and had to be woken for her night time medications.    Today, she had EKG done at 11AM, looked happy, await alert, sitting in bed, she is childish, pleasant.    Vital Signs for the last 24 hours:  Temp:  [36.7 °C (98 °F)] 36.7 °C (98 °F)  Heart Rate:  [100-109] 105  Resp:  [16-18] 18  BP: (109-137)/(66-81) 122/74    Labs:  No new lab test results today.    Mental Status Exam:  Appearance: overweight, well groom, sitting, good eye contact, plesant  Body movements: No EPS or hand tremors noticed  Speech: clear fluent, normal tone and speed  Mood: \" I am happy today\"  Affect: labile  Thought Process: linear today  Thought Content: no paranoid, no active hallucinaiton  Suicidality/Homicidality: no  Judgement/Insight: poor due to current illness  Orientation: x 3  Memory: recent memory intact  Attention: fair  Knowledge: average  Language: no word finding difficulties     Assessment and Plan: Patient seems happy today, had EKG done, pleasant when approached, she is on both Geodon and Abilify, Depakote and Ativan. Sometimes she is sedated, she refused Ativan yesterday, seems ok for her. Suggest to keep either Geodon or Abilify and maximize the dosage, use Ativan as PRN, suggest to start Stelazine 1mg TID up to 2mg TID for anxiety and agitation around the clock, it would not cause sedation. Continue to monitor mood and behaviors.      Kareem Padilla MD  [phone]  "

## 2020-04-27 LAB
ATRIAL RATE: 82
ATRIAL RATE: 90
P AXIS: 53
P AXIS: 55
PR INTERVAL: 148
PR INTERVAL: 162
QRS DURATION: 104
QRS DURATION: 92
QT INTERVAL: 370
QT INTERVAL: 390
QTC CALCULATION(BAZETT): 452
QTC CALCULATION(BAZETT): 455
R AXIS: 30
R AXIS: 42
T WAVE AXIS: 23
T WAVE AXIS: 31
VENTRICULAR RATE: 82
VENTRICULAR RATE: 90

## 2020-04-27 PROCEDURE — 93005 ELECTROCARDIOGRAM TRACING: CPT | Performed by: STUDENT IN AN ORGANIZED HEALTH CARE EDUCATION/TRAINING PROGRAM

## 2020-04-27 PROCEDURE — 63700000 HC SELF-ADMINISTRABLE DRUG: Performed by: PSYCHIATRY & NEUROLOGY

## 2020-04-27 PROCEDURE — 63700000 HC SELF-ADMINISTRABLE DRUG: Performed by: PHYSICIAN ASSISTANT

## 2020-04-27 PROCEDURE — 63700000 HC SELF-ADMINISTRABLE DRUG: Performed by: STUDENT IN AN ORGANIZED HEALTH CARE EDUCATION/TRAINING PROGRAM

## 2020-04-27 PROCEDURE — 63700000 HC SELF-ADMINISTRABLE DRUG: Performed by: HOSPITALIST

## 2020-04-27 PROCEDURE — 12400000 HC ROOM AND CARE SEMIPRIVATE PSYCH

## 2020-04-27 PROCEDURE — 99232 SBSQ HOSP IP/OBS MODERATE 35: CPT | Mod: 95 | Performed by: PSYCHIATRY & NEUROLOGY

## 2020-04-27 RX ORDER — BUMETANIDE 1 MG/1
2 TABLET ORAL
Status: DISCONTINUED | OUTPATIENT
Start: 2020-04-27 | End: 2020-05-05

## 2020-04-27 RX ORDER — ARIPIPRAZOLE 15 MG/1
15 TABLET ORAL 2 TIMES DAILY
Status: DISCONTINUED | OUTPATIENT
Start: 2020-04-27 | End: 2020-05-06 | Stop reason: HOSPADM

## 2020-04-27 RX ADMIN — SENNOSIDES 1 TABLET: 8.6 TABLET, FILM COATED ORAL at 17:29

## 2020-04-27 RX ADMIN — ZIPRASIDONE HYDROCHLORIDE 80 MG: 80 CAPSULE ORAL at 17:29

## 2020-04-27 RX ADMIN — ARIPIPRAZOLE 10 MG: 10 TABLET ORAL at 08:38

## 2020-04-27 RX ADMIN — DIVALPROEX SODIUM 125 MG: 125 TABLET, DELAYED RELEASE ORAL at 08:39

## 2020-04-27 RX ADMIN — LORAZEPAM 0.5 MG: 0.5 TABLET ORAL at 08:40

## 2020-04-27 RX ADMIN — FLUTICASONE PROPIONATE 2 SPRAY: 50 SPRAY, METERED NASAL at 08:40

## 2020-04-27 RX ADMIN — CARBAMAZEPINE 200 MG: 200 TABLET ORAL at 17:29

## 2020-04-27 RX ADMIN — BUMETANIDE 2 MG: 1 TABLET ORAL at 17:29

## 2020-04-27 RX ADMIN — ZIPRASIDONE HYDROCHLORIDE 80 MG: 80 CAPSULE ORAL at 08:38

## 2020-04-27 RX ADMIN — BUMETANIDE 1 MG: 1 TABLET ORAL at 08:38

## 2020-04-27 RX ADMIN — DILTIAZEM HYDROCHLORIDE 180 MG: 180 CAPSULE, COATED, EXTENDED RELEASE ORAL at 08:38

## 2020-04-27 RX ADMIN — LORAZEPAM 2 MG: 2 TABLET ORAL at 21:31

## 2020-04-27 RX ADMIN — DOCUSATE SODIUM 100 MG: 100 CAPSULE, LIQUID FILLED ORAL at 08:38

## 2020-04-27 RX ADMIN — SENNOSIDES 1 TABLET: 8.6 TABLET, FILM COATED ORAL at 08:46

## 2020-04-27 RX ADMIN — DOCUSATE SODIUM 100 MG: 100 CAPSULE, LIQUID FILLED ORAL at 17:29

## 2020-04-27 RX ADMIN — ARIPIPRAZOLE 15 MG: 15 TABLET ORAL at 17:29

## 2020-04-27 RX ADMIN — DIVALPROEX SODIUM 1500 MG: 250 TABLET, DELAYED RELEASE ORAL at 21:31

## 2020-04-27 RX ADMIN — CARBAMAZEPINE 200 MG: 200 TABLET ORAL at 08:39

## 2020-04-27 RX ADMIN — ASPIRIN 325 MG: 325 TABLET, DELAYED RELEASE ORAL at 08:38

## 2020-04-27 NOTE — PLAN OF CARE
Problem: Adult Behavioral Health Plan of Care  Goal: Plan of Care Review  Outcome: Progressing  Flowsheets (Taken 4/26/2020 3437)  Progress: improving  Plan of Care Reviewed With: patient  Patient Agreement with Plan of Care: agrees  Outcome Summary: Jillian was visible on the unit, seeking out staff but not disruptive. She was talked to about the ativan in the begining of the night and she agreed to be cooperative and she was compliant. She went to bed early in the night and was woken to be put on CPAP and again was calm and cooperative. Will continue to monitor for safety and needs.  Intervention(s): Patient advised she needed to take her PO ativan at bedtime or she would have to get an IM, she was cooperative.

## 2020-04-27 NOTE — PLAN OF CARE
"  Problem: Adult Behavioral Health Plan of Care  Goal: Plan of Care Review  Outcome: Progressing  Flowsheets (Taken 4/27/2020 1611)  Progress: improving  Plan of Care Reviewed With: patient  Patient Agreement with Plan of Care: agrees  Outcome Summary: Jillian was mildly agitated in early am, misinterpreting some things that other patients were saying, believing that some patients were other people in her life. Took medication.  Reluctant to take am lorazepam, but did with encouragement.  Better later in day.  To sleep directly after lunch and slept through scheduled lorazepam dose, so not given.  Continues to sleep over the past 3 hours.  Patient continues to c/o mild to moderate pain in lower extremities due to persistent +3 edema. Paged cardiology who assessed patient and ordered higher bumex dose.  Labs also due in am.  Denies thoughts of harming self or others, and at one point stated that she \"loves it here\" and \"doesn't  want to leave\".  Intervention(s): Encourage compliance.  Provided support regarding being in hospital for prolonged period of time.     "

## 2020-04-27 NOTE — PROGRESS NOTES
"Psychiatry Progress Note    Chief Complaint/Reason for follow-up: Manic Episode w/ psychosis    Interval History: Out in milieu creating disturbance over the weekend. Posturing. Calm and cooperative last night.     On exam, seen in hallway stating \"I'm really angry\" and \"I want to speak with Dr. Leon.\" Able to calm down with verbal redirection but is more distracted and illogical than usual. States that she was angry because she was on the phone with her father and he \"wants to kick me out of the house of god.\" States that she hates her father and would rather be homeless than live with them. Goes off on tangent about \"my bubblegum bodywash changed colors.\" Denies physical complaints. Reports some drooling but its tolerable. Ok with increasing Abilify.     Review of Systems:   Sleep:  Good, Appetite: Fair and GI: No complaints    Vital Signs for the last 24 hours:  Temp:  [36.6 °C (97.8 °F)-36.8 °C (98.3 °F)] 36.6 °C (97.8 °F)  Heart Rate:  [] 84  Resp:  [16-18] 16  BP: (109-150)/(52-81) 109/52      EKG 4/27/20: QTc 455 HR 82    Mental Status Exam:  Appearance: no shoes, beanie hat with headphones on, angry. Swollen ankles.  Gait and Motor: normal gait,   Speech: normal rate, loud at times  Mood: \"I'm angry with my dad\"  Affect: labile, angry then laughing. Paranoid  Associations: illogical, loose  Thought Process: circumstantial, illogical, tangential  Thought Content:  Paranoid delusions, hyperreligious  Suicidality/Homicidality: denies  Judgement/Insight: poor;minimizes severity level of illness. Impaired judgement on unit  Attention: distracted   Knowledge: normal  Language: normal        Assessment/Plan    43 y/o female pphx: bipolar disorder who was admitted through the ED in manic episode. Admitted on a 201, now on a 303 commitment. Outburst yesterday, punching walls and threatening staff. Still delusional and expansive. Jair weekend with outburts, medications refusal, posturing then good behavior at " times. On exam today, extremely labile with anger mixed with periods of calm. Worse boundaries with other patients. Worsening delusions with almost no insight into her family situation.      Bipolar Disorder, current episode manic w/ psychosis  -involuntary on 303 with [facility] second opinion as of 4/7/20  -Continue Geodon to 80 mg BID with meals   -Increase Abilify to 15 mg BID  -Continue depakote to 150mg qam and continue 1500mg qhs. Level is 80.7 on 4/21  -continue Tegretol 200 mg BID, T/C increasing if Abilify not effective  -Continue ativan 0.5 mg Qam,  1 mg  and 2 mg QHS, T/C reducing evening ativan to 1 mg  to reduce risk of disinhibition  -Ativan PRN increased to 2 mg, will prioritize this over antipsychotics due to QTc  Appreciate cardiology input, will follow QTc Daily while changing antipsychotic regimen    LE Edema  -Bumex 1 mg BID  -appears to have worsened over weekend, will have cardiology see  -appreciate recs    Disposition  -[facility] [place] 4/23, petitioned 304. Patient agreed to 90 days inpatient care  -Will work with family for disposition planning  -would benefit from family involvement for support    Tobi Arreguin MD PGY-2

## 2020-04-27 NOTE — PROGRESS NOTES
"Cardiology Progress Note  Length of Stay: 36    Subjective    Called to re-evaluate patient due to ongoing lower extremity edema. Patient evaluated with RN at bedside in her room this morning. She is laying flat in her bed and appear comfortable.    Patient reports her lower extremity edema seems stable but she feels as though her clothes are fitting tighter. She has not reported significant change in urinary output with initiating of Bumex 1 mg BID (started 3/30). She reports some shortness of breath with ambulation. She denies chest pain. She does report constipation and feel that her medications are making her \"toxic\".     We reviewed planned to increase diuretic dosing and check labs tomorrow. She is also asking if she could wear compression stockings.     Per RN, EKG was completed this morning and personally reviewed - shows sinus rhythm, QTc 455 ms (QT interval stable compared to prior tracings)        Objective    Vital signs in last 24 hours:  Temp:  [36.6 °C (97.8 °F)-36.8 °C (98.3 °F)] 36.6 °C (97.8 °F)  Heart Rate:  [] 84  Resp:  [16-18] 16  BP: ()/(52-81) 109/52    No intake or output data in the 24 hours ending 04/27/20 1219  Weight change:     Physical Exam:  Visit Vitals  BP (!) 109/52   Pulse 84   Temp 36.6 °C (97.8 °F) (Oral)   Resp 16   Ht 1.664 m (5' 5.5\")   Wt 120 kg (264 lb)   LMP  (LMP Unknown)   SpO2 98%   Breastfeeding No   BMI 43.26 kg/m²         General: Awake, alert, NAD, well-developed, well-nourished   Head: Normocephalic, atraumatic    Eyes:  EOMs intact, sclera anicteric   Neck: Supple, no JVD   Lungs:   Clear to auscultation bilaterally, no wheezes/rales/rhonchi, respirations unlabored   Chest wall:  No tenderness or deformity   Heart:  Regular rate and rhythm, normal S1/S2  No murmur, rub or gallop   Abdomen: Positive bowel sounds, soft, non-tender, non-distended   Extremities: Pitting LE edema bilaterally   Vascular: Palpable dorsalis pedis bilaterally   Neurologic: " Moves all four extremities   Psychiatric: Flat affect           Scheduled Inpatient Medications    ARIPiprazole 15 mg BID   aspirin 325 mg Daily   bumetanide 2 mg BID (am, 4p)   carBAMazepine 200 mg BID   dilTIAZem  mg Daily   divalproex 1,500 mg Nightly   divalproex 125 mg Daily   docusate sodium 100 mg BID   fluticasone propionate 2 spray Daily   LORazepam 1 mg Daily (1p)   Or     LORazepam 1 mg Daily (1p)   LORazepam 2 mg Nightly   Or     LORazepam 2 mg Nightly   LORazepam 0.5 mg Daily before breakfast   polyethylene glycol 17 g Daily   ziprasidone 80 mg BID with meals        PRN Inpatient Medications    acetaminophen 650 mg q4h PRN   alum-mag hydroxide-simeth 30 mL q4h PRN   calcium carbonate 500 mg Daily PRN   diphenhydrAMINE 25 mg q6h PRN   diphenhydrAMINE 50 mg q4h PRN   hydrocortisone  2x daily PRN   ibuprofen 400 mg q6h PRN   LORazepam 2 mg q6h PRN   Or     LORazepam 2 mg q4h PRN   ondansetron ODT 4 mg q8h PRN   senna 1 tablet 2x daily PRN   sodium chloride 2 spray q1h PRN       Labs                                Estimated Creatinine Clearance: 135.6 mL/min (by C-G formula based on SCr of 0.7 mg/dL).    Imaging  I have reviewed imaging studies in the last 24 hours.     No results found.    ECG   I have reviewed ECG(s) -   4/27/2020 - sinus rhythm 92 bpm, QTc 455 ms    Telemetry  I have personally reviewed telemetry in the last 24 hours.     Echocardiogram  N/A       Assessment & Plan  Jillian Acosta is 44 y.o. female     Edema  Assessment & Plan  Pt with b/l upper and lower extremity edema noted, did not respond to Lasix  Unclear how long it has been present  Could be multifactorial-obesity, poor diet, AMINA, R sided HF??    PLAN:  Needs outpatient echocardiogram with primary cardiologist, which is already ordered per their office  Increase Bumex from 1 mg BID to 2 mg BID beginning 4/27 - we will review BMP/magnesium in AM and re-examine patient later this week   Compression stockings  Elevated LE as  able  Continue low sodium diet    Paroxysmal A-fib (CMS/HCC)  Assessment & Plan  Currently in sinus rhythm, and sinus rhythm on admission EKG  ASCJM2Glbb of 1 for female (to our best knowledge)    PLAN:  Continue Cardizem 180  ASA increased to 325mg daily   Please call/page us if further questions, patient goes back into atrial fibrillation, or if needed      * Bipolar 1 disorder (CMS/[facility])  Assessment & Plan  Per psych  QTC top normal on geodon and haldol- if these medicines are of benefit reasonable to continue.      Follow Qtc with weekly EKG, check lytes (BMP and Mg) weekls   Avoid other QTc prolonging meds         This is Dimple Handley PA-C, scribing for DELIA [place]  4/27/2020  Pager [phone]

## 2020-04-28 LAB
ANION GAP SERPL CALC-SCNC: 10 MEQ/L (ref 3–15)
BUN SERPL-MCNC: 14 MG/DL (ref 8–20)
CALCIUM SERPL-MCNC: 9.4 MG/DL (ref 8.9–10.3)
CHLORIDE SERPL-SCNC: 100 MEQ/L (ref 98–109)
CO2 SERPL-SCNC: 28 MEQ/L (ref 22–32)
CREAT SERPL-MCNC: 0.9 MG/DL (ref 0.6–1.1)
GFR SERPL CREATININE-BSD FRML MDRD: >60 ML/MIN/1.73M*2
GLUCOSE SERPL-MCNC: 104 MG/DL (ref 70–99)
MAGNESIUM SERPL-MCNC: 2.1 MG/DL (ref 1.8–2.5)
POTASSIUM SERPL-SCNC: 4.5 MEQ/L (ref 3.6–5.1)
SODIUM SERPL-SCNC: 138 MEQ/L (ref 136–144)

## 2020-04-28 PROCEDURE — 63700000 HC SELF-ADMINISTRABLE DRUG: Performed by: PSYCHIATRY & NEUROLOGY

## 2020-04-28 PROCEDURE — 63700000 HC SELF-ADMINISTRABLE DRUG: Performed by: PHYSICIAN ASSISTANT

## 2020-04-28 PROCEDURE — 63700000 HC SELF-ADMINISTRABLE DRUG: Performed by: STUDENT IN AN ORGANIZED HEALTH CARE EDUCATION/TRAINING PROGRAM

## 2020-04-28 PROCEDURE — 12400000 HC ROOM AND CARE SEMIPRIVATE PSYCH

## 2020-04-28 PROCEDURE — 80048 BASIC METABOLIC PNL TOTAL CA: CPT | Performed by: PHYSICIAN ASSISTANT

## 2020-04-28 PROCEDURE — 63700000 HC SELF-ADMINISTRABLE DRUG: Performed by: HOSPITALIST

## 2020-04-28 PROCEDURE — 83735 ASSAY OF MAGNESIUM: CPT | Performed by: PHYSICIAN ASSISTANT

## 2020-04-28 PROCEDURE — 99232 SBSQ HOSP IP/OBS MODERATE 35: CPT | Mod: 95 | Performed by: PSYCHIATRY & NEUROLOGY

## 2020-04-28 PROCEDURE — 36415 COLL VENOUS BLD VENIPUNCTURE: CPT | Performed by: PHYSICIAN ASSISTANT

## 2020-04-28 RX ORDER — BISACODYL 10 MG/1
10 SUPPOSITORY RECTAL DAILY PRN
Status: DISCONTINUED | OUTPATIENT
Start: 2020-04-28 | End: 2020-05-06 | Stop reason: HOSPADM

## 2020-04-28 RX ORDER — LORAZEPAM 2 MG/ML
0.5 INJECTION INTRAMUSCULAR
Status: DISCONTINUED | OUTPATIENT
Start: 2020-04-28 | End: 2020-05-02

## 2020-04-28 RX ORDER — LORAZEPAM 0.5 MG/1
0.5 TABLET ORAL
Status: DISCONTINUED | OUTPATIENT
Start: 2020-04-28 | End: 2020-05-01

## 2020-04-28 RX ADMIN — DOCUSATE SODIUM 100 MG: 100 CAPSULE, LIQUID FILLED ORAL at 08:16

## 2020-04-28 RX ADMIN — ZIPRASIDONE HYDROCHLORIDE 80 MG: 80 CAPSULE ORAL at 17:11

## 2020-04-28 RX ADMIN — DILTIAZEM HYDROCHLORIDE 180 MG: 180 CAPSULE, COATED, EXTENDED RELEASE ORAL at 08:16

## 2020-04-28 RX ADMIN — CARBAMAZEPINE 200 MG: 200 TABLET ORAL at 08:15

## 2020-04-28 RX ADMIN — DIVALPROEX SODIUM 125 MG: 125 TABLET, DELAYED RELEASE ORAL at 08:16

## 2020-04-28 RX ADMIN — ASPIRIN 325 MG: 325 TABLET, DELAYED RELEASE ORAL at 08:15

## 2020-04-28 RX ADMIN — BISACODYL 10 MG: 10 SUPPOSITORY RECTAL at 09:34

## 2020-04-28 RX ADMIN — BUMETANIDE 2 MG: 1 TABLET ORAL at 17:11

## 2020-04-28 RX ADMIN — CARBAMAZEPINE 200 MG: 200 TABLET ORAL at 17:11

## 2020-04-28 RX ADMIN — BUMETANIDE 2 MG: 1 TABLET ORAL at 08:15

## 2020-04-28 RX ADMIN — ARIPIPRAZOLE 15 MG: 15 TABLET ORAL at 17:11

## 2020-04-28 RX ADMIN — DOCUSATE SODIUM 100 MG: 100 CAPSULE, LIQUID FILLED ORAL at 17:11

## 2020-04-28 RX ADMIN — ZIPRASIDONE HYDROCHLORIDE 80 MG: 80 CAPSULE ORAL at 08:16

## 2020-04-28 RX ADMIN — LORAZEPAM 2 MG: 2 TABLET ORAL at 23:05

## 2020-04-28 RX ADMIN — ARIPIPRAZOLE 15 MG: 15 TABLET ORAL at 08:15

## 2020-04-28 RX ADMIN — SENNOSIDES 1 TABLET: 8.6 TABLET, FILM COATED ORAL at 06:34

## 2020-04-28 RX ADMIN — DIVALPROEX SODIUM 1500 MG: 250 TABLET, DELAYED RELEASE ORAL at 20:50

## 2020-04-28 NOTE — PLAN OF CARE
Problem: Adult Behavioral Health Plan of Care  Goal: Plan of Care Review  Outcome: Progressing  Flowsheets (Taken 4/28/2020 1341)  Progress: improving  Plan of Care Reviewed With: patient  Patient Agreement with Plan of Care: agrees  Outcome Summary: Jillian has been in better control today.  Remains delusional at times about specific people, but overall cooperative.  Tearful in am after IV nurse came to draw labs.  (Jillian had been delusional about this staff member, thinking she was her aunt).  Able to regroup and remain calm.  Able to get labs drawn by a different IV team member. Able to sit in am community meeting.  Given dulcolax supp, which had positive results.  After lunch, Jillian went to room to nap and has been napping since.  Unable to give afternoon lorazepam as of 1415.  Will continue to evaluate when she awakens.   Intervention(s): Encourage Jillian to use distraction techniques when having negative thoughts

## 2020-04-28 NOTE — PROGRESS NOTES
"Psychiatry Progress Note    Chief Complaint/Reason for follow-up: Manic Episode w/ psychosis    Interval History: Seen by cardiology yesterday, bumex increased. Reluctant to take lorazepam but did with encouragement. Denies thoughts of harming self or others. Expressed that she does not want to leave.     On exam, seen in milieu. Initially appears calm then suddenly becomes tearful stating \"the music makes me think of other songs.\" Ok with taking headphones off for interview. States that she was fighting before but now she is not trying to be like that. States \"I'm more humble.\" Focuses on how she loves her family. No longer wants to fight her way out. Transitions to talking about how she loves her father and becomes tearful again. Does not remember saying that she hated him yesterday.     Would like to decrease daytime doses of ativan due to sedation. Reminded that the goals for her are to take her medications and to have several days of calm, reality based behavior.     Review of Systems:   Sleep:  Good, Appetite: Fair and GI: No complaints    Vital Signs for the last 24 hours:  Temp:  [36.6 °C (97.8 °F)-36.7 °C (98.1 °F)] 36.7 °C (98.1 °F)  Heart Rate:  [84-90] 86  Resp:  [16] 16  BP: ()/(52-84) 125/84      EKG 4/27/20: QTc 455 HR 82    Mental Status Exam:  Appearance: ADLs good, beanie hat on, bleary-eyed  Gait and Motor: normal gait, slowed  Speech: normal rate, loud at times  Mood: \"I just love my family\"  Affect: labile, happy then sad and tearful  Associations: illogical, loose  Thought Process: circumstantial, illogical, tangential  Thought Content:   hyperreligious  Suicidality/Homicidality: denies  Judgement/Insight: poor;minimizes severity level of illness. Impaired judgement on unit  Attention: distracted   Knowledge: normal  Language: normal        Assessment/Plan    43 y/o female pphx: bipolar disorder who was admitted through the ED in manic episode. Admitted on a 201, now on a 303 commitment. " Outburst yesterday, punching walls and threatening staff. Still delusional and expansive. On exam today, still labile alternating between being calm and becoming tearful for various different reasons. Ok with receiving treatment but does not want daytime ativan due to sedation and she has been sleeping during the day some. Would benefit from more medication changes to reduce daytime sedation and optimize mood stabilization.      Bipolar Disorder, current episode manic w/ psychosis  -involuntary on 303 with [facility] second opinion as of 4/7/20  -Continue Geodon to 80 mg BID with meals   -Increase Abilify to 15 mg BID  -Continue depakote to 150mg qam and continue 1500mg qhs. Level is 80.7 on 4/21  -continue Tegretol 200 mg BID, T/C increasing if Abilify not effective  -reduce ativan to 0.5 mg  and 2 mg QHS  -Ativan PRN increased to 2 mg, will prioritize this over antipsychotics due to QTc  Appreciate cardiology input, will follow QTc Daily while changing antipsychotic regimen    LE Edema  -Bumex 2 mg BID  -appears to have worsened over weekend, will have cardiology see  -appreciate recs    Disposition  -[facility] [place] 4/23, petitioned 304. Patient agreed to 90 days inpatient care  -Will work with family for disposition planning  -would benefit from family involvement for support    Tobi Arreguin MD PGY-2

## 2020-04-29 PROCEDURE — 63700000 HC SELF-ADMINISTRABLE DRUG: Performed by: PSYCHIATRY & NEUROLOGY

## 2020-04-29 PROCEDURE — 63700000 HC SELF-ADMINISTRABLE DRUG: Performed by: PHYSICIAN ASSISTANT

## 2020-04-29 PROCEDURE — 63700000 HC SELF-ADMINISTRABLE DRUG: Performed by: STUDENT IN AN ORGANIZED HEALTH CARE EDUCATION/TRAINING PROGRAM

## 2020-04-29 PROCEDURE — 99232 SBSQ HOSP IP/OBS MODERATE 35: CPT | Mod: 95 | Performed by: PSYCHIATRY & NEUROLOGY

## 2020-04-29 PROCEDURE — 12400000 HC ROOM AND CARE SEMIPRIVATE PSYCH

## 2020-04-29 PROCEDURE — 63700000 HC SELF-ADMINISTRABLE DRUG: Performed by: HOSPITALIST

## 2020-04-29 RX ORDER — QUETIAPINE FUMARATE 200 MG/1
200 TABLET, FILM COATED ORAL NIGHTLY
Status: DISCONTINUED | OUTPATIENT
Start: 2020-04-29 | End: 2020-04-30

## 2020-04-29 RX ORDER — LITHIUM CARBONATE 300 MG/1
300 TABLET, FILM COATED, EXTENDED RELEASE ORAL
Status: DISCONTINUED | OUTPATIENT
Start: 2020-04-29 | End: 2020-05-06 | Stop reason: HOSPADM

## 2020-04-29 RX ADMIN — ASPIRIN 325 MG: 325 TABLET, DELAYED RELEASE ORAL at 09:09

## 2020-04-29 RX ADMIN — BUMETANIDE 2 MG: 1 TABLET ORAL at 16:41

## 2020-04-29 RX ADMIN — LITHIUM CARBONATE 300 MG: 300 TABLET, EXTENDED RELEASE ORAL at 11:58

## 2020-04-29 RX ADMIN — IBUPROFEN 400 MG: 400 TABLET ORAL at 18:56

## 2020-04-29 RX ADMIN — LITHIUM CARBONATE 300 MG: 300 TABLET, EXTENDED RELEASE ORAL at 22:26

## 2020-04-29 RX ADMIN — BUMETANIDE 2 MG: 1 TABLET ORAL at 09:08

## 2020-04-29 RX ADMIN — DILTIAZEM HYDROCHLORIDE 180 MG: 180 CAPSULE, COATED, EXTENDED RELEASE ORAL at 09:08

## 2020-04-29 RX ADMIN — ARIPIPRAZOLE 15 MG: 15 TABLET ORAL at 09:08

## 2020-04-29 RX ADMIN — SENNOSIDES 1 TABLET: 8.6 TABLET, FILM COATED ORAL at 22:26

## 2020-04-29 RX ADMIN — QUETIAPINE 200 MG: 200 TABLET, FILM COATED ORAL at 22:26

## 2020-04-29 RX ADMIN — DIVALPROEX SODIUM 1500 MG: 250 TABLET, DELAYED RELEASE ORAL at 22:26

## 2020-04-29 RX ADMIN — LORAZEPAM 2 MG: 2 TABLET ORAL at 22:26

## 2020-04-29 RX ADMIN — DOCUSATE SODIUM 100 MG: 100 CAPSULE, LIQUID FILLED ORAL at 09:08

## 2020-04-29 RX ADMIN — ARIPIPRAZOLE 15 MG: 15 TABLET ORAL at 16:41

## 2020-04-29 RX ADMIN — DOCUSATE SODIUM 100 MG: 100 CAPSULE, LIQUID FILLED ORAL at 16:41

## 2020-04-29 RX ADMIN — LORAZEPAM 0.5 MG: 0.5 TABLET ORAL at 15:46

## 2020-04-29 RX ADMIN — SENNOSIDES 1 TABLET: 8.6 TABLET, FILM COATED ORAL at 15:59

## 2020-04-29 RX ADMIN — IBUPROFEN 400 MG: 400 TABLET ORAL at 07:35

## 2020-04-29 RX ADMIN — DIVALPROEX SODIUM 125 MG: 125 TABLET, DELAYED RELEASE ORAL at 09:08

## 2020-04-29 RX ADMIN — CARBAMAZEPINE 200 MG: 200 TABLET ORAL at 09:08

## 2020-04-29 RX ADMIN — ZIPRASIDONE HYDROCHLORIDE 80 MG: 80 CAPSULE ORAL at 09:09

## 2020-04-29 NOTE — PLAN OF CARE
Problem: Adult Behavioral Health Plan of Care  Goal: Plan of Care Review  Flowsheets (Taken 4/28/2020 3316)  Progress: improving  Plan of Care Reviewed With: patient  Patient Agreement with Plan of Care: agrees  Outcome Summary: Jillian has been calm, with no agitation this evening. She does report some sedation, and has been drooling. She is compliant with HS medications and CPAP. Still misinterprets at times, and is suspicious, but in control.  Intervention(s): Encouraged positive coping skills. Assisted with ADL's
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
## 2020-04-29 NOTE — PLAN OF CARE
Problem: Adult Behavioral Health Plan of Care  Goal: Develops/Participates in Therapeutic [place] to Support Successful Transition  Outcome: Progressing  Flowsheets (Taken 4/29/2020 1333)  Individual Goal: Patient will continue to attend social work [id number] per week to address d/c planning needs.     Problem: Adult Behavioral Health Plan of Care  Goal: Optimized Coping Skills in Response to Life Stressors  Outcome: Progressing  Flowsheets (Taken 4/29/2020 1333)  Individual Goal:  and patient will identify case managment servies in community.

## 2020-04-29 NOTE — PROGRESS NOTES
"Psychiatry Progress Note    Chief Complaint/Reason for follow-up: Paranoia and delusions, mari.    Interval History: Patient is seen via videoconferencing with nursing assistance.  Patient is at [facility] psych unit and MD is in [facility] .  I updated the patient's Sister Patrizia at phone #[phone].  The patient continues to be delusional states \"I am not doing good.\"  She admits to being frustrated with other peers and staff repetitively says that she does not like her father \"he said I have a bad spirit.\"  She also is still referring to the  Fabiana as a male named Simeon, telling me \"he came in my mouth.\"  She also tells me that she knows \"the good people from the bad people\" on the unit and tells me that I am 1 of the bad people.  She initially refuses to change medications, but later relents after discussion.  The patient's sister tells me that Seroquel has been helpful for the patient in the past.  She apparently has become oversedated with lithium and is concerned that she will drink too much water if she is placed on lithium.  She is initially refusing both medications but later agrees that she will give them a try.  I think lithium is worth trying as it is an exceptional mood stabilizer and the patient has failed a number of other antipsychotic medications.  If she has responded in the past by oversedation, we may try a lower dose.    Review of Systems:   Sleep:  Good, Appetite: Good and GI: No complaints    Vital Signs for the last 24 hours:  Temp:  [36.8 °C (98.3 °F)-37.7 °C (99.8 °F)] 37.7 °C (99.8 °F)  Heart Rate:  [86-98] 98  Resp:  [17-18] 17  BP: (111-121)/(62-69) 119/69    Medications  Scheduled  • ARIPiprazole  15 mg oral BID   • aspirin  325 mg oral Daily   • bumetanide  2 mg oral BID (am, 4p)   • dilTIAZem CD  180 mg oral Daily   • divalproex  1,500 mg oral Nightly   • divalproex  125 mg oral Daily   • docusate sodium  100 mg oral BID   • fluticasone propionate  2 spray Each Nostril " Daily   • lithium  300 mg oral BID (9a, 10p)   • LORazepam  0.5 mg intramuscular Daily (1p)    Or   • LORazepam  0.5 mg oral Daily (1p)   • LORazepam  2 mg oral Nightly    Or   • LORazepam  2 mg intramuscular Nightly   • polyethylene glycol  17 g oral Daily   • QUEtiapine  200 mg oral Nightly     PRN  •  acetaminophen  •  alum-mag hydroxide-simeth  •  bisacodyL  •  bisacodyL  •  calcium carbonate  •  dextran 70-hypromellose  •  diphenhydrAMINE  •  diphenhydrAMINE  •  hydrocortisone  •  ibuprofen  •  LORazepam **OR** LORazepam  •  ondansetron ODT  •  senna  •  sodium chloride      Mental Status Exam:  Appearance: disheveled  Gait and Motor: slow  Speech: slowed  Mood: irritable  Affect: blunted  Associations: illogical  Thought Process: tangential and perseveration  Thought Content: paranoia, grandiosity, hyperreligiosity and delusions  Suicidality/Homicidality: thoughts to hurt others/ no desire or plan to do so  Judgement/Insight: complete denial of illness, help accepting and makes choices that perpetuate illness  Orientation: name of place  Memory: recalls recent events and impaired recollection  Attention: distracted      Assessment/Plan  * Bipolar 1 disorder (CMS/[facility])  Assessment & Plan  D/C geodon 80 mg BID for ineffectiveness  Lithium 300 md BID  Seroquel 200 mg HS doer mood stability  Depakote 1625mg for mood stability.  D/C Tegretol 200 mg BID for concern for worsening edema  Ativan for anxiety.    I discussed the treatment plan and the patient's progress with nursing staff and Allied therapists in the treatment team meeting this morning.  Patient is seen and evaluated for approximately 25 minutes in therapy with greater than 50% of time spent in direct face-to-face counseling, coordination of care and review of the medical record.    Kareem Leon MD  4/29/2020

## 2020-04-30 PROCEDURE — 12400000 HC ROOM AND CARE SEMIPRIVATE PSYCH

## 2020-04-30 PROCEDURE — 63700000 HC SELF-ADMINISTRABLE DRUG: Performed by: HOSPITALIST

## 2020-04-30 PROCEDURE — 63700000 HC SELF-ADMINISTRABLE DRUG: Performed by: PSYCHIATRY & NEUROLOGY

## 2020-04-30 PROCEDURE — 63700000 HC SELF-ADMINISTRABLE DRUG: Performed by: PHYSICIAN ASSISTANT

## 2020-04-30 PROCEDURE — 63700000 HC SELF-ADMINISTRABLE DRUG: Performed by: STUDENT IN AN ORGANIZED HEALTH CARE EDUCATION/TRAINING PROGRAM

## 2020-04-30 PROCEDURE — 99232 SBSQ HOSP IP/OBS MODERATE 35: CPT | Mod: 95 | Performed by: PSYCHIATRY & NEUROLOGY

## 2020-04-30 RX ORDER — IBUPROFEN 600 MG/1
600 TABLET ORAL EVERY 6 HOURS PRN
Status: DISCONTINUED | OUTPATIENT
Start: 2020-04-30 | End: 2020-05-06 | Stop reason: HOSPADM

## 2020-04-30 RX ADMIN — LORAZEPAM 2 MG: 2 TABLET ORAL at 22:33

## 2020-04-30 RX ADMIN — LORAZEPAM 0.5 MG: 0.5 TABLET ORAL at 12:50

## 2020-04-30 RX ADMIN — IBUPROFEN 600 MG: 600 TABLET ORAL at 10:05

## 2020-04-30 RX ADMIN — LITHIUM CARBONATE 300 MG: 300 TABLET, EXTENDED RELEASE ORAL at 08:05

## 2020-04-30 RX ADMIN — ARIPIPRAZOLE 15 MG: 15 TABLET ORAL at 17:23

## 2020-04-30 RX ADMIN — ASPIRIN 325 MG: 325 TABLET, DELAYED RELEASE ORAL at 08:05

## 2020-04-30 RX ADMIN — QUETIAPINE 300 MG: 200 TABLET, FILM COATED ORAL at 22:32

## 2020-04-30 RX ADMIN — ARIPIPRAZOLE 15 MG: 15 TABLET ORAL at 08:05

## 2020-04-30 RX ADMIN — DOCUSATE SODIUM 100 MG: 100 CAPSULE, LIQUID FILLED ORAL at 17:23

## 2020-04-30 RX ADMIN — FLUTICASONE PROPIONATE 2 SPRAY: 50 SPRAY, METERED NASAL at 08:06

## 2020-04-30 RX ADMIN — DIVALPROEX SODIUM 125 MG: 125 TABLET, DELAYED RELEASE ORAL at 08:05

## 2020-04-30 RX ADMIN — LITHIUM CARBONATE 300 MG: 300 TABLET, EXTENDED RELEASE ORAL at 22:30

## 2020-04-30 RX ADMIN — DIVALPROEX SODIUM 1500 MG: 250 TABLET, DELAYED RELEASE ORAL at 22:31

## 2020-04-30 RX ADMIN — DILTIAZEM HYDROCHLORIDE 180 MG: 180 CAPSULE, COATED, EXTENDED RELEASE ORAL at 08:05

## 2020-04-30 RX ADMIN — BUMETANIDE 2 MG: 1 TABLET ORAL at 08:05

## 2020-04-30 RX ADMIN — DOCUSATE SODIUM 100 MG: 100 CAPSULE, LIQUID FILLED ORAL at 08:05

## 2020-04-30 RX ADMIN — BUMETANIDE 2 MG: 1 TABLET ORAL at 17:23

## 2020-04-30 NOTE — PLAN OF CARE
Problem: Adult Behavioral Health Plan of Care  Goal: Plan of Care Review  Outcome: Progressing  Flowsheets (Taken 4/30/2020 1646)  Progress: improving  Plan of Care Reviewed With: patient  Patient Agreement with Plan of Care: agrees  Outcome Summary: Jillian is visible on the unit. Religiously preoccupied. Less irritable today. Pt did take a nap this afternoon. Did receive enema today for constipation with good results. Able to remain in control. Using headphones at times. Pressured, rambling and loud speech. Will continue to monitor on q 15 minute checks.  Intervention(s): Encourage pt to utilize coping mechanisms for increased anxiety, verbalize feelings to staff, encourage rest/sleep.     Problem: Mood Impairment (Manic/Hypomanic Signs/Symptoms)  Goal: Improved Mood Symptoms (Manic/Hypomanic Signs/Symptoms)  Outcome: Progressing  Flowsheets (Taken 4/30/2020 1646)  Individual Goal: Jillian will be compliant with medications.  Goal Outcome: progressing  [place] Goal: verbalizes increased insight

## 2020-04-30 NOTE — PROGRESS NOTES
"Psychiatry Progress Note    Chief Complaint/Reason for follow-up: Manic Episode w/ psychosis    Interval History: Visible on unit, frequently up to nurses station. Religiously preoccupied. Elevated affect. Started on lithium and seroquel, tegretol and geodon DCed.     Patient interrupted my interview with another patient today. Stopped infront of open doorway and screamed \"you liar\" while staring at other patient. On my exam later, Jillian stated that she was thinking about family and became tearful. Apologized for her behavior earlier and explained that she believed the other patient was her sister, who she does not have a good relationship with. They don't look alike but she got a feeling that this other patient was her. Appears to have mood lability throughout course of the day. Understands that she is ill, but does not seem to see the concern that if she is mistaking strangers for people she doesn't like that it could put her in dangerous situations. Speaks in a sing-songy way about \"lenore and essau.\"    Ok with change to lithium and seroquel.    Review of Systems:   Sleep:  Good, Appetite: Fair and GI: No complaints    Vital Signs for the last 24 hours:  Temp:  [36.5 °C (97.7 °F)-37.7 °C (99.8 °F)] 36.9 °C (98.4 °F)  Heart Rate:  [] 100  Resp:  [16-18] 16  BP: ()/(55-78) 101/64      EKG 4/27/20: QTc 455 HR 82    Mental Status Exam:  Appearance: ADLs good, beanie hat on, bleary-eyed  Gait and Motor: normal gait, slowed  Speech: normal rate, loud at times  Mood: \"I just love my family\"  Affect: labile, happy then sad and tearful  Associations: illogical, loose  Thought Process: circumstantial, illogical, tangential  Thought Content:   hyperreligious  Suicidality/Homicidality: denies  Judgement/Insight: poor;minimizes severity level of illness. Impaired judgement on unit  Attention: distracted   Knowledge: normal  Language: normal        Assessment/Plan    45 y/o female pphx: bipolar disorder who was " admitted through the ED in manic episode. Admitted on a 201, now on a 303 commitment. Outburst yesterday, punching walls and threatening staff. Still delusional and expansive. On exam today, still labile alternating between being calm and becoming tearful for various different reasons. Ok with receiving treatment but does not want daytime ativan due to sedation and she has been sleeping during the day some. Would benefit from more medication changes to reduce daytime sedation and optimize mood stabilization.      Bipolar Disorder, current episode manic w/ psychosis  -involuntary on 303 with [facility] second opinion as of 4/7/20  -Geodon and Tegretol D/Cole   -Continue Abilify to 15 mg BID  -Continue Lithium  mg BID, will check level on 5/4  -Increase seroquel to 300 mg QHS  -Continue depakote to 150mg qam and continue 1500mg qhs. Level is 80.7 on 4/21  -reduce ativan to 0.5 mg  and 2 mg QHS  -Ativan PRN increased to 2 mg, will prioritize this over antipsychotics due to QTc  Appreciate cardiology input, will follow QTc Daily while changing antipsychotic regimen    LE Edema  -Bumex 2 mg BID  -appears to have worsened over weekend, will have cardiology see  -appreciate recs    [place]  -[facility] [place] petitioned 304. Patient agreed to 90 days inpatient care  -Will work with family for disposition planning  -would benefit from family involvement for support    Tobi Arreguin MD PGY-2

## 2020-04-30 NOTE — PROGRESS NOTES
"Cardiology Progress Note  Length of Stay: 39    Subjective    Patient seen in her room. Was sleeping. She is wearing crew length compression socks. She reports her lower extremity edema is getting a little bit better.     Chart reviewed. Geodon and Tegretol discontinued per psychiatrist and now started on lithium and Seroquel. Has been getting Bumex 2 mg BID (dose increased 4/27 PM). Labs from 4/28 reviewed -  Electrolytes within normal limits, creatinine 0.9.       Objective    Vital signs in last 24 hours:  Temp:  [36.5 °C (97.7 °F)-37.1 °C (98.7 °F)] 36.9 °C (98.4 °F)  Heart Rate:  [] 100  Resp:  [16-18] 16  BP: ()/(55-78) 101/64    No intake or output data in the 24 hours ending 04/30/20 1158  Weight change:     Physical Exam:  Visit Vitals  /64 (Patient Position: Standing)   Pulse 100   Temp 36.9 °C (98.4 °F) (Oral)   Resp 16   Ht 1.664 m (5' 5.5\")   Wt 120 kg (264 lb)   LMP  (LMP Unknown)   SpO2 98%   Breastfeeding No   BMI 43.26 kg/m²         General: Sleeping but arousable to voice, NAD, well-developed, well-nourished   Head: Normocephalic, atraumatic    Eyes:  EOMs intact, sclera anicteric   Neck: Supple   Lungs:   Clear to auscultation bilaterally, no wheezes/rales/rhonchi, respirations unlabored   Chest wall:  No tenderness or deformity   Heart:  Regular rate and rhythm, normal S1/S2  No murmur, rub or gallop   Extremities: 1+ LE edema bilaterally   Neurologic: Moves all four extremities   Psychiatric: Flat affect           Scheduled Inpatient Medications    ARIPiprazole 15 mg BID   aspirin 325 mg Daily   bumetanide 2 mg BID (am, 4p)   dilTIAZem  mg Daily   divalproex 1,500 mg Nightly   divalproex 125 mg Daily   docusate sodium 100 mg BID   fluticasone propionate 2 spray Daily   lithium 300 mg BID (9a, 10p)   LORazepam 0.5 mg Daily (1p)   Or     LORazepam 0.5 mg Daily (1p)   LORazepam 2 mg Nightly   Or     LORazepam 2 mg Nightly   polyethylene glycol 17 g Daily   QUEtiapine 200 mg " Nightly        PRN Inpatient Medications    acetaminophen 650 mg q4h PRN   alum-mag hydroxide-simeth 30 mL q4h PRN   bisacodyL 10 mg Daily PRN   bisacodyL 10 mg Daily PRN   calcium carbonate 500 mg Daily PRN   dextran 70-hypromellose 1 drop 3x daily PRN   diphenhydrAMINE 25 mg q6h PRN   diphenhydrAMINE 50 mg q4h PRN   hydrocortisone  2x daily PRN   ibuprofen 600 mg q6h PRN   LORazepam 2 mg q6h PRN   Or     LORazepam 2 mg q4h PRN   ondansetron ODT 4 mg q8h PRN   senna 1 tablet 2x daily PRN   sodium chloride 2 spray q1h PRN       Labs        Results from last 7 days   Lab Units 04/28/20  0907   SODIUM mEQ/L 138   POTASSIUM mEQ/L 4.5   CHLORIDE mEQ/L 100   CO2 mEQ/L 28   BUN mg/dL 14   CREATININE mg/dL 0.9   CALCIUM mg/dL 9.4   GLUCOSE mg/dL 104*                         Estimated Creatinine Clearance: 105.5 mL/min (by C-G formula based on SCr of 0.9 mg/dL).    Imaging  I have reviewed imaging studies in the last 24 hours.     No results found.    ECG   I have reviewed ECG(s) -   4/27/2020 - sinus rhythm 92 bpm, QTc 455 ms    Telemetry  N/A    Echocardiogram  N/A       Assessment & Plan  Jillian Acosta is 44 y.o. female     Edema  Assessment & Plan  Pt with b/l upper and lower extremity edema noted, did not respond to Lasix  Unclear how long it has been present  Could be multifactorial-obesity, poor diet, AMINA, R sided HF??    PLAN:  Needs outpatient echocardiogram with primary cardiologist, which is already ordered per their office  Compression stockings  Elevated LE as able  Continue low sodium diet  Bumex increased from 1 mg BID to 2 mg BID beginning 4/27 (labs reviewed 4/28) - continue current dosing. Will repeat BMP on 5/4 and re-examine      Paroxysmal A-fib (CMS/HCC)  Assessment & Plan  Currently in sinus rhythm, and sinus rhythm on admission EKG  [id number] of 1 for female (to our best knowledge)    PLAN:  Continue Cardizem 180  ASA increased to 325mg daily   Please call/page us if further questions, patient goes  back into atrial fibrillation, or if needed      * Bipolar 1 disorder (CMS/HCC)  Assessment & Plan  Per psych  QTC top normal on geodon and haldol- if these medicines are of benefit reasonable to continue.      Follow Qtc with weekly EKG, check lytes (BMP and Mg) weekls   Avoid other QTc prolonging meds         This is Dimple Handley PA-C, scribing for DELIA [place]  4/30/2020  Pager [phone]

## 2020-04-30 NOTE — PLAN OF CARE
"  Problem: Adult Behavioral Health Plan of Care  Goal: Plan of Care Review  Flowsheets (Taken 4/29/2020 2023)  Progress: improving  Plan of Care Reviewed With: patient  Patient Agreement with Plan of Care: agrees    Outcome Summary: Jillian is visible but frequently up at the nurse's station. Remains in control this shift. Endorses paranoid and Episcopalian delusions. States that she wants to \"hear God's voice.\" Elevated affect, loud speech and less irritable this shift. Started on lithium and seroquel tonight. Geodon and tegretaol d/c'd. Will ctm.    Intervention(s): Encouraged pt to use verbalize feelings to staff and to use coping mechanisms for increased anxiety/agitation.     "

## 2020-05-01 PROCEDURE — 63700000 HC SELF-ADMINISTRABLE DRUG: Performed by: HOSPITALIST

## 2020-05-01 PROCEDURE — 63700000 HC SELF-ADMINISTRABLE DRUG: Performed by: STUDENT IN AN ORGANIZED HEALTH CARE EDUCATION/TRAINING PROGRAM

## 2020-05-01 PROCEDURE — 63700000 HC SELF-ADMINISTRABLE DRUG: Performed by: PSYCHIATRY & NEUROLOGY

## 2020-05-01 PROCEDURE — 63700000 HC SELF-ADMINISTRABLE DRUG: Performed by: PHYSICIAN ASSISTANT

## 2020-05-01 PROCEDURE — 99232 SBSQ HOSP IP/OBS MODERATE 35: CPT | Performed by: PSYCHIATRY & NEUROLOGY

## 2020-05-01 PROCEDURE — 12400000 HC ROOM AND CARE SEMIPRIVATE PSYCH

## 2020-05-01 RX ORDER — SENNOSIDES 8.6 MG/1
1 TABLET ORAL 2 TIMES DAILY
Status: DISCONTINUED | OUTPATIENT
Start: 2020-05-01 | End: 2020-05-06 | Stop reason: HOSPADM

## 2020-05-01 RX ORDER — QUETIAPINE FUMARATE 200 MG/1
400 TABLET, FILM COATED ORAL NIGHTLY
Status: DISCONTINUED | OUTPATIENT
Start: 2020-05-01 | End: 2020-05-04

## 2020-05-01 RX ORDER — LORAZEPAM 2 MG/ML
2 INJECTION INTRAMUSCULAR NIGHTLY
Status: DISCONTINUED | OUTPATIENT
Start: 2020-05-01 | End: 2020-05-04

## 2020-05-01 RX ADMIN — DILTIAZEM HYDROCHLORIDE 180 MG: 180 CAPSULE, COATED, EXTENDED RELEASE ORAL at 08:12

## 2020-05-01 RX ADMIN — Medication 2 SPRAY: at 23:58

## 2020-05-01 RX ADMIN — FLUTICASONE PROPIONATE 2 SPRAY: 50 SPRAY, METERED NASAL at 08:14

## 2020-05-01 RX ADMIN — DOCUSATE SODIUM 100 MG: 100 CAPSULE, LIQUID FILLED ORAL at 17:44

## 2020-05-01 RX ADMIN — ARIPIPRAZOLE 15 MG: 15 TABLET ORAL at 08:12

## 2020-05-01 RX ADMIN — DIVALPROEX SODIUM 1500 MG: 250 TABLET, DELAYED RELEASE ORAL at 22:42

## 2020-05-01 RX ADMIN — BUMETANIDE 2 MG: 1 TABLET ORAL at 08:11

## 2020-05-01 RX ADMIN — ARIPIPRAZOLE 15 MG: 15 TABLET ORAL at 17:44

## 2020-05-01 RX ADMIN — ASPIRIN 325 MG: 325 TABLET, DELAYED RELEASE ORAL at 08:12

## 2020-05-01 RX ADMIN — QUETIAPINE 400 MG: 200 TABLET, FILM COATED ORAL at 22:43

## 2020-05-01 RX ADMIN — LITHIUM CARBONATE 300 MG: 300 TABLET, EXTENDED RELEASE ORAL at 22:44

## 2020-05-01 RX ADMIN — SENNOSIDES 1 TABLET: 8.6 TABLET, FILM COATED ORAL at 17:44

## 2020-05-01 RX ADMIN — BUMETANIDE 2 MG: 1 TABLET ORAL at 17:44

## 2020-05-01 RX ADMIN — SENNOSIDES 1 TABLET: 8.6 TABLET, FILM COATED ORAL at 12:34

## 2020-05-01 RX ADMIN — LITHIUM CARBONATE 300 MG: 300 TABLET, EXTENDED RELEASE ORAL at 08:12

## 2020-05-01 RX ADMIN — DOCUSATE SODIUM 100 MG: 100 CAPSULE, LIQUID FILLED ORAL at 08:12

## 2020-05-01 RX ADMIN — LORAZEPAM 1.5 MG: 1 TABLET ORAL at 22:43

## 2020-05-01 RX ADMIN — DIVALPROEX SODIUM 125 MG: 125 TABLET, DELAYED RELEASE ORAL at 08:12

## 2020-05-01 RX ADMIN — IBUPROFEN 600 MG: 600 TABLET ORAL at 16:08

## 2020-05-01 NOTE — PROGRESS NOTES
"Psychiatry Progress Note    Chief Complaint/Reason for follow-up: Manic Episode w/ psychosis    Interval History: Seen by cardiology. Visible and engaged with peers. Religiously pre-occupied. Period of irritability last night. Refused CPAP. Med compliant.     On exam, waiting outside of morning meeting room looks angry. States that Dr. Leon is \"satan\" and that he is playing a trick on her. Upset but able to be calmed down slightly. Unhappy with her current situation but unable to tell me exactly why she thinks she is being tricked. Denies needing medications, focuses on Alevism and how this has healed her before. Also mentions that her father told her that he lied about being pushed so the pretense for her being here is false. No insight into the reality of her situation. Vaguely mentions that she sees things others don't see. Denies VH and AH.     Denies medication side effects. Feels oversedated.    Review of Systems:   Sleep:  Good, Appetite: Fair and GI: No complaints    Vital Signs for the last 24 hours:  Temp:  [36.8 °C (98.3 °F)-37.4 °C (99.4 °F)] 37.4 °C (99.4 °F)  Heart Rate:  [87-98] 87  Resp:  [16] 16  BP: (100-112)/(55-58) 100/55      EKG 4/27/20: QTc 455 HR 82    Mental Status Exam:  Appearance: Beanie hat with noise-cancelling headphones on, looks enraged  Gait and Motor: normal gait,  Speech: normal rate, loud at times  Mood: \"He's Satan!\"  Affect: labile, angry  Associations: illogical, loose  Thought Process: circumstantial, illogical, tangential  Thought Content:   Hyperreligious, paranoid content  Suicidality/Homicidality: denies  Judgement/Insight: almost no insight;minimizes severity level of illness. Impaired judgement on unit  Attention: distracted   Knowledge: normal  Language: normal        Assessment/Plan    45 y/o female pphx: bipolar disorder who was admitted through the ED in manic episode. Admitted on a 201, now on a 303 commitment. Outburst yesterday, punching walls and threatening " staff. Still delusional and expansive. On exam today, extremely angry with staff as a whole. No insight into circumstances that brought her in and becomes defensive/more enraged when challenged about details. Tolerating current switch to Seroquel and Lithium.      Bipolar Disorder, current episode manic w/ psychosis  -involuntary on 303 with [facility] second opinion as of 4/7/20  -Geodon and Tegretol D/Cole   -Continue Abilify to 15 mg BID  -Continue Lithium  mg BID, will check level on 5/4  -Continue Seroquel 300 mg QHS, Increase as tolerated  -Continue depakote to 150mg qam and continue 1500mg qhs. Level is 80.7 on 4/21  -reduce ativan to 0.5 mg  and 2 mg QHS; T/C reducing evening ativan to 1 mg if behavior overnight continues to improve  -Ativan PRN increased to 2 mg, will prioritize this over antipsychotics due to QTc  Appreciate cardiology input, will follow QTc Daily while changing antipsychotic regimen    LE Edema  -Bumex 2 mg BID  -appears to have worsened over weekend, will have cardiology see  -appreciate recs    Disposition  -[facility] [place]23, petitioned 304. Patient agreed to 90 days inpatient care  -Will work with family for disposition planning  -would benefit from family involvement for support    Tobi Arreguin MD PGY-2

## 2020-05-01 NOTE — PLAN OF CARE
Problem: Adult Behavioral Health Plan of Care  Goal: Plan of Care Review  Outcome: Progressing  Flowsheets (Taken 5/1/2020 1728)  Progress: improving  Plan of Care Reviewed With: patient  Patient Agreement with Plan of Care: agrees  Outcome Summary: Pt is visible on the unit. Compliant with meds and meals. Felt very tired this am and was a little irritable. Religiously preoccupied. Thinks different staff are family members or people she knows. Rambling, loud speech. + BM today. No c/o constipation. Poor boundaries and coming to staff frequently. Needs constant reassurance. Will continue to monitor on q 15 minute checks  Intervention(s): Encourage rest/ sleep as needed, compliance with medication and treatment.     Problem: Mood Impairment (Manic/Hypomanic Signs/Symptoms)  Goal: Improved Mood Symptoms (Manic/Hypomanic Signs/Symptoms)  Outcome: Progressing  Flowsheets (Taken 5/1/2020 1728)  Individual Goal: Jillian will come to staff when feeling agitated.  Goal Outcome: progressing  [place] Goal: verbalizes increased insight

## 2020-05-01 NOTE — PLAN OF CARE
"  Problem: Adult Behavioral Health Plan of Care  Goal: Plan of Care Review  Flowsheets  Taken 5/1/2020 0040 by Zuleyma Carpenter RN  Progress: improving  Plan of Care Reviewed With: patient  Patient Agreement with Plan of Care: agrees    Outcome Summary: Jillian is visible and engaged with peers on the unit. Frequently up at nursing station.  Religiously preoccupied.  Within control for most of the shift but became irritable at around 2300. Perseverating on MHT being her  and getting upset.  Pt became tearful, \"I'm tired of these medications and how they make me feel. I've had it so hard!\" Further agitated by seeing a male 1:1 come to the unit. Refused CPAP HS. Med compliant. Will ctm.    Intervention(s): Encourage pt to utilize coping mechanisms for increased anxiety, verbalize feelings to staff, encourage rest/sleep.     "

## 2020-05-02 PROCEDURE — 63700000 HC SELF-ADMINISTRABLE DRUG: Performed by: PSYCHIATRY & NEUROLOGY

## 2020-05-02 PROCEDURE — 63700000 HC SELF-ADMINISTRABLE DRUG: Performed by: PHYSICIAN ASSISTANT

## 2020-05-02 PROCEDURE — 99232 SBSQ HOSP IP/OBS MODERATE 35: CPT | Performed by: CLINIC/CENTER

## 2020-05-02 PROCEDURE — 63700000 HC SELF-ADMINISTRABLE DRUG: Performed by: STUDENT IN AN ORGANIZED HEALTH CARE EDUCATION/TRAINING PROGRAM

## 2020-05-02 PROCEDURE — 63700000 HC SELF-ADMINISTRABLE DRUG: Performed by: HOSPITALIST

## 2020-05-02 PROCEDURE — 12400000 HC ROOM AND CARE SEMIPRIVATE PSYCH

## 2020-05-02 RX ORDER — LITHIUM CARBONATE 300 MG/1
CAPSULE ORAL
Status: DISPENSED
Start: 2020-05-02 | End: 2020-05-03

## 2020-05-02 RX ADMIN — LITHIUM CARBONATE 300 MG: 300 TABLET, EXTENDED RELEASE ORAL at 22:25

## 2020-05-02 RX ADMIN — LITHIUM CARBONATE 300 MG: 300 TABLET, EXTENDED RELEASE ORAL at 08:26

## 2020-05-02 RX ADMIN — QUETIAPINE 400 MG: 200 TABLET, FILM COATED ORAL at 21:37

## 2020-05-02 RX ADMIN — DIVALPROEX SODIUM 125 MG: 125 TABLET, DELAYED RELEASE ORAL at 08:27

## 2020-05-02 RX ADMIN — BUMETANIDE 2 MG: 1 TABLET ORAL at 08:26

## 2020-05-02 RX ADMIN — BUMETANIDE 2 MG: 1 TABLET ORAL at 17:21

## 2020-05-02 RX ADMIN — DOCUSATE SODIUM 100 MG: 100 CAPSULE, LIQUID FILLED ORAL at 08:26

## 2020-05-02 RX ADMIN — ARIPIPRAZOLE 15 MG: 15 TABLET ORAL at 17:21

## 2020-05-02 RX ADMIN — SENNOSIDES 1 TABLET: 8.6 TABLET, FILM COATED ORAL at 08:26

## 2020-05-02 RX ADMIN — LORAZEPAM 1 MG: 1 TABLET ORAL at 21:34

## 2020-05-02 RX ADMIN — ARIPIPRAZOLE 15 MG: 15 TABLET ORAL at 08:26

## 2020-05-02 RX ADMIN — DILTIAZEM HYDROCHLORIDE 180 MG: 180 CAPSULE, COATED, EXTENDED RELEASE ORAL at 08:26

## 2020-05-02 RX ADMIN — DIVALPROEX SODIUM 1500 MG: 250 TABLET, DELAYED RELEASE ORAL at 21:34

## 2020-05-02 RX ADMIN — DOCUSATE SODIUM 100 MG: 100 CAPSULE, LIQUID FILLED ORAL at 17:21

## 2020-05-02 RX ADMIN — ASPIRIN 325 MG: 325 TABLET, DELAYED RELEASE ORAL at 08:27

## 2020-05-02 NOTE — PLAN OF CARE
"  Problem: Adult Behavioral Health Plan of Care  Goal: Plan of Care Review  Outcome: Progressing  Flowsheets (Taken 5/2/2020 1615)  Progress: improving  Plan of Care Reviewed With: patient  Patient Agreement with Plan of Care: agrees  Outcome Summary: Jillian had a brief mild outburst this am, all surrounding her perception that a nurse the previous night had given her \"the wrong medication\", which \"made her not be able to breathe\".  Also mistook night nurse for someone else she has known.  This episode was after speaking on the phone with a family member and expressing her desire to go home. Was able to be redirected, escorted to room and took am meds.  Eventually able to calm down and became tearful stating, \"I don't know why I get like that.  I always think people are someone else.  I just want to get better\" .  Expressed increased insight, admitting her fear that the longer she stays sick, the longer it takes to get back to the way she was.  Responded positively to reassurance and encouragement.  Took a nap and later in shift, thoughts were much clearer and organized and did not express as much delusional material or as much Moravian preoccupation.  Able to go out on patio with acceptable behavior. Appropriately social with some female peers.  More alert.  Stated that she \"wants to eat healthier\", so assisted Jillian in planning meals for the rest of the day. Denies si/hi.  Ordered prune juice for patient after she was complaining of no BM today.  Patient did have positive results tonight with BM after drinking.  Intervention(s): Provide reassurance and emphasize positive behavior     "

## 2020-05-02 NOTE — PROGRESS NOTES
"PSYCHIATRIC PROGRESS NOTE    Chief Complaint/Reason for follow-up:  Bipolar disorder, current episode manic, w/ psychosis    Interval History:   Patient reports being upset last night because she feels like she got different medication than she used to.  She reports that she is upset with certain staff because \"they are trying to kill me\".  She feels that sometimes it is not the \"regular people\" working and this makes her distrust them.  She states that \"God brought me through it\" and saving her from the staff members.  She reports that she sees \"angels\" that talk to her and tell her what to believe.  Staff reports that patient became agitated last night due to new patient coming on the unit.  She denies SI/HI at this time and has +AH/VH of angels talking to her.  Patient has been selectively cooperative with staff.  Often becomes defensive and gets frustrated easily.  She has had poor sleep last night but adequate appetite.  Tolerating medications at this time.    Patient also states she is worrying about her weight since she has been in the hospital and has tried to stay active by walking and playing basketball.    Review of Systems  Constitutional: Patient reports increasing appetite and weight gain  HENT: Negative for congestion, hearing loss, nosebleeds, sinus pressure and sore throat  Eyes: Negative.   Respiratory: Negative for cough, shortness of breath and wheezing.   Cardiovascular: Negative for chest pain and palpitations.  Gastrointestinal: Negative for constipation, diarrhea, nausea and vomiting.  Endocrine: Negative for polydipsia, polyphagia and polyuria.   Genitourinary: Negative for difficulty urinating and dysuria.  Musculoskeletal: Negative for back pain, gait problem and myalgias.  Skin: Negative for rash and wound.   Neurological: Negative for dizziness, tremors and headaches.     Vital Signs for the last 24 hours:  Temp:  [36.8 °C (98.3 °F)-36.9 °C (98.4 °F)] 36.8 °C (98.3 °F)  Heart Rate:  " "[] 87  Resp:  [16] 16  BP: (102-123)/(55-71) 102/60    Scheduled Meds:  • ARIPiprazole  15 mg oral BID   • aspirin  325 mg oral Daily   • bumetanide  2 mg oral BID (am, 4p)   • dilTIAZem CD  180 mg oral Daily   • divalproex  1,500 mg oral Nightly   • divalproex  125 mg oral Daily   • docusate sodium  100 mg oral BID   • fluticasone propionate  2 spray Each Nostril Daily   • lithium  300 mg oral BID (9a, 10p)   • LORazepam  0.5 mg intramuscular Daily (1p)   • LORazepam  1.5 mg oral Nightly    Or   • LORazepam  2 mg intramuscular Nightly   • polyethylene glycol  17 g oral Daily   • QUEtiapine  400 mg oral Nightly   • senna  1 tablet oral BID       Labs:  Results from last 7 days   Lab Units 04/28/20  0907   POTASSIUM mEQ/L 4.5   SODIUM mEQ/L 138   CREATININE mg/dL 0.9       MENTAL STATUS EXAM  Appearance: 45 y/o obese F in casual clothing, of stated age  Behavior:  Poor eye contact, easily distracted, irritable, suspicious  Gait and Motor:  No PMR/PMA observed, normal activity  Speech:  Hyperverbal, requiring interruption, fluent with no deficits in articulation  Mood:  \"fine right now\"  Affect:  blunted  Thought Process: tangential, derailment  Thought Content:  \"their motives to hurt me is because I represent Rebel in a magnificent way\"  Suicidality/Homicidality:  none elicited at this time  Perceptions: + AH and VH of angels talking to her, + paranoid and delusional thought  Judgement/Insight: limited at this time  Orientation: oriented to person, time and place  Memory:  immediate and recent memory intact, remote memory limited by history  Attention:  distractible  Language:  age appropriate    VTE Assessment: I have reassessed and the patient's VTE risk and treatment plan is appropriate.    Assessment/Plan  45 y/o female pphx: bipolar disorder who was admitted through the ED in manic episode. Admitted on a 201, now on a 303 commitment. Outburst yesterday, punching walls and threatening staff. Still " delusional and expansive. Irritable with staff and selectively cooperative.  Poor insight into circumstances that brought her in and becomes defensive/more enraged when challenged about details. Concentrating on medications today.  Tolerating current switch to Seroquel and Lithium.      Bipolar Disorder, current episode manic w/ psychosis  -involuntary on 303 with MOO second opinion as of 4/7/2  -Continue Abilify to 15 mg BID  -Continue Lithium  mg BID, will check level on 5/4   -Continue Seroquel 400 mg QHS  -Continue depakote to 150mg qam and continue 1500mg qhs. Level is 80.7 on 4/21   Continue ativan to 0.5 mg  and 2 mg QHS; T/C reducing evening ativan to 1 mg if behavior overnight continues to improve  -Ativan PRN increased to 2 mg, will prioritize this over antipsychotics due to QTc  Appreciate cardiology input, will follow QTc Daily while changing antipsychotic regimen     LE Edema  -Bumex 2 mg BID  -appears to have worsened over weekend, will have cardiology see  -appreciate recs     Disposition  -[facility] [place] 4/23, petitioned 304. Patient agreed to 90 days inpatient care  -Will work with family for disposition planning  -would benefit from family involvement for support

## 2020-05-02 NOTE — NURSING NOTE
"Pt became agitated tonight when another new pt came to the unit. Began yelling and paranoid. Pt stated she new her \" I know what is going on\". Wanted security notified so she can leave the unit. Did bring pt to her room and talked to her. . Did explain that she was a new pt on the unit. Pt expressed she has a hard time trusting people. Pt was tearful, frustrated and did vent her feelings, Able to calm down. Religiously preoccupied. Regained control. Will continue to monitor on q 15 minute checks.  "

## 2020-05-02 NOTE — NURSING NOTE
Around 2330 Jillian was upset, agitated, yelling.  Upset about medications she received.  She stated the lemuel shift RN gave her the wrong medications.  She stated that there was 1/2 pill in med cup that she had never seen before.  Ran to door and pushed on them.  Redirected by staff to sit on couch.  /65, pulse 108, pulse ox 95%.  She was brought to her room.  RN stayed with her to calm her down.  Appeared to be very sedated, saying her legs were heavy.  Perseverating on being given the wrong medications.  RN encouraged her to relax, focus on breathing.  She was given something to eat and drink.  RN encouraged her to sleep with CPAP.  Pulse ox 96-98% room air.  She did sleep with CPAP for a little.   She appeared to be doing better, and she stated she was feeling better when she came to nurses station for a drink.  She returned to room and is currently asleep without CPAP.   Will continue to monitor.

## 2020-05-03 PROCEDURE — 63700000 HC SELF-ADMINISTRABLE DRUG: Performed by: PHYSICIAN ASSISTANT

## 2020-05-03 PROCEDURE — 63700000 HC SELF-ADMINISTRABLE DRUG: Performed by: PSYCHIATRY & NEUROLOGY

## 2020-05-03 PROCEDURE — 63700000 HC SELF-ADMINISTRABLE DRUG: Performed by: STUDENT IN AN ORGANIZED HEALTH CARE EDUCATION/TRAINING PROGRAM

## 2020-05-03 PROCEDURE — 12400000 HC ROOM AND CARE SEMIPRIVATE PSYCH

## 2020-05-03 PROCEDURE — 63700000 HC SELF-ADMINISTRABLE DRUG: Performed by: HOSPITALIST

## 2020-05-03 PROCEDURE — 99232 SBSQ HOSP IP/OBS MODERATE 35: CPT | Performed by: CLINIC/CENTER

## 2020-05-03 RX ORDER — DILTIAZEM HYDROCHLORIDE 120 MG/1
120 CAPSULE, COATED, EXTENDED RELEASE ORAL DAILY
Status: DISCONTINUED | OUTPATIENT
Start: 2020-05-04 | End: 2020-05-06 | Stop reason: HOSPADM

## 2020-05-03 RX ADMIN — QUETIAPINE 400 MG: 200 TABLET, FILM COATED ORAL at 21:30

## 2020-05-03 RX ADMIN — ASPIRIN 325 MG: 325 TABLET, DELAYED RELEASE ORAL at 08:54

## 2020-05-03 RX ADMIN — LITHIUM CARBONATE 300 MG: 300 TABLET, EXTENDED RELEASE ORAL at 21:31

## 2020-05-03 RX ADMIN — ARIPIPRAZOLE 15 MG: 15 TABLET ORAL at 17:35

## 2020-05-03 RX ADMIN — LITHIUM CARBONATE 300 MG: 300 TABLET, EXTENDED RELEASE ORAL at 08:52

## 2020-05-03 RX ADMIN — DOCUSATE SODIUM 100 MG: 100 CAPSULE, LIQUID FILLED ORAL at 17:35

## 2020-05-03 RX ADMIN — BUMETANIDE 2 MG: 1 TABLET ORAL at 08:52

## 2020-05-03 RX ADMIN — DOCUSATE SODIUM 100 MG: 100 CAPSULE, LIQUID FILLED ORAL at 08:53

## 2020-05-03 RX ADMIN — DIVALPROEX SODIUM 1500 MG: 250 TABLET, DELAYED RELEASE ORAL at 21:31

## 2020-05-03 RX ADMIN — BUMETANIDE 2 MG: 1 TABLET ORAL at 17:35

## 2020-05-03 RX ADMIN — DIVALPROEX SODIUM 125 MG: 125 TABLET, DELAYED RELEASE ORAL at 08:53

## 2020-05-03 RX ADMIN — DILTIAZEM HYDROCHLORIDE 180 MG: 180 CAPSULE, COATED, EXTENDED RELEASE ORAL at 08:52

## 2020-05-03 RX ADMIN — ARIPIPRAZOLE 15 MG: 15 TABLET ORAL at 08:53

## 2020-05-03 NOTE — PROGRESS NOTES
"Contacted by RN due to low-normal blood pressures. Chart reviewed remotely, patient not examined. Patient on diltiazem 180 mg daily and Bumex 2 mg PO BID. Her blood pressures in the last couple of days have been mostly around systolic 100s. Early this morning, her BP was 98/56. Bumex and diltiazem were administered. She is not symptomatic with dizziness but reports she feels as thought her \"heart is beating slow\".     Per RN, edema is about the same but patient with less lower extremity discomfort.       PLAN:  Reduce diltiazem to 120 mg daily beginning tomorrow  BMP with magnesium scheduled for AM draw tomorrow, previously ordered  Will follow up with patient once labs are back    Dimple Handley PA-C  Pager [phone]  "

## 2020-05-03 NOTE — PLAN OF CARE
Problem: Adult Behavioral Health Plan of Care  Goal: Plan of Care Review  Outcome: Progressing  Flowsheets (Taken 5/3/2020 0037)  Progress: improving  Plan of Care Reviewed With: patient  Patient Agreement with Plan of Care: agrees  Outcome Summary: Jillian was visible on the unit, attended wrap up group and was appropriate the entire shift. She came up for her night medication at 21:30 and did ask about the Ativan. She took 1 mg of the 1.5 dosage and took everything else without question. She does appear much clearer than she has while here on the unit. Jillian came off of her cpap at 12:30 complaining that she doesnt like people in her room. She commented that she still feels sedated and did not want the .5mg of ativan for sleep. She asked for a gingerale and said she was going back to sleep. She was pleasant and very cooperative. Will continue to monitor for safety and needs.  Intervention(s): Jillian was complimented on her behavior this evening.

## 2020-05-03 NOTE — PLAN OF CARE
"  Problem: Adult Behavioral Health Plan of Care  Goal: Plan of Care Review  Outcome: Progressing  Flowsheets (Taken 5/3/2020 1439)  Progress: improving  Plan of Care Reviewed With: patient  Patient Agreement with Plan of Care: agrees  Outcome Summary: Jillian continues to improve in her mood stability and behaviors.  Alert and oriented first thing this am.  Appropriate interactions with staff and peers.  Showing increased insight.  Able to reflect on own behavior, commenting, \"I'm sorry if I'm spending a lot of time at the desk, but I feel safe with all of you\", then laughed, appropriately, at her comment.   Stated she's making an attempt to eat healthier and has been choosing healthier options at mealtime and \"eating more fruit\".  Able to sit and talk socially and appropriately with female peers for extended periods of time.  Not as restless as she has been.  Napped for about 1 1/2 hours after lunch and was able to go out on patio. Denies aggressive thoughts towards anyone and has had NO outbursts today. Still some altered thinking, for example, was speaking about a male staff person and saying he was her , then saying she wished he were her . Took meds without a problem.  Contacted cardiology today since Jillian's BP has been running on low side and she takes cardizem 180mg ER.  Although Jillian reports no dizziness, she did report her heart feels \"slowed down\" (HR has been OK). Updated them on her edema.  Although edema seems the same, Jillian does report that it's easier for her to bend her legs and has less discomfort in her legs. Dose was adjusted, so will continue to monitor BP.  Intervention(s): Continue to provide support and specific guidance towards recovery     "

## 2020-05-03 NOTE — PROGRESS NOTES
"PSYCHIATRIC PROGRESS NOTE    Chief Complaint/Reason for follow-up:   Bipolar disorder, current episode manic, w/ psychosis    Interval History:  Reports wanting to decrease ativan at night.  Reports sleeping from 9:30 PM to 6:30 AM last night and states she has energy today.  Patient reports using the CPAP at night \"only if there are good people watching me at night\".  If the people who sit with \"don't have clear motives, I don't know if they can stay in my room\".  Patient fears they are \"going to do something\".  Reports drinking prune juice and having bowel movements that are regular 1x/day for past 3-4 days.  She states eating healthy makes her feel psychologically healthy as well. Denies SI/HI.  She reports feeling a little different than she usually does. Denies feeling depressed and is motivated to do activities during the day. States she is going to groups and trying to exercise more. Reports tolerating medication without issue. Denies AH/VH today.  Making references to thoughts she was having over a month ago.    Staff report patient has been significantly more cooperative and affect has improved.    Review of Systems  Constitutional: Negative for appetite change and fever.   HENT: Negative for congestion, hearing loss, nosebleeds, sinus pressure and sore throat  Eyes: Negative.   Respiratory: Negative for cough, shortness of breath and wheezing.   Cardiovascular: Negative for chest pain and palpitations.  Gastrointestinal: Negative for constipation, diarrhea, nausea and vomiting.  Endocrine: Negative for polydipsia, polyphagia and polyuria.   Genitourinary: Negative for difficulty urinating and dysuria.  Musculoskeletal: Negative for back pain, gait problem and myalgias.  Skin: Negative for rash and wound.   Neurological: Negative for dizziness, tremors and headaches.     Vital Signs for the last 24 hours:  Temp:  [36.5 °C (97.7 °F)-36.8 °C (98.2 °F)] 36.6 °C (97.8 °F)  Heart Rate:  [80-97] 89  Resp:  [16] " "16  BP: ()/(56-65) 105/60    Scheduled Meds:  • ARIPiprazole  15 mg oral BID   • aspirin  325 mg oral Daily   • bumetanide  2 mg oral BID (am, 4p)   • dilTIAZem CD  180 mg oral Daily   • divalproex  1,500 mg oral Nightly   • divalproex  125 mg oral Daily   • docusate sodium  100 mg oral BID   • fluticasone propionate  2 spray Each Nostril Daily   • lithium  300 mg oral BID (9a, 10p)   • lithium carbonate       • LORazepam  1.5 mg oral Nightly    Or   • LORazepam  2 mg intramuscular Nightly   • polyethylene glycol  17 g oral Daily   • QUEtiapine  400 mg oral Nightly   • senna  1 tablet oral BID       Labs:  n/a    MENTAL STATUS EXAM  Appearance:  45 y/o obese F in casual clothing  Behavior:  increased eye contact, increased cooperativeness  Gait and Motor:  No PMR/PMA observed, normal activity  Speech:  Slowed to more normal rate in relation to yesterday, rhythm and volume, fluent with no deficits in articulation  Mood:  \"I'm feeling good\"  Affect:  blunted  Thought Process: more linear thought, circumstantial at times  Thought Content:  \"My family said I was out of reality, but I think I am more in reality than I was a while ago\", \"God knows that I have his number, but I think he has a demon\", continued hyper-Hoahaoism content and delusional thought  Suicidality/Homicidality:  none elicited at this time  Perceptions: Denies AH/VH at this time, continued paranoid behavior and delusional thought  Judgement/Insight: both limited at this time  Orientation: oriented to person, time and place  Memory:  immediate and recent memory intact, remote memory limited by history  Attention:  no deficits observed  Language:  age appropriate    VTE Assessment: I have reassessed and the patient's VTE risk and treatment plan is appropriate.    Assessment/Plan  45 y/o female pphx: bipolar disorder who was admitted through the ED in manic episode. Admitted on a 201, now on a 303 commitment. No outbursts observed over past day and " a half.  Still delusional and expansive. Less irritable with staff today and selectively cooperative.  Patient is more conversational, cooperating more, continues to have hyper-Taoism and delusional thought, but it appears tempered in relation to earlier in the admission. Less medication focused today. Tolerating current switch to Seroquel and Lithium.      Bipolar Disorder, current episode manic w/ psychosis  -involuntary on 303 with MOO second opinion as of 4/7/2  -Continue Abilify to 15 mg BID  -Continue Lithium  mg BID, will check level on 5/4   -Continue Seroquel 400 mg QHS  -Continue depakote to 125 mg qam and continue 1500mg qhs. Level is 80.7 on 4/21   Continue ativan at 1.5 mg HS as behavior has improved in past 2 days  - agitation - Ativan PRN  2 mg, use this over antipsychotics due to QTc  - weekly EKG, BMP/Mg due to QTc     LE Edema  - appreciate cardiology recs  - continue Bumex 2 mg BID  - cardiology saw on recommended continuing cardizem 120 mg due to low BPs and  mg daily     Disposition  -[facility] [place] 4/23, petitioned 304. Patient agreed to 90 days inpatient care  -Will work with family for disposition planning  -would benefit from family involvement for support

## 2020-05-03 NOTE — NURSING NOTE
"Addendum to earlier shift note:  Jillian continues to remain in control; however, talking about more delusional content as day goes on.  This evening, angry at sister for not dropping clothes off, \"she's always nasty to me!\", and removed her from her HIPAA form.  Also, conversation got a little looser when talking about taking a shower, when Jillian said when she takes a hot shower \"it's like trauma, like I'm being raped\".  But more quickly redirected to more reality based conversation than in the past.  "

## 2020-05-04 LAB
ANION GAP SERPL CALC-SCNC: 9 MEQ/L (ref 3–15)
BUN SERPL-MCNC: 13 MG/DL (ref 8–20)
CALCIUM SERPL-MCNC: 9 MG/DL (ref 8.9–10.3)
CHLORIDE SERPL-SCNC: 99 MEQ/L (ref 98–109)
CO2 SERPL-SCNC: 29 MEQ/L (ref 22–32)
CREAT SERPL-MCNC: 0.9 MG/DL (ref 0.6–1.1)
GFR SERPL CREATININE-BSD FRML MDRD: >60 ML/MIN/1.73M*2
GLUCOSE SERPL-MCNC: 99 MG/DL (ref 70–99)
LITHIUM SERPL-SCNC: 0.3 MEQ/L (ref 0.5–1)
MAGNESIUM SERPL-MCNC: 2.2 MG/DL (ref 1.8–2.5)
POTASSIUM SERPL-SCNC: 4.1 MEQ/L (ref 3.6–5.1)
SODIUM SERPL-SCNC: 137 MEQ/L (ref 136–144)

## 2020-05-04 PROCEDURE — 63700000 HC SELF-ADMINISTRABLE DRUG: Performed by: PHYSICIAN ASSISTANT

## 2020-05-04 PROCEDURE — 63700000 HC SELF-ADMINISTRABLE DRUG: Performed by: PSYCHIATRY & NEUROLOGY

## 2020-05-04 PROCEDURE — 99232 SBSQ HOSP IP/OBS MODERATE 35: CPT | Performed by: PSYCHIATRY & NEUROLOGY

## 2020-05-04 PROCEDURE — 80048 BASIC METABOLIC PNL TOTAL CA: CPT | Performed by: PHYSICIAN ASSISTANT

## 2020-05-04 PROCEDURE — 12400000 HC ROOM AND CARE SEMIPRIVATE PSYCH

## 2020-05-04 PROCEDURE — 83735 ASSAY OF MAGNESIUM: CPT | Performed by: PHYSICIAN ASSISTANT

## 2020-05-04 PROCEDURE — 36415 COLL VENOUS BLD VENIPUNCTURE: CPT | Performed by: PHYSICIAN ASSISTANT

## 2020-05-04 PROCEDURE — 63700000 HC SELF-ADMINISTRABLE DRUG: Performed by: STUDENT IN AN ORGANIZED HEALTH CARE EDUCATION/TRAINING PROGRAM

## 2020-05-04 PROCEDURE — 80178 ASSAY OF LITHIUM: CPT | Performed by: CLINIC/CENTER

## 2020-05-04 RX ORDER — LORAZEPAM 1 MG/1
1 TABLET ORAL NIGHTLY PRN
Status: DISCONTINUED | OUTPATIENT
Start: 2020-05-04 | End: 2020-05-06 | Stop reason: HOSPADM

## 2020-05-04 RX ORDER — LORAZEPAM 2 MG/ML
2 INJECTION INTRAMUSCULAR NIGHTLY PRN
Status: DISCONTINUED | OUTPATIENT
Start: 2020-05-04 | End: 2020-05-06 | Stop reason: HOSPADM

## 2020-05-04 RX ADMIN — BUMETANIDE 2 MG: 1 TABLET ORAL at 10:20

## 2020-05-04 RX ADMIN — LITHIUM CARBONATE 300 MG: 300 TABLET, EXTENDED RELEASE ORAL at 22:14

## 2020-05-04 RX ADMIN — SENNOSIDES 1 TABLET: 8.6 TABLET, FILM COATED ORAL at 17:29

## 2020-05-04 RX ADMIN — ARIPIPRAZOLE 15 MG: 15 TABLET ORAL at 17:29

## 2020-05-04 RX ADMIN — DIVALPROEX SODIUM 1500 MG: 250 TABLET, DELAYED RELEASE ORAL at 22:14

## 2020-05-04 RX ADMIN — DOCUSATE SODIUM 100 MG: 100 CAPSULE, LIQUID FILLED ORAL at 17:29

## 2020-05-04 RX ADMIN — BUMETANIDE 2 MG: 1 TABLET ORAL at 17:29

## 2020-05-04 RX ADMIN — DIVALPROEX SODIUM 125 MG: 125 TABLET, DELAYED RELEASE ORAL at 10:21

## 2020-05-04 RX ADMIN — LITHIUM CARBONATE 300 MG: 300 TABLET, EXTENDED RELEASE ORAL at 10:21

## 2020-05-04 RX ADMIN — Medication 2 SPRAY: at 04:12

## 2020-05-04 RX ADMIN — ARIPIPRAZOLE 15 MG: 15 TABLET ORAL at 10:20

## 2020-05-04 RX ADMIN — QUETIAPINE 500 MG: 200 TABLET, FILM COATED ORAL at 22:14

## 2020-05-04 RX ADMIN — ASPIRIN 325 MG: 325 TABLET, DELAYED RELEASE ORAL at 10:21

## 2020-05-04 RX ADMIN — DILTIAZEM HYDROCHLORIDE 120 MG: 120 CAPSULE, COATED, EXTENDED RELEASE ORAL at 10:21

## 2020-05-04 RX ADMIN — DOCUSATE SODIUM 100 MG: 100 CAPSULE, LIQUID FILLED ORAL at 10:20

## 2020-05-04 NOTE — NURSING NOTE
Pt came to nursing and apologized for getting so angry. Reported that she could feel how tired she was from her meds and went to her room to go to sleep.

## 2020-05-04 NOTE — PLAN OF CARE
Problem: Adult Behavioral Health Plan of Care  Goal: Plan of Care Review  Flowsheets (Taken 5/4/2020 1674)  Progress: improving  Intervention(s): Per [facility] COVID pt visitation restrictions, RD not able to enter room.   Pt rescreened secondary to one month since last assessed.  Pt continues with excellent intake of 2 gm Na diet.  Pt with morbid obesity.  Noted hopeful discharge in the next few days.    Goals:  Pt to meet % of needs via PO  Long term weight loss    Recommendations:  Continue 2gm diet

## 2020-05-04 NOTE — NURSING NOTE
Jillian was agreeable to take the 1 mg of Ativan again this evening until she heard the other patient refuse her meds and that set her off.    She is refusing CPAP this evening.

## 2020-05-04 NOTE — PLAN OF CARE
Problem: Adult Behavioral Health Plan of Care  Goal: Plan of Care Review  Flowsheets (Taken 5/4/2020 6396)  Progress: improving  Plan of Care Reviewed With: patient  Patient Agreement with Plan of Care: agrees  Outcome Summary: Jillian continues to improve. Visible on unit, social with select peers. ADLs good. Affect brighter. Thoughts clearer. Still has some delusion regarding others, beleived the officer that came to her home was her doctor. Endorses worries about possible seizure due to stopping ativan. Instructed pt to inform staff of any of increased anxiety or shakiness. Requested to eat lunch in her room, stated she didn't want to be around anyone. This was short lived, Jillian came out after she eat and was chatting with a peer. Calm, cooperative. Denies SI. Reports feeling better. Will continue to monitor  Intervention(s): Continue to provide support and specific guidance towards recovery

## 2020-05-04 NOTE — NURSING NOTE
"Jillian had a total melt down tonight she overheard Emma saying that she was refusing her medications and she totally lost mind screaming \"Why can she refuse her meds and I am forced to take mine. I am NOT taking the ativan. This is bullshit. It is my body. Why can some people refuse and I cant. Its a benzo not an antipsychotic. I refuse to take it. I am out of here tomorrow!!\" Took a good 20 minutes for her to calm down she was screaming at patients and staff.\"I am sticking up for myself, I am sticking to my guns. I better get out of here tomorrow.\" She was given her medications at 9:30 so she can have her Lithium level drawn tomorrow morning.  "

## 2020-05-04 NOTE — PLAN OF CARE
Problem: Adult Behavioral Health Plan of Care  Goal: Plan of Care Review  Outcome: Progressing  Flowsheets (Taken 5/4/2020 1612)  Plan of Care Reviewed With: patient  Patient Agreement with Plan of Care: agrees  Outcome Summary:  left message for patient's sister to discuss d/c planning.   left message for Dr. Weeks psychiatry to schedule outpatient follow-up.  Waiting to hear back.

## 2020-05-04 NOTE — PROGRESS NOTES
"Psychiatry Progress Note    Chief Complaint/Reason for follow-up: Manic Episode w/ psychosis    Interval History: Seen by cardiology over the weekend for low BP. Intermittently good behavior. Apologized to staff after outburst.     Seen with Dr. Leon, much more logical and goal directed today. Denying the thoughts she expressed on Friday that members of the treatment team were satan and she was hilda. Laughs at the ridiculousness of this and states \"you aren't satan and I'm not hilda.\" Focuses conversation on minimizing medications, primarily ativan, and when she can discharge home. Would like to leave tomorrow. States \"I'm better and so bored.\" Still endorsing some grandiose/Islam ideas, that she is \"someone special\" and that her father \"had to go down because he was bad.\"     Refusing to take QHS ativan as she does not like the way this medication makes her feel. Patient was counseled on risks of not tapering this medication, primarily that she increases her risk of seizure. Ok with plan to take if she has elevated anxiety, worsening vital signs.     Review of Systems:   Sleep:  Good, Appetite: Fair and GI: No complaints    Vital Signs for the last 24 hours:  Temp:  [36.9 °C (98.5 °F)-37.1 °C (98.8 °F)] 37.1 °C (98.8 °F)  Heart Rate:  [80-97] 97  Resp:  [18] 18  BP: (105-132)/(60-76) 132/70      EKG 4/27/20: QTc 455 HR 82    Mental Status Exam:  Appearance: Better hygiene, standing in hallway, appears more awake  Gait and Motor: normal gait, no motor abnormalities  Speech: normal rate, rhythm, hard to interrupt at times  Mood: \"I never said I was hilda\"  Affect: less labile, more euthymic  Associations: logical  Thought Process: circumstantial, logical, goal oriented  Thought Content:   Still with delusional ideas/slightly grandiose, overall improved  Suicidality/Homicidality: denies  Judgement/Insight: minimizes severity level of illness. Judgement fair, improving  Attention: distracted   Knowledge: " normal  Language: normal        Assessment/Plan    43 y/o female pphx: bipolar disorder who was admitted through the ED in manic episode. Admitted on a 201, now on a 303 commitment. Outburst yesterday, punching walls and threatening staff. Still delusional and expansive. On exam today, pleasant and brighter. Overall decrease in delusional thought content and much more level mood. Improving insight but still wants to dictate medications and does not see purpose of longer hospitalization. Will benefit from continued medication changes to further improve emotional stability and avoiding outbursts.      Bipolar Disorder, current episode manic w/ psychosis  -involuntary on 303 with MOO second opinion as of 4/7/20  -Geodon and Tegretol D/Cole   -Continue Abilify to 15 mg BID  -Continue Lithium  mg BID, level on 5/4 0.3  -Increase Seroquel to 500 mg QHS  -Continue depakote to 150mg qam and continue 1500mg qhs. Level is 80.7 on 4/21  -reducing evening ativan, will make PRN, patient wants to d/c and understands risks of seizure if she refuses  -Ativan PRN increased to 2 mg, will prioritize this over antipsychotics due to QTc  Appreciate cardiology input, will follow QTc Daily while changing antipsychotic regimen    LE Edema  -Bumex 2 mg BID  -cardiology following, appreciate recs    Disposition  -[facility] [place] 4/23, petitioned 304. Patient agreed to 90 days inpatient care  -Will work with family for disposition planning, likely d/c 5/6 if improvement in behavior continues  -would benefit from family involvement for support    Tobi Arreguin MD PGY-2

## 2020-05-05 PROBLEM — I95.9 HYPOTENSION: Status: ACTIVE | Noted: 2020-05-05

## 2020-05-05 PROBLEM — I95.9 HYPOTENSION: Status: RESOLVED | Noted: 2020-05-05 | Resolved: 2020-05-05

## 2020-05-05 PROCEDURE — 12400000 HC ROOM AND CARE SEMIPRIVATE PSYCH

## 2020-05-05 PROCEDURE — 87633 RESP VIRUS 12-25 TARGETS: CPT | Performed by: INTERNAL MEDICINE

## 2020-05-05 PROCEDURE — 99232 SBSQ HOSP IP/OBS MODERATE 35: CPT | Performed by: PSYCHIATRY & NEUROLOGY

## 2020-05-05 PROCEDURE — 63700000 HC SELF-ADMINISTRABLE DRUG: Performed by: PHYSICIAN ASSISTANT

## 2020-05-05 PROCEDURE — 63700000 HC SELF-ADMINISTRABLE DRUG: Performed by: PSYCHIATRY & NEUROLOGY

## 2020-05-05 PROCEDURE — 63700000 HC SELF-ADMINISTRABLE DRUG: Performed by: STUDENT IN AN ORGANIZED HEALTH CARE EDUCATION/TRAINING PROGRAM

## 2020-05-05 PROCEDURE — 93005 ELECTROCARDIOGRAM TRACING: CPT | Performed by: PSYCHIATRY & NEUROLOGY

## 2020-05-05 PROCEDURE — U0002 COVID-19 LAB TEST NON-CDC: HCPCS | Performed by: INTERNAL MEDICINE

## 2020-05-05 RX ORDER — QUETIAPINE FUMARATE 200 MG/1
400 TABLET, FILM COATED ORAL NIGHTLY
Status: DISCONTINUED | OUTPATIENT
Start: 2020-05-05 | End: 2020-05-06 | Stop reason: HOSPADM

## 2020-05-05 RX ORDER — BUMETANIDE 1 MG/1
2 TABLET ORAL DAILY
Status: DISCONTINUED | OUTPATIENT
Start: 2020-05-06 | End: 2020-05-06 | Stop reason: HOSPADM

## 2020-05-05 RX ADMIN — ASPIRIN 325 MG: 325 TABLET, DELAYED RELEASE ORAL at 09:50

## 2020-05-05 RX ADMIN — ARIPIPRAZOLE 15 MG: 15 TABLET ORAL at 09:50

## 2020-05-05 RX ADMIN — SENNOSIDES 1 TABLET: 8.6 TABLET, FILM COATED ORAL at 16:57

## 2020-05-05 RX ADMIN — BUMETANIDE 2 MG: 1 TABLET ORAL at 09:49

## 2020-05-05 RX ADMIN — ACETAMINOPHEN 650 MG: 325 TABLET ORAL at 23:47

## 2020-05-05 RX ADMIN — DIVALPROEX SODIUM 125 MG: 125 TABLET, DELAYED RELEASE ORAL at 09:49

## 2020-05-05 RX ADMIN — DOCUSATE SODIUM 100 MG: 100 CAPSULE, LIQUID FILLED ORAL at 16:57

## 2020-05-05 RX ADMIN — DILTIAZEM HYDROCHLORIDE 120 MG: 120 CAPSULE, COATED, EXTENDED RELEASE ORAL at 09:49

## 2020-05-05 RX ADMIN — LITHIUM CARBONATE 300 MG: 300 TABLET, EXTENDED RELEASE ORAL at 21:26

## 2020-05-05 RX ADMIN — DOCUSATE SODIUM 100 MG: 100 CAPSULE, LIQUID FILLED ORAL at 09:49

## 2020-05-05 RX ADMIN — ARIPIPRAZOLE 15 MG: 15 TABLET ORAL at 16:57

## 2020-05-05 RX ADMIN — IBUPROFEN 600 MG: 600 TABLET ORAL at 09:50

## 2020-05-05 RX ADMIN — DIVALPROEX SODIUM 1500 MG: 250 TABLET, DELAYED RELEASE ORAL at 21:26

## 2020-05-05 RX ADMIN — LITHIUM CARBONATE 300 MG: 300 TABLET, EXTENDED RELEASE ORAL at 09:50

## 2020-05-05 RX ADMIN — QUETIAPINE 400 MG: 200 TABLET, FILM COATED ORAL at 21:26

## 2020-05-05 NOTE — PLAN OF CARE
Problem: Adult Behavioral Health Plan of Care  Goal: Plan of Care Review  Outcome: Progressing  Flowsheets  Taken 5/4/2020 2048  Progress: improving  Outcome Summary: Jillian was visible on unit this evening, socially engaged with staff. Her mood remains labile but improved. Tearful at times, focused on male finding a relationship with a male.  She continues to be delusional in her thought process. She showered this evening, appearance is appropriate, and speech clear. She refused dinner and ate cereal instead. Refused to attend wrap up group. Medication compliant. Cooperative with staff this evening.  Will continue to monitor and offer support on unit.  Intervention(s): Monitoring patient q15min for safety, offering emotional support as needed, and monitoring for treatment plan compliance/effectiveness.  Taken 5/4/2020 1937  Plan of Care Reviewed With: patient  Patient Agreement with Plan of Care: agrees

## 2020-05-05 NOTE — ASSESSMENT & PLAN NOTE
Not clearly symptomatic.    ?orthstasis 2/2 meds and diuretics.      Plan:  Decrease bumex to daily   Will defer psych meds to primary team   Check orthostatics once on lower dose of bumex.

## 2020-05-05 NOTE — NURSING NOTE
"Jillian awoke approximately at 2 am saying she felt a jolt of energy go through her that caused her to jump up. She was shivering and unsteady on her feet. She said she felt like she had a fever, temp was 97.2. Got her some gingerale and we talked about maybe it was some anxiety about going home. Even though she is excited to go and that is what she wants she could be subconsciously having some anxiety around the transition. She was also upset about the additional 100mg of Seroquel that was added to her night meds and worried if that was the cause of her \"symptoms\". She doesn't feel she needs that extra 100 mg of Seroquel. Her pillow and blankets were brought out to the LR and she quickly fell back asleep.   "
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
## 2020-05-05 NOTE — PLAN OF CARE
Problem: Adult Behavioral Health Plan of Care  Goal: Plan of Care Review  Flowsheets (Taken 5/5/2020 1301)  Progress: improving  Plan of Care Reviewed With: patient  Patient Agreement with Plan of Care: agrees  Outcome Summary: Jillian has been visible on unit. Attened morning art group. Med compliant. Cooperative, calm. Still has some delusions with people and believing she knows them. Overall pt has improved. No outbursts. Minimally social with peers but appropriate when she does interact. Denies SI. Did c/o feeling sleepy this morning, please when informed the Seroquel was going to be decreased. Will continue to monitor and offer support  Intervention(s): Encouraged pt to remain engaged on unit

## 2020-05-05 NOTE — PROGRESS NOTES
"Daily Progress Note    Subjective    Interval History: Low BP at times.  Not clearly symptomatic.  When asked about LH- patient tells me she feels \"grounded\" and like her reflexes are slow but not specifically LH.  Her psych meds have been adjusted recently as was her Bumex     Objective  Vital signs in last 24 hours:  Temp:  [36.4 °C (97.6 °F)-37.1 °C (98.8 °F)] 36.4 °C (97.6 °F)  Heart Rate:  [81-95] 81  Resp:  [16-18] 16  BP: ()/(53-65) 122/57    No intake or output data in the 24 hours ending 05/05/20 1528    Physical Exam:  Visit Vitals  BP (!) 122/57   Pulse 81   Temp 36.4 °C (97.6 °F) (Oral)   Resp 16   Ht 1.664 m (5' 5.5\")   Wt 120 kg (264 lb) Comment: weight from 4/26/20   LMP  (LMP Unknown)   SpO2 95%   Breastfeeding No   BMI 43.26 kg/m²                           General aa, nad  Extremities:   1+ edema seen bilaterally, warm    Neuro:  Grossly nonfocal           Scheduled Inpatient Medications:    • ARIPiprazole  15 mg oral BID   • aspirin  325 mg oral Daily   • [START ON 5/6/2020] bumetanide  2 mg oral Daily   • dilTIAZem CD  120 mg oral Daily   • divalproex  1,500 mg oral Nightly   • divalproex  125 mg oral Daily   • docusate sodium  100 mg oral BID   • fluticasone propionate  2 spray Each Nostril Daily   • lithium  300 mg oral BID (9a, 10p)   • polyethylene glycol  17 g oral Daily   • QUEtiapine  400 mg oral Nightly   • senna  1 tablet oral BID       Scheduled Inpatient Infusions:          PRN Medications    •  acetaminophen  •  alum-mag hydroxide-simeth  •  bisacodyL  •  bisacodyL  •  calcium carbonate  •  dextran 70-hypromellose  •  diphenhydrAMINE  •  diphenhydrAMINE  •  hydrocortisone  •  ibuprofen  •  LORazepam **OR** LORazepam  •  LORazepam **OR** LORazepam  •  ondansetron ODT  •  sodium chloride    Labs       Results from last 7 days   Lab Units 05/04/20  0803   SODIUM mEQ/L 137   POTASSIUM mEQ/L 4.1   CHLORIDE mEQ/L 99   CO2 mEQ/L 29   BUN mg/dL 13   CREATININE mg/dL 0.9   CALCIUM mg/dL " 9.0   GLUCOSE mg/dL 99                       Imaging  No results found.    ECG   I have personally reviewed ECG  pending    Telemetry  I have personally reviewed telemetry  na    Echocardiogram  na     Assessment & Plan    Hypotension  Assessment & Plan  Not clearly symptomatic.    ?orthstasis 2/2 meds and diuretics.      Plan:  Decrease bumex to daily   Will defer psych meds to primary team   Check orthostatics once on lower dose of bumex.        * Bipolar 1 disorder (CMS/HCC)  Assessment & Plan  Per psych  QTC top normal on geodon and haldol- if these medicines are of benefit reasonable to continue.      Follow Qtc with weekly EKG, check lytes (BMP and Mg) weeks- PENDING TODAY   Avoid other QTc prolonging meds     Edema  Assessment & Plan  Pt with b/l upper and lower extremity edema noted, did not respond to Lasix  Unclear how long it has been present  Could be multifactorial-obesity, poor diet, AMINA, R sided HF??    PLAN:  Needs outpatient echocardiogram with primary cardiologist, which is already ordered per their office  Compression stockings  Elevated LE as able  Continue low sodium diet  Bumex increased from 1 mg BID to 2 mg BID beginning 4/27 (labs reviewed 4/28) - 5/5- decrease to daily     Paroxysmal A-fib (CMS/[facility])  Assessment & Plan  Currently in sinus rhythm, and sinus rhythm on admission EKG  [id number] of 1 for female (to our best knowledge)    PLAN:   Cardizem--> decreased to 120 daily for low bp   ASA increased to 325mg daily   Please call/page us if further questions, patient goes back into atrial fibrillation, or if needed        This is Abena Armstrong PA-C d/w  DELIA Tello  5/5/2020

## 2020-05-06 VITALS
TEMPERATURE: 98.4 F | OXYGEN SATURATION: 98 % | DIASTOLIC BLOOD PRESSURE: 58 MMHG | BODY MASS INDEX: 42.43 KG/M2 | RESPIRATION RATE: 16 BRPM | WEIGHT: 264 LBS | HEART RATE: 95 BPM | HEIGHT: 66 IN | SYSTOLIC BLOOD PRESSURE: 113 MMHG

## 2020-05-06 LAB
ATRIAL RATE: 79
BILIRUB UR QL STRIP.AUTO: NEGATIVE MG/DL
CLARITY UR REFRACT.AUTO: CLEAR
COLOR UR AUTO: YELLOW
FLUAV RNA SPEC QL NAA+PROBE: NEGATIVE
FLUBV RNA SPEC QL NAA+PROBE: NEGATIVE
GLUCOSE UR STRIP.AUTO-MCNC: NEGATIVE MG/DL
HADV DNA SPEC QL NAA+PROBE: NEGATIVE
HCOV 229E RNA SPEC QL NAA+PROBE: NEGATIVE
HCOV HKU1 RNA SPEC QL NAA+PROBE: NEGATIVE
HCOV NL63 RNA SPEC QL NAA+PROBE: NEGATIVE
HCOV OC43 RNA SPEC QL NAA+PROBE: NEGATIVE
HGB UR QL STRIP.AUTO: NEGATIVE
HMPV RNA SPEC QL NAA+PROBE: NEGATIVE
HPIV1 RNA SPEC QL NAA+PROBE: NEGATIVE
HPIV2 RNA SPEC QL NAA+PROBE: NEGATIVE
HPIV3 RNA SPEC QL NAA+PROBE: NEGATIVE
HPIV4 RNA SPEC QL NAA+PROBE: NEGATIVE
KETONES UR STRIP.AUTO-MCNC: NEGATIVE MG/DL
LEUKOCYTE ESTERASE UR QL STRIP.AUTO: NEGATIVE
NITRITE UR QL STRIP.AUTO: NEGATIVE
P AXIS: 72
PH UR STRIP.AUTO: 7 [PH]
PR INTERVAL: 174
PROT UR QL STRIP.AUTO: NEGATIVE
QRS DURATION: 94
QT INTERVAL: 388
QTC CALCULATION(BAZETT): 444
R AXIS: 32
RSV RNA SPEC QL NAA+PROBE: NEGATIVE
RV+EV RNA SPEC QL NAA+PROBE: NEGATIVE
SARS-COV-2 RNA RESP QL NAA+PROBE: NEGATIVE
SP GR UR REFRACT.AUTO: 1.01
T WAVE AXIS: 24
TEST PERFORMANCE INFO SPEC: NORMAL
UROBILINOGEN UR STRIP-ACNC: 0.2 EU/DL
VENTRICULAR RATE: 79

## 2020-05-06 PROCEDURE — 63700000 HC SELF-ADMINISTRABLE DRUG: Performed by: PSYCHIATRY & NEUROLOGY

## 2020-05-06 PROCEDURE — 81003 URINALYSIS AUTO W/O SCOPE: CPT | Performed by: INTERNAL MEDICINE

## 2020-05-06 PROCEDURE — 63700000 HC SELF-ADMINISTRABLE DRUG: Performed by: STUDENT IN AN ORGANIZED HEALTH CARE EDUCATION/TRAINING PROGRAM

## 2020-05-06 PROCEDURE — 63700000 HC SELF-ADMINISTRABLE DRUG: Performed by: PHYSICIAN ASSISTANT

## 2020-05-06 PROCEDURE — 99238 HOSP IP/OBS DSCHRG MGMT 30/<: CPT | Performed by: PSYCHIATRY & NEUROLOGY

## 2020-05-06 RX ORDER — DILTIAZEM HYDROCHLORIDE 120 MG/1
120 CAPSULE, COATED, EXTENDED RELEASE ORAL DAILY
Qty: 30 CAPSULE | Refills: 0 | Status: SHIPPED | OUTPATIENT
Start: 2020-05-07 | End: 2023-07-14 | Stop reason: HOSPADM

## 2020-05-06 RX ORDER — LITHIUM CARBONATE 300 MG/1
300 TABLET, FILM COATED, EXTENDED RELEASE ORAL 2 TIMES DAILY
Qty: 60 TABLET | Refills: 0 | Status: SHIPPED | OUTPATIENT
Start: 2020-05-06 | End: 2020-12-02 | Stop reason: HOSPADM

## 2020-05-06 RX ORDER — SENNOSIDES 8.6 MG/1
1 TABLET ORAL 2 TIMES DAILY
Qty: 60 TABLET | Refills: 0 | Status: SHIPPED | OUTPATIENT
Start: 2020-05-06 | End: 2023-07-14 | Stop reason: HOSPADM

## 2020-05-06 RX ORDER — ARIPIPRAZOLE 30 MG/1
30 TABLET ORAL DAILY
Qty: 30 TABLET | Refills: 0 | Status: SHIPPED | OUTPATIENT
Start: 2020-05-06 | End: 2023-07-14 | Stop reason: HOSPADM

## 2020-05-06 RX ORDER — BUMETANIDE 2 MG/1
2 TABLET ORAL DAILY
Qty: 30 TABLET | Refills: 0 | Status: SHIPPED | OUTPATIENT
Start: 2020-05-07 | End: 2020-11-19

## 2020-05-06 RX ORDER — DIVALPROEX SODIUM 125 MG/1
125 TABLET, DELAYED RELEASE ORAL DAILY
Qty: 30 TABLET | Refills: 0 | Status: SHIPPED | OUTPATIENT
Start: 2020-05-07 | End: 2020-11-19

## 2020-05-06 RX ORDER — DIVALPROEX SODIUM 500 MG/1
1500 TABLET, DELAYED RELEASE ORAL NIGHTLY
Qty: 90 TABLET | Refills: 0 | Status: SHIPPED | OUTPATIENT
Start: 2020-05-06 | End: 2020-11-19

## 2020-05-06 RX ORDER — QUETIAPINE FUMARATE 400 MG/1
400 TABLET, FILM COATED ORAL NIGHTLY
Qty: 30 TABLET | Refills: 0 | Status: SHIPPED | OUTPATIENT
Start: 2020-05-06 | End: 2020-12-02 | Stop reason: HOSPADM

## 2020-05-06 RX ORDER — POLYETHYLENE GLYCOL 3350 17 G/17G
17 POWDER, FOR SOLUTION ORAL DAILY
Qty: 3 PACKET | Refills: 0 | Status: SHIPPED | OUTPATIENT
Start: 2020-05-07 | End: 2022-07-28

## 2020-05-06 RX ORDER — DOCUSATE SODIUM 100 MG/1
100 CAPSULE, LIQUID FILLED ORAL 2 TIMES DAILY
Qty: 60 CAPSULE | Refills: 0 | Status: SHIPPED | OUTPATIENT
Start: 2020-05-06 | End: 2020-11-19

## 2020-05-06 RX ADMIN — DILTIAZEM HYDROCHLORIDE 120 MG: 120 CAPSULE, COATED, EXTENDED RELEASE ORAL at 09:23

## 2020-05-06 RX ADMIN — DOCUSATE SODIUM 100 MG: 100 CAPSULE, LIQUID FILLED ORAL at 09:23

## 2020-05-06 RX ADMIN — IBUPROFEN 600 MG: 600 TABLET ORAL at 09:26

## 2020-05-06 RX ADMIN — ASPIRIN 325 MG: 325 TABLET, DELAYED RELEASE ORAL at 09:24

## 2020-05-06 RX ADMIN — LITHIUM CARBONATE 300 MG: 300 TABLET, EXTENDED RELEASE ORAL at 09:24

## 2020-05-06 RX ADMIN — DIVALPROEX SODIUM 125 MG: 125 TABLET, DELAYED RELEASE ORAL at 09:23

## 2020-05-06 RX ADMIN — ARIPIPRAZOLE 15 MG: 15 TABLET ORAL at 09:23

## 2020-05-06 RX ADMIN — BUMETANIDE 2 MG: 1 TABLET ORAL at 09:23

## 2020-05-06 NOTE — PROGRESS NOTES
Pt's chart reviewed remotely.  EKG with NSR with normal QT interval. BP has been stable.  Okay to continue with current medications.

## 2020-05-06 NOTE — NURSING NOTE
Lab contacted at 2:45 and told the panel was sent out to [facility], hopefully have results before the shift is over.

## 2020-05-06 NOTE — PLAN OF CARE
Problem: Adult Behavioral Health Plan of Care  Goal: Plan of Care Review  Outcome: Progressing  Flowsheets  Taken 5/5/2020 2106  Progress: improving  Outcome Summary: Jillian was visible on unit this evening, socially engaged with staff. Her mood was pleasant and affect broad. She was suspicious at times, but much improved from yesterday. She ate 100% of dinner. She c/o chills, body aches, and flu sx and asked if she has COVID-19. Patient afebrile. Reassured patient and gave her hot tea. She then went to bed. Slept during wrap up group. Medication compliant. Cooperative with staff this evening.  Will continue to monitor and offer support on unit.  Intervention(s): Monitoring patient q15min for safety, offering emotional support as needed, and monitoring for treatment plan compliance/effectiveness.  Taken 5/5/2020 1900  Plan of Care Reviewed With: patient  Patient Agreement with Plan of Care: agrees
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
## 2020-05-06 NOTE — NURSING NOTE
Patient swabbed at 11:45-12am, hat placed in toilet for urine sample. Test will take approx 2 hours for results. Pt now sleeping.

## 2020-05-06 NOTE — NURSING NOTE
Pt discharged to home. Pt behavior calm, cooperative, happy, denies SI. Instructions reviewed with pt. Pt verbalized understanding of instructions, importance of taking medications as prescribed, keeping all follow-up appts. Pt discharged with all personal belongings.

## 2020-05-06 NOTE — NURSING NOTE
COVID test came back negative MD contacted and she is adding a viral respiratory panel and flu. Will also order Motrin to her orders.

## 2020-05-06 NOTE — NURSING NOTE
Patient continued to c/o fever, chills, and body aches. VS-  101.0, 135/72, 88, 98%. Paged [facility] for further orders. Isolated patient to room with mask. Notified nursing supervisor. Will continue to monitor and report changes.

## 2020-05-06 NOTE — PLAN OF CARE
Problem: Adult Behavioral Health Plan of Care  Goal: Plan of Care Review  Outcome: Progressing  Flowsheets (Taken 5/6/2020 1022)  Plan of Care Reviewed With: patient; sibling  Patient Agreement with Plan of Care: agrees  Outcome Summary:  and patient reviewed d/c planning.  Patient has outpatient follow-up scheduled with Dr. Weeks for 5/14/20.  During appt Dr. Weeks will discuss patient starting to go to a new psychotherapist at his practice.   reviewed d/c planning with patients sister who will be providing tranportation.  Reviewed crisis planning.

## 2020-06-25 ENCOUNTER — APPOINTMENT (OUTPATIENT)
Dept: LAB | Facility: HOSPITAL | Age: 45
End: 2020-06-25
Attending: INTERNAL MEDICINE
Payer: MEDICARE

## 2020-06-25 ENCOUNTER — TRANSCRIBE ORDERS (OUTPATIENT)
Dept: REGISTRATION | Facility: HOSPITAL | Age: 45
End: 2020-06-25

## 2020-06-25 DIAGNOSIS — I48.0 PAROXYSMAL ATRIAL FIBRILLATION (CMS/HCC): ICD-10-CM

## 2020-06-25 DIAGNOSIS — R60.9 EDEMA, UNSPECIFIED: Primary | ICD-10-CM

## 2020-06-25 DIAGNOSIS — R60.9 EDEMA, UNSPECIFIED: ICD-10-CM

## 2020-06-25 DIAGNOSIS — G47.33 OBSTRUCTIVE SLEEP APNEA (ADULT) (PEDIATRIC): ICD-10-CM

## 2020-06-25 LAB
ANION GAP SERPL CALC-SCNC: 10 MEQ/L (ref 3–15)
BUN SERPL-MCNC: 6 MG/DL (ref 8–20)
CALCIUM SERPL-MCNC: 9.3 MG/DL (ref 8.9–10.3)
CHLORIDE SERPL-SCNC: 103 MEQ/L (ref 98–109)
CO2 SERPL-SCNC: 27 MEQ/L (ref 22–32)
CREAT SERPL-MCNC: 0.7 MG/DL (ref 0.6–1.1)
GFR SERPL CREATININE-BSD FRML MDRD: >60 ML/MIN/1.73M*2
GLUCOSE SERPL-MCNC: 90 MG/DL (ref 70–99)
POTASSIUM SERPL-SCNC: 4 MEQ/L (ref 3.6–5.1)
SODIUM SERPL-SCNC: 140 MEQ/L (ref 136–144)

## 2020-06-25 PROCEDURE — 80048 BASIC METABOLIC PNL TOTAL CA: CPT

## 2020-06-25 PROCEDURE — 36415 COLL VENOUS BLD VENIPUNCTURE: CPT

## 2020-07-22 ENCOUNTER — APPOINTMENT (OUTPATIENT)
Dept: LAB | Facility: HOSPITAL | Age: 45
End: 2020-07-22
Attending: INTERNAL MEDICINE
Payer: MEDICARE

## 2020-07-22 ENCOUNTER — TRANSCRIBE ORDERS (OUTPATIENT)
Dept: REGISTRATION | Facility: HOSPITAL | Age: 45
End: 2020-07-22

## 2020-07-22 DIAGNOSIS — I48.0 PAROXYSMAL ATRIAL FIBRILLATION (CMS/HCC): Primary | ICD-10-CM

## 2020-07-22 DIAGNOSIS — F25.9 SCHIZOAFFECTIVE DISORDER, UNSPECIFIED: Primary | ICD-10-CM

## 2020-07-22 DIAGNOSIS — I48.0 PAROXYSMAL ATRIAL FIBRILLATION (CMS/HCC): ICD-10-CM

## 2020-07-22 DIAGNOSIS — F25.9 SCHIZOAFFECTIVE DISORDER, UNSPECIFIED: ICD-10-CM

## 2020-07-22 LAB
ALBUMIN SERPL-MCNC: 3.6 G/DL (ref 3.4–5)
ALBUMIN SERPL-MCNC: 3.6 G/DL (ref 3.4–5)
ALP SERPL-CCNC: 66 IU/L (ref 35–126)
ALP SERPL-CCNC: 66 IU/L (ref 35–126)
ALT SERPL-CCNC: 67 IU/L (ref 11–54)
ALT SERPL-CCNC: 67 IU/L (ref 11–54)
ANION GAP SERPL CALC-SCNC: 11 MEQ/L (ref 3–15)
AST SERPL-CCNC: 51 IU/L (ref 15–41)
AST SERPL-CCNC: 51 IU/L (ref 15–41)
BILIRUB DIRECT SERPL-MCNC: 0.1 MG/DL
BILIRUB SERPL-MCNC: 0.6 MG/DL (ref 0.3–1.2)
BILIRUB SERPL-MCNC: 0.6 MG/DL (ref 0.3–1.2)
BUN SERPL-MCNC: 6 MG/DL (ref 8–20)
CALCIUM SERPL-MCNC: 9.6 MG/DL (ref 8.9–10.3)
CHLORIDE SERPL-SCNC: 105 MEQ/L (ref 98–109)
CHOLEST SERPL-MCNC: 321 MG/DL
CO2 SERPL-SCNC: 24 MEQ/L (ref 22–32)
CREAT SERPL-MCNC: 0.8 MG/DL (ref 0.6–1.1)
GFR SERPL CREATININE-BSD FRML MDRD: >60 ML/MIN/1.73M*2
GLUCOSE SERPL-MCNC: 89 MG/DL (ref 70–99)
HDLC SERPL-MCNC: 45 MG/DL
HDLC SERPL: 7.1 {RATIO}
LDLC SERPL CALC-MCNC: 225 MG/DL
LITHIUM SERPL-SCNC: 0.5 MEQ/L (ref 0.5–1)
NONHDLC SERPL-MCNC: 276 MG/DL
POTASSIUM SERPL-SCNC: 4.6 MEQ/L (ref 3.6–5.1)
PROT SERPL-MCNC: 6.2 G/DL (ref 6–8.2)
PROT SERPL-MCNC: 6.2 G/DL (ref 6–8.2)
SODIUM SERPL-SCNC: 140 MEQ/L (ref 136–144)
TRIGL SERPL-MCNC: 256 MG/DL (ref 30–149)

## 2020-07-22 PROCEDURE — 80178 ASSAY OF LITHIUM: CPT

## 2020-07-22 PROCEDURE — 82248 BILIRUBIN DIRECT: CPT

## 2020-07-22 PROCEDURE — 80061 LIPID PANEL: CPT | Mod: GZ

## 2020-07-22 PROCEDURE — 36415 COLL VENOUS BLD VENIPUNCTURE: CPT

## 2020-09-22 ENCOUNTER — APPOINTMENT (OUTPATIENT)
Dept: LAB | Facility: HOSPITAL | Age: 45
End: 2020-09-22
Attending: PSYCHIATRY & NEUROLOGY
Payer: MEDICARE

## 2020-09-22 ENCOUNTER — TRANSCRIBE ORDERS (OUTPATIENT)
Dept: REGISTRATION | Facility: HOSPITAL | Age: 45
End: 2020-09-22

## 2020-09-22 DIAGNOSIS — F25.9 SCHIZOAFFECTIVE DISORDER, UNSPECIFIED: Primary | ICD-10-CM

## 2020-09-22 DIAGNOSIS — F25.9 SCHIZOAFFECTIVE DISORDER, UNSPECIFIED: ICD-10-CM

## 2020-09-22 LAB — VALPROATE SERPL-MCNC: 83.7 UG/ML (ref 50–100)

## 2020-09-22 PROCEDURE — 80164 ASSAY DIPROPYLACETIC ACD TOT: CPT

## 2020-09-22 PROCEDURE — 36415 COLL VENOUS BLD VENIPUNCTURE: CPT

## 2020-11-05 ENCOUNTER — TRANSCRIBE ORDERS (OUTPATIENT)
Dept: REGISTRATION | Facility: HOSPITAL | Age: 45
End: 2020-11-05

## 2020-11-05 ENCOUNTER — APPOINTMENT (OUTPATIENT)
Dept: LAB | Facility: HOSPITAL | Age: 45
End: 2020-11-05
Attending: PSYCHIATRY & NEUROLOGY
Payer: MEDICARE

## 2020-11-05 DIAGNOSIS — F25.9 SCHIZOAFFECTIVE DISORDER, UNSPECIFIED: ICD-10-CM

## 2020-11-05 DIAGNOSIS — F25.9 SCHIZOAFFECTIVE DISORDER, UNSPECIFIED: Primary | ICD-10-CM

## 2020-11-05 LAB
ALBUMIN SERPL-MCNC: 3.9 G/DL (ref 3.4–5)
ALP SERPL-CCNC: 81 IU/L (ref 35–126)
ALT SERPL-CCNC: 40 IU/L (ref 11–54)
ANION GAP SERPL CALC-SCNC: 13 MEQ/L (ref 3–15)
AST SERPL-CCNC: 27 IU/L (ref 15–41)
BILIRUB SERPL-MCNC: 0.6 MG/DL (ref 0.3–1.2)
BUN SERPL-MCNC: 9 MG/DL (ref 8–20)
CALCIUM SERPL-MCNC: 9.7 MG/DL (ref 8.9–10.3)
CHLORIDE SERPL-SCNC: 104 MEQ/L (ref 98–109)
CHOLEST SERPL-MCNC: 240 MG/DL
CO2 SERPL-SCNC: 25 MEQ/L (ref 22–32)
CREAT SERPL-MCNC: 0.7 MG/DL (ref 0.6–1.1)
ERYTHROCYTE [DISTWIDTH] IN BLOOD BY AUTOMATED COUNT: 12.6 % (ref 11.7–14.4)
GFR SERPL CREATININE-BSD FRML MDRD: >60 ML/MIN/1.73M*2
GLUCOSE SERPL-MCNC: 79 MG/DL (ref 70–99)
HCT VFR BLDCO AUTO: 49.6 % (ref 35–45)
HDLC SERPL-MCNC: 39 MG/DL
HDLC SERPL: 6.2 {RATIO}
HGB BLD-MCNC: 15.7 G/DL (ref 11.8–15.7)
LDLC SERPL CALC-MCNC: 154 MG/DL
MCH RBC QN AUTO: 30 PG (ref 28–33.2)
MCHC RBC AUTO-ENTMCNC: 31.7 G/DL (ref 32.2–35.5)
MCV RBC AUTO: 94.7 FL (ref 83–98)
NONHDLC SERPL-MCNC: 201 MG/DL
PDW BLD AUTO: 10.7 FL (ref 9.4–12.3)
PLATELET # BLD AUTO: 211 K/UL (ref 150–369)
POTASSIUM SERPL-SCNC: 4.5 MEQ/L (ref 3.6–5.1)
PROT SERPL-MCNC: 6.8 G/DL (ref 6–8.2)
RBC # BLD AUTO: 5.24 M/UL (ref 3.93–5.22)
SODIUM SERPL-SCNC: 142 MEQ/L (ref 136–144)
TRIGL SERPL-MCNC: 233 MG/DL (ref 30–149)
VALPROATE SERPL-MCNC: 72 UG/ML (ref 50–100)
WBC # BLD AUTO: 5.74 K/UL (ref 3.8–10.5)

## 2020-11-05 PROCEDURE — 80164 ASSAY DIPROPYLACETIC ACD TOT: CPT

## 2020-11-05 PROCEDURE — 36415 COLL VENOUS BLD VENIPUNCTURE: CPT

## 2020-11-05 PROCEDURE — 85027 COMPLETE CBC AUTOMATED: CPT

## 2020-11-05 PROCEDURE — 80061 LIPID PANEL: CPT | Mod: GZ

## 2020-11-05 PROCEDURE — 80053 COMPREHEN METABOLIC PANEL: CPT

## 2020-11-19 ENCOUNTER — HOSPITAL ENCOUNTER (EMERGENCY)
Facility: HOSPITAL | Age: 45
Discharge: HOME | End: 2020-11-19
Attending: EMERGENCY MEDICINE
Payer: MEDICARE

## 2020-11-19 ENCOUNTER — APPOINTMENT (EMERGENCY)
Dept: RADIOLOGY | Facility: HOSPITAL | Age: 45
End: 2020-11-19
Attending: EMERGENCY MEDICINE
Payer: MEDICARE

## 2020-11-19 VITALS
SYSTOLIC BLOOD PRESSURE: 128 MMHG | TEMPERATURE: 98.6 F | DIASTOLIC BLOOD PRESSURE: 56 MMHG | OXYGEN SATURATION: 100 % | HEIGHT: 65 IN | WEIGHT: 270 LBS | RESPIRATION RATE: 16 BRPM | HEART RATE: 94 BPM | BODY MASS INDEX: 44.98 KG/M2

## 2020-11-19 DIAGNOSIS — M54.6 ACUTE BILATERAL THORACIC BACK PAIN: Primary | ICD-10-CM

## 2020-11-19 LAB
ALBUMIN SERPL-MCNC: 3.7 G/DL (ref 3.4–5)
ALP SERPL-CCNC: 73 IU/L (ref 35–126)
ALT SERPL-CCNC: 37 IU/L (ref 11–54)
ANION GAP SERPL CALC-SCNC: 9 MEQ/L (ref 3–15)
AST SERPL-CCNC: 26 IU/L (ref 15–41)
BASOPHILS # BLD: 0.05 K/UL (ref 0.01–0.1)
BASOPHILS NFR BLD: 0.6 %
BILIRUB SERPL-MCNC: 0.2 MG/DL (ref 0.3–1.2)
BILIRUB UR QL STRIP.AUTO: NEGATIVE MG/DL
BUN SERPL-MCNC: 11 MG/DL (ref 8–20)
CALCIUM SERPL-MCNC: 9.3 MG/DL (ref 8.9–10.3)
CHLORIDE SERPL-SCNC: 104 MEQ/L (ref 98–109)
CLARITY UR REFRACT.AUTO: CLEAR
CO2 SERPL-SCNC: 25 MEQ/L (ref 22–32)
COLOR UR AUTO: YELLOW
CREAT SERPL-MCNC: 0.6 MG/DL (ref 0.6–1.1)
D DIMER PPP IA.FEU-MCNC: 0.41 UG/ML FEU (ref 0–0.5)
DIFFERENTIAL METHOD BLD: ABNORMAL
EOSINOPHIL # BLD: 0.12 K/UL (ref 0.04–0.36)
EOSINOPHIL NFR BLD: 1.5 %
ERYTHROCYTE [DISTWIDTH] IN BLOOD BY AUTOMATED COUNT: 12.6 % (ref 11.7–14.4)
GFR SERPL CREATININE-BSD FRML MDRD: >60 ML/MIN/1.73M*2
GLUCOSE SERPL-MCNC: 98 MG/DL (ref 70–99)
GLUCOSE UR STRIP.AUTO-MCNC: NEGATIVE MG/DL
HCT VFR BLDCO AUTO: 42.2 % (ref 35–45)
HGB BLD-MCNC: 14.3 G/DL (ref 11.8–15.7)
HGB UR QL STRIP.AUTO: NEGATIVE
IMM GRANULOCYTES # BLD AUTO: 0.05 K/UL (ref 0–0.08)
IMM GRANULOCYTES NFR BLD AUTO: 0.6 %
KETONES UR STRIP.AUTO-MCNC: NEGATIVE MG/DL
LEUKOCYTE ESTERASE UR QL STRIP.AUTO: NEGATIVE
LYMPHOCYTES # BLD: 1.18 K/UL (ref 1.2–3.5)
LYMPHOCYTES NFR BLD: 14.8 %
MCH RBC QN AUTO: 30.6 PG (ref 28–33.2)
MCHC RBC AUTO-ENTMCNC: 33.9 G/DL (ref 32.2–35.5)
MCV RBC AUTO: 90.4 FL (ref 83–98)
MONOCYTES # BLD: 0.79 K/UL (ref 0.28–0.8)
MONOCYTES NFR BLD: 9.9 %
NEUTROPHILS # BLD: 5.77 K/UL (ref 1.7–7)
NEUTS SEG NFR BLD: 72.6 %
NITRITE UR QL STRIP.AUTO: NEGATIVE
NRBC BLD-RTO: 0 %
PDW BLD AUTO: 9.8 FL (ref 9.4–12.3)
PH UR STRIP.AUTO: 6 [PH]
PLATELET # BLD AUTO: 193 K/UL (ref 150–369)
POTASSIUM SERPL-SCNC: 4.1 MEQ/L (ref 3.6–5.1)
PROT SERPL-MCNC: 6.9 G/DL (ref 6–8.2)
PROT UR QL STRIP.AUTO: NEGATIVE
RBC # BLD AUTO: 4.67 M/UL (ref 3.93–5.22)
SODIUM SERPL-SCNC: 138 MEQ/L (ref 136–144)
SP GR UR REFRACT.AUTO: 1.02
UROBILINOGEN UR STRIP-ACNC: 0.2 EU/DL
WBC # BLD AUTO: 7.96 K/UL (ref 3.8–10.5)

## 2020-11-19 PROCEDURE — 71045 X-RAY EXAM CHEST 1 VIEW: CPT

## 2020-11-19 PROCEDURE — 81003 URINALYSIS AUTO W/O SCOPE: CPT | Performed by: PHYSICIAN ASSISTANT

## 2020-11-19 PROCEDURE — 85025 COMPLETE CBC W/AUTO DIFF WBC: CPT | Performed by: PHYSICIAN ASSISTANT

## 2020-11-19 PROCEDURE — 99284 EMERGENCY DEPT VISIT MOD MDM: CPT | Mod: 25

## 2020-11-19 PROCEDURE — 80053 COMPREHEN METABOLIC PANEL: CPT | Performed by: PHYSICIAN ASSISTANT

## 2020-11-19 PROCEDURE — 3E0337Z INTRODUCTION OF ELECTROLYTIC AND WATER BALANCE SUBSTANCE INTO PERIPHERAL VEIN, PERCUTANEOUS APPROACH: ICD-10-PCS | Performed by: EMERGENCY MEDICINE

## 2020-11-19 PROCEDURE — U0003 INFECTIOUS AGENT DETECTION BY NUCLEIC ACID (DNA OR RNA); SEVERE ACUTE RESPIRATORY SYNDROME CORONAVIRUS 2 (SARS-COV-2) (CORONAVIRUS DISEASE [COVID-19]), AMPLIFIED PROBE TECHNIQUE, MAKING USE OF HIGH THROUGHPUT TECHNOLOGIES AS DESCRIBED BY CMS-2020-01-R: HCPCS | Performed by: PHYSICIAN ASSISTANT

## 2020-11-19 PROCEDURE — 96360 HYDRATION IV INFUSION INIT: CPT

## 2020-11-19 PROCEDURE — 36415 COLL VENOUS BLD VENIPUNCTURE: CPT | Performed by: PHYSICIAN ASSISTANT

## 2020-11-19 PROCEDURE — 93005 ELECTROCARDIOGRAM TRACING: CPT | Performed by: PHYSICIAN ASSISTANT

## 2020-11-19 PROCEDURE — 85379 FIBRIN DEGRADATION QUANT: CPT | Performed by: PHYSICIAN ASSISTANT

## 2020-11-19 PROCEDURE — 25800000 HC PHARMACY IV SOLUTIONS: Performed by: PHYSICIAN ASSISTANT

## 2020-11-19 RX ORDER — FUROSEMIDE 20 MG/1
TABLET ORAL
COMMUNITY
Start: 2020-11-13 | End: 2022-07-28

## 2020-11-19 RX ORDER — VALPROIC ACID 250 MG/1
CAPSULE, LIQUID FILLED ORAL
COMMUNITY
Start: 2020-11-13 | End: 2023-07-14 | Stop reason: HOSPADM

## 2020-11-19 RX ORDER — CYCLOBENZAPRINE HCL 10 MG
10 TABLET ORAL 3 TIMES DAILY PRN
Qty: 15 TABLET | Refills: 0 | Status: SHIPPED | OUTPATIENT
Start: 2020-11-19 | End: 2022-07-28

## 2020-11-19 RX ORDER — KETOROLAC TROMETHAMINE 30 MG/ML
30 INJECTION, SOLUTION INTRAMUSCULAR; INTRAVENOUS ONCE
Status: DISCONTINUED | OUTPATIENT
Start: 2020-11-19 | End: 2020-11-19 | Stop reason: HOSPADM

## 2020-11-19 RX ORDER — NAPROXEN 500 MG/1
500 TABLET ORAL 2 TIMES DAILY WITH MEALS
Qty: 20 TABLET | Refills: 0 | Status: SHIPPED | OUTPATIENT
Start: 2020-11-19 | End: 2022-07-28

## 2020-11-19 RX ADMIN — SODIUM CHLORIDE 1000 ML: 9 INJECTION, SOLUTION INTRAVENOUS at 17:24

## 2020-11-19 ASSESSMENT — ENCOUNTER SYMPTOMS
FEVER: 0
ABDOMINAL PAIN: 0
BACK PAIN: 1
DIARRHEA: 0
VOMITING: 0
FATIGUE: 1
CHILLS: 0
SHORTNESS OF BREATH: 1
HEADACHES: 0
COUGH: 0

## 2020-11-19 NOTE — ED PROVIDER NOTES
HPI     Chief Complaint   Patient presents with   • Weakness - Generalized     pt here for evaluation, her mother was diagnosed with Covid yesterday, pt lives with her, states headache and generalized weakness   • COVID Related           46 y/o female presents for evaluation of back pain and shortness of breath x 4 days. Reports 4 days of mild shortness of breath, bilateral mid back pain, and generalized weakness. Back pain worse with movement and deep breath. Reports similar symptoms with pneumonia in the past. 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 Did not take anything for pain at home. No fevers, chills, congestion, loss of taste/smell, cough, chest pain, abdominal pain, vomiting, diarrhea, urinary complaints, or leg pain/swelling.           Patient History     Past Medical History:   Diagnosis Date   • A-fib (CMS/HCC)    • Bipolar 1 disorder (CMS/HCC)        Past Surgical History:   Procedure Laterality Date   • ANAL FISSURECTOMY     • CHOLECYSTECTOMY     • HYSTERECTOMY         Family History   Problem Relation Age of Onset   • Diabetes Biological Mother    • Heart disease Biological Father        Social History     Tobacco Use   • Smoking status: Former Smoker     Packs/day: 0.25     Types: Cigarettes   • Smokeless tobacco: Never Used   Substance Use Topics   • Alcohol use: No     Frequency: Never     Binge frequency: Never   • Drug use: No       Systems Reviewed from Nursing Triage:  Tobacco  Allergies  Meds  Problems  Med Hx  Surg Hx  Fam Hx  Soc Hx           Review of Systems     Review of Systems   Constitutional: Positive for fatigue. Negative for chills and fever.   HENT: Negative for congestion.    Respiratory: Positive for shortness of breath. Negative for cough.    Cardiovascular: Negative for chest pain.   Gastrointestinal: Negative for abdominal pain, diarrhea and vomiting.   Musculoskeletal: Positive for back pain.   Neurological: Negative for syncope and headaches.         "Physical Exam     ED Triage Vitals [11/19/20 1641]   Temp Heart Rate Resp BP SpO2   36.8 °C (98.2 °F) (!) 112 16 (!) 131/55 99 %      Temp Source Heart Rate Source Patient Position BP Location FiO2 (%) (Set)   Oral -- -- -- --       Pulse Ox %: 99 % (11/19/20 1752)  Pulse Ox Interpretation: Normal (11/19/20 1752)  Heart Rate: 99 (11/19/20 1752)  Rhythm Strip Interpretation: Normal Sinus Rhythm (11/19/20 1752)    Patient Vitals for the past 24 hrs:   BP Temp Temp src Pulse Resp SpO2 Height Weight   11/19/20 1858 -- -- -- 94 -- -- -- --   11/19/20 1641 (!) 131/55 36.8 °C (98.2 °F) Oral (!) 112 16 99 % 1.651 m (5' 5\") 122 kg (270 lb)                                          Physical Exam  Vitals signs and nursing note reviewed.   Constitutional:       Appearance: Normal appearance.   HENT:      Head: Normocephalic.      Nose: Nose normal.   Eyes:      Conjunctiva/sclera: Conjunctivae normal.   Neck:      Musculoskeletal: Neck supple.   Cardiovascular:      Rate and Rhythm: Regular rhythm.      Comments: Mild tachycardia  Pulmonary:      Effort: Pulmonary effort is normal.      Breath sounds: Normal breath sounds.   Abdominal:      Palpations: Abdomen is soft.      Tenderness: There is no abdominal tenderness.   Musculoskeletal:      Comments: Back: lower thoracic musculature tender to palpation bilaterally   Skin:     General: Skin is warm and dry.   Neurological:      General: No focal deficit present.      Mental Status: She is alert and oriented to person, place, and time.   Psychiatric:         Mood and Affect: Mood normal.              Procedures    Labs Reviewed   CBC AND DIFF - Abnormal       Result Value    WBC 7.96      RBC 4.67      Hemoglobin 14.3      Hematocrit 42.2      MCV 90.4      MCH 30.6      MCHC 33.9      RDW 12.6      Platelets 193      MPV 9.8      Differential Type Auto      nRBC 0.0      Immature Granulocytes 0.6      Neutrophils 72.6      Lymphocytes 14.8      Monocytes 9.9      Eosinophils " 1.5      Basophils 0.6      Immature Granulocytes, Absolute 0.05      Neutrophils, Absolute 5.77      Lymphocytes, Absolute 1.18 (*)     Monocytes, Absolute 0.79      Eosinophils, Absolute 0.12      Basophils, Absolute 0.05     COMPREHENSIVE METABOLIC PANEL - Abnormal    Sodium 138      Potassium 4.1      Chloride 104      CO2 25      BUN 11      Creatinine 0.6      Glucose 98      Calcium 9.3      AST (SGOT) 26      ALT (SGPT) 37      Alkaline Phosphatase 73      Total Protein 6.9      Albumin 3.7      Bilirubin, Total 0.2 (*)     eGFR >60.0      Anion Gap 9     D-DIMER - Normal    D-Dimer, Quant 0.41     UA REFLEX CULTURE (MACROSCOPIC) - Normal    Color, Urine Yellow      Clarity, Urine Clear      Specific Gravity, Urine 1.016      pH, Urine 6.0      Leukocyte Esterase Negative      Nitrite, Urine Negative      Protein, Urine Negative      Glucose, Urine Negative      Ketones, Urine Negative      Urobilinogen, Urine 0.2      Bilirubin, Urine Negative      Blood, Urine Negative     URINALYSIS REFLEX CULTURE (ED AND OUTPATIENT ONLY)    Narrative:     The following orders were created for panel order UA with reflex culture.  Procedure                               Abnormality         Status                     ---------                               -----------         ------                     UA Reflex to Culture (Ma...[[id number]]  Normal              Final result                 Please view results for these tests on the individual orders.   BHCG, SERUM, QUAL       X-RAY CHEST 1 VIEW   Final Result   IMPRESSION:   No active disease in the chest.      COMMENT:      Tubes/Lines: None.      Lungs/Pleura: Clear without pneumothorax or evidence of overt congestive heart   there are this upright AP portable chest x-ray.      Cardiomediastinal silhouette: Top normal heart size with normal aortic contours,   grossly unchanged given portable technique and differences in slight patient   rotation.      Regional Soft  Tissue/Osseous Structures: Scoliotic curvature with associated   degenerative changes again noted.            ECG 12 lead   ED Interpretation   NSR 99 bpm, normal P, normal QRS, normal S-T                        ED Course & MDM     MDM         ED Course as of Nov 19 1900   Thu Nov 19, 2020   1710 Mild tachycardia, normal VS otherwise. Plan for labs, EKG, CXR. Will give IV fluids and Toradol. Clear lungs no increased work of breathing. Back pain likely muscular.    [TT]   1803 Labs, EKG, CXR unremarkable. Awaiting urine sample. D-dimer normal.    [TT]   1853 UA normal. Overall normal workup. Doing well. Plan for NSAIDs and Flexeril at home for back pain. Will f/u with PCP and RTED for new or worsening symptoms. Does not want swabbed for COVID. HR improved.    [TT]      ED Course User Index  [TT] Katie Vides PA C         Clinical Impressions as of Nov 19 1900   Acute bilateral thoracic back pain        Katie Vides PA C  11/19/20 1901

## 2020-11-20 LAB
ATRIAL RATE: 99
P AXIS: 68
PR INTERVAL: 150
QRS DURATION: 86
QT INTERVAL: 338
QTC CALCULATION(BAZETT): 433
R AXIS: 32
T WAVE AXIS: 20
VENTRICULAR RATE: 99

## 2020-11-20 NOTE — DISCHARGE INSTRUCTIONS
Rest and try a heating pad. Take prescribed medicine as needed for back pain. See your doctor ASAP for close follow-up. Return to the ER for worsening symptoms, high fevers, pass out, or any other concerning symptoms.

## 2020-11-20 NOTE — ED ATTESTATION NOTE
The patient was evaluated and managed by the physician assistant / nurse practitioner.     Jordana Kilgore DO  11/19/20 1936

## 2020-11-22 LAB — SARS-COV-2 RNA RESP QL NAA+PROBE: NOT DETECTED

## 2020-11-29 ENCOUNTER — HOSPITAL ENCOUNTER (INPATIENT)
Facility: HOSPITAL | Age: 45
LOS: 3 days | Discharge: HOME | DRG: 871 | End: 2020-12-02
Attending: EMERGENCY MEDICINE | Admitting: HOSPITALIST
Payer: MEDICARE

## 2020-11-29 ENCOUNTER — APPOINTMENT (EMERGENCY)
Dept: RADIOLOGY | Facility: HOSPITAL | Age: 45
DRG: 871 | End: 2020-11-29
Attending: EMERGENCY MEDICINE
Payer: MEDICARE

## 2020-11-29 DIAGNOSIS — U07.1 COVID-19: ICD-10-CM

## 2020-11-29 DIAGNOSIS — R09.02 HYPOXIA: Primary | ICD-10-CM

## 2020-11-29 PROBLEM — F41.9 ANXIETY: Status: ACTIVE | Noted: 2020-11-29

## 2020-11-29 LAB
ALBUMIN SERPL-MCNC: 3.5 G/DL (ref 3.4–5)
ALP SERPL-CCNC: 60 IU/L (ref 35–126)
ALT SERPL-CCNC: 34 IU/L (ref 11–54)
ANION GAP SERPL CALC-SCNC: 11 MEQ/L (ref 3–15)
APTT PPP: 26 SEC (ref 23–35)
APTT PPP: 28 SEC (ref 23–35)
AST SERPL-CCNC: 23 IU/L (ref 15–41)
BASOPHILS # BLD: 0.01 K/UL (ref 0.01–0.1)
BASOPHILS NFR BLD: 0.3 %
BILIRUB SERPL-MCNC: 0.8 MG/DL (ref 0.3–1.2)
BUN SERPL-MCNC: 9 MG/DL (ref 8–20)
CALCIUM SERPL-MCNC: 8.2 MG/DL (ref 8.9–10.3)
CHLORIDE SERPL-SCNC: 102 MEQ/L (ref 98–109)
CK SERPL-CCNC: 35 U/L (ref 15–200)
CO2 SERPL-SCNC: 26 MEQ/L (ref 22–32)
CREAT SERPL-MCNC: 0.8 MG/DL (ref 0.6–1.1)
CRP SERPL-MCNC: 60.2 MG/L
D DIMER PPP IA.FEU-MCNC: 0.52 UG/ML FEU (ref 0–0.5)
DIFFERENTIAL METHOD BLD: ABNORMAL
EOSINOPHIL # BLD: 0.01 K/UL (ref 0.04–0.36)
EOSINOPHIL NFR BLD: 0.3 %
ERYTHROCYTE [DISTWIDTH] IN BLOOD BY AUTOMATED COUNT: 12.4 % (ref 11.7–14.4)
FLUAV RNA SPEC QL NAA+PROBE: NEGATIVE
FLUBV RNA SPEC QL NAA+PROBE: NEGATIVE
GFR SERPL CREATININE-BSD FRML MDRD: >60 ML/MIN/1.73M*2
GLUCOSE SERPL-MCNC: 100 MG/DL (ref 70–99)
HCG UR QL: NEGATIVE
HCT VFR BLDCO AUTO: 41.5 % (ref 35–45)
HGB BLD-MCNC: 13.8 G/DL (ref 11.8–15.7)
IMM GRANULOCYTES # BLD AUTO: 0.01 K/UL (ref 0–0.08)
IMM GRANULOCYTES NFR BLD AUTO: 0.3 %
INR PPP: 1 INR
INR PPP: 1.1 INR
LDH SERPL L TO P-CCNC: 273 IU/L (ref 98–192)
LITHIUM SERPL-SCNC: <0.1 MEQ/L (ref 0.5–1)
LYMPHOCYTES # BLD: 0.74 K/UL (ref 1.2–3.5)
LYMPHOCYTES NFR BLD: 19.4 %
MAGNESIUM SERPL-MCNC: 2.3 MG/DL (ref 1.8–2.5)
MCH RBC QN AUTO: 29.7 PG (ref 28–33.2)
MCHC RBC AUTO-ENTMCNC: 33.3 G/DL (ref 32.2–35.5)
MCV RBC AUTO: 89.2 FL (ref 83–98)
MONOCYTES # BLD: 0.3 K/UL (ref 0.28–0.8)
MONOCYTES NFR BLD: 7.9 %
NEUTROPHILS # BLD: 2.75 K/UL (ref 1.7–7)
NEUTS SEG NFR BLD: 71.8 %
NRBC BLD-RTO: 0 %
PDW BLD AUTO: 10.5 FL (ref 9.4–12.3)
PHOSPHATE SERPL-MCNC: 2.7 MG/DL (ref 2.4–4.7)
PLAT MORPH BLD: NORMAL
PLATELET # BLD AUTO: 188 K/UL (ref 150–369)
PLATELET # BLD EST: ABNORMAL 10*3/UL
POTASSIUM SERPL-SCNC: 3.8 MEQ/L (ref 3.6–5.1)
PROT SERPL-MCNC: 7.4 G/DL (ref 6–8.2)
PROTHROMBIN TIME: 13 SEC (ref 12.2–14.5)
PROTHROMBIN TIME: 13.8 SEC (ref 12.2–14.5)
RBC # BLD AUTO: 4.65 M/UL (ref 3.93–5.22)
RBC MORPH BLD: NORMAL
RSV RNA SPEC QL NAA+PROBE: NEGATIVE
SARS-COV-2 RNA RESP QL NAA+PROBE: POSITIVE
SODIUM SERPL-SCNC: 139 MEQ/L (ref 136–144)
TROPONIN I SERPL-MCNC: <0.02 NG/ML
VALPROATE SERPL-MCNC: 28.9 UG/ML (ref 50–100)
WBC # BLD AUTO: 3.82 K/UL (ref 3.8–10.5)

## 2020-11-29 PROCEDURE — 99223 1ST HOSP IP/OBS HIGH 75: CPT | Mod: CR | Performed by: HOSPITALIST

## 2020-11-29 PROCEDURE — 84703 CHORIONIC GONADOTROPIN ASSAY: CPT | Performed by: EMERGENCY MEDICINE

## 2020-11-29 PROCEDURE — 71045 X-RAY EXAM CHEST 1 VIEW: CPT

## 2020-11-29 PROCEDURE — 83615 LACTATE (LD) (LDH) ENZYME: CPT | Performed by: HOSPITALIST

## 2020-11-29 PROCEDURE — 87637 SARSCOV2&INF A&B&RSV AMP PRB: CPT | Performed by: EMERGENCY MEDICINE

## 2020-11-29 PROCEDURE — 85025 COMPLETE CBC W/AUTO DIFF WBC: CPT | Performed by: EMERGENCY MEDICINE

## 2020-11-29 PROCEDURE — 63700000 HC SELF-ADMINISTRABLE DRUG: Performed by: EMERGENCY MEDICINE

## 2020-11-29 PROCEDURE — 93005 ELECTROCARDIOGRAM TRACING: CPT | Performed by: EMERGENCY MEDICINE

## 2020-11-29 PROCEDURE — 20600000 HC ROOM AND CARE INTERMEDIATE/TELEMETRY

## 2020-11-29 PROCEDURE — 85610 PROTHROMBIN TIME: CPT | Performed by: HOSPITALIST

## 2020-11-29 PROCEDURE — 63700000 HC SELF-ADMINISTRABLE DRUG: Performed by: HOSPITALIST

## 2020-11-29 PROCEDURE — 80053 COMPREHEN METABOLIC PANEL: CPT | Performed by: EMERGENCY MEDICINE

## 2020-11-29 PROCEDURE — 82728 ASSAY OF FERRITIN: CPT | Performed by: HOSPITALIST

## 2020-11-29 PROCEDURE — 82550 ASSAY OF CK (CPK): CPT | Performed by: HOSPITALIST

## 2020-11-29 PROCEDURE — 85730 THROMBOPLASTIN TIME PARTIAL: CPT | Performed by: HOSPITALIST

## 2020-11-29 PROCEDURE — 84484 ASSAY OF TROPONIN QUANT: CPT | Performed by: EMERGENCY MEDICINE

## 2020-11-29 PROCEDURE — 1123F ACP DISCUSS/DSCN MKR DOCD: CPT | Mod: CR | Performed by: HOSPITALIST

## 2020-11-29 PROCEDURE — 80178 ASSAY OF LITHIUM: CPT | Performed by: EMERGENCY MEDICINE

## 2020-11-29 PROCEDURE — 99285 EMERGENCY DEPT VISIT HI MDM: CPT | Mod: 25

## 2020-11-29 PROCEDURE — 36415 COLL VENOUS BLD VENIPUNCTURE: CPT | Performed by: EMERGENCY MEDICINE

## 2020-11-29 PROCEDURE — 87040 BLOOD CULTURE FOR BACTERIA: CPT | Performed by: EMERGENCY MEDICINE

## 2020-11-29 PROCEDURE — 84100 ASSAY OF PHOSPHORUS: CPT | Performed by: HOSPITALIST

## 2020-11-29 PROCEDURE — 85610 PROTHROMBIN TIME: CPT | Performed by: EMERGENCY MEDICINE

## 2020-11-29 PROCEDURE — 85730 THROMBOPLASTIN TIME PARTIAL: CPT | Performed by: EMERGENCY MEDICINE

## 2020-11-29 PROCEDURE — 83735 ASSAY OF MAGNESIUM: CPT | Performed by: HOSPITALIST

## 2020-11-29 PROCEDURE — 85379 FIBRIN DEGRADATION QUANT: CPT | Performed by: HOSPITALIST

## 2020-11-29 PROCEDURE — 86140 C-REACTIVE PROTEIN: CPT | Performed by: HOSPITALIST

## 2020-11-29 PROCEDURE — 80164 ASSAY DIPROPYLACETIC ACD TOT: CPT | Performed by: EMERGENCY MEDICINE

## 2020-11-29 RX ORDER — SENNOSIDES 8.6 MG/1
1 TABLET ORAL 2 TIMES DAILY
Status: DISCONTINUED | OUTPATIENT
Start: 2020-11-29 | End: 2020-12-02 | Stop reason: HOSPADM

## 2020-11-29 RX ORDER — ASPIRIN 81 MG/1
81 TABLET ORAL DAILY
Status: DISCONTINUED | OUTPATIENT
Start: 2020-11-29 | End: 2020-12-02 | Stop reason: HOSPADM

## 2020-11-29 RX ORDER — DEXTROSE 50 % IN WATER (D50W) INTRAVENOUS SYRINGE
25 AS NEEDED
Status: DISCONTINUED | OUTPATIENT
Start: 2020-11-29 | End: 2020-12-02 | Stop reason: HOSPADM

## 2020-11-29 RX ORDER — DEXTROSE 40 %
15-30 GEL (GRAM) ORAL AS NEEDED
Status: DISCONTINUED | OUTPATIENT
Start: 2020-11-29 | End: 2020-12-02 | Stop reason: HOSPADM

## 2020-11-29 RX ORDER — IBUPROFEN 200 MG
16-32 TABLET ORAL AS NEEDED
Status: DISCONTINUED | OUTPATIENT
Start: 2020-11-29 | End: 2020-12-02 | Stop reason: HOSPADM

## 2020-11-29 RX ORDER — HEPARIN SODIUM 5000 [USP'U]/ML
5000 INJECTION, SOLUTION INTRAVENOUS; SUBCUTANEOUS EVERY 8 HOURS
Status: DISCONTINUED | OUTPATIENT
Start: 2020-11-30 | End: 2020-11-30

## 2020-11-29 RX ORDER — QUETIAPINE FUMARATE 100 MG/1
400 TABLET, FILM COATED ORAL NIGHTLY
Status: DISCONTINUED | OUTPATIENT
Start: 2020-11-29 | End: 2020-11-30

## 2020-11-29 RX ORDER — ARIPIPRAZOLE 10 MG/1
30 TABLET ORAL DAILY
Status: DISCONTINUED | OUTPATIENT
Start: 2020-11-29 | End: 2020-12-02 | Stop reason: HOSPADM

## 2020-11-29 RX ORDER — ACETAMINOPHEN 325 MG/1
650 TABLET ORAL ONCE
Status: COMPLETED | OUTPATIENT
Start: 2020-11-29 | End: 2020-11-29

## 2020-11-29 RX ORDER — DEXAMETHASONE SODIUM PHOSPHATE 4 MG/ML
10 INJECTION, SOLUTION INTRA-ARTICULAR; INTRALESIONAL; INTRAMUSCULAR; INTRAVENOUS; SOFT TISSUE ONCE
Status: DISPENSED | OUTPATIENT
Start: 2020-11-29 | End: 2020-11-30

## 2020-11-29 RX ORDER — CYCLOBENZAPRINE HCL 10 MG
10 TABLET ORAL 3 TIMES DAILY PRN
Status: DISCONTINUED | OUTPATIENT
Start: 2020-11-29 | End: 2020-12-02 | Stop reason: HOSPADM

## 2020-11-29 RX ORDER — DILTIAZEM HYDROCHLORIDE 120 MG/1
120 CAPSULE, COATED, EXTENDED RELEASE ORAL DAILY
Status: DISCONTINUED | OUTPATIENT
Start: 2020-11-30 | End: 2020-12-02 | Stop reason: HOSPADM

## 2020-11-29 RX ORDER — POLYETHYLENE GLYCOL 3350 17 G/17G
17 POWDER, FOR SOLUTION ORAL DAILY
Status: DISCONTINUED | OUTPATIENT
Start: 2020-11-29 | End: 2020-12-02 | Stop reason: HOSPADM

## 2020-11-29 RX ORDER — VALPROIC ACID 250 MG/1
250 CAPSULE, LIQUID FILLED ORAL DAILY
Status: DISCONTINUED | OUTPATIENT
Start: 2020-11-29 | End: 2020-11-30

## 2020-11-29 RX ORDER — LITHIUM CARBONATE 300 MG/1
300 TABLET, FILM COATED, EXTENDED RELEASE ORAL 2 TIMES DAILY
Status: DISCONTINUED | OUTPATIENT
Start: 2020-11-29 | End: 2020-11-30

## 2020-11-29 RX ORDER — ALBUTEROL SULFATE 90 UG/1
2 INHALANT RESPIRATORY (INHALATION) EVERY 6 HOURS PRN
Status: DISCONTINUED | OUTPATIENT
Start: 2020-11-29 | End: 2020-12-02 | Stop reason: HOSPADM

## 2020-11-29 RX ADMIN — ACETAMINOPHEN 650 MG: 325 TABLET ORAL at 14:57

## 2020-11-29 RX ADMIN — QUETIAPINE FUMARATE 400 MG: 100 TABLET ORAL at 23:00

## 2020-11-29 RX ADMIN — ARIPIPRAZOLE 30 MG: 10 TABLET ORAL at 20:41

## 2020-11-29 RX ADMIN — VALPROIC ACID 250 MG: 250 CAPSULE ORAL at 21:05

## 2020-11-29 ASSESSMENT — ENCOUNTER SYMPTOMS
DIAPHORESIS: 1
LIGHT-HEADEDNESS: 1
VOMITING: 0
RHINORRHEA: 0
CHEST TIGHTNESS: 0
DIARRHEA: 1
MYALGIAS: 1
FATIGUE: 1
COUGH: 1
FEVER: 1
CHILLS: 1
SOMNOLENCE: 0
ABDOMINAL PAIN: 0
NAUSEA: 0
SHORTNESS OF BREATH: 1
CONFUSION: 0

## 2020-11-29 NOTE — ASSESSMENT & PLAN NOTE
Admit 11/29 with COVID-19 with fever/diarrhea/myalgias-symptoms started 11/20  CXR with low lung volumes and bilateral air space opacities    -Pox dropped to 89% RA, currently on 2L  -Patient excepted Decadron.  Continue to monitor.  Supportive Care  -Encourage deep breathing maneuvers/modified proning as able/OOB as able/IS  -Heparin increased to [id number]   -Appreciate input from ID, continue remdesivir to complete course.  Will add vitamin C and vitamin D.

## 2020-11-29 NOTE — ED PROVIDER NOTES
HPI     Chief Complaint   Patient presents with   • Fever   • Diarrhea   • Cough       This is a 45-year-old female, past medical history as below, who presents ER with 1 week of diarrhea, cough with expectoration, generalized myalgias, fever  Suddenly worsened today   Patient's mother has Covid  Patient called her primary care doctor instructed to come the ER for evaluation treatment      History provided by:  Patient   used: No    Fever  Temp source:  Subjective  Associated symptoms: chills, congestion, cough, diarrhea and myalgias    Associated symptoms: no confusion, no nausea, no rhinorrhea, no somnolence and no vomiting    Diarrhea  Associated symptoms: chills, diaphoresis, fever and myalgias    Associated symptoms: no abdominal pain and no vomiting    Cough  Associated symptoms: chills, diaphoresis, fever, myalgias and shortness of breath    Associated symptoms: no rhinorrhea         Patient History     Past Medical History:   Diagnosis Date   • A-fib (CMS/[facility])    • A-fib (CMS/[facility])    • Bipolar 1 disorder (CMS/[facility])    • Hypertension        Past Surgical History:   Procedure Laterality Date   • ANAL FISSURECTOMY     • CHOLECYSTECTOMY     • HYSTERECTOMY         Family History   Problem Relation Age of Onset   • Diabetes Biological Mother    • Heart disease Biological Father        Social History     Tobacco Use   • Smoking status: Former Smoker     Packs/day: 0.25     Types: Cigarettes   • Smokeless tobacco: Never Used   Substance Use Topics   • Alcohol use: No     Frequency: Never     Binge frequency: Never   • Drug use: No       Systems Reviewed from Nursing Triage:  Tobacco  Allergies  Med Hx  Surg Hx  Fam Hx  Soc Hx         Review of Systems     Review of Systems   Constitutional: Positive for chills, diaphoresis, fatigue and fever.   HENT: Positive for congestion. Negative for rhinorrhea.    Respiratory: Positive for cough and shortness of breath. Negative for chest tightness.     Gastrointestinal: Positive for diarrhea. Negative for abdominal pain, nausea and vomiting.   Musculoskeletal: Positive for myalgias.   Neurological: Positive for light-headedness.   Psychiatric/Behavioral: Negative for confusion.        Physical Exam     ED Vitals    Date/Time Temp Pulse Resp BP SpO2 [facility]   11/29/20 1700 37.1 °C (98.8 °F) 70 18 99/54 94 %    11/29/20 1621 -- 77 20 103/54 95 % MF   11/29/20 1530 -- 80 18 -- 96 % STACEY   11/29/20 1515 -- 81 28 --  89 % STACEY   11/29/20 1439 38.7 °C (101.7 °F) 91 22 120/57 94 % STACEY          Pulse Ox %: 94 % (11/29/20 1502)  Pulse Ox Interpretation: Normal (11/29/20 1502)  Heart Rate: 91 (11/29/20 1502)  Rhythm Strip Interpretation: Normal Sinus Rhythm (11/29/20 1502)                                       Physical Exam  Constitutional:       General: She is awake.      Appearance: She is obese. She is not ill-appearing, toxic-appearing or diaphoretic.   HENT:      Head: Normocephalic and atraumatic.   Eyes:      Extraocular Movements: Extraocular movements intact.      Conjunctiva/sclera: Conjunctivae normal.   Cardiovascular:      Rate and Rhythm: Normal rate and regular rhythm.      Heart sounds: Normal heart sounds.   Pulmonary:      Effort: Tachypnea present. No bradypnea, accessory muscle usage, prolonged expiration or respiratory distress.      Breath sounds: Decreased air movement present.   Abdominal:      General: Abdomen is flat. Bowel sounds are normal.      Palpations: Abdomen is soft.   Neurological:      Mental Status: She is alert, oriented to person, place, and time and easily aroused.      GCS: GCS eye subscore is 4. GCS verbal subscore is 5. GCS motor subscore is 6.              Procedures    Results     Procedure Component Value Units Date/Time    Lithium [[id number]]  (Abnormal) Collected: 11/29/20 1454    Specimen: Blood, Venous Updated: 11/29/20 1632     Lithium Lvl <0.1 mEQ/L     Valproic acid [[id number]]  (Abnormal) Collected: 11/29/20 1454     Specimen: Blood, Venous Updated: 11/29/20 1626     Valproic Acid 28.9 ug/mL     SARS-CoV-2 (COVID-19), PCR Nasopharynx [[id number]]  (Abnormal) Collected: 11/29/20 1442    Specimen: Nasopharyngeal Swab from Nasopharynx Updated: 11/29/20 1546    Narrative:      The following orders were created for panel order SARS-CoV-2 (COVID-19), PCR Nasopharynx.  Procedure                               Abnormality         Status                     ---------                               -----------         ------                     SARS-COV-2 (COVID-19)/ F...[[id number]]  Abnormal            Final result                 Please view results for these tests on the individual orders.    SARS-COV-2 (COVID-19)/ FLU A/B, AND RSV, PCR Nasopharynx [[id number]]  (Abnormal) Collected: 11/29/20 1442    Specimen: Nasopharyngeal Swab from Nasopharynx Updated: 11/29/20 1546     SARS-CoV-2 (COVID-19) Positive     Influenza A Negative     Influenza B Negative     Respiratory Syncytial Virus Negative    Troponin I [[id number]]  (Normal) Collected: 11/29/20 1442    Specimen: Blood, Venous Updated: 11/29/20 1539     Troponin I <0.02 ng/mL     BhCG, Serum, Qual [[id number]]  (Normal) Collected: 11/29/20 1442    Specimen: Blood, Venous Updated: 11/29/20 1539     Preg Test, Serum Negative    CBC and differential [[id number]]  (Abnormal) Collected: 11/29/20 1442    Specimen: Blood, Venous Updated: 11/29/20 1539     WBC 3.82 K/uL      RBC 4.65 M/uL      Hemoglobin 13.8 g/dL      Hematocrit 41.5 %      MCV 89.2 fL      MCH 29.7 pg      MCHC 33.3 g/dL      RDW 12.4 %      Platelets 188 K/uL      MPV 10.5 fL      Differential Type Auto     nRBC 0.0 %      Immature Granulocytes 0.3 %      Neutrophils 71.8 %      Lymphocytes 19.4 %      Monocytes 7.9 %      Eosinophils 0.3 %      Basophils 0.3 %      Immature Granulocytes, Absolute 0.01 K/uL      Neutrophils, Absolute 2.75 K/uL      Lymphocytes, Absolute 0.74 K/uL      Monocytes, Absolute 0.30 K/uL       Eosinophils, Absolute 0.01 K/uL      Basophils, Absolute 0.01 K/uL      RBC Morphology Normal     PLT Morphology Normal     Platelet Estimate Adequate (150,000-400,000)    Comprehensive metabolic panel [[id number]]  (Abnormal) Collected: 11/29/20 1442    Specimen: Blood, Venous Updated: 11/29/20 1531     Sodium 139 mEQ/L      Potassium 3.8 mEQ/L      Comment: Results obtained on plasma. Plasma Potassium values may be up to 0.4 mEQ/L less than serum values. The differences may be greater for patients with high platelet or white cell counts.        Chloride 102 mEQ/L      CO2 26 mEQ/L      BUN 9 mg/dL      Creatinine 0.8 mg/dL      Glucose 100 mg/dL      Calcium 8.2 mg/dL      AST (SGOT) 23 IU/L      ALT (SGPT) 34 IU/L      Alkaline Phosphatase 60 IU/L      Total Protein 7.4 g/dL      Comment: Test performed on plasma which typically contains approximately 0.4 g/dL more protein than serum.        Albumin 3.5 g/dL      Bilirubin, Total 0.8 mg/dL      eGFR >60.0 mL/min/1.73m*2      Anion Gap 11 mEQ/L     Protime-INR [[id number]]  (Normal) Collected: 11/29/20 1442    Specimen: Blood, Venous Updated: 11/29/20 1518     PT 13.0 sec      INR 1.0 INR      Comment: INR has no defined significance when PT is within Reference Range.       PTT [[id number]]  (Normal) Collected: 11/29/20 1442    Specimen: Blood, Venous Updated: 11/29/20 1518     PTT 26 sec     Blood Culture Blood, Venous [[id number]] Collected: 11/29/20 1442    Specimen: Blood, Venous Updated: 11/29/20 1459    Blood Culture Blood, Venous [[id number]] Collected: 11/29/20 1442    Specimen: Blood, Venous Updated: 11/29/20 1458          Results     Procedure Component Value Units Date/Time    X-RAY CHEST 1 VIEW [[id number]] Collected: 11/29/20 1502     Updated: 11/29/20 1506    Narrative:      CLINICAL HISTORY: Chest pain    COMMENT: A single AP view of the chest was obtained and compared to prior study  from 11/19/2020.    There are low lung volumes. There are bilateral  airspace opacities.  There is no  pleural effusion or pneumothorax.  The heart is top normal in size.      Impression:      IMPRESSION:  Low lung volumes with bilateral airspace opacities.          EKG 12 lead   ED Interpretation   Is 92 bpm, normal axis deviation, T wave inversions V1 and V2, poor baseline V6                        ED Course & MDM     MDM  Number of Diagnoses or Management Options  Diagnosis management comments: 45-year-old who presents here with symptoms above  Check labs, EKG, imaging  Suspect Covid  Maintain sats greater than 92%, monitor       Amount and/or Complexity of Data Reviewed  Clinical lab tests: ordered  Tests in the radiology section of CPT®: ordered             ED Course as of Nov 29 1801   Sun Nov 29, 2020   1519 Patient saturations are in the 88 % on ra - .We will add steroids.  Patient to be admitted    [EK]   1553 Case discussed with [facility], who accept the patient to the service    [EK]      ED Course User Index  [EK] Dionne Coffman, DO         Clinical Impressions as of Nov 29 1801   Hypoxia   COVID-19        Dionne Coffman, DO  11/29/20 1801

## 2020-11-29 NOTE — ASSESSMENT & PLAN NOTE
Hypoxia likely secondary to Covid-infiltrates on chest x-ray  Viral pneumonia  Supportive care  Isolation per protocol  Stable oxygenation with supplemental oxygen-92 to 94%  Pulmonary for further recommendation  Status post Decadron by ED  Further recommendation to follow on telemetry

## 2020-11-29 NOTE — H&P
Hospital Medicine Service -  History & Physical        CHIEF COMPLAINT   Fever/cough/diarrhea/myalgias     HISTORY OF PRESENT ILLNESS      On my evaluation, very pleasant 45 female, coming in for above symptoms.  Patient is having the symptom for last 1 week, she told me she feels tired and lethargic, with myalgias.  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  Per documentation in ED documentation she had a visit couple days ago, refused to be tested for Covid.  She did have chills, with subjective fever and cough.  Denies abdominal pain on my evaluation, but did have some diarrhea.  Not feeling well and tired.  COVID-19 positive.  Denies any cardio/neuro concerns.  Denies melena hematemesis or any other concern.  Patient was given Decadron.  His pulse ox was 89% on room air, currently 92 to 96% on supplemental oxygen    Stable oxygenation, heart rate and  BP on my evaluation.     I discussed the risks, benefits and rationale of treatment & need for further workup & monitoring--patient is agreeable with the management.   All questions answered to patients satisfaction.     Pt will be admitted with further care to be followed by Day Hospitalist team along with consults, at which time new recommendations to follow.    PAST MEDICAL AND SURGICAL HISTORY      Past Medical History:   Diagnosis Date   • A-fib (CMS/HCC)    • A-fib (CMS/[facility])    • Bipolar 1 disorder (CMS/[facility])    • Hypertension        Past Surgical History:   Procedure Laterality Date   • ANAL FISSURECTOMY     • CHOLECYSTECTOMY     • HYSTERECTOMY         MEDICATIONS      Prior to Admission medications    Medication Sig Start Date End Date Taking? Authorizing Provider   ARIPiprazole (ABILIFY) 30 mg tablet Take 1 tablet (30 mg total) by mouth daily. 5/6/20 6/5/20  Kareem Leon MD   aspirin 81 mg enteric coated tablet TK 1 T PO QD 2/21/20   Provider, MD Mark   cyclobenzaprine (FLEXERIL) 10 mg tablet Take 1 tablet (10 mg total) by mouth  3 (three) times a day as needed for muscle spasms. No driving or operating heavy machinery if taking. 11/19/20   Jordana Kilgore DO   dilTIAZem CD (CARDIZEM CD) 120 mg 24 hr capsule Take 1 capsule (120 mg total) by mouth daily. 5/7/20 6/6/20  Kareem Leon MD   fluticasone propionate (FLONASE) 50 mcg/actuation nasal spray 2 sprays daily. 11/11/19   Mark Chapin MD   furosemide (LASIX) 20 mg tablet TAKE 1 TABLET DAILY AS NEEDED FOR EDEMA 11/13/20   Mark Chapin MD   lithium (LITHOBID) 300 mg CR tablet Take 1 tablet (300 mg total) by mouth 2 (two) times a day.  Patient not taking: Reported on 11/19/2020 5/6/20 6/5/20  Kareem Leon MD   naproxen (NAPROSYN) 500 mg tablet Take 1 tablet (500 mg total) by mouth 2 (two) times a day with meals. As needed for pain. 11/19/20   Jordana Kilgore DO   polyethylene glycol (MIRALAX) 17 gram packet Take 17 g by mouth daily for 3 days.  Patient not taking: Reported on 11/19/2020  5/7/20 5/10/20  Kareem Leon MD   QUEtiapine (SEROquel) 400 mg tablet Take 1 tablet (400 mg total) by mouth nightly.  Patient not taking: Reported on 11/19/2020 5/6/20 6/5/20  Kareem Leon MD   senna (SENOKOT) 8.6 mg tablet Take 1 tablet by mouth 2 (two) times a day. 5/6/20 6/5/20  Kareem Leon MD   valproic acid (DEPAKENE) 250 mg capsule TAKE 4 CAPSULES ORALLY AT BEDTIME 11/13/20   Mark Chapin MD       ALLERGIES      Azithromycin, Doxycycline, Penicillins, and Amoxicillin    FAMILY HISTORY      Family History   Problem Relation Age of Onset   • Diabetes Biological Mother    • Heart disease Biological Father        SOCIAL HISTORY      Social History     Socioeconomic History   • Marital status: Single     Spouse name: None   • Number of children: None   • Years of education: None   • Highest education level: None   Occupational History   • Occupation: disabled   Social Needs   • Financial resource strain: None   • Food insecurity     Worry: None      Inability: None   • Transportation needs     Medical: None     Non-medical: None   Tobacco Use   • Smoking status: Former Smoker     Packs/day: 0.25     Types: Cigarettes   • Smokeless tobacco: Never Used   Substance and Sexual Activity   • Alcohol use: No     Frequency: Never     Binge frequency: Never   • Drug use: No   • Sexual activity: Defer   Lifestyle   • Physical activity     Days per week: None     Minutes per session: None   • Stress: None   Relationships   • Social connections     Talks on phone: None     Gets together: None     Attends [employer] service: None     Active member of club or organization: None     Attends meetings of clubs or organizations: None     Relationship status: None   • Intimate partner violence     Fear of current or ex partner: None     Emotionally abused: None     Physically abused: None     Forced sexual activity: None   Other Topics Concern   • None   Social History Narrative   • None       REVIEW OF SYSTEMS      All other systems reviewed and negative except as noted in HPI    PHYSICAL EXAMINATION      Temp:  [38.7 °C (101.7 °F)] 38.7 °C (101.7 °F)  Heart Rate:  [77-91] 77  Resp:  [18-28] 20  BP: (103-120)/(54-57) 103/54  Body mass index is 43.29 kg/m².    Physical Exam     Constitutional: Alert, Not in distress.  Comfortable and pleasant/obese    HEENT:  NC/AT. EOMI    Neck: Supple.    CVS: Regular rhythm.  Tachycardic    Pulmonary: B/L EAE, tachypnea.  Mild expiratory rhonchi bilateral.  Overall decreased bilateral air entry.    Abdominal: Soft, Non tender. +BS.  No guarding or rigidity, obese.    Musculoskeletal: No Pedal edema.     Neurological: alert/awake. moving all 4 extremities.  Oriented x3.    Skin: No cyansois.  Warm and dry    Psych- Normal affect.    Nursing note and vitals reviewed.  LABS / IMAGING / EKG        Labs  Labs from today  reviewed. [facility] [place] labs.    Imaging  Imaging reviewed by myself with Radiologist Read  noted.    ECG/Telemetry  Telemetry Monitoring reviewed in ER.    ASSESSMENT AND PLAN         Hypoxia  Hypoxia likely secondary to Covid-infiltrates on chest x-ray  Viral pneumonia  Supportive care  Isolation per protocol  Stable oxygenation with supplemental oxygen-92 to 94%  Pulmonary for further recommendation  Status post Decadron by ED  Further recommendation to follow on telemetry    COVID-19  COVID-19-with associated viral syndrome  Supportive care  Management per ID  Follow COVID-19 labs    Hypotension  Low normal blood pressure-IV fluids and trend on telemetry  Patient denies any neuro or cardiac symptoms or concerns    Paroxysmal A-fib (CMS/[facility])  Stable heart rate-monitor electrolytes with home meds    Anxiety  Anxiety and history of psych disorder-continue home meds  Stable and comfortable    Bipolar 1 disorder (CMS/HCC)  Continue home meds         VTE Assessment: Padua VTE Score: 4  VTE Treatment Plan: Heparin  Code Status: Full Code  Palliative Care Screening Score: 0  Estimated discharge date: 12/1/2020     Mykel Bocanegra MD  11/29/2020

## 2020-11-30 PROBLEM — A41.9 SEPSIS WITHOUT ACUTE ORGAN DYSFUNCTION (CMS/HCC): Status: ACTIVE | Noted: 2020-05-05

## 2020-11-30 PROBLEM — I48.91 A-FIB (CMS/HCC): Status: ACTIVE | Noted: 2020-03-22

## 2020-11-30 LAB
ALBUMIN SERPL-MCNC: 2.8 G/DL (ref 3.4–5)
ALP SERPL-CCNC: 49 IU/L (ref 35–126)
ALT SERPL-CCNC: 26 IU/L (ref 11–54)
ANION GAP SERPL CALC-SCNC: 10 MEQ/L (ref 3–15)
AST SERPL-CCNC: 22 IU/L (ref 15–41)
ATRIAL RATE: 72
ATRIAL RATE: 92
BASOPHILS # BLD: 0 K/UL (ref 0.01–0.1)
BASOPHILS NFR BLD: 0 %
BILIRUB SERPL-MCNC: 0.9 MG/DL (ref 0.3–1.2)
BUN SERPL-MCNC: 9 MG/DL (ref 8–20)
CALCIUM SERPL-MCNC: 7.8 MG/DL (ref 8.9–10.3)
CHLORIDE SERPL-SCNC: 103 MEQ/L (ref 98–109)
CO2 SERPL-SCNC: 24 MEQ/L (ref 22–32)
CREAT SERPL-MCNC: 0.6 MG/DL (ref 0.6–1.1)
DIFFERENTIAL METHOD BLD: ABNORMAL
EOSINOPHIL # BLD: 0.01 K/UL (ref 0.04–0.36)
EOSINOPHIL NFR BLD: 0.2 %
ERYTHROCYTE [DISTWIDTH] IN BLOOD BY AUTOMATED COUNT: 12.6 % (ref 11.7–14.4)
FERRITIN SERPL-MCNC: 298 NG/ML (ref 11–250)
GFR SERPL CREATININE-BSD FRML MDRD: >60 ML/MIN/1.73M*2
GIANT PLATELETS BLD QL SMEAR: ABNORMAL
GLUCOSE SERPL-MCNC: 99 MG/DL (ref 70–99)
HCT VFR BLDCO AUTO: 37.7 % (ref 35–45)
HGB BLD-MCNC: 12.5 G/DL (ref 11.8–15.7)
IMM GRANULOCYTES # BLD AUTO: 0.02 K/UL (ref 0–0.08)
IMM GRANULOCYTES NFR BLD AUTO: 0.5 %
LYMPHOCYTES # BLD: 0.76 K/UL (ref 1.2–3.5)
LYMPHOCYTES NFR BLD: 18.1 %
MCH RBC QN AUTO: 30 PG (ref 28–33.2)
MCHC RBC AUTO-ENTMCNC: 33.2 G/DL (ref 32.2–35.5)
MCV RBC AUTO: 90.6 FL (ref 83–98)
MONOCYTES # BLD: 0.36 K/UL (ref 0.28–0.8)
MONOCYTES NFR BLD: 8.6 %
NEUTROPHILS # BLD: 3.05 K/UL (ref 1.7–7)
NEUTS SEG NFR BLD: 72.6 %
NRBC BLD-RTO: 0 %
P AXIS: 62
P AXIS: 67
PDW BLD AUTO: 11 FL (ref 9.4–12.3)
PLATELET # BLD AUTO: 167 K/UL (ref 150–369)
PLATELET # BLD EST: ABNORMAL 10*3/UL
POTASSIUM SERPL-SCNC: 4.5 MEQ/L (ref 3.6–5.1)
PR INTERVAL: 154
PR INTERVAL: 156
PROT SERPL-MCNC: 5.7 G/DL (ref 6–8.2)
QRS DURATION: 90
QRS DURATION: 94
QT INTERVAL: 342
QT INTERVAL: 404
QTC CALCULATION(BAZETT): 422
QTC CALCULATION(BAZETT): 442
R AXIS: 46
R AXIS: 48
RBC # BLD AUTO: 4.16 M/UL (ref 3.93–5.22)
SODIUM SERPL-SCNC: 137 MEQ/L (ref 136–144)
T WAVE AXIS: 28
T WAVE AXIS: 32
VENTRICULAR RATE: 72
VENTRICULAR RATE: 92
WBC # BLD AUTO: 4.2 K/UL (ref 3.8–10.5)

## 2020-11-30 PROCEDURE — 80053 COMPREHEN METABOLIC PANEL: CPT | Performed by: HOSPITALIST

## 2020-11-30 PROCEDURE — 63700000 HC SELF-ADMINISTRABLE DRUG: Performed by: HOSPITALIST

## 2020-11-30 PROCEDURE — 20600000 HC ROOM AND CARE INTERMEDIATE/TELEMETRY

## 2020-11-30 PROCEDURE — 63700000 HC SELF-ADMINISTRABLE DRUG: Performed by: PSYCHIATRY & NEUROLOGY

## 2020-11-30 PROCEDURE — 93005 ELECTROCARDIOGRAM TRACING: CPT | Performed by: HOSPITALIST

## 2020-11-30 PROCEDURE — 25800000 HC PHARMACY IV SOLUTIONS: Performed by: NURSE PRACTITIONER

## 2020-11-30 PROCEDURE — 99233 SBSQ HOSP IP/OBS HIGH 50: CPT | Mod: CR | Performed by: HOSPITALIST

## 2020-11-30 PROCEDURE — 63600000 HC DRUGS/DETAIL CODE: Performed by: PHYSICIAN ASSISTANT

## 2020-11-30 PROCEDURE — 25000000 HC PHARMACY GENERAL: Performed by: NURSE PRACTITIONER

## 2020-11-30 PROCEDURE — XW033E5 INTRODUCTION OF REMDESIVIR ANTI-INFECTIVE INTO PERIPHERAL VEIN, PERCUTANEOUS APPROACH, NEW TECHNOLOGY GROUP 5: ICD-10-PCS | Performed by: INTERNAL MEDICINE

## 2020-11-30 PROCEDURE — 63600000 HC DRUGS/DETAIL CODE: Mod: JW | Performed by: HOSPITALIST

## 2020-11-30 PROCEDURE — 85025 COMPLETE CBC W/AUTO DIFF WBC: CPT | Performed by: HOSPITALIST

## 2020-11-30 RX ORDER — ACETAMINOPHEN 325 MG/1
650 TABLET ORAL EVERY 6 HOURS PRN
Status: DISCONTINUED | OUTPATIENT
Start: 2020-11-30 | End: 2020-12-02 | Stop reason: HOSPADM

## 2020-11-30 RX ORDER — VALPROIC ACID 250 MG/1
1000 CAPSULE, LIQUID FILLED ORAL NIGHTLY
Status: DISCONTINUED | OUTPATIENT
Start: 2020-11-30 | End: 2020-12-02 | Stop reason: HOSPADM

## 2020-11-30 RX ORDER — VALPROIC ACID 250 MG/1
500 CAPSULE, LIQUID FILLED ORAL NIGHTLY
Status: DISCONTINUED | OUTPATIENT
Start: 2020-11-30 | End: 2020-11-30

## 2020-11-30 RX ORDER — DEXAMETHASONE SODIUM PHOSPHATE 4 MG/ML
6 INJECTION, SOLUTION INTRA-ARTICULAR; INTRALESIONAL; INTRAMUSCULAR; INTRAVENOUS; SOFT TISSUE DAILY
Status: DISCONTINUED | OUTPATIENT
Start: 2020-11-30 | End: 2020-12-02

## 2020-11-30 RX ORDER — HEPARIN SODIUM 5000 [USP'U]/ML
7500 INJECTION, SOLUTION INTRAVENOUS; SUBCUTANEOUS EVERY 8 HOURS
Status: DISCONTINUED | OUTPATIENT
Start: 2020-11-30 | End: 2020-12-02 | Stop reason: HOSPADM

## 2020-11-30 RX ADMIN — HEPARIN SODIUM 7500 UNITS: 5000 INJECTION INTRAVENOUS; SUBCUTANEOUS at 14:04

## 2020-11-30 RX ADMIN — ACETAMINOPHEN 650 MG: 325 TABLET ORAL at 08:27

## 2020-11-30 RX ADMIN — ASPIRIN 81 MG: 81 TABLET, COATED ORAL at 08:26

## 2020-11-30 RX ADMIN — ARIPIPRAZOLE 30 MG: 10 TABLET ORAL at 20:24

## 2020-11-30 RX ADMIN — VALPROIC ACID 1000 MG: 250 CAPSULE ORAL at 20:23

## 2020-11-30 RX ADMIN — DILTIAZEM HYDROCHLORIDE 120 MG: 120 CAPSULE, COATED, EXTENDED RELEASE ORAL at 08:27

## 2020-11-30 RX ADMIN — HEPARIN SODIUM 7500 UNITS: 5000 INJECTION INTRAVENOUS; SUBCUTANEOUS at 20:24

## 2020-11-30 RX ADMIN — CYCLOBENZAPRINE HYDROCHLORIDE 10 MG: 10 TABLET, FILM COATED ORAL at 01:31

## 2020-11-30 RX ADMIN — REMDESIVIR 200 MG: 100 INJECTION, POWDER, LYOPHILIZED, FOR SOLUTION INTRAVENOUS at 14:28

## 2020-11-30 ASSESSMENT — ENCOUNTER SYMPTOMS
WHEEZING: 0
ARTHRALGIAS: 1
COUGH: 1
SHORTNESS OF BREATH: 1
PALPITATIONS: 0
STRIDOR: 0

## 2020-11-30 NOTE — ASSESSMENT & PLAN NOTE
Wears CPAP for some of the night but then usually takes it off  Will order here and see if she can tolerate

## 2020-11-30 NOTE — ASSESSMENT & PLAN NOTE
Diltiazem  daily and Lasix 20 mg as needed edema  Hold diuretic  Continue diltiazem with holding parameters

## 2020-11-30 NOTE — PLAN OF CARE
Problem: Adult Inpatient Plan of Care  Goal: Plan of Care Review  Flowsheets (Taken 11/30/2020 8473)  Outcome Summary: Discussed in rounds:  Pt with PNA, On O2  4 L, declined IV steroids due to reaction to steroids.  Call to pt in room. Nurse in with pt ans stated pt is receiving IV medication and needs to keep arm still ans is unable to talk on phone presently.  Nurse requested CM call back in about an hour.  CM will follow up.

## 2020-11-30 NOTE — PROGRESS NOTES
Hospital Medicine Service -  Daily Progress Note       SUBJECTIVE   Patient seen and examined earlier.  Feels weak and has a poor appetite.  Denies feeling short of breath other than when she gets up and walks around.  Cough with deep breath    ROS negative other than noted above.     OBJECTIVE      Vital signs in last 24 hours:  Temp:  [36.8 °C (98.2 °F)-38.7 °C (101.7 °F)] 36.8 °C (98.2 °F)  Heart Rate:  [65-91] 65  Resp:  [18-28] 18  BP: ()/(54-67) 110/61  No intake or output data in the 24 hours ending 11/30/20 1302    PHYSICAL EXAMINATION      Physical Exam   Gen:  Weak appearing  in NAD  HEENT:NCAT, EOMI, Hearing Intact, Nasal Passages Clear, Moist Oral Mucosa  Neck:  FROM, Supple  Lungs: CTA B,  No RRW, no accessory muscle use, decreased air exchange  CVS: S1 S2 RRR, no mrg  Abd:  soft, Obese, NT no rebound/guarding, BS +  Msk: FROM in all joints  Neuro: A&O x 3, no focal deficits, no sensory deficits  Psyhe: Pleasant and cooperative, flat affect  Skin: No open cuts/sores/wounds  Ext: No edema x 2  '   LINES, CATHETERS, DRAINS, AIRWAYS, AND WOUNDS   Lines, Drains, Airways, Wounds:  Peripheral IV 11/29/20 Left Antecubital (Active)   Number of days: 1        LABS / IMAGING / TELE      I have reviewed all labs and imaging results. Please see Problem List/A/P for relevant findings.      ASSESSMENT AND PLAN      * COVID-19  Assessment & Plan  Admit 11/29 with COVID-19 with fever/diarrhea/myalgias-symptoms started 11/20  CXR with low lung volumes and bilateral air space opacities    -Pox dropped to 89% RA, currently on 4L  -Refused decadron so far due to concern for getting manic-will ask psyche to see  Supportive Care  -Encourage deep breathing maneuvers/modified proning as able/OOB as able/IS  -Heparin increased to [id number]   -ID input pending    AMINA (obstructive sleep apnea)  Assessment & Plan  Wears CPAP for some of the night but then usually takes it off  Will order here and see if she can  tolerate    A-fib (CMS/[facility])  Assessment & Plan  Hx of PAF  Cont diltiazem , aspirin 81mg/day    Bipolar 1 disorder (CMS/[facility])  Assessment & Plan  Takes abilify 30mg, depakote  Will ask psyche to see    Hypertension  Assessment & Plan  Diltiazem  daily and Lasix 20 mg as needed edema  Hold diuretic  Continue diltiazem with holding parameters    Sepsis without acute organ dysfunction (CMS/[facility])  Assessment & Plan  Met sepsis criteria on admission  Due to COVID infection  See COVID pna       VTE Assessment: Padua VTE Score: 4  VTE Prophylaxis Plan: Heparin 7500 Q8H  Code Status: Full Code  Estimated Discharge Date: 12/1/2020  Disposition Planning: home if stable, may need home oxygen      Dustin Winter MD  11/30/2020

## 2020-11-30 NOTE — PATIENT CARE CONFERENCE
Care Progression Rounds Note  Date: 11/30/2020  Time: 10:11 AM     Patient Name: Jillian Acosta     Medical Record Number: [id number]   YOB: 1975  Sex: Female      Room/Bed: 4505W    Admitting Diagnosis: Hypoxia [R09.02]  COVID-19 [U07.1]   Admit Date/Time: 11/29/2020  2:31 PM    Primary Diagnosis: COVID-19  Principal Problem: COVID-19    GMLOS: pending  Anticipated Discharge Date: 12/1/2020    AM-PAC  Mobility Score:      Discharge Planning:       Barriers to Discharge:  Barriers to Discharge: Medical issues not resolved, Consultant recommendations pending  Comment: COVID+l tried leaving AMA; 024lnc; IV decadron    Participants:  nursing, , social work/services

## 2020-11-30 NOTE — CONSULTS
Psychiatry Consult    ADMISSION H/P  45 female, coming in for above symptoms.  Patient is having the symptom for last 1 week, she told me she feels tired and lethargic, with myalgias.  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  Per documentation in ED documentation she had a visit couple days ago, refused to be tested for Covid. She did have chills, with subjective fever and cough.  Denies abdominal pain on my evaluation, but did have some diarrhea.  Not feeling well and tired.  COVID-19 positive.  Denies any cardio/neuro concerns.  Denies melena hematemesis or any other concern.  Patient was given Decadron.  His pulse ox was 89% on room air, currently 92 to 96% on supplemental oxygen    Patient Active Problem List   Diagnosis   • Bipolar 1 disorder (CMS/HCC)   • A-fib (CMS/HCC)   • Edema   • AMINA (obstructive sleep apnea)   • Sepsis without acute organ dysfunction (CMS/[facility])   • COVID-19   • Hypertension     Psychiatric History:  Patient known to me from my outpatient clinic and has a long history of Schizoaffective Disorder. She was recently hospitalized for an extended period with severe mari and has lost her father in the past 4 months. She has been able to cope so far and we have been gradually reducing her medications in my clinic with close monitoring. Patient was admitted with Covid and needs Decadron for respiratory stabilization. However she has been concerned about Steroid induced mari. Psychiatry was consulted for the same.    Suicide Attempts: yes - in the past     Risk Factors: Presence of a Mood Disorder, Impulsive or aggressive tendencies  and Isolation or feeling isolated      Protective Factors: Connectedness to individuals, family, community, and social institutions   Current Psychiatrist: yes - Dr. Weeks  Past psychiatric Hospitalization: yes - numerous including very recently for over 1 month  Medication Trials:  yes - She is currently being managed on Valproic acid ( keeps fluid  retention down as opposed to Depakote),   Abilify 30 MG Oral Tablet;   Take 1 tablet orally daily; Qty: 30; Refills: 0   Valproic Acid 250 MG Oral Capsule;   Take 4 capsules orally at bedtime; Qty: 120; Refills: 0     Substance Use History:  Substance use: None    Family History:   NA- recently lost her father who was very dear to her    Social History:   Social History     Socioeconomic History   • Marital status: Single     Spouse name: None   • Number of children: None   • Years of education: None   • Highest education level: None   Occupational History   • Occupation: disabled   Social Needs   • Financial resource strain: None   • Food insecurity     Worry: None     Inability: None   • Transportation needs     Medical: None     Non-medical: None   Tobacco Use   • Smoking status: Former Smoker     Packs/day: 0.25     Types: Cigarettes   • Smokeless tobacco: Never Used   Substance and Sexual Activity   • Alcohol use: No     Frequency: Never     Binge frequency: Never   • Drug use: No   • Sexual activity: Defer   Lifestyle   • Physical activity     Days per week: None     Minutes per session: None   • Stress: None   Relationships   • Social connections     Talks on phone: None     Gets together: None     Attends Pent[employer]alism service: None     Active member of club or organization: None     Attends meetings of clubs or organizations: None     Relationship status: None   • Intimate partner violence     Fear of current or ex partner: None     Emotionally abused: None     Physically abused: None     Forced sexual activity: None   Other Topics Concern   • None   Social History Narrative   • None       Medical History:   Past Medical History:   Diagnosis Date   • A-fib (CMS/[facility])    • A-fib (CMS/[facility])    • Bipolar 1 disorder (CMS/[facility])    • Hypertension        Allergies:   Allergies   Allergen Reactions   • Azithromycin GI intolerance     NA   • Doxycycline      Palpitation   • Penicillins      Other reaction(s): Unknown   •  Amoxicillin Rash       Current Medications:  •  acetaminophen, 650 mg, oral, q6h PRN  •  albuterol HFA, 2 puff, inhalation, q6h PRN  •  ARIPiprazole, 30 mg, oral, Daily  •  aspirin, 81 mg, oral, Daily  •  cyclobenzaprine, 10 mg, oral, 3x daily PRN  •  dexamethasone, 6 mg, intravenous, Daily  •  glucose, 16-32 g of dextrose, oral, PRN **OR** dextrose, 15-30 g of dextrose, oral, PRN **OR** glucagon, 1 mg, intramuscular, PRN **OR** dextrose in water, 25 mL, intravenous, PRN  •  glucose, 16-32 g of dextrose, oral, PRN **OR** dextrose, 15-30 g of dextrose, oral, PRN **OR** glucagon, 1 mg, intramuscular, PRN **OR** dextrose in water, 25 mL, intravenous, PRN  •  dilTIAZem CD, 120 mg, oral, Daily  •  heparin (porcine), 7,500 Units, subcutaneous, q8h AVIS  •  polyethylene glycol, 17 g, oral, Daily  •  remdesivir (VEKLURY) IVPB, 200 mg, intravenous, Once **FOLLOWED BY** [START ON 12/1/2020] remdesivir (VEKLURY) IVPB, 100 mg, intravenous, q24h INT  •  senna, 1 tablet, oral, BID  •  valproic acid, 500 mg, oral, Nightly    Home Medications:  •  ARIPiprazole, Take 1 tablet (30 mg total) by mouth daily.  •  aspirin, TK 1 T PO QD  •  cyclobenzaprine, Take 1 tablet (10 mg total) by mouth 3 (three) times a day as needed for muscle spasms. No driving or operating heavy machinery if taking.  •  dilTIAZem CD, Take 1 capsule (120 mg total) by mouth daily.  •  fluticasone propionate, 2 sprays daily.  •  furosemide, TAKE 1 TABLET DAILY AS NEEDED FOR EDEMA  •  lithium, Take 1 tablet (300 mg total) by mouth 2 (two) times a day. (Patient not taking: Reported on 11/19/2020 )  •  naproxen, Take 1 tablet (500 mg total) by mouth 2 (two) times a day with meals. As needed for pain.  •  polyethylene glycol, Take 17 g by mouth daily for 3 days. (Patient not taking: Reported on 11/19/2020 )  •  QUEtiapine, Take 1 tablet (400 mg total) by mouth nightly. (Patient not taking: Reported on 11/19/2020 )  •  senna, Take 1 tablet by mouth 2 (two) times a  day.  •  valproic acid, TAKE 4 CAPSULES ORALLY AT BEDTIME    Vitals:    11/30/20 1445   BP: (!) 107/55   Pulse: 73   Resp: 18   Temp: 36.7 °C (98 °F)   SpO2: (!) 91%       Labs  Pertinent radiology and labs reviewed    MENTAL STATUS EXAM  Alert and oriented x 3  Pleasant and cordial with MD  Speech was normal   Thoughts were linear, logical and goal directed  No obvious evidence of psychosis   She admits to anxiety and worry about Steroid induced Radha  Reassured patient that she will be closely monitored for the same  Mood was anxious and affect was hopeful  Insight and Judgment was normal    iMPRESSION  Will increase Depakene (valproic acid) to target dose of 1000mg qhs  Will continue with Abilify 30mg daily  Will monitor for steroid induced radha and manage accordingly.  Patient understands need for emergent Steroid treatment.    Micky Clinton MD11/30/2020  [phone] (o) / [phone] (c)

## 2020-11-30 NOTE — CONSULTS
Pulmonology Consult Note    Reason For Consult: COVID-19 pneumonitis and hypoxemia    HPI: Patient is a 45-year-old female who presented with feeling tired, lethargic, myalgias.  She was noted to be hypoxemic in the ED and supplemental oxygen was provided.  She is currently on 3 L via nasal cannula.  She tested positive for COVID-19 infection.  She was started on steroids and VTE prophylaxis.  She is also known to have obstructive sleep apnea however she is not always compliant with her CPAP.  Currently she is awake and alert and able to communicate in full sentences      Past Medical History:  Past Medical History:   Diagnosis Date   • A-fib (CMS/HCC)    • A-fib (CMS/HCC)    • Bipolar 1 disorder (CMS/[facility])    • Hypertension        Past Surgical History:  Past Surgical History:   Procedure Laterality Date   • ANAL FISSURECTOMY     • CHOLECYSTECTOMY     • HYSTERECTOMY         Allergies:   Azithromycin, Doxycycline, Penicillins, and Amoxicillin    Medications:  Current Facility-Administered Medications   Medication Dose Route Frequency Provider Last Rate Last Dose   • acetaminophen (TYLENOL) tablet 650 mg  650 mg oral q6h PRN Dustin Winter MD   650 mg at 11/30/20 0827   • albuterol HFA (VENTOLIN HFA) 90 mcg/actuation inhaler 2 puff  2 puff inhalation q6h PRN Mykel Bocanegra MD       • ARIPiprazole (ABILIFY) tablet 30 mg  30 mg oral Daily Mykel Bocanegra MD   30 mg at 11/29/20 2041   • aspirin enteric coated tablet 81 mg  81 mg oral Daily Mykel Bocanegra MD   81 mg at 11/30/20 0826   • cyclobenzaprine (FLEXERIL) tablet 10 mg  10 mg oral 3x daily PRN Mykel Bocanegra MD   10 mg at 11/30/20 0131   • dexamethasone (DECADRON) injection 6 mg  6 mg intravenous Daily Dustin Winter MD       • glucose chewable tablet 16-32 g of dextrose  16-32 g of dextrose oral PRN Mykel Bocanegra MD        Or   • dextrose 40 % oral gel 15-30 g of dextrose  15-30 g of dextrose oral PRN Mykel Bocanegra MD        Or   • glucagon (GLUCAGEN) injection 1  mg  1 mg intramuscular PRN Mykel Bocanegra MD        Or   • dextrose in water injection 12.5 g  25 mL intravenous PRN Mykel Bocanegra MD       • glucose chewable tablet 16-32 g of dextrose  16-32 g of dextrose oral PRN Mykel Bocanegra MD        Or   • dextrose 40 % oral gel 15-30 g of dextrose  15-30 g of dextrose oral PRN Mykel Bocanegra MD        Or   • glucagon (GLUCAGEN) injection 1 mg  1 mg intramuscular PRN Mykel Bocanegra MD        Or   • dextrose in water injection 12.5 g  25 mL intravenous PRN Mykel Bocanegra MD       • dilTIAZem CD (CARDIZEM CD) 24 hr ER capsule 120 mg  120 mg oral Daily Mykel Bocanegra MD   120 mg at 11/30/20 0827   • heparin (porcine) 5,000 unit/mL injection 7,500 Units  7,500 Units subcutaneous q8h Ami Ho PA C       • lithium (LITHOBID) tablet extended release 300 mg  300 mg oral BID Mykel Bocanegra MD       • polyethylene glycol (MIRALAX) 17 gram packet 17 g  17 g oral Daily Mykel Bocanegra MD       • QUEtiapine (SEROquel) tablet 400 mg  400 mg oral Nightly Mykel Bocanegra MD   400 mg at 11/29/20 2300   • senna (SENOKOT) tablet 1 tablet  1 tablet oral BID Mykel Bocanegra MD       • valproic acid (DEPAKENE) capsule 250 mg  250 mg oral Daily Mykel Bocanegra MD   250 mg at 11/29/20 2105       Social History:  Social History     Socioeconomic History   • Marital status: Single     Spouse name: None   • Number of children: None   • Years of education: None   • Highest education level: None   Occupational History   • Occupation: disabled   Social Needs   • Financial resource strain: None   • Food insecurity     Worry: None     Inability: None   • Transportation needs     Medical: None     Non-medical: None   Tobacco Use   • Smoking status: Former Smoker     Packs/day: 0.25     Types: Cigarettes   • Smokeless tobacco: Never Used   Substance and Sexual Activity   • Alcohol use: No     Frequency: Never     Binge frequency: Never   • Drug use: No   • Sexual activity: Defer   Lifestyle   • Physical  "activity     Days per week: None     Minutes per session: None   • Stress: None   Relationships   • Social connections     Talks on phone: None     Gets together: None     Attends Alevism service: None     Active member of club or organization: None     Attends meetings of clubs or organizations: None     Relationship status: None   • Intimate partner violence     Fear of current or ex partner: None     Emotionally abused: None     Physically abused: None     Forced sexual activity: None   Other Topics Concern   • None   Social History Narrative   • None       Family History:  Family History   Problem Relation Age of Onset   • Diabetes Biological Mother    • Heart disease Biological Father        Review of Systems:  Review of Systems   Respiratory: Positive for cough and shortness of breath. Negative for wheezing and stridor.    Cardiovascular: Negative for chest pain, palpitations and leg swelling.   Musculoskeletal: Positive for arthralgias.       Objective     Vital Signs for the last 24 hours:  Visit Vitals  /61 (BP Location: Right upper arm, Patient Position: Lying)   Pulse 65   Temp 36.8 °C (98.2 °F) (Oral)   Resp 18   Ht 1.676 m (5' 6\")   Wt 118 kg (260 lb)   LMP  (LMP Unknown)   SpO2 97%   BMI 41.97 kg/m²     PF Readings from Last 3 Encounters:   No data found for PF       Oxygen:  Oxygen Therapy: Supplemental oxygen  O2 Delivery Method: Nasal cannula     O2 Flow Rate (L/min): 4 L/min     Physical Exam:  Physical Exam  Vitals signs and nursing note reviewed.   Constitutional:       Appearance: Normal appearance.   HENT:      Head: Normocephalic and atraumatic.   Eyes:      General: No scleral icterus.  Cardiovascular:      Rate and Rhythm: Normal rate.      Heart sounds: No murmur.   Pulmonary:      Comments: Diminished breath sounds with bibasilar crackles  Abdominal:      Palpations: Abdomen is soft.   Skin:     General: Skin is warm.   Neurological:      Mental Status: She is alert and oriented to " person, place, and time.   Psychiatric:         Behavior: Behavior normal.         Labs:        Lab Results   Component Value Date    CHOL 240 (H) 11/05/2020    TRIG 233 (H) 11/05/2020    HDL 39 (L) 11/05/2020    TSH 1.77 03/22/2020    HGBA1C 5.3 03/22/2020       Input/Ouput in Last 24 hours:  No intake or output data in the 24 hours ending 11/30/20 1227    Imaging/Radiology: Bilateral, bibasilar infiltrates      IMPRESSION AND PLAN :    1.  Acute hypoxemic respiratory failure  -Supplemental oxygen 3 to 4 L via nasal cannula, can escalate to a salter cannula if needed  -Positional changes were stressed to the patient    2.  COVID-19 pneumonitis  -Agree with current dose of Decadron  -We will increase VT prophylaxis to 7500 of subcutaneous heparin every 8 hours    3.  History of AMINA patient has not been compliant  -We will initiate APAP via nasal cradle, she is agreeable to above trial

## 2020-11-30 NOTE — CONSULTS
Infectious Disease Consult Note    Patient Name: Jillian Acosta  MR#: [id number]  : 1975  Admission Date: 2020  Consult Date: 20 1:21 PM   Consultant: ELISHA Alcala    Reason for Consult: Covid pneumonia/respiratory failure.  Referring Provider: Dr Bocanegra      History of Present Illness     Jillian Acosta is a 45 y.o. female who was admitted on 2020 with fever, chills, fatigue, cough, shortness of breath and myalgias since the  but feeling worse recently so came to the ER for evaluation.  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    Outside records were reviewed.    Allergies:   Allergies   Allergen Reactions   • Azithromycin GI intolerance     NA   • Doxycycline      Palpitation   • Penicillins      Other reaction(s): Unknown   • Amoxicillin Rash       Medical History:   Past Medical History:   Diagnosis Date   • A-fib (CMS/HCC)    • A-fib (CMS/HCC)    • Bipolar 1 disorder (CMS/[facility])    • Hypertension        Surgical History:   Past Surgical History:   Procedure Laterality Date   • ANAL FISSURECTOMY     • CHOLECYSTECTOMY     • HYSTERECTOMY         Social History:   Social History     Socioeconomic History   • Marital status: Single     Spouse name: None   • Number of children: None   • Years of education: None   • Highest education level: None   Occupational History   • Occupation: disabled   Social Needs   • Financial resource strain: None   • Food insecurity     Worry: None     Inability: None   • Transportation needs     Medical: None     Non-medical: None   Tobacco Use   • Smoking status: Former Smoker     Packs/day: 0.25     Types: Cigarettes   • Smokeless tobacco: Never Used   Substance and Sexual Activity   • Alcohol use: No     Frequency: Never     Binge frequency: Never   • Drug use: No   • Sexual activity: Defer   Lifestyle   • Physical activity     Days per week: None     Minutes per session: None   • Stress: None    Relationships   • Social connections     Talks on phone: None     Gets together: None     Attends Zoroastrianism service: None     Active member of club or organization: None     Attends meetings of clubs or organizations: None     Relationship status: None   • Intimate partner violence     Fear of current or ex partner: None     Emotionally abused: None     Physically abused: None     Forced sexual activity: None   Other Topics Concern   • None   Social History Narrative   • None      Drug Details     Questions Responses    Amphetamine frequency Never used    Comment: Never used on 3/22/2020     Cannabis frequency Past rare use    Comment: Past rare use on 3/22/2020     Cocaine frequency Never used    Comment: Never used on 3/22/2020     Ecstasy frequency Never used    Comment: Never used on 3/22/2020     Hallucinogen frequency Never used    Comment: Never used on 3/22/2020     Inhalant frequency Never used    Comment: Never used on 3/22/2020     Opiate frequency Never used    Comment: Never used on 3/22/2020     Sedative frequency Never used    Comment: Never used on 3/22/2020     Other drug frequency Never used    Comment: Never used on 3/22/2020           Travel Exposure:   Travel and Exposure Screening      Most Recent Value   Travel Screening   Overnight hospitalization outside the [place] in the last year?  No Filed On: 11/29/2020 1439   Exposure Screening   Symptoms          Animal Exposure: none    Other Exposure: sick family contact    Family History:   Family History   Problem Relation Age of Onset   • Diabetes Biological Mother    • Heart disease Biological Father        Review of Systems    Constitutional: positive for fevers, fatigue, chills and anorexia, negative for difficulty drinking and difficulty eating  Ears, nose, mouth, throat, and face: positive for earaches and sore throat, negative for hoarseness  Respiratory: positive for cough, dyspnea on exertion and pleurisy/chest pain, negative for  sputum  Cardiovascular: positive for chest pain, dyspnea and fatigue, negative for syncope  Gastrointestinal: positive for diarrhea and nausea, negative for abdominal pain and vomiting  Genitourinary:negative for dysuria and hematuria  Musculoskeletal:positive for myalgias, negative for arthralgias and stiff joints  Neurological: positive for headaches, memory problems and weakness, negative for speech problems    Medications:    Current IP Meds (From admission, onward)        Frequency     remdesivir (VEKLURY) 100 mg in sodium chloride 0.9 % 250 mL IVPB  (Remdesivir Treatment for Adults)      Every 24 hours interval     valproic acid (DEPAKENE) capsule 500 mg      Nightly     remdesivir (VEKLURY) 200 mg in sodium chloride 0.9 % 250 mL IVPB  (Remdesivir Treatment for Adults)      Once     heparin (porcine) 5,000 unit/mL injection 7,500 Units  (Assessed at increased VTE risk (Padua score greater than or equal to 4))      Every 8 hours     dilTIAZem CD (CARDIZEM CD) 24 hr ER capsule 120 mg      Daily     dexamethasone (DECADRON) injection 6 mg      Daily     acetaminophen (TYLENOL) tablet 650 mg      Every 6 hours PRN     heparin (porcine) 5,000 unit/mL injection 5,000 Units  (Assessed at increased VTE risk (Padua score greater than or equal to 4))  Status:  Discontinued      Every 8 hours     glucose chewable tablet 16-32 g of dextrose  (Hypoglycemia Treatment Protocol and Hyperglycemia Validation Protocol)      As needed     dextrose 40 % oral gel 15-30 g of dextrose  (Hypoglycemia Treatment Protocol and Hyperglycemia Validation Protocol)      As needed     glucagon (GLUCAGEN) injection 1 mg  (Hypoglycemia Treatment Protocol and Hyperglycemia Validation Protocol)      As needed     dextrose in water injection 12.5 g  (Hypoglycemia Treatment Protocol and Hyperglycemia Validation Protocol)      As needed     QUEtiapine (SEROquel) tablet 400 mg  Status:  Discontinued      Nightly     lithium (LITHOBID) tablet extended  release 300 mg  Status:  Discontinued      2 times daily     senna (SENOKOT) tablet 1 tablet      2 times daily     ARIPiprazole (ABILIFY) tablet 30 mg      Daily     aspirin enteric coated tablet 81 mg      Daily     polyethylene glycol (MIRALAX) 17 gram packet 17 g      Daily     valproic acid (DEPAKENE) capsule 250 mg  Status:  Discontinued      Daily     cyclobenzaprine (FLEXERIL) tablet 10 mg      3 times daily PRN     glucose chewable tablet 16-32 g of dextrose  (Hypoglycemia Treatment Protocol and Hyperglycemia Validation Protocol)      As needed     dextrose 40 % oral gel 15-30 g of dextrose  (Hypoglycemia Treatment Protocol and Hyperglycemia Validation Protocol)      As needed     glucagon (GLUCAGEN) injection 1 mg  (Hypoglycemia Treatment Protocol and Hyperglycemia Validation Protocol)      As needed     dextrose in water injection 12.5 g  (Hypoglycemia Treatment Protocol and Hyperglycemia Validation Protocol)      As needed     albuterol HFA (VENTOLIN HFA) 90 mcg/actuation inhaler 2 puff  (albuterol sulfate (Ventolin HFA) inhaler)      Every 6 hours PRN     dexamethasone (DECADRON) injection 10 mg      Once     acetaminophen (TYLENOL) tablet 650 mg      Once                Objective     Vital Signs:    Patient Vitals for the past 72 hrs:   BP Temp Temp src Pulse Resp SpO2 Height Weight   11/30/20 1132 110/61 36.8 °C (98.2 °F) Oral 65 18 97 % -- --   11/30/20 0830 -- -- -- -- -- (!) 89 % -- --   11/30/20 0800 (!) 111/58 37.1 °C (98.8 °F) Oral 78 -- 94 % -- --   11/30/20 0518 -- -- -- -- -- -- -- 118 kg (260 lb)   11/30/20 0000 -- -- -- -- -- (!) 9 % -- --   11/29/20 2351 -- -- -- -- -- -- -- 118 kg (260 lb)   11/29/20 2100 122/67 37.1 °C (98.7 °F) Oral 72 (!) 27 96 % -- --   11/29/20 2000 (!) 115/55 -- -- 71 (!) 27 95 % -- --   11/29/20 1902 (!) 114/54 -- -- 70 (!) 24 96 % -- --   11/29/20 1700 (!) 99/54 37.1 °C (98.8 °F) Oral 70 18 94 % -- --   11/29/20 1621 (!) 103/54 -- -- 77 20 95 % -- --   11/29/20  "1530 -- -- -- 80 18 96 % -- --   20 1515 -- -- -- 81 (!) 28 (!) 89 % -- --   20 1439 (!) 120/57 (!) 38.7 °C (101.7 °F) -- 91 (!) 22 94 % 1.676 m (5' 6\") 122 kg (268 lb 3.2 oz)       Temp (72hrs), Av.3 °C (99.2 °F), Min:36.8 °C (98.2 °F), Max:38.7 °C (101.7 °F)      Physical Exam:    Visit Vitals  /61 (BP Location: Right upper arm, Patient Position: Lying)   Pulse 65   Temp 36.8 °C (98.2 °F) (Oral)   Resp 18   Ht 1.676 m (5' 6\")   Wt 118 kg (260 lb)   LMP  (LMP Unknown)   SpO2 97%   BMI 41.97 kg/m²     General appearance: alert, appears stated age, cooperative, fatigued and no distress  Eyes: anicteric  Neck: no adenopathy and supple  Lungs: diminished, no wheezes, no rhonchi, nonlabored, intermittent dry hacking cough  Abdomen: soft, ND, NT, obese  Extremities: + 1-2 edema BUE and BLE  Skin: no rash  Neurologic: Mental status: awake, alert, speech clear, oriented but forgetful, DURAN, follows commands    Lines, Drains, Airways, Wounds:  Peripheral IV 20 Left Antecubital (Active)   Number of days: 1       Labs:    CBC Results       20                    0643 1442 1722         WBC 4.20 3.82 7.96         RBC 4.16 4.65 4.67         HGB 12.5 13.8 14.3         HCT 37.7 41.5 42.2         MCV 90.6 89.2 90.4         MCH 30.0 29.7 30.6         MCHC 33.2 33.3 33.9          188 193         Comment for PLT at 0643 on 20: CONFIRMED WITH SMEAR ESTIMATE      BMP Results       20                    0643 1442 1722          139 138         K 4.5 3.8 4.1         Cl 103 102 104         CO2 24 26 25         Glucose 99 100 98         BUN 9 9 11         Creatinine 0.6 0.8 0.6         Calcium 7.8 8.2 9.3         Anion Gap 10 11 9         EGFR >60.0 >60.0 >60.0         Comment for K at 1442 on 20: Results obtained on plasma. Plasma Potassium values may be up to 0.4 mEQ/L less than serum values. The differences may be greater for patients with " high platelet or white cell counts.    Comment for K at 1722 on 11/19/20: Results obtained on plasma. Plasma Potassium values may be up to 0.4 mEQ/L less than serum values. The differences may be greater for patients with high platelet or white cell counts.      PT/PTT Results       11/29/20 11/29/20 03/22/20                    1640 1442 1708         PT 13.8 13.0 12.5         INR 1.1 1.0 1.0         PTT 28 26 29         Comment for INR at 1640 on 11/29/20: INR has no defined significance when PT is within Reference Range.    Comment for INR at 1442 on 11/29/20: INR has no defined significance when PT is within Reference Range.    Comment for INR at 1708 on 03/22/20:   INR has no defined significance when PT is within Reference Range.      UA Results       11/19/20                          1834           Color Yellow           Clarity Clear           Glucose Negative           Bilirubin Negative           Ketones Negative           Sp Grav 1.016           Blood Negative           Ph 6.0           Protein Negative           Urobilinogen 0.2           Nitrite Negative           Leuk Est Negative           Comment for Blood at 1834 on 11/19/20: The sensitivity of the occult blood test is equivalent to approximately 4 intact RBC/HPF.    Comment for Leuk Est at 1834 on 11/19/20: Results can be falsely negative due to high specific gravity, some antibiotics, glucose >3 g/dl, or WBC other than neutrophils.      Lactate Results     No lab values to display.          Microbiology Results     Procedure Component Value Units Date/Time    SARS-CoV-2 (COVID-19), PCR Nasopharynx [[id number]]  (Abnormal) Collected: 11/29/20 1442    Specimen: Nasopharyngeal Swab from Nasopharynx Updated: 11/29/20 1546    Narrative:      The following orders were created for panel order SARS-CoV-2 (COVID-19), PCR Nasopharynx.  Procedure                               Abnormality         Status                     ---------                                -----------         ------                     SARS-COV-2 (COVID-19)/ F...[[id number]]  Abnormal            Final result                 Please view results for these tests on the individual orders.    SARS-COV-2 (COVID-19)/ FLU A/B, AND RSV, PCR Nasopharynx [[id number]]  (Abnormal) Collected: 11/29/20 1442    Specimen: Nasopharyngeal Swab from Nasopharynx Updated: 11/29/20 1546     SARS-CoV-2 (COVID-19) Positive     Influenza A Negative     Influenza B Negative     Respiratory Syncytial Virus Negative          Pathology Results     ** No results found for the last 720 hours. **          Echo:          Imaging:    Radiology Imaging   XR CHEST 1 VW    Narrative CLINICAL HISTORY: Chest pain    COMMENT: A single AP view of the chest was obtained and compared to prior study  from 11/19/2020.    There are low lung volumes. There are bilateral airspace opacities.  There is no  pleural effusion or pneumothorax.  The heart is top normal in size.      Impression IMPRESSION:  Low lung volumes with bilateral airspace opacities.       Assessment     Coronavirus (COVID) Infection  Pneumonia   - CXR: with Low lung volumes with bilateral airspace opacities  - T max 101.7 in ER  - WBCs: 3.8->4.2  -   - CRP 60  - Ferritin 298  - elevated d dimer  - AST/ALT 22/26  - Maintain droplet/contact precautions.  - Blood c/s pending     Acute hypoxic respiratory failure   - on 4 liters NC    HTN    Obesity    Hx of tobacco use          Plan     Remdesivir therapy reviewed with patient including risks and benefits and she is agreeable, she was ordered steroids already but refusing, patient will need labs daily with antiviral therapy, I reviewed awake modified proning with the patient and she verbalizes understanding, continue supportive care

## 2020-12-01 PROBLEM — J96.01 ACUTE RESPIRATORY FAILURE WITH HYPOXIA (CMS/HCC): Status: ACTIVE | Noted: 2020-12-01

## 2020-12-01 PROBLEM — E66.01 MORBID OBESITY WITH BMI OF 40.0-44.9, ADULT (CMS/HCC): Status: ACTIVE | Noted: 2020-12-01

## 2020-12-01 LAB
ALBUMIN SERPL-MCNC: 3.1 G/DL (ref 3.4–5)
ALP SERPL-CCNC: 57 IU/L (ref 35–126)
ALT SERPL-CCNC: 28 IU/L (ref 11–54)
ANION GAP SERPL CALC-SCNC: 14 MEQ/L (ref 3–15)
AST SERPL-CCNC: 27 IU/L (ref 15–41)
BASOPHILS # BLD: 0 K/UL (ref 0.01–0.1)
BASOPHILS NFR BLD: 0 %
BILIRUB SERPL-MCNC: 1 MG/DL (ref 0.3–1.2)
BUN SERPL-MCNC: 12 MG/DL (ref 8–20)
CALCIUM SERPL-MCNC: 8.4 MG/DL (ref 8.9–10.3)
CHLORIDE SERPL-SCNC: 101 MEQ/L (ref 98–109)
CK SERPL-CCNC: 44 U/L (ref 15–200)
CO2 SERPL-SCNC: 22 MEQ/L (ref 22–32)
CREAT SERPL-MCNC: 0.6 MG/DL (ref 0.6–1.1)
CRP SERPL-MCNC: 49.4 MG/L
DIFFERENTIAL METHOD BLD: ABNORMAL
EOSINOPHIL # BLD: 0 K/UL (ref 0.04–0.36)
EOSINOPHIL NFR BLD: 0 %
ERYTHROCYTE [DISTWIDTH] IN BLOOD BY AUTOMATED COUNT: 12.5 % (ref 11.7–14.4)
GFR SERPL CREATININE-BSD FRML MDRD: >60 ML/MIN/1.73M*2
GLUCOSE SERPL-MCNC: 93 MG/DL (ref 70–99)
HCT VFR BLDCO AUTO: 43.7 % (ref 35–45)
HGB BLD-MCNC: 13.7 G/DL (ref 11.8–15.7)
LDH SERPL L TO P-CCNC: 388 IU/L (ref 98–192)
LYMPHOCYTES # BLD: 1.37 K/UL (ref 1.2–3.5)
LYMPHOCYTES NFR BLD: 24 %
MCH RBC QN AUTO: 29.2 PG (ref 28–33.2)
MCHC RBC AUTO-ENTMCNC: 31.4 G/DL (ref 32.2–35.5)
MCV RBC AUTO: 93.2 FL (ref 83–98)
MONOCYTES # BLD: 0.4 K/UL (ref 0.28–0.8)
MONOCYTES NFR BLD: 7 %
NEUTS BAND # BLD: 3.71 K/UL (ref 1.7–7)
NEUTS SEG NFR BLD: 65 %
PDW BLD AUTO: 10.8 FL (ref 9.4–12.3)
PLAT MORPH BLD: NORMAL
PLATELET # BLD AUTO: 230 K/UL (ref 150–369)
PLATELET # BLD EST: ABNORMAL 10*3/UL
POTASSIUM SERPL-SCNC: 4.8 MEQ/L (ref 3.6–5.1)
PROT SERPL-MCNC: 6.5 G/DL (ref 6–8.2)
RBC # BLD AUTO: 4.69 M/UL (ref 3.93–5.22)
RBC MORPH BLD: NORMAL
SODIUM SERPL-SCNC: 137 MEQ/L (ref 136–144)
VARIANT LYMPHS # BLD MANUAL: 0.23 K/UL
VARIANT LYMPHS NFR BLD: 4 %
WBC # BLD AUTO: 5.7 K/UL (ref 3.8–10.5)

## 2020-12-01 PROCEDURE — 25000000 HC PHARMACY GENERAL: Performed by: NURSE PRACTITIONER

## 2020-12-01 PROCEDURE — 36415 COLL VENOUS BLD VENIPUNCTURE: CPT | Performed by: HOSPITALIST

## 2020-12-01 PROCEDURE — 63600000 HC DRUGS/DETAIL CODE: Performed by: PHYSICIAN ASSISTANT

## 2020-12-01 PROCEDURE — 99233 SBSQ HOSP IP/OBS HIGH 50: CPT | Mod: CR | Performed by: INTERNAL MEDICINE

## 2020-12-01 PROCEDURE — 80053 COMPREHEN METABOLIC PANEL: CPT | Performed by: HOSPITALIST

## 2020-12-01 PROCEDURE — 20600000 HC ROOM AND CARE INTERMEDIATE/TELEMETRY

## 2020-12-01 PROCEDURE — 82550 ASSAY OF CK (CPK): CPT | Performed by: HOSPITALIST

## 2020-12-01 PROCEDURE — 63700000 HC SELF-ADMINISTRABLE DRUG: Performed by: INTERNAL MEDICINE

## 2020-12-01 PROCEDURE — 83615 LACTATE (LD) (LDH) ENZYME: CPT | Performed by: HOSPITALIST

## 2020-12-01 PROCEDURE — 25800000 HC PHARMACY IV SOLUTIONS: Performed by: NURSE PRACTITIONER

## 2020-12-01 PROCEDURE — 63700000 HC SELF-ADMINISTRABLE DRUG: Performed by: HOSPITALIST

## 2020-12-01 PROCEDURE — 63600000 HC DRUGS/DETAIL CODE: Mod: JW | Performed by: HOSPITALIST

## 2020-12-01 PROCEDURE — 63700000 HC SELF-ADMINISTRABLE DRUG: Performed by: PSYCHIATRY & NEUROLOGY

## 2020-12-01 PROCEDURE — 86140 C-REACTIVE PROTEIN: CPT | Performed by: HOSPITALIST

## 2020-12-01 PROCEDURE — 85025 COMPLETE CBC W/AUTO DIFF WBC: CPT | Performed by: HOSPITALIST

## 2020-12-01 RX ORDER — CHOLECALCIFEROL (VITAMIN D3) 25 MCG
1000 TABLET ORAL DAILY
Status: DISCONTINUED | OUTPATIENT
Start: 2020-12-01 | End: 2020-12-02 | Stop reason: HOSPADM

## 2020-12-01 RX ORDER — ASCORBIC ACID 500 MG
500 TABLET ORAL 2 TIMES DAILY
Status: DISCONTINUED | OUTPATIENT
Start: 2020-12-01 | End: 2020-12-02 | Stop reason: HOSPADM

## 2020-12-01 RX ADMIN — ARIPIPRAZOLE 30 MG: 10 TABLET ORAL at 21:00

## 2020-12-01 RX ADMIN — POLYETHYLENE GLYCOL 3350 17 G: 17 POWDER, FOR SOLUTION ORAL at 08:36

## 2020-12-01 RX ADMIN — REMDESIVIR 100 MG: 100 INJECTION, POWDER, LYOPHILIZED, FOR SOLUTION INTRAVENOUS at 13:09

## 2020-12-01 RX ADMIN — Medication 1000 UNITS: at 13:08

## 2020-12-01 RX ADMIN — DEXAMETHASONE SODIUM PHOSPHATE 6 MG: 4 INJECTION, SOLUTION INTRA-ARTICULAR; INTRALESIONAL; INTRAMUSCULAR; INTRAVENOUS; SOFT TISSUE at 08:37

## 2020-12-01 RX ADMIN — HEPARIN SODIUM 7500 UNITS: 5000 INJECTION INTRAVENOUS; SUBCUTANEOUS at 21:20

## 2020-12-01 RX ADMIN — VALPROIC ACID 1000 MG: 250 CAPSULE ORAL at 21:16

## 2020-12-01 RX ADMIN — HEPARIN SODIUM 7500 UNITS: 5000 INJECTION INTRAVENOUS; SUBCUTANEOUS at 13:08

## 2020-12-01 RX ADMIN — HEPARIN SODIUM 7500 UNITS: 5000 INJECTION INTRAVENOUS; SUBCUTANEOUS at 06:02

## 2020-12-01 RX ADMIN — DILTIAZEM HYDROCHLORIDE 120 MG: 120 CAPSULE, COATED, EXTENDED RELEASE ORAL at 08:37

## 2020-12-01 RX ADMIN — OXYCODONE HYDROCHLORIDE AND ACETAMINOPHEN 500 MG: 500 TABLET ORAL at 13:08

## 2020-12-01 RX ADMIN — OXYCODONE HYDROCHLORIDE AND ACETAMINOPHEN 500 MG: 500 TABLET ORAL at 21:00

## 2020-12-01 RX ADMIN — ASPIRIN 81 MG: 81 TABLET, COATED ORAL at 08:37

## 2020-12-01 ASSESSMENT — ENCOUNTER SYMPTOMS
SHORTNESS OF BREATH: 1
WHEEZING: 0
COUGH: 0
PALPITATIONS: 0
STRIDOR: 0

## 2020-12-01 NOTE — ASSESSMENT & PLAN NOTE
Morbid obesity.  Intensive lifestyle and diet modification as outpatient.    Patient is a candidate for bariatric surgery given comorbidities.  Discussion with PCP for the same as outpatient.

## 2020-12-01 NOTE — PROGRESS NOTES
"SUBJECTIVE: She continues to improve and able to tolerate room air as well as 2 L for exertion.    Allergies:   Azithromycin, Doxycycline, Penicillins, and Amoxicillin    Review of Systems:  Review of Systems   Respiratory: Positive for shortness of breath. Negative for cough, wheezing and stridor.    Cardiovascular: Negative for chest pain, palpitations and leg swelling.       Input/Ouput in Last 24 hours:  No intake or output data in the 24 hours ending 12/01/20 3234    Objective     Vital Signs for the last 24 hours:  Visit Vitals  /64 (BP Location: Right upper arm, Patient Position: Lying)   Pulse 69   Temp 36.6 °C (97.8 °F) (Oral)   Resp 18   Ht 1.676 m (5' 6\")   Wt 118 kg (260 lb)   LMP  (LMP Unknown)   SpO2 93%   BMI 41.97 kg/m²     Oxygen Therapy: None (Room air)    Physical Exam:  Physical Exam  Constitutional:       Appearance: Normal appearance.   HENT:      Head: Normocephalic and atraumatic.      Mouth/Throat:      Pharynx: No posterior oropharyngeal erythema.   Eyes:      General: No scleral icterus.  Cardiovascular:      Rate and Rhythm: Normal rate.      Heart sounds: No murmur.   Pulmonary:      Comments: Diminished breath sounds  Abdominal:      Palpations: Abdomen is soft.   Musculoskeletal:         General: No swelling.   Skin:     General: Skin is warm.   Neurological:      Mental Status: She is alert and oriented to person, place, and time.   Psychiatric:         Behavior: Behavior normal.          LABS:  Results from last 7 days   Lab Units 12/01/20 0924 11/30/20 0643 11/29/20  1639 11/29/20  1442   SODIUM mEQ/L 137 137  --  139   CO2 mEQ/L 22 24  --  26   BUN mg/dL 12 9  --  9   CALCIUM mg/dL 8.4* 7.8*  --  8.2*   ALBUMIN g/dL 3.1* 2.8*  --  3.5   PHOSPHORUS mg/dL  --   --  2.7  --      Results from last 7 days   Lab Units 12/01/20  0924 11/30/20  0643 11/29/20  1442   WBC K/uL 5.70 4.20 3.82   PLATELETS K/uL 230 167 188     Lab Results   Component Value Date    WBC 5.70 12/01/2020    " HGB 13.7 12/01/2020    HCT 43.7 12/01/2020     12/01/2020    CHOL 240 (H) 11/05/2020    TRIG 233 (H) 11/05/2020    HDL 39 (L) 11/05/2020    ALT 28 12/01/2020    AST 27 12/01/2020     12/01/2020    K 4.8 12/01/2020     12/01/2020    CREATININE 0.6 12/01/2020    BUN 12 12/01/2020    CO2 22 12/01/2020    TSH 1.77 03/22/2020    INR 1.1 11/29/2020    HGBA1C 5.3 03/22/2020       Current Facility-Administered Medications   Medication Dose Route Frequency Provider Last Rate Last Dose   • acetaminophen (TYLENOL) tablet 650 mg  650 mg oral q6h PRN Dustin Winter MD   650 mg at 11/30/20 0827   • albuterol HFA (VENTOLIN HFA) 90 mcg/actuation inhaler 2 puff  2 puff inhalation q6h PRN Mykel Bocanegra MD       • ARIPiprazole (ABILIFY) tablet 30 mg  30 mg oral Daily Mykel Bocanegra MD   30 mg at 11/30/20 2024   • ascorbic acid (VITAMIN C) tablet 500 mg  500 mg oral BID Narinder Herndon MD   500 mg at 12/01/20 1308   • aspirin enteric coated tablet 81 mg  81 mg oral Daily Mykel Bocanegra MD   81 mg at 12/01/20 0837   • cholecalciferol (vitamin D3) tablet 1,000 Units  1,000 Units oral Daily Narinder Herndon MD   1,000 Units at 12/01/20 1308   • cyclobenzaprine (FLEXERIL) tablet 10 mg  10 mg oral 3x daily PRN Mykel Bocanegra MD   10 mg at 11/30/20 0131   • dexamethasone (DECADRON) injection 6 mg  6 mg intravenous Daily Dustin Winter MD   6 mg at 12/01/20 0837   • glucose chewable tablet 16-32 g of dextrose  16-32 g of dextrose oral PRN Mykel Bocanegra MD        Or   • dextrose 40 % oral gel 15-30 g of dextrose  15-30 g of dextrose oral PRN Mykel Bocanegra MD        Or   • glucagon (GLUCAGEN) injection 1 mg  1 mg intramuscular PRN Mykel Bocanegra MD        Or   • dextrose in water injection 12.5 g  25 mL intravenous PRN Mykel Bocanegra MD       • glucose chewable tablet 16-32 g of dextrose  16-32 g of dextrose oral PRN Mykel Bocanegra MD        Or   • dextrose 40 % oral gel 15-30 g of dextrose  15-30 g of dextrose oral PRN Daljit,  MD Mykel        Or   • glucagon (GLUCAGEN) injection 1 mg  1 mg intramuscular PRN Mykel Bocanegra MD        Or   • dextrose in water injection 12.5 g  25 mL intravenous PRN Mykel Bocanegra MD       • dilTIAZem CD (CARDIZEM CD) 24 hr ER capsule 120 mg  120 mg oral Daily Dustin Winter MD   120 mg at 12/01/20 0837   • heparin (porcine) 5,000 unit/mL injection 7,500 Units  7,500 Units subcutaneous q8h Ami Ho PA C   7,500 Units at 12/01/20 1308   • polyethylene glycol (MIRALAX) 17 gram packet 17 g  17 g oral Daily Mykel Bocanegra MD   17 g at 12/01/20 0836   • remdesivir (VEKLURY) 100 mg in sodium chloride 0.9 % 250 mL IVPB  100 mg intravenous q24h INT Hayde Alcantar CRNP   Stopped at 12/01/20 1351   • senna (SENOKOT) tablet 1 tablet  1 tablet oral BID Mykel Bocanegra MD       • valproic acid (DEPAKENE) capsule 1,000 mg  1,000 mg oral Nightly Micky Clinton MD   1,000 mg at 11/30/20 2023         IMPRESSION AND PLAN :       1.  Acute hypoxemic respiratory failure  -Supplemental oxygen her O2 requirements are decreasing  -Positional changes were stressed to the patient     2.  COVID-19 pneumonitis  -Agree with current dose of Decadron  -Treated with aggressive VTE prophylaxis     3.  History of AMINA patient has not been compliant

## 2020-12-01 NOTE — ASSESSMENT & PLAN NOTE
Required 4 L of oxygen to keep saturations at goal yesterday.  Requirements down to 2 L today.  Continue to wean off oxygen as able.  Further recommendations in COVID-19 section.

## 2020-12-01 NOTE — PLAN OF CARE
Problem: Adult Inpatient Plan of Care  Goal: Readiness for Transition of Care  Intervention: Mutually Develop Transition Plan  Flowsheets (Taken 2020 1330)  Anticipated Discharge Disposition: home without services  Discharge Coordination/Progress: Spoke with patient via bedside telephone to discuss discharge planning; name, , PCP(Dr. Wasserman) & pharmacy confirmed. Restrictions on visiting patient at this time due to temporary Covid-19 infection prevention measures. Role of Care Coordination explained. Initial assessment completed.      Patient lives with her mother in a house.     PLOF: independent with ADL's  Home Care Services: no previous services  Pharmacy:[facility]  Equipment Needed After Discharge: none  Assistive Device/Animal Currently Used at Home: CPAP  Anticipated Changes Related to Illness: none  Current Discharge Risk: chronically ill  Readmission Within the Last 30 Days: no previous admission in last 30 days  Patient/Family Anticipated Services at Transition: none  Patient/Family Anticipates Transition to: home with family  Transportation Anticipated: family or friend will provide  Concerns to be Addressed: adjustment to diagnosis/illness

## 2020-12-01 NOTE — PROGRESS NOTES
Infectious Disease Progress Note    Patient Name: Jillian Acosta  MR#: [id number]  : 1975  Admission Date: 2020  Date: 20   Time: 2:43 PM   Author: ELISHA Alcala    Major Events:     Antibiotics:    Anti-infectives (From admission, onward)    Start     Dose/Rate Route Frequency Ordered Stop    20 1405  remdesivir (VEKLURY) 100 mg in sodium chloride 0.9 % 250 mL IVPB      100 mg  500 mL/hr over 30 Minutes intravenous Every 24 hours interval 20 1321 20 1414          Subjective   Patient states she is feeling a little better and hopes to go home soon to feed her cat, states she had some shortness of breath with walking earlier and had to go back on the oxygen but is ok when laying in bed, d/w nursing     Review of Systems    Pertinent items are noted in HPI.    Objective     Vital Signs:    Patient Vitals for the past 72 hrs:   BP Temp Temp src Pulse Resp SpO2 Height Weight   20 1330 (!) 105/57 -- -- -- -- -- -- --   20 1300 (!) 108/57 -- -- -- -- 94 % -- --   20 1000 -- -- -- -- -- 92 % -- --   20 0800 (!) 102/52 36.6 °C (97.9 °F) Oral 68 18 92 % -- --   20 0400 (!) 123/54 36.6 °C (97.8 °F) Oral 73 18 97 % -- --   20 0000 121/64 36.7 °C (98 °F) Oral 77 18 100 % -- --   20 2000 -- -- -- -- -- 98 % -- --   20 1730 (!) 110/38 36.8 °C (98.2 °F) Oral 76 18 94 % -- --   20 1600 -- -- -- 65 -- -- -- --   20 1445 (!) 107/55 36.7 °C (98 °F) Oral 73 18 (!) 91 % -- --   20 1415 (!) 103/47 36.7 °C (98 °F) Oral 65 18 94 % -- --   20 1300 -- -- -- -- -- 92 % -- --   20 1132 110/61 36.8 °C (98.2 °F) Oral 65 18 97 % -- --   20 0830 -- -- -- -- -- (!) 89 % -- --   20 0800 (!) 111/58 37.1 °C (98.8 °F) Oral 78 -- 94 % -- --   20 0518 -- -- -- -- -- -- -- 118 kg (260 lb)   20 0000 -- -- -- -- -- (!) 9 % -- --   20 2351 -- -- -- -- -- -- -- 118 kg (260 lb)   20 2100 122/67 37.1  "°C (98.7 °F) Oral 72 (!) 27 96 % -- --   20 (!) 115/55 -- -- 71 (!) 27 95 % -- --   20 1902 (!) 114/54 -- -- 70 (!) 24 96 % -- --   20 1700 (!) 99/54 37.1 °C (98.8 °F) Oral 70 18 94 % -- --   20 1621 (!) 103/54 -- -- 77 20 95 % -- --   20 1530 -- -- -- 80 18 96 % -- --   20 1515 -- -- -- 81 (!) 28 (!) 89 % -- --   20 1439 (!) 120/57 (!) 38.7 °C (101.7 °F) -- 91 (!) 22 94 % 1.676 m (5' 6\") 122 kg (268 lb 3.2 oz)       Temp (72hrs), Av °C (98.6 °F), Min:36.6 °C (97.8 °F), Max:38.7 °C (101.7 °F)      Physical Exam:    Visit Vitals  BP (!) 105/57 (BP Location: Right upper arm, Patient Position: Lying)   Pulse 68   Temp 36.6 °C (97.9 °F) (Oral)   Resp 18   Ht 1.676 m (5' 6\")   Wt 118 kg (260 lb)   LMP  (LMP Unknown)   SpO2 94%   BMI 41.97 kg/m²     General appearance: alert, appears stated age, cooperative and no distress  Eyes: anicteric  Lungs: nonlabored, no cough observed  Abdomen: soft, ND, NT, obese  Neurologic: Mental status: Alert, oriented, thought content appropriate    Lines, Drains, Airways, Wounds:  Peripheral IV 20 Left Antecubital (Active)   Number of days: 2       Labs:    CBC Results       20                    0924 0643 1442         WBC 5.70 4.20 3.82         RBC 4.69 4.16 4.65         HGB 13.7 12.5 13.8         HCT 43.7 37.7 41.5         MCV 93.2 90.6 89.2         MCH 29.2 30.0 29.7         MCHC 31.4 33.2 33.3          167 188         Comment for PLT at 0643 on 20: CONFIRMED WITH SMEAR ESTIMATE      BMP Results       20                    0924 0643 1442          137 139         K 4.8 4.5 3.8         Cl 101 103 102         CO2 22 24 26         Glucose 93 99 100         BUN 12 9 9         Creatinine 0.6 0.6 0.8         Calcium 8.4 7.8 8.2         Anion Gap 14 10 11         EGFR >60.0 >60.0 >60.0         Comment for K at 0924 on 20: SLIGHT HEMOLYSIS, RESULT MAY BE INCREASED.    " Comment for K at 1442 on 11/29/20: Results obtained on plasma. Plasma Potassium values may be up to 0.4 mEQ/L less than serum values. The differences may be greater for patients with high platelet or white cell counts.      PT/PTT Results       11/29/20 11/29/20 03/22/20                    1640 1442 1708         PT 13.8 13.0 12.5         INR 1.1 1.0 1.0         PTT 28 26 29         Comment for INR at 1640 on 11/29/20: INR has no defined significance when PT is within Reference Range.    Comment for INR at 1442 on 11/29/20: INR has no defined significance when PT is within Reference Range.    Comment for INR at 1708 on 03/22/20:   INR has no defined significance when PT is within Reference Range.      UA Results       11/19/20                          1834           Color Yellow           Clarity Clear           Glucose Negative           Bilirubin Negative           Ketones Negative           Sp Grav 1.016           Blood Negative           Ph 6.0           Protein Negative           Urobilinogen 0.2           Nitrite Negative           Leuk Est Negative           Comment for Blood at 1834 on 11/19/20: The sensitivity of the occult blood test is equivalent to approximately 4 intact RBC/HPF.    Comment for Leuk Est at 1834 on 11/19/20: Results can be falsely negative due to high specific gravity, some antibiotics, glucose >3 g/dl, or WBC other than neutrophils.      Lactate Results     No lab values to display.          Microbiology Results     Procedure Component Value Units Date/Time    Blood Culture Blood, Venous [[id number]] Collected: 11/29/20 1442    Specimen: Blood, Venous Updated: 11/30/20 2100     Culture No growth at 18-24 hours    Blood Culture Blood, Venous [[id number]] Collected: 11/29/20 1442    Specimen: Blood, Venous Updated: 11/30/20 2100     Culture No growth at 18-24 hours    SARS-CoV-2 (COVID-19), PCR Nasopharynx [[id number]]  (Abnormal) Collected: 11/29/20 1442    Specimen: Nasopharyngeal Swab from  Nasopharynx Updated: 11/29/20 1546    Narrative:      The following orders were created for panel order SARS-CoV-2 (COVID-19), PCR Nasopharynx.  Procedure                               Abnormality         Status                     ---------                               -----------         ------                     SARS-COV-2 (COVID-19)/ F...[[id number]]  Abnormal            Final result                 Please view results for these tests on the individual orders.    SARS-COV-2 (COVID-19)/ FLU A/B, AND RSV, PCR Nasopharynx [[id number]]  (Abnormal) Collected: 11/29/20 1442    Specimen: Nasopharyngeal Swab from Nasopharynx Updated: 11/29/20 1546     SARS-CoV-2 (COVID-19) Positive     Influenza A Negative     Influenza B Negative     Respiratory Syncytial Virus Negative          Pathology Results     ** No results found for the last 720 hours. **          Echo:          Imaging:    Radiology Imaging   XR CHEST 1 VW    Narrative CLINICAL HISTORY: Chest pain    COMMENT: A single AP view of the chest was obtained and compared to prior study  from 11/19/2020.    There are low lung volumes. There are bilateral airspace opacities.  There is no  pleural effusion or pneumothorax.  The heart is top normal in size.      Impression IMPRESSION:  Low lung volumes with bilateral airspace opacities.       Assessment     Covid 19 PNA   - intermittent use of oxygen, mainly with exertion today and currently on room air without hypoxia at rest  - afebrile  - on steroids and remdesivir  - renal and liver function stable   - blood sterile              Plan     Patient received dose of remdesivir and steroids today already however if she does not require consistent oxygen therapy recommend discontinuation of steroids and remdesivir

## 2020-12-01 NOTE — PLAN OF CARE
Problem: Adult Inpatient Plan of Care  Goal: Plan of Care Review  Outcome: Progressing  Flowsheets (Taken 12/1/2020 0709)  Progress: improving  Outcome Summary: Pt weaned to room air overnight. Pulse ox 97%. Pt with infrequent, dry cough noted. No SOB/no WOB noted. Breath sounds diminished throughout. Sinus rythm on telemetry.     Problem: Adult Inpatient Plan of Care  Goal: Patient-Specific Goal (Individualization)  Outcome: Progressing  Flowsheets (Taken 12/1/2020 0709)  Patient-Specific Goals (Include Timeframe): to go home and feed cat.  Individualized Care Needs: telemetry, resp assessments  Anxieties, Fears or Concerns: fears that her sister will get covid   Plan of Care Review  Progress: improving  Outcome Summary: Pt weaned to room air overnight. Pulse ox 97%. Pt with infrequent, dry cough noted. No SOB/no WOB noted. Breath sounds diminished throughout. Sinus rythm on telemetry.

## 2020-12-01 NOTE — PATIENT CARE CONFERENCE
[facility] Progression Rounds Note  Date: 12/1/2020  Time: 11:57 AM     Patient Name: Jillian Acosta     Medical Record Number: [id number]   YOB: 1975  Sex: Female      Room/Bed: 4505W    Admitting Diagnosis: Hypoxia [R09.02]  COVID-19 [U07.1]   Admit Date/Time: 11/29/2020  2:31 PM    Primary Diagnosis: COVID-19  Principal Problem: COVID-19    GMLOS: 4.8  Anticipated Discharge Date: 12/2/2020    AM-PAC  Mobility Score:      Discharge Planning:       Barriers to Discharge:  Barriers to Discharge: Medical issues not resolved  Comment: Remdesivir, Decadron,    Participants:  dietitian/nutrition services, nursing, social work/services

## 2020-12-01 NOTE — PATIENT CARE CONFERENCE
Care Progression Rounds Note  Date: 12/1/2020  Time: 11:57 AM     Patient Name: Jillian Acosta     Medical Record Number: [id number]   YOB: 1975  Sex: Female      Room/Bed: 4505W    Admitting Diagnosis: Hypoxia [R09.02]  COVID-19 [U07.1]   Admit Date/Time: 11/29/2020  2:31 PM    Primary Diagnosis: COVID-19  Principal Problem: COVID-19    GMLOS: 4.8  Anticipated Discharge Date: 12/2/2020    AM-PAC  Mobility Score:      Discharge Planning:       Barriers to Discharge:  Barriers to Discharge: Medical issues not resolved  Comment: Remdesivir, Decadron,    Participants:  dietitian/nutrition services, nursing, social work/services

## 2020-12-01 NOTE — PROGRESS NOTES
Hospital Medicine Service -  Daily Progress Note       SUBJECTIVE   Interval History: Patient examined at bedside, denied any new complaints, night was uneventful.    Patient denies  fever, night sweats, runny nose, mouth sores, sore throat,   palpitations, chest pain, heartburn, constipation, nausea, vomiting, blood in stools, incontinence  Painful urination urgency  Joint pain  Headache, vision change, weakness, dizziness, tremor  Bleeding, blood in stools.     OBJECTIVE      Vital signs in last 24 hours:  Temp:  [36.6 °C (97.8 °F)-36.8 °C (98.2 °F)] 36.6 °C (97.9 °F)  Heart Rate:  [65-77] 68  Resp:  [18] 18  BP: (102-123)/(38-64) 102/52  No intake or output data in the 24 hours ending 12/01/20 1139    PHYSICAL EXAMINATION      Physical Exam  Vitals signs and nursing note reviewed.   Constitutional:       Appearance: Normal appearance. She is normal weight.   HENT:      Head: Normocephalic and atraumatic.      Right Ear: Tympanic membrane, ear canal and external ear normal.      Left Ear: Tympanic membrane, ear canal and external ear normal.      Nose: Nose normal.      Mouth/Throat:      Mouth: Mucous membranes are moist.      Pharynx: Oropharynx is clear.   Eyes:      Extraocular Movements: Extraocular movements intact.      Conjunctiva/sclera: Conjunctivae normal.      Pupils: Pupils are equal, round, and reactive to light.   Neck:      Musculoskeletal: Normal range of motion and neck supple.   Cardiovascular:      Rate and Rhythm: Normal rate and regular rhythm.      Pulses: Normal pulses.      Heart sounds: Normal heart sounds.   Pulmonary:      Comments: Reduced air entry at lung bases.    Abdominal:      General: Abdomen is flat. Bowel sounds are normal.      Palpations: Abdomen is soft.   Musculoskeletal: Normal range of motion.   Skin:     General: Skin is warm.      Capillary Refill: Capillary refill takes less than 2 seconds.   Neurological:      General: No focal deficit present.      Mental Status:  She is alert and oriented to person, place, and time. Mental status is at baseline.   Psychiatric:         Mood and Affect: Mood normal.         Behavior: Behavior normal.         Thought Content: Thought content normal.         Judgment: Judgment normal.        LINES, CATHETERS, DRAINS, AIRWAYS, AND WOUNDS   Lines, Drains, Airways, Wounds:  Peripheral IV 11/29/20 Left Antecubital (Active)   Number of days: 2       Comments:      LABS / IMAGING / TELE      Labs  Results from last 7 days   Lab Units 12/01/20 0924 11/30/20 0643 11/29/20  1442   WBC K/uL 5.70 4.20 3.82   HEMOGLOBIN g/dL 13.7 12.5 13.8   HEMATOCRIT % 43.7 37.7 41.5   PLATELETS K/uL 230 167 188   DIFF TYPE  Manu Auto Auto   NRBC %  --  0.0 0.0   IMM GRANULOCYTES %  --  0.5 0.3   NEUTROPHILS %  --  72.6 71.8   NEUTROS PCT MAN % 65  --   --    LYMPHOCYTES %  --  18.1 19.4   LYMPHO PCT MAN % 24  --   --    MONOCYTES %  --  8.6 7.9   MONO PCT MAN % 7  --   --    EOSINOPHILS %  --  0.2 0.3   EOSINO PCT MAN % 0  --   --    BASOPHILS %  --  0.0 0.3   BASOS PCT MAN % 0  --   --    IMM GRANUCOCYTES ABS K/uL  --  0.02 0.01   SEGS ABS MAN K/uL 3.71  --   --    LYMPHO ABS MAN K/uL 1.37  --   --    MONO ABS AUTO K/uL  --  0.36 0.30   MONO ABS MAN K/uL 0.40  --   --    EOS ABS AUTO K/uL  --  0.01* 0.01*   EOS ABS MAN K/uL 0.00*  --   --    BASO ABS AUTO K/uL  --  0.00* 0.01   BASOS ABS MAN K/uL 0.00*  --   --      Results from last 7 days   Lab Units 12/01/20 0924 11/30/20 0643 11/29/20  1442   SODIUM mEQ/L 137 137 139   POTASSIUM mEQ/L 4.8 4.5 3.8   CHLORIDE mEQ/L 101 103 102   CO2 mEQ/L 22 24 26   BUN mg/dL 12 9 9   CREATININE mg/dL 0.6 0.6 0.8   GLUCOSE mg/dL 93 99 100*   CALCIUM mg/dL 8.4* 7.8* 8.2*         Imaging  No new imaging.     ECG/Telemetry  I have independently reviewed the telemetry. No events for the last 24 hours.    ASSESSMENT AND PLAN      * COVID-19  Assessment & Plan  Admit 11/29 with COVID-19 with fever/diarrhea/myalgias-symptoms started    CXR with low lung volumes and bilateral air space opacities    -Pox dropped to 89% RA, currently on 2L  -Patient excepted Decadron.  Continue to monitor.  Supportive Care  -Encourage deep breathing maneuvers/modified proning as able/OOB as able/IS  -Heparin increased to [id number]   -Appreciate input from ID, continue remdesivir to complete course.  Will add vitamin C and vitamin D.    Acute respiratory failure with hypoxia (CMS/[facility])  Assessment & Plan  Required 4 L of oxygen to keep saturations at goal yesterday.  Requirements down to 2 L today.  Continue to wean off oxygen as able.  Further recommendations in COVID-19 section.      Hypertension  Assessment & Plan  Diltiazem  daily and Lasix 20 mg as needed edema  Hold diuretic  Continue diltiazem with holding parameters    Sepsis without acute organ dysfunction (CMS/[facility])  Assessment & Plan  Met sepsis criteria on admission  Due to COVID infection  See COVID pna    AMINA (obstructive sleep apnea)  Assessment & Plan  Wears CPAP for some of the night but then usually takes it off  Will order here and see if she can tolerate    A-fib (CMS/[facility])  Assessment & Plan  Hx of PAF  Cont diltiazem , aspirin 81mg/day    Bipolar 1 disorder (CMS/HCC)  Assessment & Plan  Takes abilify 30mg, depakote  Will ask psyche to see       VTE Assessment: Padua VTE Score: 4  VTE Prophylaxis Plan: Lovenox  Code Status: Full Code  Estimated Discharge Date: 2020  Disposition Plannin/3/2020      Narinder Herndon MD  2020

## 2020-12-02 VITALS
BODY MASS INDEX: 41.78 KG/M2 | SYSTOLIC BLOOD PRESSURE: 123 MMHG | RESPIRATION RATE: 18 BRPM | HEART RATE: 70 BPM | TEMPERATURE: 98 F | OXYGEN SATURATION: 97 % | WEIGHT: 260 LBS | DIASTOLIC BLOOD PRESSURE: 61 MMHG | HEIGHT: 66 IN

## 2020-12-02 LAB
ALBUMIN SERPL-MCNC: 3.3 G/DL (ref 3.4–5)
ALP SERPL-CCNC: 61 IU/L (ref 35–126)
ALT SERPL-CCNC: 27 IU/L (ref 11–54)
ANION GAP SERPL CALC-SCNC: 11 MEQ/L (ref 3–15)
AST SERPL-CCNC: 19 IU/L (ref 15–41)
BASOPHILS # BLD: 0 K/UL (ref 0.01–0.1)
BASOPHILS NFR BLD: 0 %
BILIRUB SERPL-MCNC: 0.6 MG/DL (ref 0.3–1.2)
BUN SERPL-MCNC: 13 MG/DL (ref 8–20)
CALCIUM SERPL-MCNC: 9.1 MG/DL (ref 8.9–10.3)
CHLORIDE SERPL-SCNC: 103 MEQ/L (ref 98–109)
CO2 SERPL-SCNC: 27 MEQ/L (ref 22–32)
CREAT SERPL-MCNC: 0.6 MG/DL (ref 0.6–1.1)
DIFFERENTIAL METHOD BLD: ABNORMAL
EOSINOPHIL # BLD: 0 K/UL (ref 0.04–0.36)
EOSINOPHIL NFR BLD: 0 %
ERYTHROCYTE [DISTWIDTH] IN BLOOD BY AUTOMATED COUNT: 12.5 % (ref 11.7–14.4)
GFR SERPL CREATININE-BSD FRML MDRD: >60 ML/MIN/1.73M*2
GLUCOSE SERPL-MCNC: 128 MG/DL (ref 70–99)
HCT VFR BLDCO AUTO: 42.5 % (ref 35–45)
HGB BLD-MCNC: 14.4 G/DL (ref 11.8–15.7)
LYMPHOCYTES # BLD: 1.43 K/UL (ref 1.2–3.5)
LYMPHOCYTES NFR BLD: 16 %
MCH RBC QN AUTO: 30.1 PG (ref 28–33.2)
MCHC RBC AUTO-ENTMCNC: 33.9 G/DL (ref 32.2–35.5)
MCV RBC AUTO: 88.9 FL (ref 83–98)
MONOCYTES # BLD: 0.54 K/UL (ref 0.28–0.8)
MONOCYTES NFR BLD: 6 %
NEUTS BAND # BLD: 0.36 K/UL (ref 0–0.53)
NEUTS BAND # BLD: 6.44 K/UL (ref 1.7–7)
NEUTS BAND NFR BLD: 4 %
NEUTS SEG NFR BLD: 72 %
PDW BLD AUTO: 10.5 FL (ref 9.4–12.3)
PLAT MORPH BLD: NORMAL
PLATELET # BLD AUTO: 336 K/UL (ref 150–369)
PLATELET # BLD EST: ABNORMAL 10*3/UL
POTASSIUM SERPL-SCNC: 4.8 MEQ/L (ref 3.6–5.1)
PROT SERPL-MCNC: 7.1 G/DL (ref 6–8.2)
RBC # BLD AUTO: 4.78 M/UL (ref 3.93–5.22)
RBC MORPH BLD: NORMAL
SODIUM SERPL-SCNC: 141 MEQ/L (ref 136–144)
VARIANT LYMPHS # BLD MANUAL: 0.18 K/UL
VARIANT LYMPHS NFR BLD: 2 %
WBC # BLD AUTO: 8.95 K/UL (ref 3.8–10.5)

## 2020-12-02 PROCEDURE — 63700000 HC SELF-ADMINISTRABLE DRUG: Performed by: HOSPITALIST

## 2020-12-02 PROCEDURE — 63700000 HC SELF-ADMINISTRABLE DRUG: Performed by: INTERNAL MEDICINE

## 2020-12-02 PROCEDURE — 63600000 HC DRUGS/DETAIL CODE: Mod: JW | Performed by: HOSPITALIST

## 2020-12-02 PROCEDURE — 80053 COMPREHEN METABOLIC PANEL: CPT | Performed by: HOSPITALIST

## 2020-12-02 PROCEDURE — 85025 COMPLETE CBC W/AUTO DIFF WBC: CPT | Performed by: HOSPITALIST

## 2020-12-02 PROCEDURE — 36415 COLL VENOUS BLD VENIPUNCTURE: CPT | Performed by: HOSPITALIST

## 2020-12-02 PROCEDURE — 99239 HOSP IP/OBS DSCHRG MGMT >30: CPT | Mod: CR | Performed by: INTERNAL MEDICINE

## 2020-12-02 PROCEDURE — 94761 N-INVAS EAR/PLS OXIMETRY MLT: CPT

## 2020-12-02 PROCEDURE — 63600000 HC DRUGS/DETAIL CODE: Performed by: PHYSICIAN ASSISTANT

## 2020-12-02 RX ORDER — CHOLECALCIFEROL (VITAMIN D3) 25 MCG
1000 TABLET ORAL DAILY
Qty: 30 TABLET | Refills: 0 | Status: SHIPPED | OUTPATIENT
Start: 2020-12-03 | End: 2022-07-28

## 2020-12-02 RX ORDER — ALBUTEROL SULFATE 90 UG/1
2 INHALANT RESPIRATORY (INHALATION) EVERY 6 HOURS PRN
Qty: 1 INHALER | Refills: 1 | Status: SHIPPED | OUTPATIENT
Start: 2020-12-02 | End: 2025-01-08

## 2020-12-02 RX ORDER — ASCORBIC ACID 500 MG
500 TABLET ORAL 2 TIMES DAILY
Qty: 60 TABLET | Refills: 0 | Status: SHIPPED | OUTPATIENT
Start: 2020-12-02 | End: 2025-01-08

## 2020-12-02 RX ADMIN — ASPIRIN 81 MG: 81 TABLET, COATED ORAL at 08:31

## 2020-12-02 RX ADMIN — OXYCODONE HYDROCHLORIDE AND ACETAMINOPHEN 500 MG: 500 TABLET ORAL at 08:31

## 2020-12-02 RX ADMIN — DILTIAZEM HYDROCHLORIDE 120 MG: 120 CAPSULE, COATED, EXTENDED RELEASE ORAL at 08:31

## 2020-12-02 RX ADMIN — HEPARIN SODIUM 7500 UNITS: 5000 INJECTION INTRAVENOUS; SUBCUTANEOUS at 06:29

## 2020-12-02 RX ADMIN — Medication 1000 UNITS: at 08:31

## 2020-12-02 NOTE — PATIENT CARE CONFERENCE
Care Progression Rounds Note  Date: 12/2/2020  Time: 10:06 AM     Patient Name: Jillian Acosta     Medical Record Number: [id number]   YOB: 1975  Sex: Female      Room/Bed: 4505W    Admitting Diagnosis: Hypoxia [R09.02]  COVID-19 [U07.1]   Admit Date/Time: 11/29/2020  2:31 PM    Primary Diagnosis: COVID-19  Principal Problem: COVID-19    GMLOS: 4.8  Anticipated Discharge Date: 12/2/2020    AM-PAC  Mobility Score:      Discharge Planning:  Living Arrangements: house  Anticipated Discharge Disposition: home without services    Barriers to Discharge:  Barriers to Discharge: Medical issues not resolved  Comment: d/c decadron/remdesivir; home 02 eval probable d/c today    Participants:  nursing, , social work/services

## 2020-12-02 NOTE — NURSING NOTE
Patient discharged to home. Passed home o2 eval. IV removed. Telemetry monitor discontinued. Reviewed discharge instructions with patient, verbalizes understanding. Sister to provide transportation.

## 2020-12-02 NOTE — DISCHARGE SUMMARY
Hospital Medicine Service -  Inpatient Discharge Summary        BRIEF OVERVIEW   Admitting Provider: Mykel Bocanegra MD  Attending Provider: No att. providers found Attending phys phone: N/A    PCP: Otto Wasserman [phone]    Admission Date: 11/29/2020  Discharge Date: 12/2/2020     DISCHARGE DIAGNOSES      Primary Discharge Diagnosis  COVID-19    Secondary Discharge Diagnoses  Active Hospital Problems    Diagnosis Date Noted   • COVID-19 11/29/2020     Priority: High   • Acute respiratory failure with hypoxia (CMS/[facility]) 12/01/2020     Priority: Medium   • Morbid obesity with BMI of 40.0-44.9, adult (CMS/[facility]) 12/01/2020   • Hypertension    • Sepsis without acute organ dysfunction (CMS/[facility]) 05/05/2020   • A-fib (CMS/[facility]) 03/22/2020   • AMINA (obstructive sleep apnea) 03/22/2020   • Bipolar 1 disorder (CMS/[facility]) 05/07/2018      Resolved Hospital Problems   No resolved problems to display.       Problem List on Day of Discharge  * COVID-19  Assessment & Plan  Admit 11/29 with COVID-19 with fever/diarrhea/myalgias-symptoms started 11/20  CXR with low lung volumes and bilateral air space opacities    -Pox dropped to 89% RA, currently on 2L  -Patient excepted Decadron.  Continue to monitor.  Supportive Care  -Encourage deep breathing maneuvers/modified proning as able/OOB as able/IS  -Heparin increased to [id number]   -Appreciate input from ID, continue remdesivir to complete course.  Will add vitamin C and vitamin D.    Acute respiratory failure with hypoxia (CMS/[facility])  Assessment & Plan  Required 4 L of oxygen to keep saturations at goal yesterday.  Requirements down to 2 L today.  Continue to wean off oxygen as able.  Further recommendations in COVID-19 section.      Morbid obesity with BMI of 40.0-44.9, adult (CMS/[facility])  Assessment & Plan  Morbid obesity.  Intensive lifestyle and diet modification as outpatient.    Patient is a candidate for bariatric surgery given comorbidities.  Discussion with PCP for the same as  outpatient.    Hypertension  Assessment & Plan  Diltiazem  daily and Lasix 20 mg as needed edema  Hold diuretic  Continue diltiazem with holding parameters    Sepsis without acute organ dysfunction (CMS/[facility])  Assessment & Plan  Met sepsis criteria on admission  Due to COVID infection  See COVID pna    AMINA (obstructive sleep apnea)  Assessment & Plan  Wears CPAP for some of the night but then usually takes it off  Will order here and see if she can tolerate    A-fib (CMS/[facility])  Assessment & Plan  Hx of PAF  Cont diltiazem , aspirin 81mg/day    Bipolar 1 disorder (CMS/[facility])  Assessment & Plan  Takes abilify 30mg, depakote  Will ask psyche to see      SUMMARY OF HOSPITALIZATION      Presenting Problem/History of Present Illness  Hypoxia    This is a 45 y.o. year-old female admitted on 11/29/2020 with Hypoxia [R09.02]  COVID-19 [U07.1].         Hospital Course    Patient is a 45-year-old female with past medical history of atrial fibrillation, bipolar disorder, essential hypertension, obstructive sleep apnea and morbid obesity.  Patient came in with chief complaint of fever cough and myalgias for a few days.  Stating that she feels tired and a bit lethargic.  Patient was diagnosed with COVID-19 pneumonia on imaging and lab work.  Patient initially required 4 L of oxygen, which was able to be weaned down to room air in the subsequent 48 hours.  Patient received 2 doses of steroids and remdesivir subsequently.  Infectious disease and pulmonology saw the patient as well.  Infectious disease recommended to discontinue remdesivir and steroids given that she was on room air.  Patient was evaluated for home oxygen, and did not require any.  Patient is now hemodynamically and symptomatically stable for discharge, is to follow-up with primary care and pulmonology as an outpatient.    Exam on Day of Discharge  Physical Exam  Vitals signs and nursing note reviewed.   Constitutional:       Appearance: Normal appearance.  She is normal weight.   HENT:      Head: Normocephalic and atraumatic.      Right Ear: Tympanic membrane, ear canal and external ear normal.      Left Ear: Tympanic membrane, ear canal and external ear normal.      Nose: Nose normal.      Mouth/Throat:      Mouth: Mucous membranes are moist.      Pharynx: Oropharynx is clear.   Eyes:      Extraocular Movements: Extraocular movements intact.      Conjunctiva/sclera: Conjunctivae normal.      Pupils: Pupils are equal, round, and reactive to light.   Neck:      Musculoskeletal: Normal range of motion and neck supple.   Cardiovascular:      Rate and Rhythm: Normal rate and regular rhythm.      Pulses: Normal pulses.      Heart sounds: Normal heart sounds.   Pulmonary:      Effort: Pulmonary effort is normal.      Breath sounds: Normal breath sounds.   Abdominal:      General: Abdomen is flat. Bowel sounds are normal.      Palpations: Abdomen is soft.   Musculoskeletal: Normal range of motion.   Skin:     General: Skin is warm.      Capillary Refill: Capillary refill takes less than 2 seconds.   Neurological:      General: No focal deficit present.      Mental Status: She is alert and oriented to person, place, and time. Mental status is at baseline.   Psychiatric:         Mood and Affect: Mood normal.         Behavior: Behavior normal.         Thought Content: Thought content normal.         Judgment: Judgment normal.         Consults During Admission  IP CONSULT TO INFECTIOUS DISEASE  IP CONSULT TO PULMONOLOGY/SLEEP MEDICINE  IP CONSULT TO PSYCHIATRY    DISCHARGE MEDICATIONS     PENDING ORDERS (720h ago, onward)         Ordered     Basic metabolic panel  Morning draw,   Discontinued:  11/29/20 2350,   Status:  Canceled      Signed and Held               Medication List      START taking these medications    albuterol HFA 90 mcg/actuation inhaler  Commonly known as: VENTOLIN HFA  Inhale 2 puffs every 6 (six) hours as needed for wheezing or shortness of breath.  Dose: 2  puff     ascorbic acid 500 mg tablet  Commonly known as: VITAMIN C  Take 1 tablet (500 mg total) by mouth 2 (two) times a day.  Dose: 500 mg     cholecalciferol (vitamin D3) 1,000 unit (25 mcg) tablet  Start taking on: December 3, 2020  Take 1 tablet (1,000 Units total) by mouth daily.  Dose: 1,000 Units        CONTINUE taking these medications    ARIPiprazole 30 mg tablet  Commonly known as: ABILIFY  Take 1 tablet (30 mg total) by mouth daily.  Dose: 30 mg     aspirin 81 mg enteric coated tablet  TK 1 T PO QD     cyclobenzaprine 10 mg tablet  Commonly known as: FLEXERIL  Take 1 tablet (10 mg total) by mouth 3 (three) times a day as needed for muscle spasms. No driving or operating heavy machinery if taking.  Dose: 10 mg     dilTIAZem  mg 24 hr capsule  Commonly known as: CARDIZEM CD  Take 1 capsule (120 mg total) by mouth daily.  Dose: 120 mg     fluticasone propionate 50 mcg/actuation nasal spray  Commonly known as: FLONASE  2 sprays daily.  Dose: 2 spray     furosemide 20 mg tablet  Commonly known as: LASIX  TAKE 1 TABLET DAILY AS NEEDED FOR EDEMA     naproxen 500 mg tablet  Commonly known as: NAPROSYN  Take 1 tablet (500 mg total) by mouth 2 (two) times a day with meals. As needed for pain.  Dose: 500 mg     polyethylene glycol 17 gram packet  Commonly known as: MIRALAX  Take 17 g by mouth daily for 3 days.  Dose: 17 g     senna 8.6 mg tablet  Commonly known as: SENOKOT  Take 1 tablet by mouth 2 (two) times a day.  Dose: 1 tablet     valproic acid 250 mg capsule  Commonly known as: DEPAKENE  TAKE 4 CAPSULES ORALLY AT BEDTIME        STOP taking these medications    lithium 300 mg CR tablet  Commonly known as: LITHOBID     QUEtiapine 400 mg tablet  Commonly known as: SEROquel                   PROCEDURES / LABS / IMAGING      Operative Procedures  --    Other Procedures  --    Pertinent Labs  Results from last 7 days   Lab Units 12/02/20  1046 12/01/20  0924 11/30/20  0643 11/29/20  1442   WBC K/uL 8.95 5.70  4.20 3.82   HEMOGLOBIN g/dL 14.4 13.7 12.5 13.8   HEMATOCRIT % 42.5 43.7 37.7 41.5   PLATELETS K/uL 336 230 167 188   DIFF TYPE  Manu Manu Auto Auto   NRBC %  --   --  0.0 0.0   IMM GRANULOCYTES %  --   --  0.5 0.3   NEUTROPHILS %  --   --  72.6 71.8   NEUTROS PCT MAN % 72 65  --   --    LYMPHOCYTES %  --   --  18.1 19.4   LYMPHO PCT MAN % 16 24  --   --    MONOCYTES %  --   --  8.6 7.9   MONO PCT MAN % 6 7  --   --    EOSINOPHILS %  --   --  0.2 0.3   EOSINO PCT MAN % 0 0  --   --    BASOPHILS %  --   --  0.0 0.3   BASOS PCT MAN % 0 0  --   --    BANDS PCT MAN % 4  --   --   --    IMM GRANUCOCYTES ABS K/uL  --   --  0.02 0.01   SEGS ABS MAN K/uL 6.44 3.71  --   --    LYMPHO ABS MAN K/uL 1.43 1.37  --   --    MONO ABS AUTO K/uL  --   --  0.36 0.30   MONO ABS MAN K/uL 0.54 0.40  --   --    EOS ABS AUTO K/uL  --   --  0.01* 0.01*   EOS ABS MAN K/uL 0.00* 0.00*  --   --    BASO ABS AUTO K/uL  --   --  0.00* 0.01   BASOS ABS MAN K/uL 0.00* 0.00*  --   --      Results from last 7 days   Lab Units 12/02/20  1046 12/01/20  0924 11/30/20  0643   SODIUM mEQ/L 141 137 137   POTASSIUM mEQ/L 4.8 4.8 4.5   CHLORIDE mEQ/L 103 101 103   CO2 mEQ/L 27 22 24   BUN mg/dL 13 12 9   CREATININE mg/dL 0.6 0.6 0.6   GLUCOSE mg/dL 128* 93 99   CALCIUM mg/dL 9.1 8.4* 7.8*   ,mp      Pertinent Imaging  X-ray Chest 1 View    Result Date: 11/29/2020  IMPRESSION:  Low lung volumes with bilateral airspace opacities.    X-ray Chest 1 View    Result Date: 11/19/2020  IMPRESSION: No active disease in the chest. COMMENT: Tubes/Lines: None. Lungs/Pleura: Clear without pneumothorax or evidence of overt congestive heart there are this upright AP portable chest x-ray. Cardiomediastinal silhouette: Top normal heart size with normal aortic contours, grossly unchanged given portable technique and differences in slight patient rotation. Regional Soft Tissue/Osseous Structures: Scoliotic curvature with associated degenerative changes again noted.        OUTPATIENT  FOLLOW-UP / REFERRALS / PENDING TESTS        Outpatient Follow-Up Appointments  Encounter Information     You do not currently have any appointments scheduled.          Referrals  No orders of the defined types were placed in this encounter.      Test Results Pending at Discharge  Unresulted Labs (From admission, onward)    None          Important Issues to Address in Follow-Up  Patient needs to continue taking deep breaths.  Doing deep breathing exercises as taught in the hospital.  Follow-up as outpatient with PCP.    DISCHARGE DISPOSITION      Disposition: Home     Code Status At Discharge: Prior    Physician Order for Life-Sustaining Treatment Document Status      No documents found                   Total amount of time taken to discharge the patient was 37 minutes, inclusive of reexamining the patient, counseling the patient, reassessing medications, coordinating with care coordination and discharging the patient in the EMR.

## 2020-12-02 NOTE — DISCHARGE INSTRUCTIONS
Please continue doing deep breathing exercises as you were taught at home.  Use the inhaler when needed if you are wheezing or short of breath.  Continue taking the vitamin C and vitamin D supplement.  Resume your home medications.    Follow-up with your PCP.  You will require a repeat chest x-ray after a few weeks, to see resolution of the pneumonia.            While you were hospitalized you were tested for COVID-19/Coronavirus and the results were positive (meaning you do/did have COVID-19/Coronavirus).  The Health Department will be in touch with you.  If they do not call, please contact them using the appropriate phone number below.     After discharge the [facility] requires you to remain in quarantine (away from others) for at least 7 days after your symptoms started PLUS at least 3 days (72 hours) have passed since you were better (meaning no fever AND improvement in respiratory symptoms (e.g., cough, shortness of breath).  If you are a healthcare provider please ensure you check with the Department of Health before removing yourself from quarantine.     The [place] Centers for Disease Control provides us with instructions of how you should be quarantined.  These instructions can also be found at: https://www.cdc.gov/coronavirus/2019-ncov/downloads/sick-with-2019-nCoV-fact-sheet.pdf     • Stay home except to get medical care. You should restrict activities outside your home, except for getting medical care. Do not go to work, school, or public areas. Avoid using public transportation, ride-sharing, or taxis.  • Separate yourself from other people and animals in your home. People: As much as possible, you should stay in a specific room and away from other people in your home. Also, you should use a separate bathroom, if available. Animals: Do not handle pets or other animals while sick.  • Call ahead before visiting your doctor. If you have a medical appointment, call the healthcare provider  and tell them that you have or may have COVID-19. This will help the healthcare provider's office take steps to keep other people from getting infected or exposed.  • Wear a facemask: You should wear a facemask when you are around other people (e.g., sharing a room or vehicle) or pets and before you enter a healthcare provider's office. If you are not able to wear a facemask (for example, because it causes trouble breathing), then people who live with you should not stay in the same room with you, or they should wear a facemask if they enter your room.  • Cover your coughs and sneezes. Cover your mouth and nose with a tissue when you cough or sneeze. Throw used tissues in a lined trash can; immediately wash your hands with soap and water for at least 20 seconds or clean your hands with an alcohol-based hand  that contains at least 60-95% alcohol covering all surfaces of your hands and rubbing them together until they feel dry. Soap and water should be used preferentially if hands are visibly dirty  • Avoid sharing personal household items. You should not share dishes, drinking glasses, cups, eating utensils, towels, or bedding with other people or pets in your home. After using these items, they should be washed thoroughly with soap and water.  • Clean your hands often. Wash your hands often with soap and water for at least 20 seconds. If soap and water are not available, clean your hands with an alcohol-based hand  that contains at least 60% alcohol, covering all surfaces of your hands and rubbing them together until they feel dry. Soap and water should be used preferentially if hands are visibly dirty. Avoid touching your eyes, nose, and mouth with unwashed hands.  • Clean all ?igh-touch” surfaces every day. High touch surfaces include counters, tabletops, doorknobs, bathroom fixtures, toilets, phones, keyboards, tablets, and bedside tables. Also, clean any surfaces that may have blood, stool, or  body fluids on them. Use a household cleaning spray or wipe, according to the label instructions. Labels contain instructions for safe and effective use of the cleaning product including precautions you should take when applying the product, such as wearing gloves and making sure you have good ventilation during use of the product.  • Monitor your symptoms. Seek prompt medical attention if your illness is worsening (e.g., difficulty breathing). Before seeking care, call your healthcare provider and tell them that you have, or are being evaluated for, COVID-19. Put on a facemask before you enter the facility. These steps will help the healthcare provider's office to keep other people in the office or waiting room from getting infected or exposed.  • If you have a medical emergency and need to call 911, notify the dispatch personnel that you have, or are being evaluated for COVID-19. If possible, put on a facemask before emergency medical services arrive.        If you notice any worsening shortness of breath, light-headedness, confusion, extreme fatigue please call you doctor or return to the hospital immediately.      For further information or if you are not called by the Health Department:     For All [place] Residents  [place] Department of Health Information webpage: https://www.[facility].pa.gov/providers/Providers/Pages/Coronavirus-2020.aspx  [place] Department of Health COVID-19 phone number: 1-751-BPTicket Cake (1-151.872.1073)     For All [facility] webpage: https://www.[facility]/services/mental-physical-health/environmental-health-hazards/covid-19/  [facility] phone number: 1-976.529.7055     For All [facility] webpage: [email or web address]://data-[employer]laudia[employer]/pages/covid-19  [facility] phone number: [phone]     For Residents   [facility] Information webpage: https://www.[facility].[place].gov/providers/Providers/Pages/Coronavirus-2020.aspx  [place] Department of Health COVID-19 phone number: 2-778-PD-[employer] (1-319.788.9472)     For all [facility] Residents  [facility] webpage: [email or web address]://www.[email or web address]/Health  [facility] phone number: 1-123.298.6949     [place] Center for Disease Control (CDC) COVID-19 Information Webpage: https://www.cdc.gov/coronavirus/2019-nCoV/index.html

## 2020-12-02 NOTE — PLAN OF CARE
Problem: Adult Inpatient Plan of Care  Goal: Plan of Care Review  Outcome: Progressing  Flowsheets (Taken 12/2/2020 0450)  Progress: improving  Plan of Care Reviewed With: patient  Outcome Summary: Recieved pt in bed, extremely anxious. Verbalized that she felt as though the steroid is changing her mood. No complaints of pain. Pt 96% room air, slight, infrequent dry cough noted. Pt became agitated this am, took telemetry off attempted to leave. Was redirected and is now calm and cooperative.     Problem: Adult Inpatient Plan of Care  Goal: Patient-Specific Goal (Individualization)  Outcome: Progressing  Flowsheets (Taken 12/1/2020 0709)  Patient-Specific Goals (Include Timeframe): to go home and feed cat.  Individualized Care Needs: telemetry, resp assessments  Anxieties, Fears or Concerns: fears that her sister will get covid   Plan of Care Review  Plan of Care Reviewed With: patient  Progress: improving  Outcome Summary: Recieved pt in bed, extremely anxious. Verbalized that she felt as though the steroid is changing her mood. No complaints of pain. Pt 96% room air, slight, infrequent dry cough noted. Pt became agitated this am, took telemetry off attempted to leave. Was redirected and is now calm and cooperative.

## 2020-12-02 NOTE — NURSING NOTE
Home Oxygen Qualification Protocol    Findings:  Room Air Spo2 at rest: 97 %  Room Air SpO2 % with ambulation: 95 %       Conclusion:  Meets criteria for home oxygen: No  Does not meet criteria for home oxygen: Yes    Oxygen Recommendation:             The information above was collected from the most recent home oxygen qualification protocol performed on the patient.

## 2020-12-03 ENCOUNTER — PATIENT OUTREACH (OUTPATIENT)
Dept: CASE MANAGEMENT | Facility: CLINIC | Age: 45
End: 2020-12-03

## 2020-12-03 NOTE — PROGRESS NOTES
spoke to pt state she is doing well denies symptoms had tele med with pcp CM reviewed stoplight poc monitor progress

## 2020-12-04 LAB
BACTERIA BLD CULT: NORMAL
BACTERIA BLD CULT: NORMAL

## 2020-12-10 ENCOUNTER — PATIENT OUTREACH (OUTPATIENT)
Dept: CASE MANAGEMENT | Facility: CLINIC | Age: 45
End: 2020-12-10

## 2020-12-10 NOTE — PROGRESS NOTES
Spoke to denies sob, fever, cough, cp just tired had tele med with pcp Cm reviewed spotlight POC close case

## 2021-04-21 ENCOUNTER — TRANSCRIBE ORDERS (OUTPATIENT)
Dept: REGISTRATION | Facility: HOSPITAL | Age: 46
End: 2021-04-21

## 2021-04-21 ENCOUNTER — APPOINTMENT (OUTPATIENT)
Dept: LAB | Facility: HOSPITAL | Age: 46
End: 2021-04-21
Attending: INTERNAL MEDICINE
Payer: MEDICARE

## 2021-04-21 DIAGNOSIS — I48.0 PAROXYSMAL ATRIAL FIBRILLATION (CMS/HCC): ICD-10-CM

## 2021-04-21 DIAGNOSIS — E78.2 MIXED HYPERLIPIDEMIA: ICD-10-CM

## 2021-04-21 DIAGNOSIS — I48.0 PAROXYSMAL ATRIAL FIBRILLATION (CMS/HCC): Primary | ICD-10-CM

## 2021-04-21 LAB
ALBUMIN SERPL-MCNC: 3.7 G/DL (ref 3.4–5)
ALP SERPL-CCNC: 82 IU/L (ref 35–126)
ALT SERPL-CCNC: 30 IU/L (ref 11–54)
AST SERPL-CCNC: 25 IU/L (ref 15–41)
BILIRUB DIRECT SERPL-MCNC: 0.1 MG/DL
BILIRUB SERPL-MCNC: 0.7 MG/DL (ref 0.3–1.2)
CHOLEST SERPL-MCNC: 212 MG/DL
CK SERPL-CCNC: 70 U/L (ref 15–200)
HDLC SERPL-MCNC: 44 MG/DL
HDLC SERPL: 4.8 {RATIO}
LDLC SERPL CALC-MCNC: 121 MG/DL
NONHDLC SERPL-MCNC: 168 MG/DL
PROT SERPL-MCNC: 6.9 G/DL (ref 6–8.2)
TRIGL SERPL-MCNC: 235 MG/DL (ref 30–149)

## 2021-04-21 PROCEDURE — 36415 COLL VENOUS BLD VENIPUNCTURE: CPT

## 2021-04-21 PROCEDURE — 80061 LIPID PANEL: CPT

## 2021-04-21 PROCEDURE — 82550 ASSAY OF CK (CPK): CPT

## 2021-04-21 PROCEDURE — 80076 HEPATIC FUNCTION PANEL: CPT

## 2021-10-21 ENCOUNTER — APPOINTMENT (OUTPATIENT)
Dept: LAB | Facility: HOSPITAL | Age: 46
End: 2021-10-21
Attending: INTERNAL MEDICINE
Payer: MEDICARE

## 2021-10-21 ENCOUNTER — TRANSCRIBE ORDERS (OUTPATIENT)
Dept: SCHEDULING | Age: 46
End: 2021-10-21
Payer: MEDICARE

## 2021-10-21 DIAGNOSIS — E53.8 DEFICIENCY OF OTHER SPECIFIED B GROUP VITAMINS: ICD-10-CM

## 2021-10-21 DIAGNOSIS — E55.9 VITAMIN D DEFICIENCY, UNSPECIFIED: ICD-10-CM

## 2021-10-21 DIAGNOSIS — M25.50 PAIN IN UNSPECIFIED JOINT: Primary | ICD-10-CM

## 2021-10-21 DIAGNOSIS — Z00.01 ENCOUNTER FOR GENERAL ADULT MEDICAL EXAMINATION WITH ABNORMAL FINDINGS: ICD-10-CM

## 2021-10-21 DIAGNOSIS — R53.83 OTHER FATIGUE: ICD-10-CM

## 2021-10-21 DIAGNOSIS — M25.50 PAIN IN UNSPECIFIED JOINT: ICD-10-CM

## 2021-10-21 LAB
25(OH)D3 SERPL-MCNC: 15 NG/ML (ref 30–100)
ALBUMIN SERPL-MCNC: 3.7 G/DL (ref 3.4–5)
ALP SERPL-CCNC: 76 IU/L (ref 35–126)
ALT SERPL-CCNC: 28 IU/L (ref 11–54)
ANION GAP SERPL CALC-SCNC: 15 MEQ/L (ref 3–15)
AST SERPL-CCNC: 17 IU/L (ref 15–41)
BASOPHILS # BLD: 0.02 K/UL (ref 0.01–0.1)
BASOPHILS NFR BLD: 0.3 %
BILIRUB SERPL-MCNC: 0.4 MG/DL (ref 0.3–1.2)
BILIRUB UR QL STRIP.AUTO: NEGATIVE MG/DL
BUN SERPL-MCNC: 10 MG/DL (ref 8–20)
CALCIUM SERPL-MCNC: 10.2 MG/DL (ref 8.9–10.3)
CHLORIDE SERPL-SCNC: 101 MEQ/L (ref 98–109)
CHOLEST SERPL-MCNC: 152 MG/DL
CLARITY UR REFRACT.AUTO: CLEAR
CO2 SERPL-SCNC: 24 MEQ/L (ref 22–32)
COLOR UR AUTO: YELLOW
CREAT SERPL-MCNC: 0.9 MG/DL (ref 0.6–1.1)
DIFFERENTIAL METHOD BLD: ABNORMAL
EOSINOPHIL # BLD: 0.12 K/UL (ref 0.04–0.36)
EOSINOPHIL NFR BLD: 2 %
ERYTHROCYTE [DISTWIDTH] IN BLOOD BY AUTOMATED COUNT: 13.2 % (ref 11.7–14.4)
FERRITIN SERPL-MCNC: 141 NG/ML (ref 11–250)
GFR SERPL CREATININE-BSD FRML MDRD: >60 ML/MIN/1.73M*2
GLUCOSE SERPL-MCNC: 89 MG/DL (ref 70–99)
GLUCOSE UR STRIP.AUTO-MCNC: NEGATIVE MG/DL
HCT VFR BLDCO AUTO: 45.3 % (ref 35–45)
HDLC SERPL-MCNC: 33 MG/DL
HDLC SERPL: 4.6 {RATIO}
HGB BLD-MCNC: 14.7 G/DL (ref 11.8–15.7)
HGB UR QL STRIP.AUTO: NEGATIVE
IMM GRANULOCYTES # BLD AUTO: 0.03 K/UL (ref 0–0.08)
IMM GRANULOCYTES NFR BLD AUTO: 0.5 %
KETONES UR STRIP.AUTO-MCNC: NEGATIVE MG/DL
LDLC SERPL CALC-MCNC: 88 MG/DL
LEUKOCYTE ESTERASE UR QL STRIP.AUTO: NEGATIVE
LYMPHOCYTES # BLD: 1.27 K/UL (ref 1.2–3.5)
LYMPHOCYTES NFR BLD: 21.5 %
MCH RBC QN AUTO: 29.6 PG (ref 28–33.2)
MCHC RBC AUTO-ENTMCNC: 32.5 G/DL (ref 32.2–35.5)
MCV RBC AUTO: 91.1 FL (ref 83–98)
MONOCYTES # BLD: 0.58 K/UL (ref 0.28–0.8)
MONOCYTES NFR BLD: 9.8 %
NEUTROPHILS # BLD: 3.88 K/UL (ref 1.7–7)
NEUTS SEG NFR BLD: 65.9 %
NITRITE UR QL STRIP.AUTO: NEGATIVE
NONHDLC SERPL-MCNC: 119 MG/DL
NRBC BLD-RTO: 0 %
PDW BLD AUTO: 10.5 FL (ref 9.4–12.3)
PH UR STRIP.AUTO: 6.5 [PH]
PLATELET # BLD AUTO: 210 K/UL (ref 150–369)
POTASSIUM SERPL-SCNC: 4.7 MEQ/L (ref 3.6–5.1)
PROT SERPL-MCNC: 6 G/DL (ref 6–8.2)
PROT UR QL STRIP.AUTO: NEGATIVE
RBC # BLD AUTO: 4.97 M/UL (ref 3.93–5.22)
SODIUM SERPL-SCNC: 140 MEQ/L (ref 136–144)
SP GR UR REFRACT.AUTO: 1.01
TRIGL SERPL-MCNC: 153 MG/DL (ref 30–149)
TSH SERPL DL<=0.05 MIU/L-ACNC: 1.96 MIU/L (ref 0.34–5.6)
UROBILINOGEN UR STRIP-ACNC: 0.2 EU/DL
VIT B12 SERPL-MCNC: 476 PG/ML (ref 180–914)
WBC # BLD AUTO: 5.9 K/UL (ref 3.8–10.5)

## 2021-10-21 PROCEDURE — 82607 VITAMIN B-12: CPT

## 2021-10-21 PROCEDURE — 36415 COLL VENOUS BLD VENIPUNCTURE: CPT

## 2021-10-21 PROCEDURE — 82306 VITAMIN D 25 HYDROXY: CPT

## 2021-10-21 PROCEDURE — 84443 ASSAY THYROID STIM HORMONE: CPT

## 2021-10-21 PROCEDURE — 80061 LIPID PANEL: CPT | Mod: GZ

## 2021-10-21 PROCEDURE — 80053 COMPREHEN METABOLIC PANEL: CPT

## 2021-10-21 PROCEDURE — 85025 COMPLETE CBC W/AUTO DIFF WBC: CPT

## 2021-10-21 PROCEDURE — 86618 LYME DISEASE ANTIBODY: CPT

## 2021-10-21 PROCEDURE — 81003 URINALYSIS AUTO W/O SCOPE: CPT

## 2021-10-21 PROCEDURE — 82728 ASSAY OF FERRITIN: CPT

## 2021-10-22 LAB — B BURGDOR AB SER IA-ACNC: 0.11 RATIO

## 2021-11-14 NOTE — PROGRESS NOTES
[facility]     Progress Note    Patient Name: Mirna Meredith  : 1942  MRN: [id number]  Primary Care Physician:  Pan Pacheco MD  Date of admission: 2021    Subjective   Subjective     Chief Complaint: Patient complaining of backache but much better now controlled with Ultram she does have metastatic cancer have discussed with the admitting physician and may be we could get an MRI she is alert oriented in good spirits today is going to get a stent inserted because of hydronephrosis    HPI:  Patient Reports patient has metastatic breast cancer and came with confusion disorientation depression today she looks much better and in good spirits    Review of Systems   All systems were reviewed and negative except for: Reviewed    Objective   Objective     Vitals:   Temp:  [98.1 °F (36.7 °C)-99.8 °F (37.7 °C)] 99.8 °F (37.7 °C)  Heart Rate:  [] 101  Resp:  [18-20] 20  BP: ()/(50-83) 158/76    Physical Exam    Constitutional: Awake, alert   Eyes: PERRLA, sclerae anicteric, no conjunctival injection   HENT: NCAT, mucous membranes moist   Neck: Supple, no thyromegaly, no lymphadenopathy, trachea midline   Respiratory: Clear to auscultation bilaterally, nonlabored respirations    Cardiovascular: RRR, no murmurs, rubs, or gallops, palpable pedal pulses bilaterally   Gastrointestinal: Positive bowel sounds, soft, nontender, nondistended   Musculoskeletal: No bilateral ankle edema, no clubbing or cyanosis to extremities   Psychiatric: Appropriate affect, cooperative   Neurologic: Oriented x 3, strength symmetric in all extremities, Cranial Nerves grossly intact to confrontation, speech clear   Skin: No rashes     Result Review    Result Review:  I have personally reviewed the results from the time of this admission to 2021 10:27 EST and agree with these findings:  []  Laboratory  []  Microbiology  [x]  Radiology  []  EKG/Telemetry   []  Cardiology/Vascular   []  Pathology  []  Old  bumex started 3/30- will recheck BMP, Mg re Cr and lytes if agreeable to blood draw  Chart reviewed- and EKG was ordered today to recheck QTc given additional antipsychotic treatment- will f/u on EKG tomorrow      records  []  Other: DJD  Most notable findings include: Reviewed    Assessment/Plan   Assessment / Plan     Brief Patient Summary:  Mirna Meredith is a 79 y.o. female who admitted with backache confusion disorientation depression but much better today    Active Hospital Problems:  Active Hospital Problems    Diagnosis    • **Hydronephrosis due to obstruction of ureter      Added automatically from request for surgery [id number]     • Metabolic encephalopathy        Plan:   Continue current management patient will be getting a stent    DVT prophylaxis:  Mechanical DVT prophylaxis orders are present.    CODE STATUS:   Code Status (Patient has no pulse and is not breathing): No CPR (Do Not Attempt to Resuscitate)  Medical Interventions (Patient has pulse or is breathing): Full Support    Disposition:  I expect patient to be discharged when she is stable.    Electronically signed by Jose G Grissom MD, 11/14/21, 10:27 AM EST.

## 2022-01-31 ENCOUNTER — APPOINTMENT (OUTPATIENT)
Dept: URBAN - METROPOLITAN AREA CLINIC 200 | Age: 47
Setting detail: DERMATOLOGY
End: 2022-02-04

## 2022-01-31 DIAGNOSIS — L81.7 PIGMENTED PURPURIC DERMATOSIS: ICD-10-CM

## 2022-01-31 DIAGNOSIS — Z11.52 ENCOUNTER FOR SCREENING FOR COVID-19: ICD-10-CM

## 2022-01-31 DIAGNOSIS — L81.4 OTHER MELANIN HYPERPIGMENTATION: ICD-10-CM

## 2022-01-31 DIAGNOSIS — L57.8 OTHER SKIN CHANGES DUE TO CHRONIC EXPOSURE TO NONIONIZING RADIATION: ICD-10-CM

## 2022-01-31 DIAGNOSIS — F42.4 EXCORIATION (SKIN-PICKING) DISORDER: ICD-10-CM

## 2022-01-31 DIAGNOSIS — Z71.89 OTHER SPECIFIED COUNSELING: ICD-10-CM

## 2022-01-31 PROBLEM — D23.62 OTHER BENIGN NEOPLASM OF SKIN OF LEFT UPPER LIMB, INCLUDING SHOULDER: Status: ACTIVE | Noted: 2022-01-31

## 2022-01-31 PROBLEM — S30.92XA UNSPECIFIED SUPERFICIAL INJURY OF ABDOMINAL WALL, INITIAL ENCOUNTER: Status: ACTIVE | Noted: 2022-01-31

## 2022-01-31 PROCEDURE — OTHER SUNSCREEN RECOMMENDATIONS: OTHER

## 2022-01-31 PROCEDURE — OTHER MIPS QUALITY: OTHER

## 2022-01-31 PROCEDURE — OTHER SCREENING FOR COVID-19: OTHER

## 2022-01-31 PROCEDURE — OTHER PRESCRIPTION SAMPLES PROVIDED: OTHER

## 2022-01-31 PROCEDURE — OTHER REASSURANCE: OTHER

## 2022-01-31 PROCEDURE — 99203 OFFICE O/P NEW LOW 30 MIN: CPT

## 2022-01-31 PROCEDURE — OTHER COUNSELING: OTHER

## 2022-01-31 ASSESSMENT — LOCATION SIMPLE DESCRIPTION DERM
LOCATION SIMPLE: LEFT FOOT
LOCATION SIMPLE: ABDOMEN
LOCATION SIMPLE: RIGHT BACK
LOCATION SIMPLE: LEFT UPPER BACK

## 2022-01-31 ASSESSMENT — LOCATION DETAILED DESCRIPTION DERM
LOCATION DETAILED: PERIUMBILICAL SKIN
LOCATION DETAILED: LEFT DORSAL FOOT
LOCATION DETAILED: RIGHT SUPERIOR LATERAL UPPER BACK
LOCATION DETAILED: LEFT MID-UPPER BACK
LOCATION DETAILED: LEFT SUPERIOR LATERAL UPPER BACK

## 2022-01-31 ASSESSMENT — LOCATION ZONE DERM
LOCATION ZONE: TRUNK
LOCATION ZONE: FEET

## 2022-01-31 NOTE — PROCEDURE: REASSURANCE
Hide Additional Notes?: No
Additional Notes (Optional): From medications that pt is currently taking
Detail Level: Zone
Detail Level: Detailed

## 2022-04-14 ENCOUNTER — TRANSCRIBE ORDERS (OUTPATIENT)
Dept: REGISTRATION | Facility: HOSPITAL | Age: 47
End: 2022-04-14

## 2022-04-14 ENCOUNTER — APPOINTMENT (OUTPATIENT)
Dept: LAB | Facility: HOSPITAL | Age: 47
End: 2022-04-14
Attending: PSYCHIATRY & NEUROLOGY
Payer: MEDICARE

## 2022-04-14 DIAGNOSIS — F25.9 SCHIZOAFFECTIVE DISORDER, UNSPECIFIED: ICD-10-CM

## 2022-04-14 DIAGNOSIS — F25.9 SCHIZOAFFECTIVE DISORDER, UNSPECIFIED: Primary | ICD-10-CM

## 2022-04-14 LAB
ALBUMIN SERPL-MCNC: 3.8 G/DL (ref 3.4–5)
ALP SERPL-CCNC: 79 IU/L (ref 35–126)
ALT SERPL-CCNC: 21 IU/L (ref 11–54)
ANION GAP SERPL CALC-SCNC: 10 MEQ/L (ref 3–15)
AST SERPL-CCNC: 16 IU/L (ref 15–41)
BILIRUB SERPL-MCNC: 0.6 MG/DL (ref 0.3–1.2)
BUN SERPL-MCNC: 14 MG/DL (ref 8–20)
CALCIUM SERPL-MCNC: 9.7 MG/DL (ref 8.9–10.3)
CHLORIDE SERPL-SCNC: 103 MEQ/L (ref 98–109)
CHOLEST SERPL-MCNC: 168 MG/DL
CO2 SERPL-SCNC: 24 MEQ/L (ref 22–32)
CREAT SERPL-MCNC: 0.8 MG/DL (ref 0.6–1.1)
ERYTHROCYTE [DISTWIDTH] IN BLOOD BY AUTOMATED COUNT: 14.2 % (ref 11.7–14.4)
GFR SERPL CREATININE-BSD FRML MDRD: >60 ML/MIN/1.73M*2
GLUCOSE SERPL-MCNC: 81 MG/DL (ref 70–99)
HCT VFR BLDCO AUTO: 42.8 % (ref 35–45)
HDLC SERPL-MCNC: 39 MG/DL
HDLC SERPL: 4.3 {RATIO}
HGB BLD-MCNC: 13.8 G/DL (ref 11.8–15.7)
LDLC SERPL CALC-MCNC: 103 MG/DL
MCH RBC QN AUTO: 28.5 PG (ref 28–33.2)
MCHC RBC AUTO-ENTMCNC: 32.2 G/DL (ref 32.2–35.5)
MCV RBC AUTO: 88.4 FL (ref 83–98)
NONHDLC SERPL-MCNC: 129 MG/DL
PDW BLD AUTO: 10.1 FL (ref 9.4–12.3)
PLATELET # BLD AUTO: 208 K/UL (ref 150–369)
POTASSIUM SERPL-SCNC: 4.6 MEQ/L (ref 3.6–5.1)
PROT SERPL-MCNC: 6.3 G/DL (ref 6–8.2)
RBC # BLD AUTO: 4.84 M/UL (ref 3.93–5.22)
SODIUM SERPL-SCNC: 137 MEQ/L (ref 136–144)
TRIGL SERPL-MCNC: 128 MG/DL (ref 30–149)
VALPROATE SERPL-MCNC: 64.3 UG/ML (ref 50–100)
WBC # BLD AUTO: 6.27 K/UL (ref 3.8–10.5)

## 2022-04-14 PROCEDURE — 80061 LIPID PANEL: CPT

## 2022-04-14 PROCEDURE — 36415 COLL VENOUS BLD VENIPUNCTURE: CPT

## 2022-04-14 PROCEDURE — 85027 COMPLETE CBC AUTOMATED: CPT

## 2022-04-14 PROCEDURE — 80164 ASSAY DIPROPYLACETIC ACD TOT: CPT

## 2022-04-14 PROCEDURE — 80053 COMPREHEN METABOLIC PANEL: CPT

## 2022-04-27 ENCOUNTER — TRANSCRIBE ORDERS (OUTPATIENT)
Dept: REGISTRATION | Facility: HOSPITAL | Age: 47
End: 2022-04-27

## 2022-04-27 ENCOUNTER — APPOINTMENT (OUTPATIENT)
Dept: LAB | Facility: HOSPITAL | Age: 47
End: 2022-04-27
Attending: INTERNAL MEDICINE
Payer: MEDICARE

## 2022-04-27 DIAGNOSIS — E53.8 DEFICIENCY OF OTHER SPECIFIED B GROUP VITAMINS: ICD-10-CM

## 2022-04-27 DIAGNOSIS — E55.9 VITAMIN D DEFICIENCY, UNSPECIFIED: ICD-10-CM

## 2022-04-27 DIAGNOSIS — Z86.39 PERSONAL HISTORY OF OTHER ENDOCRINE, NUTRITIONAL AND METABOLIC DISEASE: ICD-10-CM

## 2022-04-27 DIAGNOSIS — Z00.01 ENCOUNTER FOR GENERAL ADULT MEDICAL EXAMINATION WITH ABNORMAL FINDINGS: Primary | ICD-10-CM

## 2022-04-27 DIAGNOSIS — Z00.01 ENCOUNTER FOR GENERAL ADULT MEDICAL EXAMINATION WITH ABNORMAL FINDINGS: ICD-10-CM

## 2022-04-27 LAB
25(OH)D3 SERPL-MCNC: 14 NG/ML (ref 30–100)
BACTERIA URNS QL MICRO: ABNORMAL /HPF
BILIRUB UR QL STRIP.AUTO: NEGATIVE MG/DL
CLARITY UR REFRACT.AUTO: CLEAR
COLOR UR AUTO: YELLOW
EST. AVERAGE GLUCOSE BLD GHB EST-MCNC: 108 MG/DL
FERRITIN SERPL-MCNC: 267 NG/ML (ref 11–250)
FOLATE SERPL-MCNC: 16 NG/ML
GLUCOSE UR STRIP.AUTO-MCNC: NEGATIVE MG/DL
HBA1C MFR BLD HPLC: 5.4 %
HGB UR QL STRIP.AUTO: NEGATIVE
HYALINE CASTS #/AREA URNS LPF: ABNORMAL /LPF
KETONES UR STRIP.AUTO-MCNC: NEGATIVE MG/DL
LEUKOCYTE ESTERASE UR QL STRIP.AUTO: NEGATIVE
NITRITE UR QL STRIP.AUTO: NEGATIVE
PH UR STRIP.AUTO: 5.5 [PH]
PROT UR QL STRIP.AUTO: NEGATIVE
RBC #/AREA URNS HPF: ABNORMAL /HPF
SP GR UR REFRACT.AUTO: 1.02
SQUAMOUS URNS QL MICRO: ABNORMAL /HPF
TSH SERPL DL<=0.05 MIU/L-ACNC: 2.58 MIU/L (ref 0.34–5.6)
UROBILINOGEN UR STRIP-ACNC: 0.2 EU/DL
VIT B12 SERPL-MCNC: 485 PG/ML (ref 180–914)
WBC #/AREA URNS HPF: ABNORMAL /HPF

## 2022-04-27 PROCEDURE — 82306 VITAMIN D 25 HYDROXY: CPT

## 2022-04-27 PROCEDURE — 81001 URINALYSIS AUTO W/SCOPE: CPT

## 2022-04-27 PROCEDURE — 82728 ASSAY OF FERRITIN: CPT

## 2022-04-27 PROCEDURE — 84443 ASSAY THYROID STIM HORMONE: CPT

## 2022-04-27 PROCEDURE — 83036 HEMOGLOBIN GLYCOSYLATED A1C: CPT

## 2022-04-27 PROCEDURE — 36415 COLL VENOUS BLD VENIPUNCTURE: CPT

## 2022-04-27 PROCEDURE — 82607 VITAMIN B-12: CPT

## 2022-04-27 PROCEDURE — 82746 ASSAY OF FOLIC ACID SERUM: CPT

## 2022-04-27 PROCEDURE — 86618 LYME DISEASE ANTIBODY: CPT

## 2022-04-29 LAB — B BURGDOR AB SER IA-ACNC: 0.12 RATIO

## 2022-07-28 ENCOUNTER — OFFICE VISIT (OUTPATIENT)
Dept: ENDOCRINOLOGY | Facility: CLINIC | Age: 47
End: 2022-07-28
Payer: MEDICARE

## 2022-07-28 VITALS
WEIGHT: 276.6 LBS | HEIGHT: 65 IN | OXYGEN SATURATION: 97 % | DIASTOLIC BLOOD PRESSURE: 76 MMHG | HEART RATE: 99 BPM | RESPIRATION RATE: 14 BRPM | BODY MASS INDEX: 46.08 KG/M2 | SYSTOLIC BLOOD PRESSURE: 122 MMHG

## 2022-07-28 DIAGNOSIS — E55.9 VITAMIN D DEFICIENCY: Primary | ICD-10-CM

## 2022-07-28 DIAGNOSIS — I87.2 VENOUS INSUFFICIENCY: ICD-10-CM

## 2022-07-28 DIAGNOSIS — E66.01 MORBID OBESITY WITH BMI OF 40.0-44.9, ADULT (CMS/HCC): ICD-10-CM

## 2022-07-28 DIAGNOSIS — R53.83 OTHER FATIGUE: ICD-10-CM

## 2022-07-28 PROBLEM — R53.82 CHRONIC FATIGUE: Status: RESOLVED | Noted: 2022-07-28 | Resolved: 2022-07-28

## 2022-07-28 PROBLEM — R53.82 CHRONIC FATIGUE: Status: ACTIVE | Noted: 2022-07-28

## 2022-07-28 PROCEDURE — 99204 OFFICE O/P NEW MOD 45 MIN: CPT | Performed by: INTERNAL MEDICINE

## 2022-07-28 PROCEDURE — G8754 DIAS BP LESS 90: HCPCS | Performed by: INTERNAL MEDICINE

## 2022-07-28 PROCEDURE — G8752 SYS BP LESS 140: HCPCS | Performed by: INTERNAL MEDICINE

## 2022-07-28 RX ORDER — DIVALPROEX SODIUM 250 MG/1
250 TABLET, DELAYED RELEASE ORAL
COMMUNITY
Start: 2022-07-06 | End: 2023-07-14 | Stop reason: HOSPADM

## 2022-07-28 RX ORDER — DIVALPROEX SODIUM 500 MG/1
500 TABLET, DELAYED RELEASE ORAL DAILY
COMMUNITY
Start: 2022-07-06 | End: 2023-07-14 | Stop reason: HOSPADM

## 2022-07-28 RX ORDER — LURASIDONE HYDROCHLORIDE 40 MG/1
40 TABLET, FILM COATED ORAL
COMMUNITY
Start: 2022-07-20 | End: 2023-07-14 | Stop reason: HOSPADM

## 2022-07-28 RX ORDER — ACETAMINOPHEN 500 MG
5000 TABLET ORAL DAILY
COMMUNITY

## 2022-07-28 RX ORDER — ATORVASTATIN CALCIUM 40 MG/1
TABLET, FILM COATED ORAL
COMMUNITY
Start: 2022-07-24

## 2022-07-28 ASSESSMENT — ENCOUNTER SYMPTOMS
MUSCULOSKELETAL NEGATIVE: 1
CONSTITUTIONAL NEGATIVE: 1
PSYCHIATRIC NEGATIVE: 1
NEUROLOGICAL NEGATIVE: 1
GASTROINTESTINAL NEGATIVE: 1
EYES NEGATIVE: 1
RESPIRATORY NEGATIVE: 1
CARDIOVASCULAR NEGATIVE: 1
HEMATOLOGIC/LYMPHATIC NEGATIVE: 1
ENDOCRINE COMMENTS: AS PER HPI
ALLERGIC/IMMUNOLOGIC NEGATIVE: 1

## 2022-07-28 NOTE — PATIENT INSTRUCTIONS
Schedule evaluation through comprehensive weight management to see if options for weight gain associated with medications.     Other option to consider would be bariatric surgery.     Try compressive stockings to help with the darkening of the legs.     Continue taking vitamin D 5000 IU daily. PCP should follow this level in a few months.

## 2022-07-28 NOTE — ASSESSMENT & PLAN NOTE
-likely darkening 2'2 hemosiderin related to venous insufficency; would recommend compression stockings  -would recommend f/u with cardiology/ or vascular if needed

## 2022-07-28 NOTE — LETTER
2022     Alan Davila, DO  [address] [facility]  [place] [id number]    Patient: Jillian Acosta  YOB: 1975  Date of Visit: 2022      Dear Dr. Davila:    Thank you for referring Jillian Acosta to me for evaluation. Below are my notes for this consultation.    If you have questions, please do not hesitate to call me. I look forward to following your patient along with you.         Sincerely,        Laya Yeager MD        CC: No Recipients  Laya Yeager MD  2022 10:45 AM  Signed  Jillian Acosta is a 47 y.o. female presents today for evaluation for thyroid disease.     HPI     46 y/o F p/for evaluation to see if any endocrine issues given mother's endocrine issues and sister hyperparathyroid issues.     Current sxs:     Was low energy, but currently, it is improved  No GI sxs  Occ Has  No vision changes  Experienced darkening in the legs/ feet - PCP recommended compression stockings- has not tried yet because of the heat  Occasionally when swallows gets ache in neck- intermittent for a few months     Has had increased weight gain with the pscyhiatric medications; increased appetite       Vitamin D defic:   Started on 5K IU daily     PMH:   Bipolar I  afib   HTN  AMINA  Vitamin D defic    PSH:   Hysterectomy - ; has ovaries still     FH:   3 sisters total:   Sister: hypercalcemia 2'2 parathyroidectomy, thymectomy, poor circulation  Sister:  Idiopathic edema  Sister: gastricparesis, addicted to opioids  Father: MI/ CVA;    Mother: central hypothyroidism, panhypopituitary         Past Medical History:   Diagnosis Date   • A-fib (CMS/HCC)    • A-fib (CMS/HCC)    • Bipolar 1 disorder (CMS/HCC)    • Hypertension    • Sleep apnea        Past Surgical History:   Procedure Laterality Date   • ANAL FISSURECTOMY     • CHOLECYSTECTOMY     • HYSTERECTOMY          Social History     Socioeconomic History   • Marital status: Single   Occupational History   •  Occupation: disabled   Tobacco Use   • Smoking status: Former Smoker     Packs/day: 0.25     Types: Cigarettes   • Smokeless tobacco: Never Used   Substance and Sexual Activity   • Alcohol use: No   • Drug use: No   • Sexual activity: Defer       Family History   Problem Relation Age of Onset   • Diabetes Biological Mother    • Heart disease Biological Father        Allergies  Azithromycin, Doxycycline, Penicillins, and Amoxicillin    Current Outpatient Medications   Medication Sig Dispense Refill   • ARIPiprazole (ABILIFY) 30 mg tablet Take 1 tablet (30 mg total) by mouth daily. (Patient taking differently: Take 5 mg by mouth daily.) 30 tablet 0   • aspirin 81 mg enteric coated tablet TK 1 T PO QD     • atorvastatin (LIPITOR) 40 mg tablet      • cholecalciferol, vitamin D3, 5,000 unit (125 mcg) tablet Take 5,000 Units by mouth daily.     • dilTIAZem CD (CARDIZEM CD) 120 mg 24 hr capsule Take 1 capsule (120 mg total) by mouth daily. 30 capsule 0   • divalproex (DEPAKOTE) 250 mg EC tablet Take 250 mg by mouth once daily.     • divalproex (DEPAKOTE) 500 mg EC tablet TAKE 1 TABLET ORALLY AT BEDTIME WITH DEPAKOTE 250MG FOR A TOTAL DOSE OF 750MG AT BEDTIME     • LATUDA 40 mg tablet Take 40 mg by mouth.     • albuterol HFA (VENTOLIN HFA) 90 mcg/actuation inhaler Inhale 2 puffs every 6 (six) hours as needed for wheezing or shortness of breath. 1 Inhaler 1   • ascorbic acid (VITAMIN C) 500 mg tablet Take 1 tablet (500 mg total) by mouth 2 (two) times a day. 60 tablet 0   • fluticasone propionate (FLONASE) 50 mcg/actuation nasal spray 2 sprays daily.     • senna (SENOKOT) 8.6 mg tablet Take 1 tablet by mouth 2 (two) times a day. 60 tablet 0   • valproic acid (DEPAKENE) 250 mg capsule TAKE 4 CAPSULES ORALLY AT BEDTIME       No current facility-administered medications for this visit.         Review of Systems   Constitutional: Negative.    HENT: Negative.    Eyes: Negative.    Respiratory: Negative.    Cardiovascular:  "Negative.    Gastrointestinal: Negative.    Endocrine:        As per HPI    Genitourinary: Negative.    Musculoskeletal: Negative.    Skin: Negative.    Allergic/Immunologic: Negative.    Neurological: Negative.    Hematological: Negative.    Psychiatric/Behavioral: Negative.    All other systems reviewed and are negative.         Vitals:    07/28/22 0959   BP: 122/76   BP Location: Left upper arm   Patient Position: Sitting   Pulse: 99   Resp: 14   SpO2: 97%   Weight: 125 kg (276 lb 9.6 oz)   Height: 1.657 m (5' 5.25\")         Body mass index is 45.68 kg/m².      Physical Exam  Vitals and nursing note reviewed.   Constitutional:       Appearance: Normal appearance. She is well-developed. She is obese.   HENT:      Head: Normocephalic and atraumatic.   Eyes:      Conjunctiva/sclera: Conjunctivae normal.      Comments: No lid lag.   No proptosis.    Neck:      Thyroid: No thyromegaly.      Trachea: No tracheal deviation.   Cardiovascular:      Rate and Rhythm: Normal rate and regular rhythm.      Heart sounds: Normal heart sounds. No murmur heard.  Pulmonary:      Effort: Pulmonary effort is normal.      Breath sounds: Normal breath sounds.   Abdominal:      General: Bowel sounds are normal.      Palpations: Abdomen is soft.      Tenderness: There is no abdominal tenderness.   Musculoskeletal:      Cervical back: Neck supple.      Right lower leg: Edema present.      Left lower leg: Edema present.   Lymphadenopathy:      Cervical: No cervical adenopathy.   Skin:     General: Skin is warm and dry.   Neurological:      Mental Status: She is alert and oriented to person, place, and time.      Motor: No tremor.           Latest Reference Range & Units 04/14/22 11:02   Sodium 136 - 144 mEQ/L 137   Potassium 3.6 - 5.1 mEQ/L 4.6   Chloride 98 - 109 mEQ/L 103   CO2 22 - 32 mEQ/L 24   BUN 8 - 20 mg/dL 14   Creatinine 0.6 - 1.1 mg/dL 0.8   Glucose 70 - 99 mg/dL 81   Calcium 8.9 - 10.3 mg/dL 9.7   AST (SGOT) 15 - 41 IU/L 16 "   ALT (SGPT) 11 - 54 IU/L 21   Alkaline Phosphatase 35 - 126 IU/L 79   Total Protein 6.0 - 8.2 g/dL 6.3   Albumin 3.4 - 5.0 g/dL 3.8   Bilirubin, Total 0.3 - 1.2 mg/dL 0.6   eGFR >=60.0 mL/min/1.73m*2 >60.0   Anion Gap 3 - 15 mEQ/L 10   Valproic Acid 50.0 - 100.0 ug/mL 64.3   Cholesterol <=200 mg/dL 168   Triglycerides 30 - 149 mg/dL 128   HDL >=55 mg/dL 39 (L)   LDL Calculated <=100 mg/dL 103 (H)   Non-HDL, Calculated mg/dL 129   RISK <=5.0  4.3   WBC 3.80 - 10.50 K/uL 6.27   RBC 3.93 - 5.22 M/uL 4.84   Hemoglobin 11.8 - 15.7 g/dL 13.8   Hematocrit 35.0 - 45.0 % 42.8   MCV 83.0 - 98.0 fL 88.4   MCH 28.0 - 33.2 pg 28.5   MCHC 32.2 - 35.5 g/dL 32.2   RDW 11.7 - 14.4 % 14.2   Platelets 150 - 369 K/uL 208   MPV 9.4 - 12.3 fL 10.1   (L): Data is abnormally low  (H): Data is abnormally high    Assessment/Plan      Diagnoses and all orders for this visit:    Vitamin D deficiency (Primary)  Assessment & Plan:  -cont 5K IU daily ; defer to PCP management/ followup      Morbid obesity with BMI of 40.0-44.9, adult (CMS/[facility])  Assessment & Plan:  -would seek evaluation by comprehensive weight management or bariatric surgery        Venous insufficiency  Assessment & Plan:  -likely darkening 2'2 hemosiderin related to venous insufficency; would recommend compression stockings  -would recommend f/u with cardiology/ or vascular if needed        Other fatigue  Assessment & Plan:  -improving at this time; TSH wnl; Na/K/ BP wnl- unlikely adrenal in nature             Laya Yeager MD    7/28/2022

## 2022-07-28 NOTE — PROGRESS NOTES
Jillian Acosta is a 47 y.o. female presents today for evaluation for thyroid disease.     HPI     48 y/o F p/for evaluation to see if any endocrine issues given mother's endocrine issues and sister hyperparathyroid issues.     Current sxs:     Was low energy, but currently, it is improved  No GI sxs  Occ Has  No vision changes  Experienced darkening in the legs/ feet - PCP recommended compression stockings- has not tried yet because of the heat  Occasionally when swallows gets ache in neck- intermittent for a few months     Has had increased weight gain with the pscyhiatric medications; increased appetite       Vitamin D defic:   Started on 5K IU daily     PMH:   Bipolar I  afib   HTN  AMINA  Vitamin D defic    PSH:   Hysterectomy 2017- GOOD; has ovaries still     FH:   3 sisters total:   Sister: hypercalcemia 2'2 parathyroidectomy, thymectomy, poor circulation  Sister:  Idiopathic edema  Sister: gastricparesis, addicted to opioids  Father: MI/ CVA;    Mother: central hypothyroidism, panhypopituitary         Past Medical History:   Diagnosis Date   • A-fib (CMS/HCC)    • A-fib (CMS/HCC)    • Bipolar 1 disorder (CMS/HCC)    • Hypertension    • Sleep apnea        Past Surgical History:   Procedure Laterality Date   • ANAL FISSURECTOMY     • CHOLECYSTECTOMY     • HYSTERECTOMY          Social History     Socioeconomic History   • Marital status: Single   Occupational History   • Occupation: disabled   Tobacco Use   • Smoking status: Former Smoker     Packs/day: 0.25     Types: Cigarettes   • Smokeless tobacco: Never Used   Substance and Sexual Activity   • Alcohol use: No   • Drug use: No   • Sexual activity: Defer       Family History   Problem Relation Age of Onset   • Diabetes Biological Mother    • Heart disease Biological Father        Allergies  Azithromycin, Doxycycline, Penicillins, and Amoxicillin    Current Outpatient Medications   Medication Sig Dispense Refill   • ARIPiprazole (ABILIFY) 30 mg tablet  "Take 1 tablet (30 mg total) by mouth daily. (Patient taking differently: Take 5 mg by mouth daily.) 30 tablet 0   • aspirin 81 mg enteric coated tablet TK 1 T PO QD     • atorvastatin (LIPITOR) 40 mg tablet      • cholecalciferol, vitamin D3, 5,000 unit (125 mcg) tablet Take 5,000 Units by mouth daily.     • dilTIAZem CD (CARDIZEM CD) 120 mg 24 hr capsule Take 1 capsule (120 mg total) by mouth daily. 30 capsule 0   • divalproex (DEPAKOTE) 250 mg EC tablet Take 250 mg by mouth once daily.     • divalproex (DEPAKOTE) 500 mg EC tablet TAKE 1 TABLET ORALLY AT BEDTIME WITH DEPAKOTE 250MG FOR A TOTAL DOSE OF 750MG AT BEDTIME     • LATUDA 40 mg tablet Take 40 mg by mouth.     • albuterol HFA (VENTOLIN HFA) 90 mcg/actuation inhaler Inhale 2 puffs every 6 (six) hours as needed for wheezing or shortness of breath. 1 Inhaler 1   • ascorbic acid (VITAMIN C) 500 mg tablet Take 1 tablet (500 mg total) by mouth 2 (two) times a day. 60 tablet 0   • fluticasone propionate (FLONASE) 50 mcg/actuation nasal spray 2 sprays daily.     • senna (SENOKOT) 8.6 mg tablet Take 1 tablet by mouth 2 (two) times a day. 60 tablet 0   • valproic acid (DEPAKENE) 250 mg capsule TAKE 4 CAPSULES ORALLY AT BEDTIME       No current facility-administered medications for this visit.         Review of Systems   Constitutional: Negative.    HENT: Negative.    Eyes: Negative.    Respiratory: Negative.    Cardiovascular: Negative.    Gastrointestinal: Negative.    Endocrine:        As per HPI    Genitourinary: Negative.    Musculoskeletal: Negative.    Skin: Negative.    Allergic/Immunologic: Negative.    Neurological: Negative.    Hematological: Negative.    Psychiatric/Behavioral: Negative.    All other systems reviewed and are negative.         Vitals:    07/28/22 0959   BP: 122/76   BP Location: Left upper arm   Patient Position: Sitting   Pulse: 99   Resp: 14   SpO2: 97%   Weight: 125 kg (276 lb 9.6 oz)   Height: 1.657 m (5' 5.25\")         Body mass index " is 45.68 kg/m².      Physical Exam  Vitals and nursing note reviewed.   Constitutional:       Appearance: Normal appearance. She is well-developed. She is obese.   HENT:      Head: Normocephalic and atraumatic.   Eyes:      Conjunctiva/sclera: Conjunctivae normal.      Comments: No lid lag.   No proptosis.    Neck:      Thyroid: No thyromegaly.      Trachea: No tracheal deviation.   Cardiovascular:      Rate and Rhythm: Normal rate and regular rhythm.      Heart sounds: Normal heart sounds. No murmur heard.  Pulmonary:      Effort: Pulmonary effort is normal.      Breath sounds: Normal breath sounds.   Abdominal:      General: Bowel sounds are normal.      Palpations: Abdomen is soft.      Tenderness: There is no abdominal tenderness.   Musculoskeletal:      Cervical back: Neck supple.      Right lower leg: Edema present.      Left lower leg: Edema present.   Lymphadenopathy:      Cervical: No cervical adenopathy.   Skin:     General: Skin is warm and dry.   Neurological:      Mental Status: She is alert and oriented to person, place, and time.      Motor: No tremor.           Latest Reference Range & Units 04/14/22 11:02   Sodium 136 - 144 mEQ/L 137   Potassium 3.6 - 5.1 mEQ/L 4.6   Chloride 98 - 109 mEQ/L 103   CO2 22 - 32 mEQ/L 24   BUN 8 - 20 mg/dL 14   Creatinine 0.6 - 1.1 mg/dL 0.8   Glucose 70 - 99 mg/dL 81   Calcium 8.9 - 10.3 mg/dL 9.7   AST (SGOT) 15 - 41 IU/L 16   ALT (SGPT) 11 - 54 IU/L 21   Alkaline Phosphatase 35 - 126 IU/L 79   Total Protein 6.0 - 8.2 g/dL 6.3   Albumin 3.4 - 5.0 g/dL 3.8   Bilirubin, Total 0.3 - 1.2 mg/dL 0.6   eGFR >=60.0 mL/min/1.73m*2 >60.0   Anion Gap 3 - 15 mEQ/L 10   Valproic Acid 50.0 - 100.0 ug/mL 64.3   Cholesterol <=200 mg/dL 168   Triglycerides 30 - 149 mg/dL 128   HDL >=55 mg/dL 39 (L)   LDL Calculated <=100 mg/dL 103 (H)   Non-HDL, Calculated mg/dL 129   RISK <=5.0  4.3   WBC 3.80 - 10.50 K/uL 6.27   RBC 3.93 - 5.22 M/uL 4.84   Hemoglobin 11.8 - 15.7 g/dL 13.8    Hematocrit 35.0 - 45.0 % 42.8   MCV 83.0 - 98.0 fL 88.4   MCH 28.0 - 33.2 pg 28.5   MCHC 32.2 - 35.5 g/dL 32.2   RDW 11.7 - 14.4 % 14.2   Platelets 150 - 369 K/uL 208   MPV 9.4 - 12.3 fL 10.1   (L): Data is abnormally low  (H): Data is abnormally high    Assessment/Plan      Diagnoses and all orders for this visit:    Vitamin D deficiency (Primary)  Assessment & Plan:  -cont 5K IU daily ; defer to PCP management/ followup      Morbid obesity with BMI of 40.0-44.9, adult (CMS/[facility])  Assessment & Plan:  -would seek evaluation by comprehensive weight management or bariatric surgery        Venous insufficiency  Assessment & Plan:  -likely darkening 2'2 hemosiderin related to venous insufficency; would recommend compression stockings  -would recommend f/u with cardiology/ or vascular if needed        Other fatigue  Assessment & Plan:  -improving at this time; TSH wnl; Na/K/ BP wnl- unlikely adrenal in nature             Laya Yeager MD    7/28/2022

## 2022-10-11 ENCOUNTER — APPOINTMENT (OUTPATIENT)
Dept: LAB | Facility: HOSPITAL | Age: 47
End: 2022-10-11
Attending: INTERNAL MEDICINE
Payer: MEDICARE

## 2022-10-11 ENCOUNTER — TRANSCRIBE ORDERS (OUTPATIENT)
Dept: CARDIOLOGY | Facility: HOSPITAL | Age: 47
End: 2022-10-11

## 2022-10-11 DIAGNOSIS — G47.33 OBSTRUCTIVE SLEEP APNEA (ADULT) (PEDIATRIC): ICD-10-CM

## 2022-10-11 DIAGNOSIS — G31.9 DEGENERATIVE DISEASE OF NERVOUS SYSTEM, UNSPECIFIED: ICD-10-CM

## 2022-10-11 DIAGNOSIS — I48.0 PAROXYSMAL ATRIAL FIBRILLATION (CMS/HCC): Primary | ICD-10-CM

## 2022-10-11 DIAGNOSIS — E66.01 MORBID (SEVERE) OBESITY DUE TO EXCESS CALORIES (CMS/HCC): ICD-10-CM

## 2022-10-11 DIAGNOSIS — R78.2 FINDING OF COCAINE IN BLOOD: ICD-10-CM

## 2022-10-11 DIAGNOSIS — R60.9 EDEMA, UNSPECIFIED: ICD-10-CM

## 2022-10-11 DIAGNOSIS — I48.0 PAROXYSMAL ATRIAL FIBRILLATION (CMS/HCC): ICD-10-CM

## 2022-10-11 LAB
ALBUMIN SERPL-MCNC: 3.5 G/DL (ref 3.4–5)
ALP SERPL-CCNC: 76 IU/L (ref 35–126)
ALT SERPL-CCNC: 16 IU/L (ref 11–54)
AST SERPL-CCNC: 12 IU/L (ref 15–41)
BILIRUB DIRECT SERPL-MCNC: 0.1 MG/DL
BILIRUB SERPL-MCNC: 0.5 MG/DL (ref 0.3–1.2)
CHOLEST SERPL-MCNC: 173 MG/DL
CK SERPL-CCNC: 63 U/L (ref 15–200)
HDLC SERPL-MCNC: 39 MG/DL
HDLC SERPL: 4.4 {RATIO}
LDLC SERPL CALC-MCNC: 105 MG/DL
NONHDLC SERPL-MCNC: 134 MG/DL
PROT SERPL-MCNC: 6.2 G/DL (ref 6–8.2)
TRIGL SERPL-MCNC: 145 MG/DL (ref 30–149)

## 2022-10-11 PROCEDURE — 36415 COLL VENOUS BLD VENIPUNCTURE: CPT

## 2022-10-11 PROCEDURE — 80061 LIPID PANEL: CPT

## 2022-10-11 PROCEDURE — 82550 ASSAY OF CK (CPK): CPT

## 2022-10-11 PROCEDURE — 80076 HEPATIC FUNCTION PANEL: CPT

## 2022-11-29 ENCOUNTER — APPOINTMENT (OUTPATIENT)
Dept: LAB | Facility: HOSPITAL | Age: 47
End: 2022-11-29
Attending: INTERNAL MEDICINE
Payer: MEDICARE

## 2022-11-29 ENCOUNTER — TRANSCRIBE ORDERS (OUTPATIENT)
Dept: REGISTRATION | Facility: HOSPITAL | Age: 47
End: 2022-11-29

## 2022-11-29 DIAGNOSIS — E53.8 DEFICIENCY OF OTHER SPECIFIED B GROUP VITAMINS: ICD-10-CM

## 2022-11-29 DIAGNOSIS — Z00.01 ENCOUNTER FOR GENERAL ADULT MEDICAL EXAMINATION WITH ABNORMAL FINDINGS: ICD-10-CM

## 2022-11-29 DIAGNOSIS — E53.8 DEFICIENCY OF OTHER SPECIFIED B GROUP VITAMINS: Primary | ICD-10-CM

## 2022-11-29 DIAGNOSIS — E55.9 VITAMIN D DEFICIENCY, UNSPECIFIED: ICD-10-CM

## 2022-11-29 LAB
25(OH)D3 SERPL-MCNC: 28 NG/ML (ref 30–100)
ALBUMIN SERPL-MCNC: 3.8 G/DL (ref 3.4–5)
ALP SERPL-CCNC: 85 IU/L (ref 35–126)
ALT SERPL-CCNC: 16 IU/L (ref 11–54)
ANION GAP SERPL CALC-SCNC: 10 MEQ/L (ref 3–15)
AST SERPL-CCNC: 13 IU/L (ref 15–41)
BILIRUB SERPL-MCNC: 0.4 MG/DL (ref 0.3–1.2)
BUN SERPL-MCNC: 9 MG/DL (ref 8–20)
CALCIUM SERPL-MCNC: 9.7 MG/DL (ref 8.9–10.3)
CHLORIDE SERPL-SCNC: 101 MEQ/L (ref 98–109)
CO2 SERPL-SCNC: 24 MEQ/L (ref 22–32)
CREAT SERPL-MCNC: 0.8 MG/DL (ref 0.6–1.1)
FOLATE SERPL-MCNC: 9.7 NG/ML
GFR SERPL CREATININE-BSD FRML MDRD: >60 ML/MIN/1.73M*2
GLUCOSE SERPL-MCNC: 84 MG/DL (ref 70–99)
MAGNESIUM SERPL-MCNC: 1.8 MG/DL (ref 1.8–2.5)
POTASSIUM SERPL-SCNC: 4.7 MEQ/L (ref 3.6–5.1)
PROT SERPL-MCNC: 6.3 G/DL (ref 6–8.2)
SODIUM SERPL-SCNC: 135 MEQ/L (ref 136–144)
VIT B12 SERPL-MCNC: 924 PG/ML (ref 180–914)

## 2022-11-29 PROCEDURE — 80053 COMPREHEN METABOLIC PANEL: CPT

## 2022-11-29 PROCEDURE — 36415 COLL VENOUS BLD VENIPUNCTURE: CPT

## 2022-11-29 PROCEDURE — 82746 ASSAY OF FOLIC ACID SERUM: CPT

## 2022-11-29 PROCEDURE — 82607 VITAMIN B-12: CPT

## 2022-11-29 PROCEDURE — 82306 VITAMIN D 25 HYDROXY: CPT

## 2022-11-29 PROCEDURE — 83735 ASSAY OF MAGNESIUM: CPT

## 2023-03-15 ENCOUNTER — TRANSCRIBE ORDERS (OUTPATIENT)
Dept: CARDIOLOGY | Facility: HOSPITAL | Age: 48
End: 2023-03-15

## 2023-03-15 ENCOUNTER — TRANSCRIBE ORDERS (OUTPATIENT)
Dept: REGISTRATION | Facility: HOSPITAL | Age: 48
End: 2023-03-15

## 2023-03-15 ENCOUNTER — APPOINTMENT (OUTPATIENT)
Dept: LAB | Facility: HOSPITAL | Age: 48
End: 2023-03-15
Attending: INTERNAL MEDICINE
Payer: MEDICARE

## 2023-03-15 DIAGNOSIS — E78.2 MIXED HYPERLIPIDEMIA: ICD-10-CM

## 2023-03-15 DIAGNOSIS — F25.9 SCHIZOAFFECTIVE DISORDER, UNSPECIFIED: ICD-10-CM

## 2023-03-15 DIAGNOSIS — R60.9 EDEMA, UNSPECIFIED: ICD-10-CM

## 2023-03-15 DIAGNOSIS — E66.01 MORBID (SEVERE) OBESITY DUE TO EXCESS CALORIES (CMS/HCC): ICD-10-CM

## 2023-03-15 DIAGNOSIS — F25.9 SCHIZOAFFECTIVE DISORDER, UNSPECIFIED: Primary | ICD-10-CM

## 2023-03-15 DIAGNOSIS — Z01.419 ENCOUNTER FOR GYNECOLOGICAL EXAMINATION (GENERAL) (ROUTINE) WITHOUT ABNORMAL FINDINGS: ICD-10-CM

## 2023-03-15 DIAGNOSIS — I48.0 PAROXYSMAL ATRIAL FIBRILLATION (CMS/HCC): Primary | ICD-10-CM

## 2023-03-15 DIAGNOSIS — I48.0 PAROXYSMAL ATRIAL FIBRILLATION (CMS/HCC): ICD-10-CM

## 2023-03-15 DIAGNOSIS — F31.9 BIPOLAR DISORDER, UNSPECIFIED (CMS/HCC): ICD-10-CM

## 2023-03-15 DIAGNOSIS — G47.33 OBSTRUCTIVE SLEEP APNEA (ADULT) (PEDIATRIC): ICD-10-CM

## 2023-03-15 DIAGNOSIS — Z01.419 ENCOUNTER FOR GYNECOLOGICAL EXAMINATION (GENERAL) (ROUTINE) WITHOUT ABNORMAL FINDINGS: Primary | ICD-10-CM

## 2023-03-15 LAB
ALBUMIN SERPL-MCNC: 3.7 G/DL (ref 3.4–5)
ALBUMIN SERPL-MCNC: 3.7 G/DL (ref 3.4–5)
ALP SERPL-CCNC: 71 IU/L (ref 35–126)
ALP SERPL-CCNC: 71 IU/L (ref 35–126)
ALT SERPL-CCNC: 15 IU/L (ref 11–54)
ALT SERPL-CCNC: 15 IU/L (ref 11–54)
ANION GAP SERPL CALC-SCNC: 9 MEQ/L (ref 3–15)
AST SERPL-CCNC: 12 IU/L (ref 15–41)
AST SERPL-CCNC: 12 IU/L (ref 15–41)
BILIRUB DIRECT SERPL-MCNC: 0.2 MG/DL
BILIRUB SERPL-MCNC: 0.6 MG/DL (ref 0.3–1.2)
BILIRUB SERPL-MCNC: 0.6 MG/DL (ref 0.3–1.2)
BUN SERPL-MCNC: 9 MG/DL (ref 8–20)
CALCIUM SERPL-MCNC: 9.7 MG/DL (ref 8.9–10.3)
CHLORIDE SERPL-SCNC: 104 MEQ/L (ref 98–109)
CHOLEST SERPL-MCNC: 138 MG/DL
CK SERPL-CCNC: 54 U/L (ref 15–200)
CO2 SERPL-SCNC: 24 MEQ/L (ref 22–32)
CREAT SERPL-MCNC: 0.8 MG/DL (ref 0.6–1.1)
ERYTHROCYTE [DISTWIDTH] IN BLOOD BY AUTOMATED COUNT: 14.1 % (ref 11.7–14.4)
GFR SERPL CREATININE-BSD FRML MDRD: >60 ML/MIN/1.73M*2
GLUCOSE SERPL-MCNC: 89 MG/DL (ref 70–99)
HCT VFR BLDCO AUTO: 45.3 % (ref 35–45)
HDLC SERPL-MCNC: 33 MG/DL
HDLC SERPL: 4.2 {RATIO}
HGB BLD-MCNC: 14.6 G/DL (ref 11.8–15.7)
LDLC SERPL CALC-MCNC: 78 MG/DL
MCH RBC QN AUTO: 28.5 PG (ref 28–33.2)
MCHC RBC AUTO-ENTMCNC: 32.2 G/DL (ref 32.2–35.5)
MCV RBC AUTO: 88.5 FL (ref 83–98)
NONHDLC SERPL-MCNC: 105 MG/DL
PDW BLD AUTO: 11.1 FL (ref 9.4–12.3)
PLATELET # BLD AUTO: 218 K/UL (ref 150–369)
POTASSIUM SERPL-SCNC: 4.5 MEQ/L (ref 3.6–5.1)
PROT SERPL-MCNC: 5.8 G/DL (ref 6–8.2)
PROT SERPL-MCNC: 5.8 G/DL (ref 6–8.2)
RBC # BLD AUTO: 5.12 M/UL (ref 3.93–5.22)
SODIUM SERPL-SCNC: 137 MEQ/L (ref 136–144)
TRIGL SERPL-MCNC: 133 MG/DL (ref 30–149)
VALPROATE SERPL-MCNC: 60.8 UG/ML (ref 50–100)
WBC # BLD AUTO: 6.35 K/UL (ref 3.8–10.5)

## 2023-03-15 PROCEDURE — 80164 ASSAY DIPROPYLACETIC ACD TOT: CPT

## 2023-03-15 PROCEDURE — 80061 LIPID PANEL: CPT

## 2023-03-15 PROCEDURE — 83001 ASSAY OF GONADOTROPIN (FSH): CPT

## 2023-03-15 PROCEDURE — 85027 COMPLETE CBC AUTOMATED: CPT

## 2023-03-15 PROCEDURE — 80053 COMPREHEN METABOLIC PANEL: CPT

## 2023-03-15 PROCEDURE — 82670 ASSAY OF TOTAL ESTRADIOL: CPT

## 2023-03-15 PROCEDURE — 36415 COLL VENOUS BLD VENIPUNCTURE: CPT

## 2023-03-15 PROCEDURE — 82550 ASSAY OF CK (CPK): CPT

## 2023-03-16 LAB
ESTRADIOL SERPL-MCNC: 15 PG/ML
FSH SERPL-ACNC: 7.7 IU/L

## 2023-04-03 ENCOUNTER — OFFICE VISIT (OUTPATIENT)
Dept: VASCULAR SURGERY | Facility: CLINIC | Age: 48
End: 2023-04-03
Payer: MEDICARE

## 2023-04-03 VITALS — OXYGEN SATURATION: 98 % | DIASTOLIC BLOOD PRESSURE: 83 MMHG | SYSTOLIC BLOOD PRESSURE: 125 MMHG | HEART RATE: 89 BPM

## 2023-04-03 DIAGNOSIS — I87.2 VENOUS INSUFFICIENCY: Primary | ICD-10-CM

## 2023-04-03 PROCEDURE — G8752 SYS BP LESS 140: HCPCS | Performed by: SURGERY

## 2023-04-03 PROCEDURE — G8754 DIAS BP LESS 90: HCPCS | Performed by: SURGERY

## 2023-04-03 PROCEDURE — 99203 OFFICE O/P NEW LOW 30 MIN: CPT | Performed by: SURGERY

## 2023-04-03 ASSESSMENT — ENCOUNTER SYMPTOMS
ARTHRALGIAS: 0
ADENOPATHY: 0
FACIAL SWELLING: 0
WEAKNESS: 0
SHORTNESS OF BREATH: 0
UNEXPECTED WEIGHT CHANGE: 0
FEVER: 0
ABDOMINAL PAIN: 0
PSYCHIATRIC NEGATIVE: 1
POLYPHAGIA: 0
DYSURIA: 0

## 2023-04-03 NOTE — PATIENT INSTRUCTIONS
Dear Jillian Acosta    Your physician has requested that you have follow up vascular testing.    To schedule your appointment at the [facility] location, please call [phone].    The name(s) of your test(s) is/are:    US VENOUS REFLUX LEG BILATERAL    After you schedule your testing, please call our office at [phone] to schedule your follow up.     *If you are having the test outside of the [facility], we ask that you obtain a CD of the study and a copy of your report.   Please bring it to our office at least three (3) days prior to your appointment for the physician to review.    If you have any questions, please call the office at [phone].      Thank you,    [facility]

## 2023-04-03 NOTE — LETTER
April 3, 2023     Alan Davila DO  [address] [facility]  [place] [id number]    Patient: Jillian Acosta  YOB: 1975  Date of Visit: 4/3/2023      Dear Dr. Davila:    Thank you for referring Jillian Acosta to me for evaluation. Below are my notes for this consultation.    If you have questions, please do not hesitate to call me. I look forward to following your patient along with you.         Sincerely,        Moisés Castillo MD        CC: No Recipients    Moisés Castillo MD  4/3/2023  1:13 PM  Signed       [facility]  Initial Consultation -  Venous Issues  @ [facility]        DETAILS OF CONSULTATION   4/3/2023  Jillian Acosta               YOB: 1975  [id number]    Consulting Service:  [facility]    PCP:  Alan Davila DO    Reason for Consultation: venous claudication and Varicose Veins      HISTORY OF PRESENT ILLNESS        Jillian Acosta is a 47 y.o. female with venous claudication spider veins, affecting bilateral lower extremities.  No history of DVT.  Has not been trying compression.  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  She is dealing with some tiredness and heaviness in the leg especially at the end of the day.  No open wounds or sores or cellulitis.      Venous history:   Prior DVT: No  Prior phlebitis:  None  Existing IVC Filter:  no  Bleeding/clotting disorder: None  Family venous history: grandmother had VV  Prior Pulmonary Embolus: None  Venous Stasis dermatitis or ulcer:  No  Prior Spider/Reticular Treatment:  No  Analgesics: OTC NSAID pain medication use :  PRN.  Compression Stocking/Support Hose:  Patient has been using - no             PAST MEDICAL AND SURGICAL HISTORY        Past Medical History:   Diagnosis Date   • A-fib (CMS/[facility])    • A-fib (CMS/[facility])    • Bipolar 1 disorder (CMS/[facility])    • Hypertension    • Sleep apnea        Past Surgical  History:   Procedure Laterality Date   • ANAL FISSURECTOMY     • CHOLECYSTECTOMY     • HYSTERECTOMY         MEDICATIONS          Current Outpatient Medications:   •  aspirin 81 mg enteric coated tablet, TK 1 T PO QD, Disp: , Rfl:   •  atorvastatin (LIPITOR) 40 mg tablet, , Disp: , Rfl:   •  cholecalciferol, vitamin D3, 5,000 unit (125 mcg) tablet, Take 5,000 Units by mouth daily., Disp: , Rfl:   •  divalproex (DEPAKOTE) 500 mg EC tablet, Take 500 mg by mouth daily. 1000mg 1x/daily, Disp: , Rfl:   •  fluticasone propionate (FLONASE) 50 mcg/actuation nasal spray, 2 sprays daily., Disp: , Rfl:   •  LATUDA 40 mg tablet, Take 40 mg by mouth., Disp: , Rfl:   •  albuterol HFA (VENTOLIN HFA) 90 mcg/actuation inhaler, Inhale 2 puffs every 6 (six) hours as needed for wheezing or shortness of breath., Disp: 1 Inhaler, Rfl: 1  •  ARIPiprazole (ABILIFY) 30 mg tablet, Take 1 tablet (30 mg total) by mouth daily. (Patient taking differently: Take 5 mg by mouth daily.), Disp: 30 tablet, Rfl: 0  •  ascorbic acid (VITAMIN C) 500 mg tablet, Take 1 tablet (500 mg total) by mouth 2 (two) times a day., Disp: 60 tablet, Rfl: 0  •  dilTIAZem CD (CARDIZEM CD) 120 mg 24 hr capsule, Take 1 capsule (120 mg total) by mouth daily., Disp: 30 capsule, Rfl: 0  •  divalproex (DEPAKOTE) 250 mg EC tablet, Take 250 mg by mouth once daily., Disp: , Rfl:   •  senna (SENOKOT) 8.6 mg tablet, Take 1 tablet by mouth 2 (two) times a day., Disp: 60 tablet, Rfl: 0  •  valproic acid (DEPAKENE) 250 mg capsule, TAKE 4 CAPSULES ORALLY AT BEDTIME, Disp: , Rfl:     ALLERGIES        Azithromycin, Doxycycline, Penicillins, and Amoxicillin    FAMILY HISTORY        Family History   Problem Relation Age of Onset   • Diabetes Biological Mother    • Heart disease Biological Father        SOCIAL/ TOBACCO HISTORY        Social History     Socioeconomic History   • Marital status: Single     Spouse name: None   • Number of children: None   • Years of education: None   • Highest  education level: None   Occupational History   • Occupation: disabled   Tobacco Use   • Smoking status: Former     Packs/day: 0.25     Types: Cigarettes   • Smokeless tobacco: Never   Vaping Use   • Vaping status: Never Used   Substance and Sexual Activity   • Alcohol use: No   • Drug use: No   • Sexual activity: Defer     Social History     Tobacco Use   Smoking Status Former   • Packs/day: 0.25   • Types: Cigarettes   Smokeless Tobacco Never   Vaping Use   Vaping Status Never Used       REVIEW OF SYSTEMS      Review of Systems   Constitutional: Negative for fever and unexpected weight change.   HENT: Negative for facial swelling.    Eyes: Negative for visual disturbance.   Respiratory: Negative for shortness of breath.    Cardiovascular: Negative for chest pain.   Gastrointestinal: Negative for abdominal pain.   Endocrine: Negative for polyphagia.   Genitourinary: Negative for dysuria.   Musculoskeletal: Negative for arthralgias.   Skin: Negative for rash.   Neurological: Negative for syncope and weakness.   Hematological: Negative for adenopathy.   Psychiatric/Behavioral: Negative.        PHYSICAL EXAMINATION      Visit Vitals  /83   Pulse 89   LMP  (LMP Unknown)   SpO2 98%     There is no height or weight on file to calculate BMI.  Physical Exam  Vitals reviewed.   Constitutional:       Appearance: She is well-developed.   HENT:      Head: Normocephalic and atraumatic.   Eyes:      Pupils: Pupils are equal, round, and reactive to light.   Neck:      Trachea: No tracheal deviation.   Cardiovascular:      Rate and Rhythm: Normal rate.      Pulses:           Dorsalis pedis pulses are 2+ on the right side and 2+ on the left side.   Pulmonary:      Effort: Pulmonary effort is normal.      Breath sounds: Normal breath sounds.   Abdominal:      Palpations: Abdomen is soft. There is no mass.   Musculoskeletal:         General: Normal range of motion.      Cervical back: Normal range of motion and neck supple.    Feet:      Right foot:      Skin integrity: No ulcer.      Left foot:      Skin integrity: No ulcer.   Skin:     General: Skin is warm and dry.   Neurological:      Mental Status: She is alert and oriented to person, place, and time.         LABS / IMAGING / EKG        Labs  I have reviewed the patient's pertinent labs.      Imaging  I have independently reviewed the pertinent imaging from the EMR and pertinent outside records.    No venous studies appropriate to review for today's visit       ASSESSMENT AND PLAN         Problem List Items Addressed This Visit        Circulatory    Venous insufficiency - Primary    Overview     Bilateral edema, venous claudication spider veins         Current Assessment & Plan     We will arrange a venous reflux ultrasound of the bilateral lower extremities,  Suspect a we will have options for venous and endovenous therapies.  We will review and discuss the procedural options once the ultrasound has been completed.  We also recommended use of compression garments of the bilateral lower extremities in the meantime.         Relevant Orders    Ultrasound venous reflux leg bilateral         Moisés Castillo MD, FACS       Return in about 4 weeks (around 5/1/2023) for Recheck, To review ordered studies.       Moisés Castillo MD, FACS    Vascular and Endovascular Specialist  Main [address], [address]  [facility] [address]  Phone [phone]  Fax  [phone]     Thank you very much for allowing us to participate in the care of your patient.  Please not hesitate to call or email if there are any questions. Sincerely.    This document was generated utilizing voice recognition technology. An attempt at proofreading has been made to minimize errors but typographical errors may be present.

## 2023-04-03 NOTE — PROGRESS NOTES
[facility]  Initial Consultation -  Venous Issues  @ [place]        DETAILS OF CONSULTATION   4/3/2023  Jillian Acosta               YOB: 1975  [id number]    Consulting Service:  [facility]    PCP:  Alan Davila DO    Reason for Consultation: venous claudication and Varicose Veins      HISTORY OF PRESENT ILLNESS        Jillian Acosta is a 47 y.o. female with venous claudication spider veins, affecting bilateral lower extremities.  No history of DVT.  Has not been trying compression.  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  She is dealing with some tiredness and heaviness in the leg especially at the end of the day.  No open wounds or sores or cellulitis.      Venous history:   Prior DVT: No  Prior phlebitis:  None  Existing IVC Filter:  no  Bleeding/clotting disorder: None  Family venous history: grandmother had VV  Prior Pulmonary Embolus: None  Venous Stasis dermatitis or ulcer:  No  Prior Spider/Reticular Treatment:  No  Analgesics: OTC NSAID pain medication use :  PRN.  Compression Stocking/Support Hose:  Patient has been using - no             PAST MEDICAL AND SURGICAL HISTORY        Past Medical History:   Diagnosis Date   • A-fib (CMS/HCC)    • A-fib (CMS/HCC)    • Bipolar 1 disorder (CMS/HCC)    • Hypertension    • Sleep apnea        Past Surgical History:   Procedure Laterality Date   • ANAL FISSURECTOMY     • CHOLECYSTECTOMY     • HYSTERECTOMY         MEDICATIONS          Current Outpatient Medications:   •  aspirin 81 mg enteric coated tablet, TK 1 T PO QD, Disp: , Rfl:   •  atorvastatin (LIPITOR) 40 mg tablet, , Disp: , Rfl:   •  cholecalciferol, vitamin D3, 5,000 unit (125 mcg) tablet, Take 5,000 Units by mouth daily., Disp: , Rfl:   •  divalproex (DEPAKOTE) 500 mg EC tablet, Take 500 mg by mouth daily. 1000mg 1x/daily, Disp: , Rfl:   •  fluticasone propionate (FLONASE) 50  mcg/actuation nasal spray, 2 sprays daily., Disp: , Rfl:   •  LATUDA 40 mg tablet, Take 40 mg by mouth., Disp: , Rfl:   •  albuterol HFA (VENTOLIN HFA) 90 mcg/actuation inhaler, Inhale 2 puffs every 6 (six) hours as needed for wheezing or shortness of breath., Disp: 1 Inhaler, Rfl: 1  •  ARIPiprazole (ABILIFY) 30 mg tablet, Take 1 tablet (30 mg total) by mouth daily. (Patient taking differently: Take 5 mg by mouth daily.), Disp: 30 tablet, Rfl: 0  •  ascorbic acid (VITAMIN C) 500 mg tablet, Take 1 tablet (500 mg total) by mouth 2 (two) times a day., Disp: 60 tablet, Rfl: 0  •  dilTIAZem CD (CARDIZEM CD) 120 mg 24 hr capsule, Take 1 capsule (120 mg total) by mouth daily., Disp: 30 capsule, Rfl: 0  •  divalproex (DEPAKOTE) 250 mg EC tablet, Take 250 mg by mouth once daily., Disp: , Rfl:   •  senna (SENOKOT) 8.6 mg tablet, Take 1 tablet by mouth 2 (two) times a day., Disp: 60 tablet, Rfl: 0  •  valproic acid (DEPAKENE) 250 mg capsule, TAKE 4 CAPSULES ORALLY AT BEDTIME, Disp: , Rfl:     ALLERGIES        Azithromycin, Doxycycline, Penicillins, and Amoxicillin    FAMILY HISTORY        Family History   Problem Relation Age of Onset   • Diabetes Biological Mother    • Heart disease Biological Father        SOCIAL/ TOBACCO HISTORY        Social History     Socioeconomic History   • Marital status: Single     Spouse name: None   • Number of children: None   • Years of education: None   • Highest education level: None   Occupational History   • Occupation: disabled   Tobacco Use   • Smoking status: Former     Packs/day: 0.25     Types: Cigarettes   • Smokeless tobacco: Never   Vaping Use   • Vaping status: Never Used   Substance and Sexual Activity   • Alcohol use: No   • Drug use: No   • Sexual activity: Defer     Social History     Tobacco Use   Smoking Status Former   • Packs/day: 0.25   • Types: Cigarettes   Smokeless Tobacco Never   Vaping Use   Vaping Status Never Used       REVIEW OF SYSTEMS      Review of Systems    Constitutional: Negative for fever and unexpected weight change.   HENT: Negative for facial swelling.    Eyes: Negative for visual disturbance.   Respiratory: Negative for shortness of breath.    Cardiovascular: Negative for chest pain.   Gastrointestinal: Negative for abdominal pain.   Endocrine: Negative for polyphagia.   Genitourinary: Negative for dysuria.   Musculoskeletal: Negative for arthralgias.   Skin: Negative for rash.   Neurological: Negative for syncope and weakness.   Hematological: Negative for adenopathy.   Psychiatric/Behavioral: Negative.        PHYSICAL EXAMINATION      Visit Vitals  /83   Pulse 89   LMP  (LMP Unknown)   SpO2 98%     There is no height or weight on file to calculate BMI.  Physical Exam  Vitals reviewed.   Constitutional:       Appearance: She is well-developed.   HENT:      Head: Normocephalic and atraumatic.   Eyes:      Pupils: Pupils are equal, round, and reactive to light.   Neck:      Trachea: No tracheal deviation.   Cardiovascular:      Rate and Rhythm: Normal rate.      Pulses:           Dorsalis pedis pulses are 2+ on the right side and 2+ on the left side.   Pulmonary:      Effort: Pulmonary effort is normal.      Breath sounds: Normal breath sounds.   Abdominal:      Palpations: Abdomen is soft. There is no mass.   Musculoskeletal:         General: Normal range of motion.      Cervical back: Normal range of motion and neck supple.   Feet:      Right foot:      Skin integrity: No ulcer.      Left foot:      Skin integrity: No ulcer.   Skin:     General: Skin is warm and dry.   Neurological:      Mental Status: She is alert and oriented to person, place, and time.         LABS / IMAGING / EKG        Labs  I have reviewed the patient's pertinent labs.      Imaging  I have independently reviewed the pertinent imaging from the EMR and pertinent outside records.    No venous studies appropriate to review for today's visit       ASSESSMENT AND PLAN         Problem  List Items Addressed This Visit        Circulatory    Venous insufficiency - Primary    Overview     Bilateral edema, venous claudication spider veins         Current Assessment & Plan     We will arrange a venous reflux ultrasound of the bilateral lower extremities,  Suspect a we will have options for venous and endovenous therapies.  We will review and discuss the procedural options once the ultrasound has been completed.  We also recommended use of compression garments of the bilateral lower extremities in the meantime.         Relevant Orders    Ultrasound venous reflux leg bilateral         Moisés Castillo MD, FACS       Return in about 4 weeks (around 5/1/2023) for Recheck, To review ordered studies.       Moisés Castillo MD, FACS    Vascular and Endovascular Specialist  Main [address], [address]  [facility] [address]  Phone [phone]  Fax  [phone]     Thank you very much for allowing us to participate in the care of your patient.  Please not hesitate to call or email if there are any questions. Sincerely.    This document was generated utilizing voice recognition technology. An attempt at proofreading has been made to minimize errors but typographical errors may be present.

## 2023-06-26 ENCOUNTER — OFFICE VISIT (OUTPATIENT)
Dept: SURGERY | Facility: CLINIC | Age: 48
End: 2023-06-26
Payer: MEDICARE

## 2023-06-26 VITALS — HEIGHT: 62 IN | BODY MASS INDEX: 43.98 KG/M2 | WEIGHT: 239 LBS

## 2023-06-26 DIAGNOSIS — L02.91 ABSCESS: Primary | ICD-10-CM

## 2023-06-26 PROCEDURE — G8756 NO BP MEASURE DOC: HCPCS | Performed by: SURGERY

## 2023-06-26 PROCEDURE — 10060 I&D ABSCESS SIMPLE/SINGLE: CPT | Performed by: SURGERY

## 2023-06-26 PROCEDURE — 99203 OFFICE O/P NEW LOW 30 MIN: CPT | Mod: 25 | Performed by: SURGERY

## 2023-06-26 RX ORDER — CLINDAMYCIN HYDROCHLORIDE 300 MG/1
CAPSULE ORAL
COMMUNITY
Start: 2023-06-23 | End: 2023-07-14 | Stop reason: HOSPADM

## 2023-06-26 RX ORDER — LIDOCAINE HYDROCHLORIDE 10 MG/ML
1 INJECTION, SOLUTION INFILTRATION; PERINEURAL ONCE
Status: COMPLETED | OUTPATIENT
Start: 2023-06-26 | End: 2023-06-26

## 2023-06-26 RX ORDER — SULFAMETHOXAZOLE AND TRIMETHOPRIM 800; 160 MG/1; MG/1
1 TABLET ORAL 2 TIMES DAILY
Qty: 20 TABLET | Refills: 0 | Status: SHIPPED | OUTPATIENT
Start: 2023-06-26 | End: 2023-07-06

## 2023-06-26 RX ORDER — HYDROXYZINE PAMOATE 25 MG/1
25 CAPSULE ORAL 2 TIMES DAILY
COMMUNITY
Start: 2023-03-23 | End: 2024-02-02 | Stop reason: HOSPADM

## 2023-06-26 RX ADMIN — LIDOCAINE HYDROCHLORIDE 1 ML: 10 INJECTION, SOLUTION INFILTRATION; PERINEURAL at 13:47

## 2023-06-26 NOTE — PROGRESS NOTES
"General Surgery Consult    Chief complaint: Right upper quadrant abscess    HPI:  Jillian Acosta is a 47 y.o. female past medical history as noted below who was seen in an urgent care center on 6/22/2023 with right upper quadrant abscess which was incised and drained.  She was started on clindamycin.  There apparently was a small antecedent mass had been present for a year or 2 before.  She was planning on seeing Dr. Vaca for this but found out that he retired.  She is not having any fevers or chills.  She is not a known diabetic.    Medical History:   Past Medical History:   Diagnosis Date   • A-fib (CMS/HCC)    • A-fib (CMS/HCC)    • Bipolar 1 disorder (CMS/HCC)    • Hypertension    • Sleep apnea        Surgical History:   Past Surgical History:   Procedure Laterality Date   • ANAL FISSURECTOMY     • CHOLECYSTECTOMY     • HYSTERECTOMY         Social History:   Social History     Social History Narrative   • Not on file       Family History:   Family History   Problem Relation Age of Onset   • Diabetes Biological Mother    • Heart disease Biological Father        Allergies: Azithromycin, Doxycycline, Penicillins, and Amoxicillin    Home Medications:  Not in a hospital admission.    Current Medications:  •  ARIPiprazole  •  aspirin  •  atorvastatin  •  cholecalciferol (vitamin D3)  •  clindamycin  •  dilTIAZem CD  •  divalproex  •  divalproex  •  fluticasone propionate  •  hydrOXYzine  •  LATUDA  •  valproic acid  •  albuterol HFA  •  ascorbic acid  •  senna    Review of Systems: A complete review of systems is negative except as noted above    Objective     Physicial Exam  Visit Vitals  Ht 1.581 m (5' 2.25\")   Wt 108 kg (239 lb)   LMP  (LMP Unknown)   BMI 43.36 kg/m²       General appearance: Well-developed well-nourished, no acute distress  Heart: Regular rate and rhythm with no murmurs, rubs, or gallops.  Normal S1, S2  Lungs: Clear to auscultation bilaterally with no wheezes, rales, or rhonchi.  Nonlabored " respirations.  Abdomen: Soft, nontender and nondistended with no palpable organomegaly   Good bowel sounds noted.    In the right upper quadrant there is a tender is erythematous fluctuant mass present.  This was incised and drained today in the office and did yield some pus.  I did not see any definite sebaceous material.  The opening was deeply bit loosely packed with a corner of gauze.      Impression: Right upper quadrant abscess with 2-year antecedent mass but no definite sebaceous material seen.     Plan: I did prescribe an additional course of oral Bactrim to start after she finishes clindamycin.  She will apply warm soaks to this area 3 times daily.  I will see her back in 3 months time and if there is any residual mass remaining it should be excised.          Rubén Emerson MD

## 2023-06-26 NOTE — LETTER
June 26, 2023     Otto Wasserman MD  [address]  [address] [facility] [address]    Patient: Jillian Acosta  YOB: 1975  Date of Visit: 6/26/2023      Dear Dr. Wasserman:    Thank you for referring Jillian Acosta to me for evaluation. Below are my notes for this consultation.    If you have questions, please do not hesitate to call me. I look forward to following your patient along with you.         Sincerely,        Rubén Emerson MD        CC: MD Laya Benoit MD Dallara, Charles A, MD  6/26/2023  1:35 PM  Signed  General Surgery Consult    Chief complaint: Right upper quadrant abscess    HPI:  Jillian Acosta is a 47 y.o. female past medical history as noted below who was seen in an urgent care center on 6/22/2023 with right upper quadrant abscess which was incised and drained.  She was started on clindamycin.  There apparently was a small antecedent mass had been present for a year or 2 before.  She was planning on seeing Dr. Vaca for this but found out that he retired.  She is not having any fevers or chills.  She is not a known diabetic.    Medical History:   Past Medical History:   Diagnosis Date   • A-fib (CMS/HCC)    • A-fib (CMS/HCC)    • Bipolar 1 disorder (CMS/HCC)    • Hypertension    • Sleep apnea        Surgical History:   Past Surgical History:   Procedure Laterality Date   • ANAL FISSURECTOMY     • CHOLECYSTECTOMY     • HYSTERECTOMY         Social History:   Social History     Social History Narrative   • Not on file       Family History:   Family History   Problem Relation Age of Onset   • Diabetes Biological Mother    • Heart disease Biological Father        Allergies: Azithromycin, Doxycycline, Penicillins, and Amoxicillin    Home Medications:  Not in a hospital admission.    Current Medications:  •  ARIPiprazole  •  aspirin  •  atorvastatin  •  cholecalciferol (vitamin D3)  •  clindamycin  •  dilTIAZem CD  •  divalproex  •  divalproex  •  fluticasone  "propionate  •  hydrOXYzine  •  LATUDA  •  valproic acid  •  albuterol HFA  •  ascorbic acid  •  senna    Review of Systems: A complete review of systems is negative except as noted above    Objective     Physicial Exam  Visit Vitals  Ht 1.581 m (5' 2.25\")   Wt 108 kg (239 lb)   LMP  (LMP Unknown)   BMI 43.36 kg/m²       General appearance: Well-developed well-nourished, no acute distress  Heart: Regular rate and rhythm with no murmurs, rubs, or gallops.  Normal S1, S2  Lungs: Clear to auscultation bilaterally with no wheezes, rales, or rhonchi.  Nonlabored respirations.  Abdomen: Soft, nontender and nondistended with no palpable organomegaly   Good bowel sounds noted.    In the right upper quadrant there is a tender is erythematous fluctuant mass present.  This was incised and drained today in the office and did yield some pus.  I did not see any definite sebaceous material.  The opening was deeply bit loosely packed with a corner of gauze.      Impression: Right upper quadrant abscess with 2-year antecedent mass but no definite sebaceous material seen.     Plan: I did prescribe an additional course of oral Bactrim to start after she finishes clindamycin.  She will apply warm soaks to this area 3 times daily.  I will see her back in 3 months time and if there is any residual mass remaining it should be excised.          Rubén Emerson MD    "

## 2023-07-10 ENCOUNTER — HOSPITAL ENCOUNTER (EMERGENCY)
Facility: HOSPITAL | Age: 48
DRG: 885 | End: 2023-07-11
Attending: EMERGENCY MEDICINE | Admitting: PSYCHIATRY & NEUROLOGY
Payer: MEDICARE

## 2023-07-10 DIAGNOSIS — F22 PARANOIA (CMS/HCC): Primary | ICD-10-CM

## 2023-07-10 PROBLEM — F31.2 SEVERE MANIC BIPOLAR 1 DISORDER WITH PSYCHOTIC BEHAVIOR (CMS/HCC): Status: ACTIVE | Noted: 2023-07-10

## 2023-07-10 LAB
ALBUMIN SERPL-MCNC: 3.9 G/DL (ref 3.5–5.7)
ALP SERPL-CCNC: 58 IU/L (ref 34–104)
ALT SERPL-CCNC: 10 IU/L (ref 7–52)
AMPHET UR QL SCN: NOT DETECTED
ANION GAP SERPL CALC-SCNC: 5 MEQ/L (ref 3–15)
APAP SERPL-MCNC: 0.1 UG/ML (ref 10–30)
AST SERPL-CCNC: 8 IU/L (ref 13–39)
BARBITURATES UR QL SCN: NOT DETECTED
BASOPHILS # BLD: 0.02 K/UL (ref 0.01–0.1)
BASOPHILS NFR BLD: 0.3 %
BENZODIAZ UR QL SCN: NOT DETECTED
BILIRUB SERPL-MCNC: 0.3 MG/DL (ref 0.3–1)
BUN SERPL-MCNC: 12 MG/DL (ref 7–25)
CALCIUM SERPL-MCNC: 9.2 MG/DL (ref 8.6–10.3)
CANNABINOIDS UR QL SCN: NOT DETECTED
CHLORIDE SERPL-SCNC: 108 MEQ/L (ref 98–107)
CO2 SERPL-SCNC: 27 MEQ/L (ref 21–31)
COCAINE UR QL SCN: NOT DETECTED
CREAT SERPL-MCNC: 0.8 MG/DL (ref 0.6–1.2)
DIFFERENTIAL METHOD BLD: NORMAL
EOSINOPHIL # BLD: 0.13 K/UL (ref 0.04–0.36)
EOSINOPHIL NFR BLD: 2 %
ERYTHROCYTE [DISTWIDTH] IN BLOOD BY AUTOMATED COUNT: 13.7 % (ref 11.7–14.4)
ETHANOL SERPL-MCNC: <5 MG/DL
FENTANYL URINE SCR: NOT DETECTED
GFR SERPL CREATININE-BSD FRML MDRD: >60 ML/MIN/1.73M*2
GLUCOSE SERPL-MCNC: 96 MG/DL (ref 70–99)
HCT VFR BLDCO AUTO: 40.4 % (ref 35–45)
HGB BLD-MCNC: 13.2 G/DL (ref 11.8–15.7)
IMM GRANULOCYTES # BLD AUTO: 0.04 K/UL (ref 0–0.08)
IMM GRANULOCYTES NFR BLD AUTO: 0.6 %
LYMPHOCYTES # BLD: 1.49 K/UL (ref 1.2–3.5)
LYMPHOCYTES NFR BLD: 22.4 %
MCH RBC QN AUTO: 28.9 PG (ref 28–33.2)
MCHC RBC AUTO-ENTMCNC: 32.7 G/DL (ref 32.2–35.5)
MCV RBC AUTO: 88.4 FL (ref 83–98)
MONOCYTES # BLD: 0.56 K/UL (ref 0.28–0.8)
MONOCYTES NFR BLD: 8.4 %
NEUTROPHILS # BLD: 4.41 K/UL (ref 1.7–7)
NEUTS SEG NFR BLD: 66.3 %
NRBC BLD-RTO: 0 %
OPIATES UR QL SCN: NOT DETECTED
PCP UR QL SCN: NOT DETECTED
PDW BLD AUTO: 9.9 FL (ref 9.4–12.3)
PLATELET # BLD AUTO: 224 K/UL (ref 150–369)
POTASSIUM SERPL-SCNC: 4.2 MEQ/L (ref 3.5–5.1)
PROT SERPL-MCNC: 6.7 G/DL (ref 6.4–8.9)
RBC # BLD AUTO: 4.57 M/UL (ref 3.93–5.22)
SALICYLATES SERPL-MCNC: <1.5 MG/DL
SODIUM SERPL-SCNC: 140 MEQ/L (ref 136–145)
VALPROATE SERPL-MCNC: 56.2 UG/ML (ref 50–100)
WBC # BLD AUTO: 6.65 K/UL (ref 3.8–10.5)

## 2023-07-10 PROCEDURE — 85025 COMPLETE CBC W/AUTO DIFF WBC: CPT | Performed by: EMERGENCY MEDICINE

## 2023-07-10 PROCEDURE — 83735 ASSAY OF MAGNESIUM: CPT

## 2023-07-10 PROCEDURE — 80307 DRUG TEST PRSMV CHEM ANLYZR: CPT | Performed by: EMERGENCY MEDICINE

## 2023-07-10 PROCEDURE — 99285 EMERGENCY DEPT VISIT HI MDM: CPT

## 2023-07-10 PROCEDURE — 36415 COLL VENOUS BLD VENIPUNCTURE: CPT

## 2023-07-10 PROCEDURE — G0480 DRUG TEST DEF 1-7 CLASSES: HCPCS

## 2023-07-10 PROCEDURE — 80053 COMPREHEN METABOLIC PANEL: CPT

## 2023-07-10 PROCEDURE — 63700000 HC SELF-ADMINISTRABLE DRUG: Performed by: PHYSICIAN ASSISTANT

## 2023-07-10 PROCEDURE — G0480 DRUG TEST DEF 1-7 CLASSES: HCPCS | Performed by: EMERGENCY MEDICINE

## 2023-07-10 PROCEDURE — 85025 COMPLETE CBC W/AUTO DIFF WBC: CPT

## 2023-07-10 PROCEDURE — 80164 ASSAY DIPROPYLACETIC ACD TOT: CPT | Performed by: EMERGENCY MEDICINE

## 2023-07-10 PROCEDURE — 84443 ASSAY THYROID STIM HORMONE: CPT | Performed by: PSYCHIATRY & NEUROLOGY

## 2023-07-10 PROCEDURE — 12400000 HC ROOM AND CARE SEMIPRIVATE PSYCH

## 2023-07-10 PROCEDURE — 80053 COMPREHEN METABOLIC PANEL: CPT | Performed by: EMERGENCY MEDICINE

## 2023-07-10 RX ORDER — HALOPERIDOL 5 MG/ML
2 INJECTION INTRAMUSCULAR EVERY 6 HOURS PRN
Status: CANCELLED | OUTPATIENT
Start: 2023-07-10

## 2023-07-10 RX ORDER — HYDROXYZINE HYDROCHLORIDE 25 MG/1
50 TABLET, FILM COATED ORAL EVERY 6 HOURS PRN
Status: CANCELLED | OUTPATIENT
Start: 2023-07-10

## 2023-07-10 RX ORDER — OLANZAPINE 5 MG/1
5 TABLET, ORALLY DISINTEGRATING ORAL EVERY 6 HOURS PRN
Status: CANCELLED | OUTPATIENT
Start: 2023-07-10

## 2023-07-10 RX ORDER — ATORVASTATIN CALCIUM 40 MG/1
40 TABLET, FILM COATED ORAL
Status: CANCELLED | OUTPATIENT
Start: 2023-07-11

## 2023-07-10 RX ORDER — LURASIDONE HYDROCHLORIDE 40 MG/1
40 TABLET, FILM COATED ORAL
Status: CANCELLED | OUTPATIENT
Start: 2023-07-11

## 2023-07-10 RX ORDER — DIVALPROEX SODIUM 125 MG/1
1000 CAPSULE, COATED PELLETS ORAL ONCE
Status: COMPLETED | OUTPATIENT
Start: 2023-07-10 | End: 2023-07-10

## 2023-07-10 RX ORDER — DIVALPROEX SODIUM 250 MG/1
500 TABLET, DELAYED RELEASE ORAL DAILY
Status: CANCELLED | OUTPATIENT
Start: 2023-07-11

## 2023-07-10 RX ORDER — ATORVASTATIN CALCIUM 40 MG/1
40 TABLET, FILM COATED ORAL ONCE
Status: COMPLETED | OUTPATIENT
Start: 2023-07-10 | End: 2023-07-10

## 2023-07-10 RX ORDER — IBUPROFEN/PSEUDOEPHEDRINE HCL 200MG-30MG
3 TABLET ORAL NIGHTLY PRN
Status: CANCELLED | OUTPATIENT
Start: 2023-07-10

## 2023-07-10 RX ADMIN — DIVALPROEX SODIUM 1000 MG: 125 CAPSULE, COATED PELLETS ORAL at 23:23

## 2023-07-10 RX ADMIN — ATORVASTATIN CALCIUM 40 MG: 40 TABLET, FILM COATED ORAL at 23:22

## 2023-07-11 ENCOUNTER — HOSPITAL ENCOUNTER (INPATIENT)
Facility: HOSPITAL | Age: 48
LOS: 3 days | Discharge: HOME | DRG: 885 | End: 2023-07-14
Attending: PSYCHIATRY & NEUROLOGY | Admitting: PSYCHIATRY & NEUROLOGY
Payer: MEDICARE

## 2023-07-11 VITALS
WEIGHT: 238 LBS | DIASTOLIC BLOOD PRESSURE: 59 MMHG | SYSTOLIC BLOOD PRESSURE: 131 MMHG | OXYGEN SATURATION: 99 % | HEART RATE: 88 BPM | TEMPERATURE: 96.8 F | HEIGHT: 66 IN | BODY MASS INDEX: 38.25 KG/M2 | RESPIRATION RATE: 18 BRPM

## 2023-07-11 DIAGNOSIS — F31.9 BIPOLAR 1 DISORDER (CMS/HCC): ICD-10-CM

## 2023-07-11 DIAGNOSIS — I48.91 ATRIAL FIBRILLATION, UNSPECIFIED TYPE (CMS/HCC): ICD-10-CM

## 2023-07-11 DIAGNOSIS — F31.2 SEVERE MANIC BIPOLAR 1 DISORDER WITH PSYCHOTIC BEHAVIOR (CMS/HCC): Primary | ICD-10-CM

## 2023-07-11 PROBLEM — L03.90 WOUND CELLULITIS: Status: ACTIVE | Noted: 2023-07-11

## 2023-07-11 PROBLEM — E83.42 HYPOMAGNESEMIA: Status: ACTIVE | Noted: 2023-07-11

## 2023-07-11 PROBLEM — F39 MOOD DISORDER (CMS/HCC): Status: ACTIVE | Noted: 2023-07-11

## 2023-07-11 PROBLEM — E78.5 HYPERLIPIDEMIA: Chronic | Status: ACTIVE | Noted: 2023-07-11

## 2023-07-11 LAB
ATRIAL RATE: 81
BILIRUB UR QL STRIP.AUTO: NEGATIVE MG/DL
CLARITY UR REFRACT.AUTO: CLEAR
COLOR UR AUTO: YELLOW
GLUCOSE UR STRIP.AUTO-MCNC: NEGATIVE MG/DL
HGB UR QL STRIP.AUTO: NEGATIVE
KETONES UR STRIP.AUTO-MCNC: NEGATIVE MG/DL
LEUKOCYTE ESTERASE UR QL STRIP.AUTO: NEGATIVE
MAGNESIUM SERPL-MCNC: 1.7 MG/DL (ref 1.9–2.7)
NITRITE UR QL STRIP.AUTO: NEGATIVE
P AXIS: 103
PH UR STRIP.AUTO: 6.5 [PH]
PR INTERVAL: 146
PROT UR QL STRIP.AUTO: NEGATIVE
QRS DURATION: 82
QT INTERVAL: 380
QTC CALCULATION(BAZETT): 441
R AXIS: 154
SP GR UR REFRACT.AUTO: 1.02
T WAVE AXIS: 153
TSH SERPL DL<=0.05 MIU/L-ACNC: 2.1 MIU/L (ref 0.34–5.6)
UROBILINOGEN UR STRIP-ACNC: 0.2 EU/DL
VENTRICULAR RATE: 81

## 2023-07-11 PROCEDURE — 63700000 HC SELF-ADMINISTRABLE DRUG: Performed by: PSYCHIATRY & NEUROLOGY

## 2023-07-11 PROCEDURE — 90792 PSYCH DIAG EVAL W/MED SRVCS: CPT | Performed by: PSYCHIATRY & NEUROLOGY

## 2023-07-11 PROCEDURE — 93005 ELECTROCARDIOGRAM TRACING: CPT

## 2023-07-11 PROCEDURE — 200200 PR NO CHARGE

## 2023-07-11 PROCEDURE — 12400000 HC ROOM AND CARE SEMIPRIVATE PSYCH

## 2023-07-11 PROCEDURE — 81003 URINALYSIS AUTO W/O SCOPE: CPT | Performed by: PSYCHIATRY & NEUROLOGY

## 2023-07-11 PROCEDURE — 63700000 HC SELF-ADMINISTRABLE DRUG

## 2023-07-11 PROCEDURE — 99223 1ST HOSP IP/OBS HIGH 75: CPT | Performed by: INTERNAL MEDICINE

## 2023-07-11 PROCEDURE — 93005 ELECTROCARDIOGRAM TRACING: CPT | Performed by: PSYCHIATRY & NEUROLOGY

## 2023-07-11 RX ORDER — HALOPERIDOL 5 MG/ML
5 INJECTION INTRAMUSCULAR EVERY 6 HOURS PRN
Status: DISCONTINUED | OUTPATIENT
Start: 2023-07-11 | End: 2023-07-14 | Stop reason: HOSPADM

## 2023-07-11 RX ORDER — SULFAMETHOXAZOLE AND TRIMETHOPRIM 800; 160 MG/1; MG/1
1 TABLET ORAL EVERY 12 HOURS
Status: COMPLETED | OUTPATIENT
Start: 2023-07-11 | End: 2023-07-12

## 2023-07-11 RX ORDER — HALOPERIDOL 5 MG/1
5 TABLET ORAL EVERY 6 HOURS PRN
Status: DISCONTINUED | OUTPATIENT
Start: 2023-07-11 | End: 2023-07-14 | Stop reason: HOSPADM

## 2023-07-11 RX ORDER — ALUMINUM HYDROXIDE, MAGNESIUM HYDROXIDE, AND SIMETHICONE 1200; 120; 1200 MG/30ML; MG/30ML; MG/30ML
30 SUSPENSION ORAL EVERY 4 HOURS PRN
Status: DISCONTINUED | OUTPATIENT
Start: 2023-07-11 | End: 2023-07-14 | Stop reason: HOSPADM

## 2023-07-11 RX ORDER — IBUPROFEN/PSEUDOEPHEDRINE HCL 200MG-30MG
3 TABLET ORAL NIGHTLY PRN
Status: DISCONTINUED | OUTPATIENT
Start: 2023-07-11 | End: 2023-07-14 | Stop reason: HOSPADM

## 2023-07-11 RX ORDER — DIVALPROEX SODIUM 500 MG/1
1500 TABLET, FILM COATED, EXTENDED RELEASE ORAL NIGHTLY
Status: DISCONTINUED | OUTPATIENT
Start: 2023-07-11 | End: 2023-07-11

## 2023-07-11 RX ORDER — DIVALPROEX SODIUM 250 MG/1
1500 TABLET, DELAYED RELEASE ORAL NIGHTLY
Status: DISCONTINUED | OUTPATIENT
Start: 2023-07-11 | End: 2023-07-14 | Stop reason: HOSPADM

## 2023-07-11 RX ORDER — LANOLIN ALCOHOL/MO/W.PET/CERES
400 CREAM (GRAM) TOPICAL ONCE
Status: COMPLETED | OUTPATIENT
Start: 2023-07-11 | End: 2023-07-11

## 2023-07-11 RX ORDER — DIPHENHYDRAMINE HCL 50 MG/ML
50 VIAL (ML) INJECTION EVERY 6 HOURS PRN
Status: DISCONTINUED | OUTPATIENT
Start: 2023-07-11 | End: 2023-07-14 | Stop reason: HOSPADM

## 2023-07-11 RX ORDER — TRAZODONE HYDROCHLORIDE 50 MG/1
50 TABLET ORAL NIGHTLY PRN
Status: DISCONTINUED | OUTPATIENT
Start: 2023-07-11 | End: 2023-07-14 | Stop reason: HOSPADM

## 2023-07-11 RX ORDER — HYDROXYZINE HYDROCHLORIDE 25 MG/1
50 TABLET, FILM COATED ORAL EVERY 6 HOURS PRN
Status: DISCONTINUED | OUTPATIENT
Start: 2023-07-11 | End: 2023-07-14 | Stop reason: HOSPADM

## 2023-07-11 RX ORDER — ACETAMINOPHEN 325 MG/1
650 TABLET ORAL EVERY 4 HOURS PRN
Status: DISCONTINUED | OUTPATIENT
Start: 2023-07-11 | End: 2023-07-14 | Stop reason: HOSPADM

## 2023-07-11 RX ORDER — LURASIDONE HYDROCHLORIDE 40 MG/1
80 TABLET, FILM COATED ORAL NIGHTLY
Status: DISCONTINUED | OUTPATIENT
Start: 2023-07-11 | End: 2023-07-14 | Stop reason: HOSPADM

## 2023-07-11 RX ADMIN — Medication 400 MG: at 16:59

## 2023-07-11 RX ADMIN — SULFAMETHOXAZOLE AND TRIMETHOPRIM 1 TABLET: 800; 160 TABLET ORAL at 15:46

## 2023-07-11 RX ADMIN — DIVALPROEX SODIUM 1500 MG: 250 TABLET, DELAYED RELEASE ORAL at 21:54

## 2023-07-11 RX ADMIN — HYDROXYZINE HYDROCHLORIDE 50 MG: 25 TABLET ORAL at 12:12

## 2023-07-11 RX ADMIN — LURASIDONE HYDROCHLORIDE 80 MG: 40 TABLET, FILM COATED ORAL at 21:13

## 2023-07-11 ASSESSMENT — ENCOUNTER SYMPTOMS
HYPERACTIVE: 0
NUMBNESS: 0
HALLUCINATIONS: 1
WEAKNESS: 0
COLOR CHANGE: 0
DIARRHEA: 0
FEVER: 0
SHORTNESS OF BREATH: 0
VOMITING: 0
ABDOMINAL PAIN: 0
COUGH: 0

## 2023-07-11 NOTE — PROGRESS NOTES
Jillian presented to the ED due to increased stress and paranoia. Jillian states she has been hallucinating and feeling very paranoid. 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 Pt did not want us contacting her sister at this time. Pt sees outpatient psychiatrist, Dr Weeks at [facility]. Pt has been inpatient 8 times, last in 2020. Pt has a history of Bipolar and mood disorder. Pt denies SI/HI.      07/11/23 1000   General Information,    Admission Status voluntary admission   Arrived From emergency department   Preferred Language English   Contact Information   Permission Granted to Share Info With family/designee  (Aunt)   Living Environment   People in Home parent(s)   Current Living Arrangements home   Employment/   Employment Status disabled   Financial/Legal   Source of Income disability   Legal   Criminal Activity/Legal Involvement none   Values/Beliefs   Spiritual, Cultural Beliefs, Judaism Practices, Values that Affect Care no   Mental Status   General Appearance WDL WDL   Behavior WDL   Behavior WDL X;interactions   Interactions guarded   Emotion Mood WDL   Emotion/Mood/Affect WDL X;affect;emotion mood   Affect affect consistent with mood   Emotion/Mood sad   Speech WDL   Speech WDL X;speech   Speech hyper verbal   Perceptual State WDL   Perceptual State WDL X;perceptual state   Hallucinations denies hallucinations   Perceptual State derealization   Thought Process WDL   Thought Process WDL X;judgment and insight   Judgment and Insight insight not appropriate to situation   Thought Content relevant   Mental Status Summary   Recent Changes in Mental Status/Cognitive Functioning mood;thought patterns   Personal Safety   Feels Safe at Home or Work/School yes   Feels Threatened by Someone no   Does Anyone Try to Keep You From Having Contact with Others or Doing Things Outside Your Home? no   Physical Signs of Abuse Present no   Violence Risk   History of Violence 0-->No  (Simultaneous filing. User may not have  seen previous data.)   Aggressive/Violent Observed Behaviors 0-->No observed behaviors  (Simultaneous filing. User may not have seen previous data.)   Total Score 0  (Simultaneous filing. User may not have seen previous data.)   Do You Have Any Dangerous Items in Your Possession no   Homicidal ideation No  (Simultaneous filing. User may not have seen previous data.)   Current Self-injurious Behavior   Current Self-injurious Behavior denies   Past Self-injurious Behavior   Past Self-injurious Behavior denies   Behavioral Health Condition Management   Behavioral Health Symptoms/Conditions psychosis;depression   Behavioral Management Strategies medication therapy   Mental Health Treatment   Previous Mental Health Treatment inpatient treatment;counseling;psychiatrist   Current Mental Health Treatment psychiatrist;counseling   Substance Use   Substance Use Status never used   Alcohol Use   Q1: How often do you have a drink containing alcohol? Never   Q2: How many drinks containing alcohol do you have on a typical day when you are drinking? None   Q3: How often do you have six or more drinks on one occasion? Never   Coping/Stress, BH   Sources of Support parent(s);sibling(s);friend(s)   Techniques to [place] with Loss/Stress/Change medication   Developmental Stage (Adinon's)   Developmental Stage Stage 7 (35-65 years/Middle Adulthood) Generativity vs. Stagnation   Discharge Needs Assessment,    Readmission Within the Last 30 Days no previous admission in last 30 days   Patient/Family Anticipates Transition to home with family   Patient/Family Anticipated Services at Transition mental health services   Transportation Concerns no car   Transportation Anticipated family or friend will provide   Outpatient/Agency/Support Group Needs outpatient counseling;outpatient medication management   Anticipated Discharge Disposition home with family   Discharge Planning   Anticipated Discharge Disposition home with outpatient services    Plan   Patient/Family in Agreement with Plan yes

## 2023-07-11 NOTE — H&P
"Psychiatry History and Evaluation    No chief complaint on file.      Jillian Acosta is a 48 y.o. female with past  History of bipolar d/o, mood d/o,AMINA, a-fib, R breast cyst and HTN who presented to the ER for evaluation. She said she has been under a lot of stress lately and feels like her medications are no longer working. She has been hallucinating and feeling very paranoid. She said she takes care of her mother and her siblings are not being very helpful. She was hospitalized at [facility] in 2020 and said her symptoms are similar but not as severe yet. She denied substance use. She said she has been compliant with medications.     Past records were reviewed and it appears her illness was indeed very severe in 2020, here 56 days with aggressive outbursts, Druze preoccupation and delusions regarding staff. On interview today (with SW) she was calm but described paranoia saying the Devil is trying to destroy her. \"I just need some sleep.\" Says she has been in and out of sleep at night, however she had not been using her CPAP for a few weeks prior to the past 3 days. Says people she saw last night in the ER appeared to be angels and demons at the time. Says she has been toribio for about 2 1/5 weeks, that she is the only one helping her mom 24/7. 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 She has a degree in medical coding and billing and enjoyed working. She has had 8 total hospitalizations (including this one) since diagnosis with depression at age 18. She sees Dr Weeks outpatient. Denies any history of self harm, however has been destructive to objects including punching a hole in a wall here during last admission.  She said she has taken Geodon, Risperdal, Zyprexa, and Haldol which were not effective. Became panicky on Ativan but would try Atarax (was prescribed Vistaril she never took). Abilify was ok at first but lost effectiveness. Antidepressants make her manic, " Seroquel has been ok in conjunction with another antipsychotic.      Psychiatric History:  Current Psychiatrist: yes - Dr Dewitt at [facility]  Past psychiatric Hospitalization: yes - 7, last one at [facility] for 56 days in 2020 for mari  Medication Trials:  yes - see HPI  ECT trials: unknown    Suicide Risk Assessment Components:     Previous Suicide Attempts: No     Access to Firearms: No     Chronic Suicide Risk Factors: Psychiatric illness (mood, anxiety, psychotic, personality, SUDS, other)     Proximal Suicide Risk Factors: Current interpersonal conflicts and Impulsivity or agitation     Protective Factors: Connectedness to individuals, family, community, or social institutions, Identified reasons for living, Access to effective mental health care and Problem-solving and conflict resolution skills    Substance Use History:  Substance use:   Drug Details     Questions Responses    Amphetamine frequency Never used    Comment: Never used on 3/22/2020     Cannabis frequency Past rare use    Comment: Past rare use on 3/22/2020     Cocaine frequency Never used    Comment: Never used on 3/22/2020     Ecstasy frequency Never used    Comment: Never used on 3/22/2020     Hallucinogen frequency Never used    Comment: Never used on 3/22/2020     Inhalant frequency Never used    Comment: Never used on 3/22/2020     Opiate frequency Never used    Comment: Never used on 3/22/2020     Sedative frequency Never used    Comment: Never used on 3/22/2020     Other drug frequency Never used    Comment: Never used on 3/22/2020         Consequences of use: No  Past D&A Treatment: No    Family History:   Family History   Problem Relation Age of Onset   • Diabetes Biological Mother    • Heart disease Biological Father        Social History:   Social History     Socioeconomic History   • Marital status: Single     Spouse name: None   • Number of children: None   • Years of education: None   • Highest education level: None   Occupational History    • Occupation: disabled   Tobacco Use   • Smoking status: Former     Packs/day: 0.25     Types: Cigarettes   • Smokeless tobacco: Never   Vaping Use   • Vaping Use: Never used   Substance and Sexual Activity   • Alcohol use: No   • Drug use: No   • Sexual activity: Defer     Social Determinants of Health     Food Insecurity: No Food Insecurity (7/11/2023)    Hunger Vital Sign    • Worried About Running Out of Food in the Last Year: Never true    • Ran Out of Food in the Last Year: Never true   Stress: Stress Concern Present (7/11/2023)    [facility] - Occupational Stress Questionnaire    • Feeling of Stress : Very much       Medical History:   Past Medical History:   Diagnosis Date   • A-fib (CMS/HCC)    • A-fib (CMS/HCC)    • Bipolar 1 disorder (CMS/HCC)    • Hypertension    • Sleep apnea        Surgical History:   Past Surgical History:   Procedure Laterality Date   • ANAL FISSURECTOMY     • CHOLECYSTECTOMY     • HYSTERECTOMY         Allergies:   Allergies   Allergen Reactions   • Azithromycin GI intolerance     NA   • Doxycycline      Palpitation   • Penicillins      Other reaction(s): Unknown   • Amoxicillin Rash       Current Medications:  SCHEDULED:  • divalproex  1,500 mg oral Nightly   • lurasidone  80 mg oral Nightly   • sulfamethoxazole-trimethoprim  1 tablet oral q12h AVIS     PRN  •  acetaminophen  •  alum-mag hydroxide-simeth  •  diphenhydrAMINE  •  haloperidoL **OR** haloperidol lactate  •  hydrOXYzine  •  melatonin  •  trazodone      Review of Systems  Musculoskeletal:  Denies complaints    Objective     Vital Signs for the last 24 hours:  Temp:  [36 °C (96.8 °F)-36.8 °C (98.2 °F)] 36.8 °C (98.2 °F)  Heart Rate:  [] 75  Resp:  [16-20] 18  BP: (103-132)/(53-68) 103/61    Labs  Results from last 7 days   Lab Units 07/10/23  1842   HEMOGLOBIN g/dL 13.2   WBC K/uL 6.65   PLATELETS K/uL 224   POTASSIUM mEQ/L 4.2   SODIUM mEQ/L 140   CREATININE mg/dL 0.8     Results from  last 7 days   Lab Units 07/10/23  1842   ALT IU/L 10   AST IU/L 8*   ALK PHOS IU/L 58     Ethanol   Date Value Ref Range Status   07/10/2023 <5 <=10 mg/dL Final   03/22/2020 <5 <=10 mg/dL Final     Lab Results   Component Value Date    VPA 56.2 07/10/2023    TSH 2.10 07/10/2023    FREET4 0.82 03/25/2015    [id number] 924 (H) 11/29/2022    FOLATE 9.7 11/29/2022           No lab exists for component: BENZOURQL, BARBSCRNUR  Lab Results   Component Value Date    VENTRICRATE 81 07/11/2023    QTCCALCULAT 441 07/11/2023     Lab Results   Component Value Date    HDL 33 (L) 03/15/2023    LDLCALC 78 03/15/2023    TRIG 133 03/15/2023    CHOL 138 03/15/2023    HGBA1C 5.4 04/27/2022       No results found.    MENTAL STATUS EXAM  Appearance: well groomed  Gait and Motor: no abnormal movements, normal gait and normal  Speech: normal rate/rhythm/volume and fluent  Mood: anxious, fearful and irritable  Affect: anxious  Associations: logical and illogical  Thought Process: tangential and circumstantial  Thought Content: no auditory or visual hallucinations., appropriate to situation, paranoia and hyperreligiosity  Suicidality/Homicidality: denies  Judgement/Insight: acknowledges illness and help accepting  Orientation: day, month, year, season, type of place, name of place, city and situation  Memory: recalls recent events and recalls remote events  Attention: distracted  Knowledge: normal and appropriate for education  Language: normal       Assessment/Plan  Psychotic disorder (CMS/HCC)  Assessment & Plan  Patient reports paranoia, feeling the Devil is trying to destroy her. Thinking people in the ER are angels and Devils. History of being admitted to [facility] for 56 days for mari in 2020,  Punching wall and difficult to control. Admits to not using her CPAP for 2 1/2 weeks up until the past 3 days.     -patient agreed to increase Depakote to 1500 mg HS and Latuda to 80 mg HS for psychosis and potential mari  - 15 minute checks  -  group and milieu therapy  - collateral-  left for DR Weeks  - jones dc plan  - see AMINA plan but CPAP compliance jabier to improve mental status    AMINA (obstructive sleep apnea)  Assessment & Plan  Patient uses CPAP at home but has not brought it. Ordered hospital machine with virtual jose angel due to hose/cords.    A-fib (CMS/[facility])  Assessment & Plan  Reported by patient; said she is no longer on Cardizem however per CRNP it was filled as recently as 3/23. Will consult cardiology, especially as she did not use CPAP for 2 1/2 weeks.

## 2023-07-11 NOTE — CONSULTS
[facility] Cardiology  [facility] Consult Note       Reason for consult: Palpitations, hx of AF     HPI     Jillian Acosta is a 48 y.o. female known to Dr. Candido Tabares with a past medical history significant for paroxysmal atrial fibrillation, bipolar 1 disorder, hypertension, right breast abscess (drained on 6/26/2023), and AMINA on CPAP.  He presented to [facility] on 7/10 for paranoia and delusions, cardiology was consulted on 7/11 for palpitations and her history of A-fib.    She was previously seen during hospitalization by our service in 2020 for edema and her history of A-fib.  She had been maintained on Cardizem. She was recommended to follow-up with her outpatient cardiologist to have a repeat echocardiogram.  She had one 6-hour episode of atrial fibrillation February 2020 with no documented reoccurrence.    She says she has been having palpitations over the last several weeks. She has noted some chest pain which she attributes to her anxiety.    She was last seen by Dr. Tabares in May 2023.  At that time she reported her 40 pound weight loss through lifestyle changes.  Dr. Tabares did recommend discontinuing her Cardizem at that time.  She was recommended to remain on CPAP given her history of sleep apnea.    ROS: As noted per HPI    Past History      Past Medical History:   Diagnosis Date   • A-fib (CMS/HCC)    • A-fib (CMS/HCC)    • Bipolar 1 disorder (CMS/HCC)    • Hypertension    • Sleep apnea        Past Surgical History:   Procedure Laterality Date   • ANAL FISSURECTOMY     • CHOLECYSTECTOMY     • HYSTERECTOMY         Social History     Social History Narrative   • Not on file       Family History   Problem Relation Age of Onset   • Diabetes Biological Mother    • Heart disease Biological Father         Medications     Medications prior to admission:  Medications Prior to Admission   Medication Sig Dispense Refill Last Dose   • albuterol HFA (VENTOLIN HFA) 90 mcg/actuation inhaler  Inhale 2 puffs every 6 (six) hours as needed for wheezing or shortness of breath. 1 Inhaler 1    • ARIPiprazole (ABILIFY) 30 mg tablet Take 1 tablet (30 mg total) by mouth daily. (Patient taking differently: Take 5 mg by mouth daily.) 30 tablet 0    • ascorbic acid (VITAMIN C) 500 mg tablet Take 1 tablet (500 mg total) by mouth 2 (two) times a day. 60 tablet 0    • aspirin 81 mg enteric coated tablet TK 1 T PO QD      • atorvastatin (LIPITOR) 40 mg tablet       • cholecalciferol, vitamin D3, 5,000 unit (125 mcg) tablet Take 5,000 Units by mouth daily.      • clindamycin (CLEOCIN) 300 mg capsule TAKE 1 CAPSULE BY MOUTH THREE TIMES A DAY FOR 7 DAYS      • dilTIAZem CD (CARDIZEM CD) 120 mg 24 hr capsule Take 1 capsule (120 mg total) by mouth daily. 30 capsule 0    • divalproex (DEPAKOTE) 250 mg EC tablet Take 250 mg by mouth once daily.      • divalproex (DEPAKOTE) 500 mg EC tablet Take 500 mg by mouth daily. 1000mg 1x/daily      • fluticasone propionate (FLONASE) 50 mcg/actuation nasal spray 2 sprays daily.      • hydrOXYzine (VISTARIL) 25 mg capsule Take 25 mg by mouth 2 (two) times a day.      • LATUDA 40 mg tablet Take 40 mg by mouth.      • senna (SENOKOT) 8.6 mg tablet Take 1 tablet by mouth 2 (two) times a day. 60 tablet 0    • valproic acid (DEPAKENE) 250 mg capsule TAKE 4 CAPSULES ORALLY AT BEDTIME          Scheduled Inpatient Medications:  • divalproex  1,500 mg oral Nightly   • lurasidone  80 mg oral Nightly   • sulfamethoxazole-trimethoprim  1 tablet oral q12h AVIS       Scheduled Inpatient Infusions:       Allergies     Azithromycin, Doxycycline, Penicillins, and Amoxicillin     Vital Signs/Exam     Vital signs in last 24 hours:  Temp:  [36 °C (96.8 °F)-36.8 °C (98.2 °F)] 36.8 °C (98.2 °F)  Heart Rate:  [] 75  Resp:  [16-20] 18  BP: (103-132)/(53-68) 103/61    I/O  No intake or output data in the 24 hours ending 07/11/23 1550    Weight  Weight change:     Physical Exam:  Visit Vitals  /61 (BP  Location: Left upper arm, Patient Position: Lying)   Pulse 75   Temp 36.8 °C (98.2 °F) (Oral)   Resp 18   LMP  (LMP Unknown)   SpO2 97%       Gen: no distress  HEENT: no jvd, no carotid bruits   Lungs: clear bilaterally; no crackles, rhonchi, wheezing  Chest wall: no tenderness  Heart: regular rate and rhythm; no murmur  Abdomen: nondistended, nontender  Extremities: no edema  Neuro: nonfocal, alert and oriented  Psych: normal mood and affect     Diagnostic Data   Diagnostic data personally reviewed.    Labs:  Results from last 7 days   Lab Units 07/10/23  1842   WBC K/uL 6.65   HEMOGLOBIN g/dL 13.2   HEMATOCRIT % 40.4   PLATELETS K/uL 224     Results from last 7 days   Lab Units 07/10/23  1842   SODIUM mEQ/L 140   POTASSIUM mEQ/L 4.2   CHLORIDE mEQ/L 108*   CO2 mEQ/L 27   BUN mg/dL 12   CREATININE mg/dL 0.8   GLUCOSE mg/dL 96   CALCIUM mg/dL 9.2         No results found for: HSTROPONINI                            Imaging last 24 hrs:   No results found.    Vascular Studies:  No results found for this or any previous visit from the past 365 days.        Peripheral:  No results found for this or any previous visit from the past 365 days.      Stress Tests:             Cardiac cath:      Echocardiogram:  6/19/2020 [facility]  Summary:  Technically adequate study.  The left ventricular ejection fraction is 60%.  Left ventricular ejection fraction is normal.  Left ventricular wall motion is probably normal.  Diastolic function is normal.    ECG: Independently reviewed: NSR, limb lead reversal, nonspecific ST and T wave abnormalities         Assessment and Plan    CODE STATUS: Full Code    Active Hospital Problems    Diagnosis Date Noted   • Mood disorder (CMS/[facility]) 07/11/2023   • Hyperlipidemia 07/11/2023   • Wound cellulitis 07/11/2023   • A-fib (CMS/[facility]) 03/22/2020   • AMINA (obstructive sleep apnea) 03/22/2020   • Psychotic disorder (CMS/[facility]) 03/22/2020      Resolved Hospital Problems   No  resolved problems to display.       Jillian Acosta is a 48 y.o. female known to Dr. Candido Tabares with a past medical history significant for paroxysmal atrial fibrillation, bipolar 1 disorder, hypertension, right breast abscess (drained on 6/26/2023), and AMINA on CPAP.  He presented to [facility] on 7/10 for paranoia and delusions, cardiology was consulted on 7/11 for palpitations and her history of A-fib.    Palpitations   Hx of Afib  -She has a history of a 6-hour episode of AF in 2020 with no documented reoccurrence, not on AC  -Routinely follows with Dr. Tabares, was recommended to discontinue her diltiazem by Dr. Tabares at her last office visit in May 2023  -Her cardiac exam is unremarkable, she reports intermittent episodes of palpitations and some chest pain that she attributes to anxiety  -EKG shows a sinus rhythm with nonspecific ST and T wave abnormalities, and limb lead reversal  -For now she can remain off of diltiazem, if her palpitations increase in frequency can consider resuming diltiazem  -No further cardiac testing is needed at this time    Thank you for this consultation.        Patient was seen and evaluated in conjunction with ELISHA Singer    [facility] Cardiology, Main Office: [phone]  [facility]: [facility]   Pager #[phone]  7/11/2023

## 2023-07-11 NOTE — ED ATTESTATION NOTE
I have personally seen and examined Jillian Acosta. I personally performed the key components of the encounter and provided a substantive portion of the care and medical decision making for this patient.    I reviewed and agree with physician assistant’s assessment and plan of care, with any exceptions as documented below.     My focused history, examination, assessment, and plan of care of Jillian Acosta is as follows:    Brief History:   48-year-old female history of bipolar disorder hypertension sleep apnea history of atrial fibrillation no longer requiring anticoagulation presented with increasing paranoia with delusions of strangers around her speaking about her.  No active suicidal ideations no plans.  Denies any alcohol or drug use.  Patient stated that she might need to be evaluated for possible hospitalization so that she does not reach to the point where she requires a 56-day stay like last time.  Focused Physical Exam:   Vitals:    07/10/23 1826   BP: 132/68   Pulse: (!) 120   Resp: 20   Temp: (!) 36 °C (96.8 °F)   SpO2: 99%   Awake and alert good eye contact pleasant cooperative with normal clear speech, spontaneous movements in all 4 extremities with normal complexion conjunctiva.  Laughing smiling cooperative      Assessment / Plan / MDM:  Clinical history and exam concerning for but not limited to:  Paranoia worsening manic behavior    One-to-one observer medical clearance crisis Silvio Kingston MD  07/10/23 [id number]

## 2023-07-11 NOTE — PROGRESS NOTES
07/11/23 0930   Activity/Group Checklist   Group Title Community meeting   Attendance Did not attend

## 2023-07-11 NOTE — PROGRESS NOTES
Jillian did not attend Affirmations group.   07/11/23 0220   Activity/Group Checklist   Group Title Support and Self-expression   Attendance Did not attend

## 2023-07-11 NOTE — BEHAVIORAL HEALTH CRISIS PROGRESS NOTE
9:30PM -[facility] set up telepsych with Dr. Whiteside from [facility] /

## 2023-07-11 NOTE — PROGRESS NOTES
07/11/23 1030   Activity/Group Checklist   Group Title Wellness tools   Attendance Did not attend  (Jillian was provided with a group handout as an alternative intervention)
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
## 2023-07-11 NOTE — PROGRESS NOTES
07/11/23 1445   Activity/Group Checklist   Group Title Interactive therapy   Attendance Did not attend

## 2023-07-11 NOTE — PLAN OF CARE
Problem: Manic or Hypomanic Signs/Symptoms  Goal: Improved Sleep (Manic/Hypomanic Signs/Symptoms)  Outcome: Progressing  Flowsheets (Taken 7/11/2023 1500)  Goal Outcome: progressing  Individual Goal: Jillian will sleep at least 4 hrs tonight.  [place] Goal: sleeps 4-6 hours at night     Problem: Psychotic Signs/Symptoms  Goal: Improved Mood Symptoms  Outcome: Progressing  Flowsheets (Taken 7/11/2023 1500)  [place] Goal: identifies personal treatment goal  Goal Outcome: not progressing  Individual Goal: Jillian will attend at least 1 therapy group and verbalize her goal.     Problem: Adult Behavioral Health Plan of Care  Goal: Plan of Care Review  Outcome: Progressing  Flowsheets (Taken 7/11/2023 1500)  Progress: improving  Patient Agreement with Plan of Care: agrees  Outcome Evaluation: Jillian is visible on unit at start of shift, reading a book in milieu. On approach pt is pleasant with bright affect. Pt is guarded on approach stating she wants to talk to doctor about medications and if she talks with RN she will get tearful. Pt is slightly disorganized and tangential at times. Pt denies current SI/HI/AVH. Pt endorse some anxiety and some depression. At 1212 PRN atarax given with positive effect for anxiety. Pt took nap in afternoon. Pt's repeat EKG and orthostatic vitals completed per [facility] order. Pt reported eating and sleeping good.   Plan of Care Reviewed With: patient  Intervention(s): Nursing assessment, medication management, Q 15 minutes safety rounds.

## 2023-07-11 NOTE — PLAN OF CARE
Plan of Care Review  Plan of Care Reviewed With: patient  Patient Agreement with Plan of Care: agrees  Consent Given to Review Plan with: pt  Progress: improving  Intervention(s): q 15 min safety rounds, nursing assessment, suicide assessment, orientation to unit  Outcome Evaluation: Jillian is a 47 yo F from [facility] ED on a 201 here for auditory hallucinations, depression, anxiety. Pt has a history of Bipolar I. Pt has been IP here before, in 2020. Currently denies auditory and visual hallucinations but states a few days ago she heard a pedro voice that woke her up. Pt calm, cooperative with assessment. Hyper verbal, tangential. Denies SI. Pt has been experiencing increasing stress at home over the past two weeks, she is main care giver for mother and recently got into a fight with her sisters for not helping her when needed/asked. Pt reports she is unsure if she is able to continue caring for mom in this capacity but that it is expected of her as she lives with her mother. Father passed away in 2020. Was recently at urgent care for an abcess/cyst under right breast, is taking antibiotic for this. Able to make needs known. Denies abuse or trauma. Denies drinking or drug use. Will continue to monitor.

## 2023-07-11 NOTE — CONSULTS
[facility] Medicine Service     Psychiatric Unit Medical Evaluation         Requesting:  Inpatient Psychiatric Unit Team    Reason for Consultation:  Initial Medical Evaluation     HISTORY OF PRESENT ILLNESS        This is a 48 y.o. female admitted to the psychiatric unit for psychiatric evaluation. The hospitalist medicine service department is consulted for routine medical evaluation. History obtained by chart review and patient interview.      Patient’s past medical history is significant for HTN, HLD, PAF (not prescribed AC; follows with Dr. Tabares), AMINA (uses CPAP), recent right breast abscess drained on 6/26, anxiety, depression and bipolar disorder.       Upon assessment, patient is alert and in no distress. She is seen out of bed ambulating in room without difficulty. Patient currently denies any current or recent symptoms of chest pain, shortness of breath, dizziness, lightheadedness. She reports intermittent palpitations during episodes of anxiety over the last week. No reports of syncope or diaphoresis. She denies any exertional chest pain or discomfort. No symptoms of orthopnea or PND reported. She denies any worsening peripheral edema. Patient denies headaches, paroxysmal nocturnal dyspnea, peripheral edema, blurred vision, and orthopnea. Patient also denies abdominal pain, constipation, nausea/vomiting, diarrhea, bloody stools, dysuria symptoms, loss of appetite, or gastric reflux. No recent fevers/chills or sick contacts.     Medication dispense history reviewed. She reports she previously was taking Aspirin 81 mg PO daily but reports she has stopped taking it secondary to recent RUQ skin abscess and was told she had risks of bleeding. She also reports she no longer takes Cardizem.     The patient denies any medical concerns currently.     PAST MEDICAL AND SURGICAL HISTORY        Past Medical History:   Diagnosis Date   • A-fib (CMS/[facility])    • A-fib (CMS/[facility])    • Bipolar 1 disorder (CMS/[facility])    •  Hypertension    • Sleep apnea        Past Surgical History:   Procedure Laterality Date   • ANAL FISSURECTOMY     • CHOLECYSTECTOMY     • HYSTERECTOMY         PCP: Otto Wasserman MD    MEDICATIONS        Home Medications:  Medications Prior to Admission   Medication Sig Dispense Refill Last Dose   • albuterol HFA (VENTOLIN HFA) 90 mcg/actuation inhaler Inhale 2 puffs every 6 (six) hours as needed for wheezing or shortness of breath. 1 Inhaler 1    • ARIPiprazole (ABILIFY) 30 mg tablet Take 1 tablet (30 mg total) by mouth daily. (Patient taking differently: Take 5 mg by mouth daily.) 30 tablet 0    • ascorbic acid (VITAMIN C) 500 mg tablet Take 1 tablet (500 mg total) by mouth 2 (two) times a day. 60 tablet 0    • aspirin 81 mg enteric coated tablet TK 1 T PO QD      • atorvastatin (LIPITOR) 40 mg tablet       • cholecalciferol, vitamin D3, 5,000 unit (125 mcg) tablet Take 5,000 Units by mouth daily.      • clindamycin (CLEOCIN) 300 mg capsule TAKE 1 CAPSULE BY MOUTH THREE TIMES A DAY FOR 7 DAYS      • dilTIAZem CD (CARDIZEM CD) 120 mg 24 hr capsule Take 1 capsule (120 mg total) by mouth daily. 30 capsule 0    • divalproex (DEPAKOTE) 250 mg EC tablet Take 250 mg by mouth once daily.      • divalproex (DEPAKOTE) 500 mg EC tablet Take 500 mg by mouth daily. 1000mg 1x/daily      • fluticasone propionate (FLONASE) 50 mcg/actuation nasal spray 2 sprays daily.      • hydrOXYzine (VISTARIL) 25 mg capsule Take 25 mg by mouth 2 (two) times a day.      • LATUDA 40 mg tablet Take 40 mg by mouth.      • senna (SENOKOT) 8.6 mg tablet Take 1 tablet by mouth 2 (two) times a day. 60 tablet 0    • valproic acid (DEPAKENE) 250 mg capsule TAKE 4 CAPSULES ORALLY AT BEDTIME          Current inpatient medications were personally reviewed.    ALLERGIES        Azithromycin, Doxycycline, Penicillins, and Amoxicillin    FAMILY HISTORY        Family History   Problem Relation Age of Onset   • Diabetes Biological Mother    • Heart disease  Biological Father        SOCIAL HISTORY        Social History     Tobacco Use   • Smoking status: Former     Packs/day: 0.25     Types: Cigarettes   • Smokeless tobacco: Never   Vaping Use   • Vaping Use: Never used   Substance Use Topics   • Alcohol use: No   • Drug use: No        REVIEW OF SYSTEMS        All other systems reviewed and negative except as noted in HPI    PHYSICAL EXAMINATION        Visit Vitals  /61 (BP Location: Left upper arm, Patient Position: Lying)   Pulse 75   Temp 36.8 °C (98.2 °F) (Oral)   Resp 18   LMP  (LMP Unknown)   SpO2 97%     There is no height or weight on file to calculate BMI.      Physical Exam  Vitals reviewed.   Constitutional:       General: She is not in acute distress.     Appearance: She is not diaphoretic.   Cardiovascular:      Rate and Rhythm: Normal rate and regular rhythm.      Pulses: Normal pulses.      Heart sounds: Normal heart sounds, S1 normal and S2 normal.      Comments: + 1 bilateral lower extremity edema, non-pitting   Pulmonary:      Effort: Pulmonary effort is normal. No tachypnea or respiratory distress.      Breath sounds: No wheezing.      Comments: Diminished at bases   Skin:     General: Skin is warm.   Neurological:      General: No focal deficit present.      Mental Status: She is alert and oriented to person, place, and time.         LABS / EKG        Labs  Results from last 7 days   Lab Units 07/10/23  1842   WBC K/uL 6.65   HEMOGLOBIN g/dL 13.2   HEMATOCRIT % 40.4   PLATELETS K/uL 224       Results from last 7 days   Lab Units 07/10/23  1842   SODIUM mEQ/L 140   POTASSIUM mEQ/L 4.2   CHLORIDE mEQ/L 108*   CO2 mEQ/L 27   BUN mg/dL 12   CREATININE mg/dL 0.8   CALCIUM mg/dL 9.2   ALBUMIN g/dL 3.9   BILIRUBIN TOTAL mg/dL 0.3   ALK PHOS IU/L 58   ALT IU/L 10   AST IU/L 8*   GLUCOSE mg/dL 96       Lab Results   Component Value Date    TSH 2.10 07/10/2023        Urine Drug Screen Results       07/10/23     2052    PCP Scrn Ur Not Detected     Benzodiazepine Ur Qual Not Detected    Amphetamine+Methamphetamine Scrn Ur Not Detected    Cannabinoid Screen Ur Not Detected    Opiate Scrn Ur Not Detected    Barbiturate Screen Ur Not Detected         Comment for PCP Scrn Ur at 2052 on 07/10/23: Assay Detects: phencyclidine in urine. Lowest detectable concentration is 25 ng/mL of phencyclidine.    Comment for Benzodiazepine Ur Qual at 2052 on 07/10/23: Assay Detects: benzodiazepines and metabolites at varying concentrations. Lowest detectable concentration is 200 ng/mL of oxazepam.    Comment for Amphetamine+Methamphetamine Scrn Ur at 2052 on 07/10/23: Assay Detects: d-methamphetamine, d-amphetamine, methlyenedioxyamphetamine (MDA), and methlyenendioxymethamphetamine (MDMA) in urine. Lowest detectable concentration is 1000 ng/mL of d-methamphetamine.  Assay is less sensitive to MDA and MDMA (lowest detectable concentration, 2500 ng/mL) and could produce a false negative result. If MDMA overdose is suspected and the result is negative, a more specific test should be requested.    Comment for Cannabinoid Screen Ur at 2052 on 07/10/23: Assay Detects: cannabinoid metabolites in urine. Lowest detectable concentration is 50 ng/mL    Comment for Opiate Scrn Ur at 2052 on 07/10/23: Assay Detects: codeine, dihydrocodeine, hydrocodone, hydromorphone, levorphanol, morphine, morphine-3-glucuronide, norcodeine, oxycodone in urine. Lowest detectable concentration is 300 ng/mL of morphine.    Comment for Barbiturate Screen Ur at 2052 on 07/10/23: Assay Detects: alphenal, amobarbital, aprobarbital, barbital, butabarbital, butalbital, butethal, diallybarbital, pentobarbital, secobarbital,talbutal, and thiopental. Lowest detectable concentration is 200 ng/mL of secobarbital.           SARS-CoV-2 (COVID-19) (no units)   Date/Time Value   11/29/2020 1442 Positive (AA)          EKG:  I have independently reviewed the ECG. Significant findings include non-specific ST abnormality;  possible misinterpreted secondary to lead placement. .  Ordered repeat EKG for completion; possible lead misplacement on prior EKG on admission.       ASSESSMENT AND RECOMMENDATIONS           * Mood disorder (CMS/HCC)  Assessment & Plan  Management per psychiatry     Hypomagnesemia  Assessment & Plan  Mg 1.7 on admission labs  Ordered Mg replacement x 1.       Plan-  - No acute interventions indicated at this time.   - If clinical concern, could consider repeat BMP with Mg.  - Monitor intake/output closely.     Wound cellulitis  Assessment & Plan  Recently tx outpatient for right upper quadrant (below right breast) abscess.   Recently evaluated by Dr. Rubén Emerson MD general surgery on 6/26/2023  S/P I&D in the office  Currently prescribed Bactrim for antibiotics; last day of antibiotics to be tomorrow- 7/12 (to complete a 10-day course of treatment).    On exam, RUQ/R breast incision appears clean dry and intact; no drainage noted on exam. No recent fevers or chills per pt. No symptoms of N/V or diarrhea.       Plan-  - Continue to monitor for any s/s infection   - Complete Bactrim as prescribed.   - Continue outpatient follow up with Dr. Emerson.     Hyperlipidemia  Assessment & Plan  Recent lipid panel results in 3/2023: LDL 78    Plan-  - Continue statin     AMINA (obstructive sleep apnea)  Assessment & Plan  Hx of AMINA, uses CPAP at night time.     Plan-  - Recommend respiratory therapy consult for evaluation of home CPAP machine   - Follow up with PCP at discharge for hospital follow up and age appropriate cancer screenings.     Paroxysmal atrial fibrillation (CMS/[facility])  Assessment & Plan  Hx of PAF in 2020   Follows with Dr. Tabares cardiologist.   Not prescribed AC.   She reports she no longer takes Cardizem. Last RX = Diltiazem  mg PO daily dispensed 90 day supply on 3/3/23 with 1 refill.   Patient reports intermittent palpitations and fatigue over the last week. HRR on exam.   Orthostatic vital  signs: negative       Plan-  - Ordered repeat EKG for evaluation   - Maintain K+ > 4 and Mg > 2  - Check orthostatic vital signs  - If patient continues to have sx of palpitations or any other cardiac sx, would recommend cardiology evaluate   - Follow up with PCP and primary cardiologist at discharge to continue routine management and monitoring.   - Please contact [facility] with any questions or concerns.          Please communicate acute concerns with the covering hospitalist.    The patient should follow up with Primary Care upon discharge from the [facility].        ELISHA Rosario  7/12/2023

## 2023-07-11 NOTE — ED PROVIDER NOTES
Emergency Medicine Note  HPI   HISTORY OF PRESENT ILLNESS       The patient is a 48-year-old female with a past medical history of bipolar, mood disorder, hypertension who presents emergency room for psychiatric evaluation.  She reports that she has been under a lot of stress recently and feels like her medications are no longer working.  She has been hallucinating and feeling very paranoid.  She states that she takes care of her mother and feels like her siblings are not very helpful.  She was hospitalized for mental health back in 2020 and states that her symptoms are similar but not as severe yet.  She states that she does not feel suicidal or have any plans to harm herself.  She does state that she is not afraid to die.  She denies any drugs or alcohol use.  She states that she been taking her medications as directed.  She feels like she needs inpatient help.      She is currently on antibiotics for an abscess on her right breast. She states that it is healing well.      History provided by:  Patient        Patient History   PAST HISTORY     Reviewed from Nursing Triage:       Past Medical History:   Diagnosis Date   • A-fib (CMS/Formerly [facility])    • A-fib (CMS/Formerly [facility])    • Bipolar 1 disorder (CMS/Formerly [facility])    • Hypertension    • Sleep apnea        Past Surgical History:   Procedure Laterality Date   • ANAL FISSURECTOMY     • CHOLECYSTECTOMY     • HYSTERECTOMY         Family History   Problem Relation Age of Onset   • Diabetes Biological Mother    • Heart disease Biological Father        Social History     Tobacco Use   • Smoking status: Former     Packs/day: 0.25     Types: Cigarettes   • Smokeless tobacco: Never   Vaping Use   • Vaping Use: Never used   Substance Use Topics   • Alcohol use: No   • Drug use: No         Review of Systems   REVIEW OF SYSTEMS     Review of Systems   Constitutional: Negative for fever.   Respiratory: Negative for cough and shortness of breath.    Cardiovascular: Negative for chest pain.    Gastrointestinal: Negative for abdominal pain, diarrhea and vomiting.   Skin: Negative for color change and rash.   Neurological: Negative for weakness and numbness.   Psychiatric/Behavioral: Positive for hallucinations. Negative for self-injury and suicidal ideas. The patient is not hyperactive.         Paranoia         VITALS     ED Vitals    Date/Time Temp Pulse Resp BP SpO2 [facility]   07/11/23 0043 -- 88 18 131/59 99 % SK   07/10/23 2241 -- 93 16 130/60 96 % REE   07/10/23 1826 36 °C (96.8 °F) 120 20 132/68 99 % DMM        Pulse Ox %: 99 % (07/10/23 2113)  Pulse Ox Interpretation: Normal (07/10/23 2113)           Physical Exam   PHYSICAL EXAM     Physical Exam  Vitals and nursing note reviewed.   Constitutional:       General: She is not in acute distress.     Appearance: Normal appearance. She is well-developed.   HENT:      Head: Atraumatic.   Eyes:      Conjunctiva/sclera: Conjunctivae normal.   Cardiovascular:      Rate and Rhythm: Regular rhythm. Tachycardia present.      Pulses: Normal pulses.   Pulmonary:      Effort: Pulmonary effort is normal.      Breath sounds: Normal breath sounds. No decreased breath sounds, wheezing or rhonchi.   Abdominal:      Palpations: Abdomen is soft.      Tenderness: There is no abdominal tenderness.   Musculoskeletal:         General: No deformity. Normal range of motion.      Cervical back: Normal range of motion and neck supple. No rigidity.   Skin:     General: Skin is warm and dry.      Capillary Refill: Capillary refill takes less than 2 seconds.   Neurological:      General: No focal deficit present.      Mental Status: She is alert and oriented to person, place, and time. Mental status is at baseline.   Psychiatric:         Mood and Affect: Mood is not anxious or depressed. Affect is not angry, tearful or inappropriate.         Behavior: Behavior normal. Behavior is not agitated. Behavior is cooperative.         Thought Content: Thought content is paranoid. Thought  content does not include homicidal or suicidal ideation. Thought content does not include homicidal or suicidal plan.           PROCEDURES     Procedures     DATA     Results     Procedure Component Value Units Date/Time    [place] Draw Panel [[id number]] Collected: 07/10/23 1842    Specimen: Blood, Venous Updated: 07/11/23 0300    Narrative:      The following orders were created for panel order [facility] Draw Panel.  Procedure                               Abnormality         Status                     ---------                               -----------         ------                     RAINBOW RED[[id number]]                                      Final result                 Please view results for these tests on the individual orders.    RAINBOW RED [[id number]] Collected: 07/10/23 1842    Specimen: Blood, Venous Updated: 07/11/23 0300    UDS (Drug screen panel) emergency [[id number]]  (Normal) Collected: 07/10/23 2052    Specimen: Urine, Clean Catch Updated: 07/10/23 2253     PCP Scrn, Ur Not Detected     Comment: Assay Detects: phencyclidine in urine. Lowest detectable concentration is 25 ng/mL of phencyclidine.        Benzodiazepine Ur Qual Not Detected     Comment: Assay Detects: benzodiazepines and metabolites at varying concentrations. Lowest detectable concentration is 200 ng/mL of oxazepam.        Cocaine Screen, Urine Not Detected     Comment: Assay Detects: benzoylecgonine and cocaine in urine. Lowest detectable concentration is 300 ng/mL of benzoylecgonine.        Amphetamine+Methamphetamine Screen, Ur Not Detected     Comment: Assay Detects: d-methamphetamine, d-amphetamine, methlyenedioxyamphetamine (MDA), and methlyenendioxymethamphetamine (MDMA) in urine. Lowest detectable concentration is 1000 ng/mL of d-methamphetamine.  Assay is less sensitive to MDA and MDMA (lowest detectable concentration, 2500 ng/mL) and could produce a false negative result. If MDMA overdose is suspected and the result is  negative, a more specific test should be requested.        Cannabinoid Screen, Urine Not Detected     Comment: Assay Detects: cannabinoid metabolites in urine. Lowest detectable concentration is 50 ng/mL        Opiate Scrn, Ur Not Detected     Comment: Assay Detects: codeine, dihydrocodeine, hydrocodone, hydromorphone, levorphanol, morphine, morphine-3-glucuronide, norcodeine, oxycodone in urine. Lowest detectable concentration is 300 ng/mL of morphine.        Barbiturate Screen, Ur Not Detected     Comment: Assay Detects: alphenal, amobarbital, aprobarbital, barbital, butabarbital, butalbital, butethal, diallybarbital, pentobarbital, secobarbital,talbutal, and thiopental. Lowest detectable concentration is 200 ng/mL of secobarbital.        Fentanyl Screen, Urine Not Detected    Valproic acid [[id number]]  (Normal) Collected: 07/10/23 1842    Specimen: Blood, Venous Updated: 07/10/23 2229     Valproic Acid 56.2 ug/mL     Toxicology Screen, serum [[id number]]  (Abnormal) Collected: 07/10/23 1842    Specimen: Blood, Venous Updated: 07/10/23 1928     Salicylate <1.5 mg/dL      Acetaminophen 0.1 ug/mL      Ethanol <5 mg/dL     Comprehensive metabolic panel [[id number]]  (Abnormal) Collected: 07/10/23 1842    Specimen: Blood, Venous Updated: 07/10/23 1922     Sodium 140 mEQ/L      Potassium 4.2 mEQ/L      Comment: Results obtained on plasma. Plasma Potassium values may be up to 0.4 mEQ/L less than serum values. The differences may be greater for patients with high platelet or white cell counts.        Chloride 108 mEQ/L      CO2 27 mEQ/L      BUN 12 mg/dL      Creatinine 0.8 mg/dL      Glucose 96 mg/dL      Calcium 9.2 mg/dL      AST (SGOT) 8 IU/L      ALT (SGPT) 10 IU/L      Alkaline Phosphatase 58 IU/L      Total Protein 6.7 g/dL      Comment: Test performed on plasma which typically contains approximately 0.4 g/dL more protein than serum.        Albumin 3.9 g/dL      Bilirubin, Total 0.3 mg/dL      eGFR >60.0  mL/min/1.73m*2      Anion Gap 5 mEQ/L     CBC and differential [[id number]] Collected: 07/10/23 1842    Specimen: Blood, Venous Updated: 07/10/23 1904     WBC 6.65 K/uL      RBC 4.57 M/uL      Hemoglobin 13.2 g/dL      Hematocrit 40.4 %      MCV 88.4 fL      MCH 28.9 pg      MCHC 32.7 g/dL      RDW 13.7 %      Platelets 224 K/uL      MPV 9.9 fL      Differential Type Auto     nRBC 0.0 %      Immature Granulocytes 0.6 %      Neutrophils 66.3 %      Lymphocytes 22.4 %      Monocytes 8.4 %      Eosinophils 2.0 %      Basophils 0.3 %      Immature Granulocytes, Absolute 0.04 K/uL      Neutrophils, Absolute 4.41 K/uL      Lymphocytes, Absolute 1.49 K/uL      Monocytes, Absolute 0.56 K/uL      Eosinophils, Absolute 0.13 K/uL      Basophils, Absolute 0.02 K/uL           Imaging Results    None         No orders to display       Scoring tools                                  ED Course & MDM   MDM / ED COURSE / CLINICAL IMPRESSION / DISPO     MDM    ED Course as of 07/11/23 0345   Mon Jul 10, 2023   2113 Pt is medically cleared for crisis eval [HL]   8423 The patient was evaluated by telepsych.  They recommend inpatient treatment.  They do not recommend a one-to-one.    I will write for the patient's nighttime medications [LB]      ED Course User Index  [HL] Silvio Vyas MD  [LB] Crystal Aragon PA C     Clinical Impression      Paranoia (CMS/HCC)     _________________     ED Disposition   Transfer to Behavioral Health                   Crystal Aragon PA C  07/11/23 4958

## 2023-07-11 NOTE — PROGRESS NOTES
07/10/2023 10:54 PM  Dr. Whiteside recommending 201 inpatient.     [facility] Tele-Psych - T [phone], Spoke with Miriam and requested admission orders for [facility].  Provided [phone] as CB#.    Transfer Center - [phone]- No- placed bed on hold     F31.2 Bipolar disorder  [address]  Accepting Dr is Dr. Naldo Whiteside.    Precert Information    Primary MCO:  Medicare  Auth Number: No Prior-Auth needed, provided auth at D/C  Accepting MD: Dr. Naldo Whiteside    Secondary[facility]COB [facility] [phone]   Auth Number: Received upon discharge  Service Authorized: COB- F31.2 Bipolar disorder, current episode manic severe with psychotic features  Days: COB 07/10/2023 begin date, WILL NEED Notificain of Arrival  Insurance Rep: Meghna  Insurance Phone: [phone]   Accepting MD: Dr. Naldo Whiteside    EVS Completed:  Verification No. [id number] - 07/10/2023    Recipient  Name: AMANDEEP CONTEH  Recipient ID: [id number]  YOB: 1975  Gender: Female    Eligibility Summary  Type Name Begin End  Medicaid Category: MHX  Program Status: 00  Service Program: EPOMS-[facility] Based Funding Only - Non-Medic 07/10/2023 07/10/2023  [facility] 07/10/2023 07/10/2023  Managed Care [facility] 07/10/2023 07/10/2023  Medicaid Category: PH  Program Status: 80  Service Program: HCB50-ADULT 07/10/2023 07/10/2023  Other or Additional Payor MEDICARE PART B 07/10/2023 07/10/2023  Other or Additional Payor MEDICARE PART A 07/10/2023 07/10/2023  Co-Insurance [place] Co-insurance: 0%      Co-Payment [place]-Pharmacy Generic Prescriptions/Refills: $1.00 07/10/2023 07/10/2023  Co-Payment MA-Pharmacy Brand Name Prescription/Refills: $3.00 07/10/2023 07/10/2023  Co-Payment MA-Inpatient Hospital/Rehab/Private Psych: $3.00 07/10/2023 07/10/2023  Co-Payment MA-Diagnostic Radiology/X-ray (Tech Component):  $1.00 07/10/2023 07/10/2023  Co-Payment MA-Outpatient Psychotherapy Services: $0.50 07/10/2023 07/10/2023  Co-Payment MA-Sliding scale: $0.65 07/10/2023 07/10/2023  Deductible [place] Deductible: $0      Limitations [place]-Limitations: Limitation Desk Reference        Eligibility Detail  Status: Medicaid  Service Type: 30-Health Benefit Plan Coverage  Insurance Type: MC-Medicaid  Coverage Description: Category: MHX  Program Status: 00  Service Program: EPOMS-[facility] Funding Only - Non-Medic  Plan 07/10/2023  Benefit Related Entity: Payer  [place] Service Program  Information Contact  Telephone: [phone]    Eligibility Detail  Status: Managed Care  Service Type: 30-Health Benefit Plan Coverage  Insurance Type: HM-Health Maintenance Organization (HMO)  Plan 07/10/2023  Benefit Related Entity: Primary Care Provider  RICHARD CARTER  Information Contact  Telephone: [phone]  Benefit Related Entity: Managed Care Organization  [address]  Information Contact  Telephone: [phone]    Eligibility Detail  Status: Managed Care  Service Type: 30-Health Benefit Plan Coverage  Insurance Type: HM-Health Maintenance Organization (HMO)  Plan 07/10/2023  Benefit Related Entity: Managed Care Organization  [facility]  Information Contact  Telephone: [phone]    Eligibility Detail  Status: Medicaid  Service Type: 1-Medical Care  4-Diagnostic X-Ray  30-Health Benefit Plan Coverage  33-Chiropractic  35-Dental Care  47-Hospital  48-Hospital - Inpatient  50-Hospital - Outpatient  86-Emergency Services  88-Pharmacy  98-Professional (Physician) Visit - Office  A6-Psychotherapy  AL-Vision (Optometry)  MH-Mental Health  UC-Urgent Care  Insurance Type: MC-Medicaid  Coverage Description: Category: PH  Program Status: 80  Service Program: HCB50-ADULT  Plan 07/10/2023  Benefit Related Entity: Payer  [place] Service Program  Information Contact  Telephone:  [phone]    Eligibility Detail  Status: Other or Additional Payor  Service Type: 30-Health Benefit Plan Coverage  Insurance Type: MB-Medicare Part B  Eligibility 07/10/2023  Benefit Related Entity: Payer  MEDICARE PART B  Payer Identifier: [id number]  Message Text: Recipient is dual eligible. Payment approval is subject to [place] policy, service delivery rules and TPL.    Eligibility Detail  Status: Other or Additional Payor  Service Type: 30-Health Benefit Plan Coverage  Insurance Type: [place]-Medicare Part A  Eligibility 07/10/2023  Benefit Related Entity: Payer  MEDICARE PART A  Payer Identifier: [id number]  Message Text: Recipient is dual eligible. Payment approval is subject to [place] policy, service delivery rules and TPL.    Eligibility Detail  Status: Co-Insurance  Service Type: 30-Health Benefit Plan Coverage  Coverage Description: [place] Co-insurance  Benefit Percent: 0  In Plan Network: Yes  Benefit Related Entity: Payer  Copayment Desk Reference  Information Contact  Uniform Resource  (URL): www.[facility].[place].gov/docs/For-Providers/Pages/Ojssvwvotmi-Mzsgzfszp-Lbhtdp-and-Manuals.aspx  Message Text: Patient Financial Responsibility information returned on this response may not apply in all billing situations.    Eligibility Detail  Status: Co-Payment  Service Type: 88-Pharmacy  Coverage Description: MA-Pharmacy Generic Prescriptions/Refills  Benefit Amount: $1.00  In Plan Network: Yes  Plan 07/10/2023    Eligibility Detail  Status: Co-Payment  Service Type: 88-Pharmacy  Coverage Description: MA-Pharmacy Brand Name Prescription/Refills  Benefit Amount: $3.00  In Plan Network: Yes  Plan 07/10/2023    Eligibility Detail  Status: Co-Payment  Service Type: 48-Hospital - Inpatient  Coverage Description: MA-Inpatient Hospital/Rehab/Private Psych  Time Period Qualifier: Day  Benefit Amount: $3.00  Quantity: Maximum: 21.00  In Plan Network: Yes  Plan 07/10/2023    Eligibility Detail  Status: Co-Payment  Service  Type: 4-Diagnostic X-Ray  Coverage Description: MA-Diagnostic Radiology/X-ray (Tech Component)  Benefit Amount: $1.00  In Plan Network: Yes  Plan 07/10/2023    Eligibility Detail  Status: Co-Payment  Service Type: A6-Psychotherapy  Coverage Description: MA-Outpatient Psychotherapy Services  Benefit Amount: $0.50  In Plan Network: Yes  Delivery Qualifier: Units  Delivery Quantity: 1  Plan 07/10/2023    Eligibility Detail  Status: Co-Payment  Service Type: 1-Medical Care  33-Chiropractic  35-Dental Care  50-Hospital - Outpatient  98-Professional (Physician) Visit - Office  AL-Vision (Optometry)  MH-Mental Health  UC-Urgent Care  Coverage Description: MA-Sliding scale  Benefit Amount: $0.65  In Plan Network: Yes  Plan 07/10/2023  Message Text: MA-Sliding scale co-payment applies.  Message Text: Copay $0.65 between $2.00-$10.00,Copay $1.30 between $10.01-$25.00,Copay $2.55 between $25.01-$50.00,Copay $3.80 above $50.01.    Eligibility Detail  Status: Deductible  Service Type: 30-Health Benefit Plan Coverage  Coverage Description: [place] Deductible  Benefit Amount: $0.00  In Plan Network: Yes    Eligibility Detail  Status: Limitations  Coverage Description: [place]ations  Benefit Related Entity: Payer  Limitation Desk Reference  Information Contact  Uniform Resource  (URL): www.[facility].[place].gov/docs/For-Providers/Pages/Rloukcacpcu-Fjesvfssc-Epljwv-and-Manuals.aspx  Message Text: Patient Limitation information returned on this response may not apply in all billing situations.

## 2023-07-12 LAB
ATRIAL RATE: 86
P AXIS: 55
PR INTERVAL: 152
QRS DURATION: 100
QT INTERVAL: 388
QTC CALCULATION(BAZETT): 464
R AXIS: 46
T WAVE AXIS: 40
VENTRICULAR RATE: 86

## 2023-07-12 PROCEDURE — 63700000 HC SELF-ADMINISTRABLE DRUG: Performed by: PSYCHIATRY & NEUROLOGY

## 2023-07-12 PROCEDURE — 99232 SBSQ HOSP IP/OBS MODERATE 35: CPT | Performed by: PSYCHIATRY & NEUROLOGY

## 2023-07-12 PROCEDURE — 12400000 HC ROOM AND CARE SEMIPRIVATE PSYCH

## 2023-07-12 RX ADMIN — DIVALPROEX SODIUM 1500 MG: 250 TABLET, DELAYED RELEASE ORAL at 21:37

## 2023-07-12 RX ADMIN — SULFAMETHOXAZOLE AND TRIMETHOPRIM 1 TABLET: 800; 160 TABLET ORAL at 08:43

## 2023-07-12 RX ADMIN — SULFAMETHOXAZOLE AND TRIMETHOPRIM 1 TABLET: 800; 160 TABLET ORAL at 21:38

## 2023-07-12 RX ADMIN — LURASIDONE HYDROCHLORIDE 80 MG: 40 TABLET, FILM COATED ORAL at 21:38

## 2023-07-12 NOTE — PROGRESS NOTES
SW met with patient and treatment team to review and sign tx plan.  Tx plan filed in physical chart on unit.       07/12/23 4803   Treatment Plan   Treatment Plan Type Initial Treatment Plan   Plan Date 07/12/23   Treatment Plan Details Jillian Acosta is a 48 year old Female from [facility] ED on a 201 here for auditory hallucinations, depression, anxiety.   Long Range Goal Mood stability   Individualization   Patient Personal Strengths expressive of needs;expressive of emotions;community support;independent living skills;insight into illness/situation   Patient Vulnerabilities family/relationship conflict;limited support system   Discharge   Plan Jillian and  will identify appropriate discharge plan. Jillian is encouraged to follow up with physician recommendations and outpatient treatment at discharge.   Treatment Plan Status   Treatment Plan Status In Progress

## 2023-07-12 NOTE — PROGRESS NOTES
Jillian did not attend stress management group.   07/12/23 5380   Activity/Group Checklist   Group Title Support and Self-expression   Attendance Did not attend

## 2023-07-12 NOTE — PROGRESS NOTES
"   07/12/23 1444   Activity/Group Checklist   Group Title Interactive therapy   Attendance Did not attend  (Jillian was invited to session, though declined, stating \"I'm not doing groups today. I don't feel like talking to anyone.\")       "

## 2023-07-12 NOTE — PLAN OF CARE
"Plan of Care Review  Plan of Care Reviewed With: patient  Patient Agreement with Plan of Care: agrees  Consent Given to Review Plan with: self  Progress: no change  Intervention(s): nursing assessment completed  Outcome Evaluation: shar was received this evening attending scheduled unit group activity in community day room, pt chose to remain laying down in her room for the majority of the evening, pt only observed out of her room at brief times for needs, observed with quiet and flat affect but brighter when spoken with, pt continues to remain confused and paranoid, stated she thought nursing staff was \"an popeye, but a good popeye,\" offered no complaints at this time and requested no prn medications this evening, attempted to provided scheduled medications however pt refused Depakote due to statements that she did not take ER, contacted Telepsych and order changed, pt accepted medication after change, will continue to assess pt activity, offer any help needed and provide ordered medications  "

## 2023-07-12 NOTE — PLAN OF CARE
Pt does not have home cpap and refused hospital issued cpap. Pt said she will try to have family bring hers in. Any changes please call #[phone].

## 2023-07-12 NOTE — PROGRESS NOTES
07/12/23 0915   Activity/Group Checklist   Group Title Community meeting   Attendance Did not attend

## 2023-07-12 NOTE — PROGRESS NOTES
07/12/23 1015   Activity/Group Checklist   Group Title Wellness tools   Attendance Did not attend  (Jillian was provided with group handout as an alternative intervention)

## 2023-07-12 NOTE — PLAN OF CARE
Problem: Manic or Hypomanic Signs/Symptoms  Goal: Improved Sleep (Manic/Hypomanic Signs/Symptoms)  Outcome: Progressing  Flowsheets (Taken 7/12/2023 1237)  Goal Outcome: progressing  Individual Goal: Jillian will use positive coping skills when in distress.  [place] Goal: uses relaxation techniques     Problem: Psychotic Signs/Symptoms  Goal: Improved Mood Symptoms  Outcome: Progressing  Flowsheets (Taken 7/12/2023 1237)  [place] Goal: verbalizes increased insight  Goal Outcome: progressing  Individual Goal: Jillian will able to verbalize her goal for the day.     Problem: Adult Behavioral Health Plan of Care  Goal: Plan of Care Review  Outcome: Progressing  Flowsheets (Taken 7/12/2023 1237)  Progress: improving  Patient Agreement with Plan of Care: agrees  Outcome Evaluation: Jillian is mostly isolative in her room this shift, briefly coming out to use phone or for her needs. At start of shift pt was upset as she had bad conversation with family on phone. Pt was irritable, tearful and labile. After having therapeutic conversation with pt, she calmed down. Later pt was calm and pleasant with bright affect. Pt endorse anxiety, depression. Denies SI/HI/AVH. Pt is paranoid at times about good angels verses bad angels. Pt stated Most staff here are good angels. Pt reports eating and sleeping good. Pt reported she did not use hospital C-PAP as she was feeling paranoid about cleanliness. Pt asked her uncle to drop off her own C-PAP machine this afternoon. Pt compliant taking her medications. Pt did not attend therapy group even after encouragement; however pt was using headphones to listen to music and reading a book in her room.  Plan of Care Reviewed With: patient  Intervention(s): Nursing assessment, medication management, Q 15 minutes safety rounds.

## 2023-07-12 NOTE — PROGRESS NOTES
"PSYCHIATRIC PROGRESS NOTE    Chief Complaint/Reason for follow-up: psychosis    Interval History: Patient seen and interviewed. Found in bed. Disheveled. Mood: \"I don'tknow..I'm discouraged.\" Good sleep and appetite. Anxiety 4-5/10. Asked mom for CPAP, got disappointing response. Ambivalent about being here. We discussed exercise and weight management tohelp  Her remain compliant with her medications. Pt paranoid in groups. Denied SI/HI/aVH however.   Vital Signs for the last 24 hours:  Temp:  [36.3 °C (97.4 °F)] 36.3 °C (97.4 °F)  Heart Rate:  [80] 80  Resp:  [18] 18  BP: (120)/(60) 120/60    Scheduled Meds:  • divalproex  1,500 mg oral Nightly   • lurasidone  80 mg oral Nightly   • sulfamethoxazole-trimethoprim  1 tablet oral q12h AVIS       Labs:  Selected Electrolytes  Results from last 7 days   Lab Units 07/10/23  1842   POTASSIUM mEQ/L 4.2   SODIUM mEQ/L 140   CREATININE mg/dL 0.8       Recent EKG HR/QTC  Lab Results   Component Value Date    VENTRICRATE 86 07/11/2023    QTCCALCULAT 464 07/11/2023       Lab Results   Component Value Date    VPA 56.2 07/10/2023    VPA 60.8 03/15/2023    LITHIUM <0.1 (L) 11/29/2020    LITHIUM 0.5 07/22/2020       MENTAL STATUS EXAM  Appearance: unkempt  Gait and Motor: no abnormal movements and normal  Speech: normal rate/rhythm/volume  Mood: depressed and anxious  Affect: constricted  Associations: illogical  Thought Process: tangential and circumstantial  Thought Content: no auditory or visual hallucinations., appropriate to situation and paranoia  Suicidality/Homicidality: denies  Judgement/Insight: acknowledges illness and help accepting  Orientation: day, month, year, season, type of place, name of place, city and situation  Memory: recalls recent events and recalls remote events  Attention: impaired  Knowledge: appropriate for education  Language: normal     Assessment/Plan  Psychotic disorder (CMS/HCC)  Assessment & Plan  Patient reports paranoia, feeling the Devil is " trying to destroy her. Thinking people in the ER are angels and Devils. History of being admitted to [facility] for [facility] days for mari in 2020,  Punching wall and difficult to control. Admits to not using her CPAP for 2 1/2 weeks up until the past 3 days.     Wed 7/12, Continue current plan for now:      -patient agreed to increase Depakote to 1500 mg HS and Latuda to 80 mg HS for psychosis and potential mari  - 15 minute checks  - group and milieu therapy  - collateral-  left for DR Weeks  - jones [place] plan  - see AMINA plan but CPAP compliance [facility] to improve mental status    AMINA (obstructive sleep apnea)  Assessment & Plan  Patient uses CPAP at home but has not brought it. Ordered hospital machine with virtual jose angel due to hose/cords.    Paroxysmal atrial fibrillation (CMS/HCC)  Assessment & Plan  Reported by patient; said she is no longer on Cardizem however per CRNP it was filled as recently as 3/23. Will consult cardiology, especially as she did not use CPAP for 2 1/2 weeks.

## 2023-07-13 PROCEDURE — 12400000 HC ROOM AND CARE SEMIPRIVATE PSYCH

## 2023-07-13 PROCEDURE — 63700000 HC SELF-ADMINISTRABLE DRUG: Performed by: PSYCHIATRY & NEUROLOGY

## 2023-07-13 PROCEDURE — 99232 SBSQ HOSP IP/OBS MODERATE 35: CPT | Performed by: PSYCHIATRY & NEUROLOGY

## 2023-07-13 RX ORDER — IBUPROFEN 600 MG/1
600 TABLET ORAL EVERY 8 HOURS PRN
Status: DISCONTINUED | OUTPATIENT
Start: 2023-07-13 | End: 2023-07-14 | Stop reason: HOSPADM

## 2023-07-13 RX ADMIN — LURASIDONE HYDROCHLORIDE 80 MG: 40 TABLET, FILM COATED ORAL at 20:10

## 2023-07-13 RX ADMIN — DIVALPROEX SODIUM 1500 MG: 250 TABLET, DELAYED RELEASE ORAL at 20:10

## 2023-07-13 NOTE — PROGRESS NOTES
07/13/23 0930   Activity/Group Checklist   Group Title Community meeting   Attendance Did not attend

## 2023-07-13 NOTE — PROGRESS NOTES
"PSYCHIATRIC PROGRESS NOTE    Chief Complaint/Reason for follow-up: psychoais    Interval History: Patient seen and interviewed. Upbeat affect, smiling, says she feels better. Denies SI/HI/aVH. Had signed 72 hour notice meaning tomorrow will be the last option before the weekend to discharge. Good sleep \"I slept the whole time I've been here.\" Denies paranoia, agrees to return to Dr Weeks.       Vital Signs for the last 24 hours:  Temp:  [36.6 °C (97.9 °F)] 36.6 °C (97.9 °F)  Heart Rate:  [86-88] 86  Resp:  [17] 17  BP: (109-131)/(62-65) 131/65    Scheduled Meds:  • divalproex  1,500 mg oral Nightly   • lurasidone  80 mg oral Nightly       Labs:  Selected Electrolytes  Results from last 7 days   Lab Units 07/10/23  1842   POTASSIUM mEQ/L 4.2   SODIUM mEQ/L 140   CREATININE mg/dL 0.8       Recent EKG HR/QTC  Lab Results   Component Value Date    VENTRICRATE 86 07/11/2023    QTCCALCULAT 464 07/11/2023       Lab Results   Component Value Date    VPA 56.2 07/10/2023    VPA 60.8 03/15/2023    LITHIUM <0.1 (L) 11/29/2020    LITHIUM 0.5 07/22/2020       MENTAL STATUS EXAM  Appearance: well groomed  Gait and Motor: no abnormal movements, normal gait and normal  Speech: normal rate/rhythm/volume and fluent  Mood: happy  Affect: normal  Associations: coherent  Thought Process: goal-directed  Thought Content: no auditory or visual hallucinations. and appropriate to situation  Suicidality/Homicidality: denies  Judgement/Insight: minimizes severity level of illness and help accepting  Orientation: day, month, year, season, type of place, name of place, city and situation  Memory: recalls recent events and recalls remote events  Attention: alert  Knowledge: normal and appropriate for education  Language: normal     Assessment/Plan  Psychotic disorder (CMS/HCC)  Assessment & Plan  Patient reports paranoia, feeling the Devil is trying to destroy her. Thinking people in the ER are angels and Devils. History of being admitted to [facility] " for [facility] days for mari in 2020,  Punching wall and difficult to control. Admits to not using her CPAP for 2 1/2 weeks up until the past 3 days.     Wed 7/12, Continue current plan for now    Thursday: plan for dc in am. Pt has signed a 72 hour notice, is not a danger to self or others and is able to care for self. Will advise her to get a depakote level 7/17.       -patient agreed to increase Depakote to 1500 mg HS and Latuda to 80 mg HS for psychosis and potential mari  - 15 minute checks  - group and milieu therapy  - collateral- VM left for DR Weeks  - safe dc plan  - see AMINA plan but CPAP compliance j[place] to improve mental status    AMINA (obstructive sleep apnea)  Assessment & Plan  Patient uses CPAP at home but has not brought it. Ordered hospital machine with virtual jose angel due to hose/cords.    Paroxysmal atrial fibrillation (CMS/HCC)  Assessment & Plan  Reported by patient; said she is no longer on Cardizem however per CRNP it was filled as recently as 3/23. Will consult cardiology, especially as she did not use CPAP for 2 1/2 weeks.

## 2023-07-13 NOTE — PLAN OF CARE
Problem: Manic or Hypomanic Signs/Symptoms  Goal: Improved Sleep (Manic/Hypomanic Signs/Symptoms)  Outcome: Progressing  Flowsheets (Taken 7/13/2023 1109)  Goal Outcome: progressing  Individual Goal: Jillian will sleep at least 4 hrs at night.  [place] Goal: sleeps 4-6 hours at night     Problem: Psychotic Signs/Symptoms  Goal: Improved Mood Symptoms  Outcome: Progressing  Flowsheets (Taken 7/13/2023 1109)  [place] Goal: identifies personal treatment goal  Individual Goal: Jillian will attend at least 1 therapy group.     Problem: Adult Behavioral Health Plan of Care  Goal: Plan of Care Review  Outcome: Progressing  Flowsheets (Taken 7/13/2023 1109)  Progress: improving  Patient Agreement with Plan of Care: agrees  Outcome Evaluation: Jillian is visible on unit, isolative to self. Doesn't interact with peers. Pt is able to make her needs know. Pt is slightly disorganized and tangential. Discharged focus. Pt reported that now she able to sleep good at night she feels she is ready to go home. Pt reported mild anxiety. Denies SI/HI/AVH. Pt reports eating and sleeping good. Pt attended 1 therapy group this AM after encouragement.    Pt signed 72 hrs on 7/13/23 at 9:10 AM MD and SW were made aware.      Plan of Care Reviewed With: patient  Intervention(s): Nursing assessment, encourage to attend therapy group.

## 2023-07-13 NOTE — PLAN OF CARE
Plan of Care Review  Plan of Care Reviewed With: patient  Patient Agreement with Plan of Care: agrees  Consent Given to Review Plan with: self  Progress: no change  Intervention(s): nursing assessment completed  Outcome Evaluation: shar was received this evening laying in bed in her room, pt chose not to attend any scheduled unit group activities this evening, pt chose to remain isolative to her room laying down for the majority of the evening, only out at brief times for needs, often irritable when out, easily frustrated with peers, easily upset on the phone with family, made statements in regard to signing out of tx but redirectable with the help of nursing staff and did not sign 72 hr notice at this time, later apologized for conflicts with peers, scheduled medications brought to bedside and pt tolerated well, will continue to assess pt activity, offer any help needed and provide ordered medications

## 2023-07-13 NOTE — PROGRESS NOTES
"   07/13/23 1030   Activity/Group Checklist   Group Title Wellness tools  (Safety Planning: Identifying warning signs, coping skills, + distractions, and supports. Pt was provided with an activity packet at end of session as a group supplement.)   Attendance Attended   Attendance Duration (min) 46-60   Follows Direction Followed directions   Interactions Disorganized interaction   Affect/Mood Range Wide   Affect/Mood Display Alert;Irritable   Goals Achieved Able to listen to others;Identified feelings;Discussed coping/wellness tools;Able to engage in interactions  (Jillian attended group w mod encouragement. She was tangential and hyperverbal, though redirectable. Jillian was pleasant with select peers and irritable with others, reporting that a peer was giving her \"bad vibes\". She ID'ed coping skill(listening to music).)       "

## 2023-07-13 NOTE — PROGRESS NOTES
Jillian did not attend group.   07/13/23 1926   Activity/Group Checklist   Group Title Interactive therapy   Attendance Did not attend

## 2023-07-14 VITALS
HEIGHT: 66 IN | TEMPERATURE: 97.8 F | OXYGEN SATURATION: 95 % | SYSTOLIC BLOOD PRESSURE: 115 MMHG | HEART RATE: 102 BPM | RESPIRATION RATE: 18 BRPM | WEIGHT: 238 LBS | DIASTOLIC BLOOD PRESSURE: 55 MMHG | BODY MASS INDEX: 38.25 KG/M2

## 2023-07-14 PROCEDURE — 99238 HOSP IP/OBS DSCHRG MGMT 30/<: CPT | Performed by: PSYCHIATRY & NEUROLOGY

## 2023-07-14 RX ORDER — HYDROXYZINE HYDROCHLORIDE 50 MG/1
25 TABLET, FILM COATED ORAL EVERY 6 HOURS PRN
Qty: 30 TABLET | Refills: 0 | Status: SHIPPED | OUTPATIENT
Start: 2023-07-14 | End: 2025-01-20

## 2023-07-14 RX ORDER — DIVALPROEX SODIUM 500 MG/1
1500 TABLET, DELAYED RELEASE ORAL NIGHTLY
Qty: 90 TABLET | Refills: 0 | Status: SHIPPED | OUTPATIENT
Start: 2023-07-14 | End: 2024-02-02 | Stop reason: HOSPADM

## 2023-07-14 RX ORDER — LURASIDONE HYDROCHLORIDE 80 MG/1
80 TABLET, FILM COATED ORAL NIGHTLY
Qty: 30 TABLET | Refills: 0 | Status: SHIPPED | OUTPATIENT
Start: 2023-07-14 | End: 2024-02-02 | Stop reason: HOSPADM
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
## 2023-07-14 NOTE — IP PSYCH PROVIDER DISCHARGE INSTRUCTIONS
Reason for Admission  You came to the ER for hallucinations, delusions and depression.    Major Procedures and Tests  Imaging Results  No results found.    No Surgical Procedures    Advance Directives  Does patient have advance directive?: No  Patient does not have Advance Directive: Patient/Family declines further information  Does patient have current OOH DNR form?: No  Patient does not have current OOH DNR form: Patient/Family declines further information  Does patient have current POLST?: No  Patient does not have current POLST: Patient/Family declines further information  Does patient have mental health advance directive?: No  Patient does not have Mental Health Advance Directive: Patient/Family declines further information

## 2023-07-14 NOTE — PLAN OF CARE
Plan of Care Review  Plan of Care Reviewed With: patient  Patient Agreement with Plan of Care: agrees  Consent Given to Review Plan with: self  Progress: improving  Intervention(s): nursing assessment completed  Outcome Evaluation: shra was received this evening attending scheduled unit group activity in community day room, pt observed with flat affet but brighter and pleasant when spoken with, received notification from pt attending psychiatrist that pt scheduled for discharge tomorrow, pt expressed positive focus toward pending discharge to home tomorrow, pt offered no complaints at this time and requested no prn medications this evening, cooperative with all nursing care and interventions this evening, provided scheduled medications and pt tolerated well, will continue to assess pt activity, offer any help needed and provide ordered medications

## 2023-07-14 NOTE — PROGRESS NOTES
07/14/23 0915   Activity/Group Checklist   Group Title Community meeting   Attendance Attended   Attendance Duration (min) 31-45   Follows Direction Followed directions   Interactions Initiates interaction   Affect/Mood Range Constricted   Affect/Mood Display Calm;Alert   Goals Achieved Able to listen to others;Identified feelings;Able to engage in interactions;Discussed discharge plan  (Jillian was calm and maintained behavioral control throughout session. She shared d/c plan and goal to make iced coffee and walk when she leaves.)

## 2023-07-14 NOTE — NURSING NOTE
Jillian is AAOx3, VSS and WNL.  She has signed for and received for her belongings / discharge paperwork.  She will ambulate off the unit.

## 2023-07-14 NOTE — PROGRESS NOTES
SW met with patient this morning to review aftercare and discharge plan.  Pt was agreeable and denied SI/HI/AVH upon this interview.  Pt's family will arrive at 11:00am for .  Pt's safety/crisis plan has been completed and copied.  Plan is located in physical chart on unit.       07/14/23 0941   Recommendations/Plan   Clinical assessment summary Pt admitted for worsening symptoms of chronic mental health issues.  pt currently denying SI/HI/AVh and has signed 72 hour notice   Recommended level of care Outpatient Psych PHP/IOP   Patient refused treatment recommendation Yes   Why was treatment refused? Pt stated that groups are not helpful.  Pt will see OP individual provider and request to be placed on list for OP therapy services.   Suicide Resource Information Provided yes

## 2023-07-15 ENCOUNTER — TRANSCRIBE ORDERS (OUTPATIENT)
Dept: REGISTRATION | Facility: HOSPITAL | Age: 48
End: 2023-07-15

## 2023-07-15 ENCOUNTER — HOSPITAL ENCOUNTER (EMERGENCY)
Facility: HOSPITAL | Age: 48
Discharge: HOME | End: 2023-07-15
Attending: EMERGENCY MEDICINE
Payer: MEDICARE

## 2023-07-15 VITALS
RESPIRATION RATE: 18 BRPM | TEMPERATURE: 97.6 F | OXYGEN SATURATION: 98 % | WEIGHT: 238 LBS | HEART RATE: 96 BPM | DIASTOLIC BLOOD PRESSURE: 87 MMHG | BODY MASS INDEX: 38.41 KG/M2 | SYSTOLIC BLOOD PRESSURE: 143 MMHG

## 2023-07-15 DIAGNOSIS — F25.9 SCHIZOAFFECTIVE DISORDER, UNSPECIFIED: Primary | ICD-10-CM

## 2023-07-15 DIAGNOSIS — F41.9 ANXIETY: Primary | ICD-10-CM

## 2023-07-15 LAB
ALBUMIN SERPL-MCNC: 3.8 G/DL (ref 3.5–5.7)
ALP SERPL-CCNC: 57 IU/L (ref 34–104)
ALT SERPL-CCNC: 9 IU/L (ref 7–52)
ANION GAP SERPL CALC-SCNC: 7 MEQ/L (ref 3–15)
APAP SERPL-MCNC: 0.1 UG/ML (ref 10–30)
AST SERPL-CCNC: 8 IU/L (ref 13–39)
BASOPHILS # BLD: 0.04 K/UL (ref 0.01–0.1)
BASOPHILS NFR BLD: 0.5 %
BILIRUB SERPL-MCNC: 0.3 MG/DL (ref 0.3–1)
BUN SERPL-MCNC: 13 MG/DL (ref 7–25)
CALCIUM SERPL-MCNC: 9.4 MG/DL (ref 8.6–10.3)
CHLORIDE SERPL-SCNC: 107 MEQ/L (ref 98–107)
CO2 SERPL-SCNC: 23 MEQ/L (ref 21–31)
CREAT SERPL-MCNC: 0.6 MG/DL (ref 0.6–1.2)
DIFFERENTIAL METHOD BLD: ABNORMAL
EOSINOPHIL # BLD: 0.13 K/UL (ref 0.04–0.36)
EOSINOPHIL NFR BLD: 1.7 %
ERYTHROCYTE [DISTWIDTH] IN BLOOD BY AUTOMATED COUNT: 13.7 % (ref 11.7–14.4)
ETHANOL SERPL-MCNC: <5 MG/DL
GFR SERPL CREATININE-BSD FRML MDRD: >60 ML/MIN/1.73M*2
GLUCOSE SERPL-MCNC: 117 MG/DL (ref 70–99)
HCG UR QL: NEGATIVE
HCT VFR BLDCO AUTO: 41.1 % (ref 35–45)
HGB BLD-MCNC: 13.6 G/DL (ref 11.8–15.7)
IMM GRANULOCYTES # BLD AUTO: 0.03 K/UL (ref 0–0.08)
IMM GRANULOCYTES NFR BLD AUTO: 0.4 %
LYMPHOCYTES # BLD: 1.2 K/UL (ref 1.2–3.5)
LYMPHOCYTES NFR BLD: 16 %
MCH RBC QN AUTO: 29.2 PG (ref 28–33.2)
MCHC RBC AUTO-ENTMCNC: 33.1 G/DL (ref 32.2–35.5)
MCV RBC AUTO: 88.2 FL (ref 83–98)
MONOCYTES # BLD: 0.83 K/UL (ref 0.28–0.8)
MONOCYTES NFR BLD: 11.1 %
NEUTROPHILS # BLD: 5.28 K/UL (ref 1.7–7)
NEUTS SEG NFR BLD: 70.3 %
NRBC BLD-RTO: 0 %
PDW BLD AUTO: 10 FL (ref 9.4–12.3)
PLATELET # BLD AUTO: 203 K/UL (ref 150–369)
POTASSIUM SERPL-SCNC: 4.2 MEQ/L (ref 3.5–5.1)
PROT SERPL-MCNC: 6.6 G/DL (ref 6.4–8.9)
RBC # BLD AUTO: 4.66 M/UL (ref 3.93–5.22)
SALICYLATES SERPL-MCNC: <1.5 MG/DL
SODIUM SERPL-SCNC: 137 MEQ/L (ref 136–145)
VALPROATE SERPL-MCNC: 34.3 UG/ML (ref 50–100)
WBC # BLD AUTO: 7.51 K/UL (ref 3.8–10.5)

## 2023-07-15 PROCEDURE — 85025 COMPLETE CBC W/AUTO DIFF WBC: CPT | Performed by: EMERGENCY MEDICINE

## 2023-07-15 PROCEDURE — 80164 ASSAY DIPROPYLACETIC ACD TOT: CPT | Performed by: EMERGENCY MEDICINE

## 2023-07-15 PROCEDURE — 84703 CHORIONIC GONADOTROPIN ASSAY: CPT | Performed by: EMERGENCY MEDICINE

## 2023-07-15 PROCEDURE — G0480 DRUG TEST DEF 1-7 CLASSES: HCPCS | Performed by: EMERGENCY MEDICINE

## 2023-07-15 PROCEDURE — 99283 EMERGENCY DEPT VISIT LOW MDM: CPT

## 2023-07-15 PROCEDURE — 36415 COLL VENOUS BLD VENIPUNCTURE: CPT | Performed by: EMERGENCY MEDICINE

## 2023-07-15 PROCEDURE — 90792 PSYCH DIAG EVAL W/MED SRVCS: CPT | Performed by: PSYCHIATRY & NEUROLOGY

## 2023-07-15 PROCEDURE — 80053 COMPREHEN METABOLIC PANEL: CPT | Performed by: EMERGENCY MEDICINE

## 2023-07-15 RX ORDER — HYDROXYZINE PAMOATE 25 MG/1
25 CAPSULE ORAL 2 TIMES DAILY
Qty: 28 CAPSULE | Refills: 0 | Status: SHIPPED | OUTPATIENT
Start: 2023-07-15 | End: 2023-07-29

## 2023-07-15 ASSESSMENT — ENCOUNTER SYMPTOMS
COUGH: 0
SHORTNESS OF BREATH: 0
SLEEP DISTURBANCE: 1
NERVOUS/ANXIOUS: 1
DYSURIA: 0
EYE PAIN: 0
HEADACHES: 0
DIARRHEA: 0
SORE THROAT: 0
DIZZINESS: 0
HEMATURIA: 0
FEVER: 0
ABDOMINAL PAIN: 0
DYSPHORIC MOOD: 1
VOMITING: 0
DIFFICULTY URINATING: 0
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
## 2023-07-15 NOTE — CONSULTS
"Psychiatry Consult  Patient seen remotely, physician present at [facility] the patient at [facility]  Diagnosis: No admission diagnoses are documented for this encounter..  Chief Complaint:   Chief Complaint   Patient presents with   • Psychiatric Evaluation   .    Subjective     Jillian Acosta is a 48 y.o. female who presented again to the ER today, she was recently admitted on the inpatient psychiatric unit at [facility] from 7/10 to 7/14, reason for initial admission was her complaining of increasing paranoia\" I distrust people they are all evil\", also reported at the time some ideas of reference.  She has had multiple inpatient psychiatric hospitalization including at [place] for more than a month.  During most recent hospitalization her psychotropic medication regimen was adjusted, her Depakene was increased from 1000 to 1500 mg at bedtime and her Latuda from 40 to 80 mg at bedtime.  Generally her affect and mood were deemed improved and she did not exhibit any psychotic symptoms.  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  In the morning apparently siblings and her aunt came and were cursing at her\" they were saying in the name of God you are a bad Worship\" leaving patient feeling overwhelmed and angry which led to her returning to " the ER.  Seems to be somewhat religiously preoccupied.  Has history of impulsivity with nonsuicidal harming behavior for example punching walls/from chart review admission in 2021- patient is on Medicaid, does not have any other support, and felt she was not able to stand her ground with her family.  Despite that, patient is not suicidal and not homicidal and did not appear to respond to internal stimuli, her mood is not manic therefore not meeting criteria for being readmitted to inpatient psychiatric care she does have an appointment with a psychiatrist on August 1, was advised to continue taking her psychotropic medications.  Today on presentation, valproic level 34.3, QTc 464 ms  No history of illicit drug use or alcohol    PDMP: Unknown    Collateral information: Chart reviewed, spoke to crisis  at [facility]    Psychiatric History:    PSS-3:  1) Over the past 2 weeks have you felt down, depressed or hopeless?  Denied  2) Over the past 2 weeks have you had thoughts of killing yourself?  Denied  3) Have you ever in your life attempted to kill yourself?  Denied, not reported  Within the past 6 months?    Not reported    Suicidality-denies any active thoughts, plans or intent   Suicide Attempts: none reported      Risk Factors: Presence of a Mood Disorder     Protective Factors:   Internal - has limited, immature coping skills   External - support is limited         Effective and accessible mental health care , Contacts with caregivers and Cultural/Anabaptism beliefs that discourage suicide   Current Psychiatrist: yes - has appointment on aug 1st   Past psychiatric Hospitalization: multiple, most recent from 7/10- 7/14  Medication Trials:  yes - Geodon, Risperdal, Zyprexa, and Haldol which were not effective. Became panicky on Ativan but would try Atarax (was prescribed Vistaril she never took). Abilify was ok at first but lost effectiveness. Antidepressants make her manic, Seroquel has been ok in  conjunction with another antipsychotic.    ECT trials: none reported   Not have access to firearms  Agitation, impulsivity or aggression -  None recent   Legal hx- unk, has no access to guns     Substance Use History:  Substance use:   Drug Details     Questions Responses    Amphetamine frequency Never used    Comment: Never used on 3/22/2020     Cannabis frequency Past rare use    Comment: Past rare use on 3/22/2020     Cocaine frequency Never used    Comment: Never used on 3/22/2020     Ecstasy frequency Never used    Comment: Never used on 3/22/2020     Hallucinogen frequency Never used    Comment: Never used on 3/22/2020     Inhalant frequency Never used    Comment: Never used on 3/22/2020     Opiate frequency Never used    Comment: Never used on 3/22/2020     Sedative frequency Never used    Comment: Never used on 3/22/2020     Other drug frequency Never used    Comment: Never used on 3/22/2020         Past D&A Treatment:unk, not reported     Family History:   Family History   Problem Relation Age of Onset   • Diabetes Biological Mother    • Heart disease Biological Father        Social History:   Social History     Socioeconomic History   • Marital status: Single   Occupational History   • Occupation: disabled   Tobacco Use   • Smoking status: Former     Packs/day: 0.25     Types: Cigarettes   • Smokeless tobacco: Never   Vaping Use   • Vaping Use: Never used   Substance and Sexual Activity   • Alcohol use: No   • Drug use: No   • Sexual activity: Defer     Social Determinants of Health     Food Insecurity: No Food Insecurity (7/15/2023)    Hunger Vital Sign    • Worried About Running Out of Food in the Last Year: Never true    • Ran Out of Food in the Last Year: Never true   Stress: Stress Concern Present (7/11/2023)    [employer] - Occupational Stress Questionnaire    • Feeling of Stress : Very much     Employment on disability   Education unk   Stressors limited support    Strength/ supports verbal, Hoahaoism     Medical History:   Past Medical History:   Diagnosis Date   • A-fib (CMS/HCC)    • A-fib (CMS/HCC)    • Bipolar 1 disorder (CMS/HCC)    • Hypertension    • Sleep apnea        Allergies:   Allergies   Allergen Reactions   • Azithromycin GI intolerance     NA   • Doxycycline      Palpitation   • Penicillins      Other reaction(s): Unknown   • Amoxicillin Rash       Current Medications:  HOME MEDS:  Not in a hospital admission.  SCHEDULED:    PRN      Review of Systems  All other systems reviewed and negative except as noted in the HPI.    Objective     Vital Signs for the last 24 hours:  Temp:  [36.4 °C (97.6 °F)] 36.4 °C (97.6 °F)  Heart Rate:  [97] 97  Resp:  [19] 19  BP: (136)/(76) 136/76    Labs  Results from last 7 days   Lab Units 07/15/23  0911 07/10/23  1842   HEMOGLOBIN g/dL 13.6 13.2   WBC K/uL 7.51 6.65   PLATELETS K/uL 203 224   POTASSIUM mEQ/L 4.2 4.2   SODIUM mEQ/L 137 140   CREATININE mg/dL 0.6 0.8     Results from last 7 days   Lab Units 07/15/23  0911 07/10/23  1842   ALT IU/L 9 10   AST IU/L 8* 8*   ALK PHOS IU/L 57 58     Ethanol   Date Value Ref Range Status   07/15/2023 <5 <=10 mg/dL Final   07/10/2023 <5 <=10 mg/dL Final   03/22/2020 <5 <=10 mg/dL Final     Lab Results   Component Value Date    LITHIUM <0.1 (L) 11/29/2020    TSH 2.10 07/10/2023    FREET4 0.82 03/25/2015    XNHKUSTY42 924 (H) 11/29/2022    FOLATE 9.7 11/29/2022     No results found for: PHENYTOIN, PHENOBARB, VALPROATE, CBMZ  Lab Results   Component Value Date    HDL 33 (L) 03/15/2023    LDLCALC 78 03/15/2023    TRIG 133 03/15/2023    CHOL 138 03/15/2023    HGBA1C 5.4 04/27/2022     No results found.    Mental Status Evaluation:    Was sitting on stretcher, calm, no abnormal movements, overweight, mild parkinsonism vs akinesia, speech is slow and concrete, moo dis upset ad frustrated, affect is blunted and constricted range, thought process is slow and concrete, she denies any active death  "wish, did not appear to RIS, no hallucinations or delusions, denies SI/HI, she is AOx3, partial I/ J     Assessment   Impression/ risk assessment   48 y.o. female who presented again to the ER today, she was recently admitted on the inpatient psychiatric unit at [facility] from 7/10 to 7/14, reason for initial admission was her complaining of increasing paranoia\" I distrust people they are all evil\", also reported at the time some ideas of reference.  She has had multiple inpatient psychiatric hospitalization including at [place] for more than a month.  During most recent hospitalization her psychotropic medication regimen was adjusted, her Depakene was increased from 1000 to 1500 mg at bedtime and her Latuda from 40 to 80 mg at bedtime.  Generally her affect and mood were deemed improved and she did not exhibit any psychotic symptoms.      Diagnosis  R/o ID   Schizoaffective disorder   Borderline pers   Parkinsonism     Plan     Treatment recommendations     Additional work up/ labs- reviewed, VPA level on admit 34.3   ms     Safety/ observation level  Given risk assessment after interview and given clinical judgement- no need for one 2 one     Psychotropic medication management-    Continue VPA 1500 mg HS PO, for mood stabilization   Continue ziprasidone 80 mg HS PO for sxs of schizoaffective dis     Vistaril as needed for anxiety- alternative could be gabapentin 100- 200 mg tid prn     Disposition/ level of care- patient does not meet criteria for inpt psych care, has outpt follow up beginning of august     Plan discussed with pt and crisis SW,   Risks/ benefits, adverse effects and alternatives of treatment discussed and reviewed.   Thank you for consult request. Please call or text our service if condition changes or any questions.  Note was generated using speech recognition software, please excuse any errors  74 minutes were spent in direct care.   More than 50% of time spent obtaining, " reviewing medical record, counseling and/or coordination of care including chart review, history taking, entering orders/ tests and medications when appropriate, documenting.

## 2023-07-15 NOTE — DISCHARGE INSTRUCTIONS
Follow-up with outpatient resources provided to you by the crisis team    Follow-up with your family doctor on Monday for reevaluation as well as your psychiatrist    Continue taking all of your medications as prescribed    You were given 2-week prescription of hydroxyzine 25 mg twice a day to help with anxiety that was recommended by psychiatry    Return to the emergency department for worsening of symptoms or if you develop any new concerning symptoms \    Follow up with your family doctor within 48 hours for re-evaluation and further treatment and monitoring.         Call for a Partial Therapy Intake:  [facility] [address] [place] [address]  [facility]  [phone]    [address] [place] [id number] [place] [address]  [phone]

## 2023-07-15 NOTE — CONSULTS
Behavioral Health Crisis Consult     Reason for Consult     CC consulted due to the patient asking to be readmitted to inpatient psych at [facility].     Admitting Diagnoses  No admission diagnoses are documented for this encounter.     Interval History  Pt admitted  Behavioral Health Crisis consult received after the patient presents to the emergency department for psychiatric evaluation.  Patient was just admitted to [facility] psychiatric unit and discharged home yesterday.  Patient states that she got home, was doing okay throughout the day but things seem to get worse overnight.  She starts to tell me how she went to bed yesterday evening, woke up in the middle of the night to pee and then somebody had wet the bed.  She then starts praying and it is difficult to redirect her back to how she has been feeling.  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 Psychiatry also consulted.  Will follow in conjunction with Psychiatry to facilitate aftercare recommendations per their evaluation.    Plan  [facility] facilitated a Telepsych evaluation with Dr. Ramirez as the patient was discharged yesterday from inpatient psych.  Per Dr. Ramirez the patient should follow up with a PHP or outpatient therapist, as well as, her previously scheduled psychiatry appointment.  [facility] will provide resources for the patient to follow up with on her discharge papers.    Jillian Stanley MSW, LSW  Pgr: [phone]

## 2023-07-15 NOTE — ED PROVIDER NOTES
Emergency Medicine Note  HPI   HISTORY OF PRESENT ILLNESS     48-year-old female with history of atrial fibrillation, hypertension, sleep apnea, bipolar disorder, schizoaffective disorder presents to the ED for psychiatric evaluation.  Patient reports increasing anxiety, feeling hyperstressed, difficulty sleeping and not being able to cope since she left our psychiatric unit yesterday after voluntarily committing herself here for 72 hours.  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  She has an appointment with her psychiatrist August 1st.  She does not currently sees a therapist.  Denies SI, HI, history of suicide attempts, recent alcohol or drug use, and any physical complaints at this time.      History provided by:  Patient        Patient History   PAST HISTORY     Reviewed from Nursing Triage:       Past Medical History:   Diagnosis Date   • A-fib (CMS/HCC)    • A-fib (CMS/[facility])    • Bipolar 1 disorder (CMS/[facility])    • Hypertension    • Sleep apnea        Past Surgical History:   Procedure Laterality Date   • ANAL FISSURECTOMY     • CHOLECYSTECTOMY     • HYSTERECTOMY         Family History   Problem Relation Age of Onset   • Diabetes Biological Mother    • Heart disease Biological Father        Social History     Tobacco Use   • Smoking status: Former     Packs/day: 0.25     Types: Cigarettes   • Smokeless tobacco: Never   Vaping Use   • Vaping Use: Never used   Substance Use Topics   • Alcohol use: No   • Drug use: No         Review of Systems   REVIEW OF SYSTEMS     Review of Systems   Constitutional: Negative for fever.   HENT: Negative for sore throat.    Eyes: Negative for pain.   Respiratory: Negative for cough and shortness of breath.    Cardiovascular: Negative for chest pain.   Gastrointestinal: Negative for abdominal pain, diarrhea and  vomiting.   Genitourinary: Negative for difficulty urinating, dysuria and hematuria.   Musculoskeletal: Negative for gait problem.   Skin: Negative for rash.   Neurological: Negative for dizziness, syncope and headaches.   Psychiatric/Behavioral: Positive for dysphoric mood and sleep disturbance. Negative for self-injury and suicidal ideas. The patient is nervous/anxious.          VITALS     ED Vitals    Date/Time Temp Pulse Resp BP SpO2 [facility]   07/15/23 1302 -- 96 18 143/87 98 % CC   07/15/23 0903 36.4 °C (97.6 °F) 97 19 136/76 97 % MF        Pulse Ox %: 97 % (07/15/23 0956)  Pulse Ox Interpretation: Normal (07/15/23 0956)           Physical Exam   PHYSICAL EXAM     Physical Exam  Vitals and nursing note reviewed.   Constitutional:       General: She is not in acute distress.  HENT:      Head: Normocephalic and atraumatic.      Mouth/Throat:      Mouth: Mucous membranes are moist.   Eyes:      Pupils: Pupils are equal, round, and reactive to light.   Cardiovascular:      Rate and Rhythm: Normal rate and regular rhythm.   Pulmonary:      Effort: Pulmonary effort is normal. No respiratory distress.      Breath sounds: Normal breath sounds.   Abdominal:      Palpations: Abdomen is soft.      Tenderness: There is no abdominal tenderness. There is no guarding or rebound.   Musculoskeletal:      Cervical back: Neck supple.      Comments: Mitchell x 4   Skin:     General: Skin is warm and dry.   Neurological:      Mental Status: She is alert and oriented to person, place, and time.   Psychiatric:         Mood and Affect: Mood is anxious.         Speech: Speech normal.         Behavior: Behavior is cooperative.         Thought Content: Thought content does not include homicidal or suicidal ideation.           PROCEDURES     Procedures     DATA     Results     Procedure Component Value Units Date/Time    Extra Tubes [[id number]] Collected: 07/15/23 0910    Specimen: Blood, Venous Updated: 07/15/23 1801    Narrative:      The  following orders were created for panel order Extra Tubes.  Procedure                               Abnormality         Status                     ---------                               -----------         ------                     Extra Tube Gold[[id number]]                                  Final result                 Please view results for these tests on the individual orders.    Extra Tube Gold [[id number]] Collected: 07/15/23 0910    Specimen: Blood, Venous Updated: 07/15/23 1801    Toxicology Screen, serum [[id number]]  (Abnormal) Collected: 07/15/23 0911    Specimen: Blood, Venous Updated: 07/15/23 1002     Salicylate <1.5 mg/dL      Acetaminophen 0.1 ug/mL      Ethanol <5 mg/dL     BhCG, Serum, Qual (if menstruating female and no urine) [[id number]]  (Normal) Collected: 07/15/23 0911    Specimen: Blood, Venous Updated: 07/15/23 1002     Preg Test, Serum Negative    Comprehensive metabolic panel [[id number]]  (Abnormal) Collected: 07/15/23 0911    Specimen: Blood, Venous Updated: 07/15/23 0943     Sodium 137 mEQ/L      Potassium 4.2 mEQ/L      Comment: Results obtained on plasma. Plasma Potassium values may be up to 0.4 mEQ/L less than serum values. The differences may be greater for patients with high platelet or white cell counts.        Chloride 107 mEQ/L      CO2 23 mEQ/L      BUN 13 mg/dL      Creatinine 0.6 mg/dL      Glucose 117 mg/dL      Calcium 9.4 mg/dL      AST (SGOT) 8 IU/L      ALT (SGPT) 9 IU/L      Alkaline Phosphatase 57 IU/L      Total Protein 6.6 g/dL      Comment: Test performed on plasma which typically contains approximately 0.4 g/dL more protein than serum.        Albumin 3.8 g/dL      Bilirubin, Total 0.3 mg/dL      eGFR >60.0 mL/min/1.73m*2      Anion Gap 7 mEQ/L     Valproic acid [[id number]]  (Abnormal) Collected: 07/15/23 0911    Specimen: Blood, Venous Updated: 07/15/23 0942     Valproic Acid 34.3 ug/mL     CBC and differential [[id number]]  (Abnormal) Collected: 07/15/23 0911     "Specimen: Blood, Venous Updated: 07/15/23 0923     WBC 7.51 K/uL      RBC 4.66 M/uL      Hemoglobin 13.6 g/dL      Hematocrit 41.1 %      MCV 88.2 fL      MCH 29.2 pg      MCHC 33.1 g/dL      RDW 13.7 %      Platelets 203 K/uL      MPV 10.0 fL      Differential Type Auto     nRBC 0.0 %      Immature Granulocytes 0.4 %      Neutrophils 70.3 %      Lymphocytes 16.0 %      Monocytes 11.1 %      Eosinophils 1.7 %      Basophils 0.5 %      Immature Granulocytes, Absolute 0.03 K/uL      Neutrophils, Absolute 5.28 K/uL      Lymphocytes, Absolute 1.20 K/uL      Monocytes, Absolute 0.83 K/uL      Eosinophils, Absolute 0.13 K/uL      Basophils, Absolute 0.04 K/uL           Imaging Results    None         No orders to display       Scoring tools                                  ED Course & MDM   MDM / ED COURSE / CLINICAL IMPRESSION / DISPO     Medical Decision Making  ddx considered but not limited to anxiety, stress reaction    Amount and/or Complexity of Data Reviewed  External Data Reviewed: labs and notes.  Labs: ordered. Decision-making details documented in ED Course.  Discussion of management or test interpretation with external provider(s): consulted telepsychiatry who evaluated patient, see work-up section for their recommendations    Risk  Prescription drug management.          ED Course as of 07/16/23 0638   Sat Jul 15, 2023   1016 Medically cleared for psych eval [AJ]   1030 Spoke with crisis. Patient was discharged from [facility] psych unit yesterday. Will be evaluated by psych [AJ]   1249 Patient evaluated by telepsych   assessment   Impression/ risk assessment   48 y.o. female who presented again to the ER today, she was recently admitted on the inpatient psychiatric unit at [facility] from 7/10 to 7/14, reason for initial admission was her complaining of increasing paranoia\" I distrust people they are all evil\", also reported at the time some ideas of reference.  She has had multiple inpatient psychiatric " hospitalization including at [facility] for more than a month.  During most recent hospitalization her psychotropic medication regimen was adjusted, her Depakene was increased from 1000 to 1500 mg at bedtime and her Latuda from 40 to 80 mg at bedtime.  Generally her affect and mood were deemed improved and she did not exhibit any psychotic symptoms.        Diagnosis  R/o ID   Schizoaffective disorder   Borderline pers   Parkinsonism      Plan      Treatment recommendations      Additional work up/ labs- reviewed, VPA level on admit 34.3   ms      Safety/ observation level  Given risk assessment after interview and given clinical judgement- no need for one 2 one      Psychotropic medication management-     Continue VPA 1500 mg HS PO, for mood stabilization   Continue ziprasidone 80 mg HS PO for sxs of schizoaffective dis      Vistaril as needed for anxiety- alternative could be gabapentin 100- 200 mg tid prn      Disposition/ level of care- patient does not meet criteria for inpt psych care, has outpt follow up beginning of august  [TC]   1300 Vistaril 25mg BID prescription as needed anxiety provided for 14 days as recommended by telepsychiatry [TC]      ED Course User Index  [AJ] Jeny Benavides MD  [TC] Gina Alvarado PA C     Clinical Impression      Anxiety     _________________     ED Disposition   Discharge                   Gina Alvarado PA C  07/16/23 0638

## 2023-07-17 ENCOUNTER — APPOINTMENT (OUTPATIENT)
Dept: LAB | Facility: HOSPITAL | Age: 48
End: 2023-07-17
Attending: PSYCHIATRY & NEUROLOGY
Payer: MEDICARE

## 2023-07-17 DIAGNOSIS — F25.9 SCHIZOAFFECTIVE DISORDER, UNSPECIFIED: ICD-10-CM

## 2023-07-17 LAB — VALPROATE SERPL-MCNC: 103.3 UG/ML (ref 50–100)

## 2023-07-17 PROCEDURE — 80164 ASSAY DIPROPYLACETIC ACD TOT: CPT

## 2023-07-17 PROCEDURE — 36415 COLL VENOUS BLD VENIPUNCTURE: CPT

## 2023-08-04 ENCOUNTER — TRANSCRIBE ORDERS (OUTPATIENT)
Dept: REGISTRATION | Facility: HOSPITAL | Age: 48
End: 2023-08-04

## 2023-08-04 DIAGNOSIS — F25.9 SCHIZOAFFECTIVE DISORDER, UNSPECIFIED: Primary | ICD-10-CM

## 2023-08-05 ENCOUNTER — APPOINTMENT (OUTPATIENT)
Dept: LAB | Facility: HOSPITAL | Age: 48
End: 2023-08-05
Attending: PSYCHIATRY & NEUROLOGY
Payer: MEDICARE

## 2023-08-05 DIAGNOSIS — F25.9 SCHIZOAFFECTIVE DISORDER, UNSPECIFIED: ICD-10-CM

## 2023-08-05 LAB — VALPROATE SERPL-MCNC: 98.5 UG/ML (ref 50–100)

## 2023-08-05 PROCEDURE — 80164 ASSAY DIPROPYLACETIC ACD TOT: CPT

## 2023-08-05 PROCEDURE — 36415 COLL VENOUS BLD VENIPUNCTURE: CPT

## 2023-10-03 ENCOUNTER — TRANSCRIBE ORDERS (OUTPATIENT)
Dept: REGISTRATION | Facility: HOSPITAL | Age: 48
End: 2023-10-03

## 2023-10-03 DIAGNOSIS — Z51.81 ENCOUNTER FOR THERAPEUTIC DRUG LEVEL MONITORING: Primary | ICD-10-CM

## 2023-10-07 ENCOUNTER — APPOINTMENT (OUTPATIENT)
Dept: LAB | Facility: HOSPITAL | Age: 48
End: 2023-10-07
Attending: INTERNAL MEDICINE
Payer: MEDICARE

## 2023-10-07 ENCOUNTER — TRANSCRIBE ORDERS (OUTPATIENT)
Dept: REGISTRATION | Facility: HOSPITAL | Age: 48
End: 2023-10-07

## 2023-10-07 DIAGNOSIS — R60.9 EDEMA, UNSPECIFIED: ICD-10-CM

## 2023-10-07 DIAGNOSIS — F31.9 BIPOLAR DISORDER, UNSPECIFIED (CMS/HCC): ICD-10-CM

## 2023-10-07 DIAGNOSIS — Z51.81 ENCOUNTER FOR THERAPEUTIC DRUG LEVEL MONITORING: ICD-10-CM

## 2023-10-07 DIAGNOSIS — Z86.79 PERSONAL HISTORY OF OTHER DISEASES OF THE CIRCULATORY SYSTEM: ICD-10-CM

## 2023-10-07 DIAGNOSIS — Z86.79 PERSONAL HISTORY OF OTHER DISEASES OF THE CIRCULATORY SYSTEM: Primary | ICD-10-CM

## 2023-10-07 DIAGNOSIS — E78.2 MIXED HYPERLIPIDEMIA: ICD-10-CM

## 2023-10-07 LAB
ALBUMIN SERPL-MCNC: 4 G/DL (ref 3.5–5.7)
ALP SERPL-CCNC: 54 IU/L (ref 34–125)
ALT SERPL-CCNC: 17 IU/L (ref 7–52)
AST SERPL-CCNC: 12 IU/L (ref 13–39)
BILIRUB DIRECT SERPL-MCNC: 0.1 MG/DL
BILIRUB SERPL-MCNC: 0.4 MG/DL (ref 0.3–1.2)
CHOLEST SERPL-MCNC: 164 MG/DL
HDLC SERPL-MCNC: 42 MG/DL
HDLC SERPL: 3.9 {RATIO}
LDLC SERPL CALC-MCNC: 89 MG/DL
NONHDLC SERPL-MCNC: 122 MG/DL
PROT SERPL-MCNC: 6.4 G/DL (ref 6–8.2)
TRIGL SERPL-MCNC: 163 MG/DL
VALPROATE SERPL-MCNC: 95.1 UG/ML (ref 50–100)

## 2023-10-07 PROCEDURE — 80076 HEPATIC FUNCTION PANEL: CPT

## 2023-10-07 PROCEDURE — 36415 COLL VENOUS BLD VENIPUNCTURE: CPT

## 2023-10-07 PROCEDURE — 80061 LIPID PANEL: CPT

## 2023-10-07 PROCEDURE — 80164 ASSAY DIPROPYLACETIC ACD TOT: CPT

## 2023-10-09 ENCOUNTER — OFFICE VISIT (OUTPATIENT)
Dept: SURGERY | Facility: CLINIC | Age: 48
End: 2023-10-09
Payer: MEDICARE

## 2023-10-09 VITALS
HEART RATE: 88 BPM | OXYGEN SATURATION: 99 % | SYSTOLIC BLOOD PRESSURE: 122 MMHG | BODY MASS INDEX: 40.45 KG/M2 | TEMPERATURE: 98.3 F | WEIGHT: 250.6 LBS | DIASTOLIC BLOOD PRESSURE: 78 MMHG

## 2023-10-09 DIAGNOSIS — L02.91 ABSCESS: Primary | ICD-10-CM

## 2023-10-09 PROCEDURE — G8752 SYS BP LESS 140: HCPCS | Performed by: SURGERY

## 2023-10-09 PROCEDURE — 99212 OFFICE O/P EST SF 10 MIN: CPT | Performed by: SURGERY

## 2023-10-09 PROCEDURE — G8754 DIAS BP LESS 90: HCPCS | Performed by: SURGERY

## 2023-10-09 RX ORDER — MIRABEGRON 25 MG/1
25 TABLET, FILM COATED, EXTENDED RELEASE ORAL DAILY
COMMUNITY

## 2023-10-09 RX ORDER — ASPIRIN 81 MG/1
81 TABLET ORAL DAILY
COMMUNITY

## 2023-10-09 NOTE — LETTER
October 9, 2023     Otto Wasserman MD  [address] [facility]  [place] [address]    Patient: Jillian Acosta  YOB: 1975  Date of Visit: 10/9/2023      Dear Dr. Wasserman:    Thank you for referring Jillian Acosta to me for evaluation. Below are my notes for this consultation.    If you have questions, please do not hesitate to call me. I look forward to following your patient along with you.         Sincerely,        Rubén Emerson MD        CC: MD Laya Lin MD Dallara, Charles A, MD  10/9/2023  2:08 PM  Signed  General Surgery Consult    Chief complaint: History of right upper quadrant abscess    HPI:  Jillian Acosta is a 47 y.o. female past medical history as noted below who was seen in an urgent care center on 6/22/2023 with right upper quadrant abscess which was incised and drained.  She was started on clindamycin.  There apparently was a small antecedent mass had been present for a year or 2 before.  She was planning on seeing Dr. Vaca for this but found out that he retired.  She is not having any fevers or chills.  She is not a known diabetic.    There was a tender erythematous mass still present right upper quadrant was incised and drained in the office that day and did yield pus but I do not see definite sebaceous material.  She was treated with an additional course of oral Bactrim which she was to start after she finished her clindamycin.    She returns today for follow-up.  She reports no complaints in regards to her I&D site but can still feel a mass beneath it.       Medical History:   Past Medical History:   Diagnosis Date    A-fib (CMS/HCC)     A-fib (CMS/HCC)     Abscess 06/2023    Bipolar 1 disorder (CMS/HCC)     Hypertension     Sleep apnea        Surgical History:   Past Surgical History:   Procedure Laterality Date    ANAL FISSURECTOMY      CHOLECYSTECTOMY      HYSTERECTOMY         Social History:   Social History     Social History Narrative    Not  on file       Family History:   Family History   Problem Relation Age of Onset    Diabetes Biological Mother     Heart disease Biological Father        Allergies: Azithromycin, Doxycycline, Penicillins, and Amoxicillin    Home Medications:  Not in a hospital admission.    Current Medications:    aspirin    atorvastatin    cholecalciferol (vitamin D3)    divalproex    lurasidone    mirabegron    albuterol HFA    ascorbic acid    fluticasone propionate    hydrOXYzine    hydrOXYzine    Review of Systems: A complete review of systems is negative except as noted above    Objective     Physicial Exam  Visit Vitals  /78 (BP Location: Left upper arm, Patient Position: Sitting)   Pulse 88   Temp 36.8 °C (98.3 °F) (Skin)   Wt 114 kg (250 lb 9.6 oz)   LMP  (LMP Unknown)   SpO2 99%   BMI 40.45 kg/m²       General appearance: Well-developed well-nourished, no acute distress  Heart: Regular rate and rhythm with no murmurs, rubs, or gallops.  Normal S1, S2  Lungs: Clear to auscultation bilaterally with no wheezes, rales, or rhonchi.  Nonlabored respirations.  Abdomen: Soft, nontender and nondistended with no palpable organomegaly or other masses noted.  Good bowel sounds noted.    The right upper quadrant there is a scar present.  There does appear to be a punctum at 1 end and I can feel a mass beneath this.  There is no erythema or fluctuance.      Impression: History of right upper quadrant abscess, likely from infected sebaceous cyst       Plan: Management options were discussed with Jillian.  I did recommend she had this area excised and reviewed with her the indications, risks, complications, and alternatives to doing so, including doing nothing.      Rubén Emerson MD

## 2023-10-09 NOTE — PROGRESS NOTES
General Surgery Consult    Chief complaint: History of right upper quadrant abscess    HPI:  Jillian Acosta is a 47 y.o. female past medical history as noted below who was seen in an urgent care center on 6/22/2023 with right upper quadrant abscess which was incised and drained.  She was started on clindamycin.  There apparently was a small antecedent mass had been present for a year or 2 before.  She was planning on seeing Dr. Vaca for this but found out that he retired.  She is not having any fevers or chills.  She is not a known diabetic.    There was a tender erythematous mass still present right upper quadrant was incised and drained in the office that day and did yield pus but I do not see definite sebaceous material.  She was treated with an additional course of oral Bactrim which she was to start after she finished her clindamycin.    She returns today for follow-up.  She reports no complaints in regards to her I&D site but can still feel a mass beneath it.       Medical History:   Past Medical History:   Diagnosis Date    A-fib (CMS/[facility])     A-fib (CMS/[facility])     Abscess 06/2023    Bipolar 1 disorder (CMS/[facility])     Hypertension     Sleep apnea        Surgical History:   Past Surgical History:   Procedure Laterality Date    ANAL FISSURECTOMY      CHOLECYSTECTOMY      HYSTERECTOMY         Social History:   Social History     Social History Narrative    Not on file       Family History:   Family History   Problem Relation Age of Onset    Diabetes Biological Mother     Heart disease Biological Father        Allergies: Azithromycin, Doxycycline, Penicillins, and Amoxicillin    Home Medications:  Not in a hospital admission.    Current Medications:    aspirin    atorvastatin    cholecalciferol (vitamin D3)    divalproex    lurasidone    mirabegron    albuterol HFA    ascorbic acid    fluticasone propionate    hydrOXYzine    hydrOXYzine    Review of Systems: A complete review of systems is  negative except as noted above    Objective     Physicial Exam  Visit Vitals  /78 (BP Location: Left upper arm, Patient Position: Sitting)   Pulse 88   Temp 36.8 °C (98.3 °F) (Skin)   Wt 114 kg (250 lb 9.6 oz)   LMP  (LMP Unknown)   SpO2 99%   BMI 40.45 kg/m²       General appearance: Well-developed well-nourished, no acute distress  Heart: Regular rate and rhythm with no murmurs, rubs, or gallops.  Normal S1, S2  Lungs: Clear to auscultation bilaterally with no wheezes, rales, or rhonchi.  Nonlabored respirations.  Abdomen: Soft, nontender and nondistended with no palpable organomegaly or other masses noted.  Good bowel sounds noted.    The right upper quadrant there is a scar present.  There does appear to be a punctum at 1 end and I can feel a mass beneath this.  There is no erythema or fluctuance.      Impression: History of right upper quadrant abscess, likely from infected sebaceous cyst       Plan: Management options were discussed with Jillian.  I did recommend she had this area excised and reviewed with her the indications, risks, complications, and alternatives to doing so, including doing nothing.      Rubén Emerson MD   Yes

## 2023-10-24 PROCEDURE — 88341 IMHCHEM/IMCYTCHM EA ADD ANTB: CPT | Mod: 59 | Performed by: SURGERY

## 2023-10-25 ENCOUNTER — LAB REQUISITION (OUTPATIENT)
Dept: LAB | Facility: HOSPITAL | Age: 48
End: 2023-10-25
Attending: SURGERY
Payer: MEDICARE

## 2023-10-25 DIAGNOSIS — L02.91 CUTANEOUS ABSCESS, UNSPECIFIED: ICD-10-CM

## 2023-10-27 LAB
CASE RPRT: NORMAL
CLINICAL INFO: NORMAL
IMMUNE STAIN STUDY: NORMAL
PATH REPORT.FINAL DX SPEC: NORMAL
PATH REPORT.GROSS SPEC: NORMAL

## 2023-10-30 ENCOUNTER — TELEPHONE (OUTPATIENT)
Dept: SURGERY | Facility: CLINIC | Age: 48
End: 2023-10-30
Payer: MEDICARE

## 2023-10-30 NOTE — TELEPHONE ENCOUNTER
Please call Jillian and let her know her pathology was benign.    Final Diagnosis   A.  Subcutaneous mass right upper quadrant:    Dermal scar.  No evidence of malignancy in the specimen provided.  See immunohistochemistry

## 2023-11-08 ENCOUNTER — OFFICE VISIT (OUTPATIENT)
Dept: SURGERY | Facility: CLINIC | Age: 48
End: 2023-11-08
Payer: MEDICARE

## 2023-11-08 VITALS
WEIGHT: 250 LBS | SYSTOLIC BLOOD PRESSURE: 110 MMHG | HEART RATE: 97 BPM | OXYGEN SATURATION: 98 % | TEMPERATURE: 97.9 F | BODY MASS INDEX: 40.18 KG/M2 | DIASTOLIC BLOOD PRESSURE: 76 MMHG | HEIGHT: 66 IN

## 2023-11-08 DIAGNOSIS — Z09 POSTOP CHECK: Primary | ICD-10-CM

## 2023-11-08 PROCEDURE — 99024 POSTOP FOLLOW-UP VISIT: CPT | Performed by: SURGERY

## 2023-11-08 RX ORDER — LEVOFLOXACIN 500 MG/1
500 TABLET, FILM COATED ORAL DAILY
Qty: 10 TABLET | Refills: 0 | Status: SHIPPED | OUTPATIENT
Start: 2023-11-08 | End: 2023-11-18

## 2023-11-08 ASSESSMENT — PAIN SCALES - GENERAL: PAINLEVEL: 0-NO PAIN

## 2023-11-08 NOTE — PROGRESS NOTES
Patient ID: Jillian Acosta                              : 1975  MRN: [id number]                                            Visit Date: 2023  Encounter Provider: Rubén Emerson  Referring Provider: Otto Wasserman MD    Subjective   Chief Complaint: Status post excision right upper quadrant mass 10/24/1023, follow-up exam      Jillian Acosta is a 48 y.o. old female with a past medical history as noted below who returns for excision of a subcutaneous mass in the right upper quadrant on 10/24/2023.  She reports no complaints in regards to her incisional wound.  She has noted some scant drainage but denies any fevers or chills.     Medications:   Current Outpatient Medications:     albuterol HFA (VENTOLIN HFA) 90 mcg/actuation inhaler, Inhale 2 puffs every 6 (six) hours as needed for wheezing or shortness of breath., Disp: 1 Inhaler, Rfl: 1    ascorbic acid (VITAMIN C) 500 mg tablet, Take 1 tablet (500 mg total) by mouth 2 (two) times a day., Disp: 60 tablet, Rfl: 0    aspirin 81 mg enteric coated tablet, Take 81 mg by mouth daily., Disp: , Rfl:     atorvastatin (LIPITOR) 40 mg tablet, , Disp: , Rfl:     cholecalciferol, vitamin D3, 5,000 unit (125 mcg) tablet, Take 5,000 Units by mouth daily., Disp: , Rfl:     divalproex (DEPAKOTE) 500 mg EC tablet, Take 3 tablets (1,500 mg total) by mouth nightly. Please get a level between  and 'please get script from PCP or outpatient pscyhiatrist, Disp: 90 tablet, Rfl: 0    fluticasone propionate (FLONASE) 50 mcg/actuation nasal spray, 2 sprays daily., Disp: , Rfl:     hydrOXYzine (ATARAX) 50 mg tablet, Take 0.5 tablets (25 mg total) by mouth every 6 (six) hours as needed for anxiety., Disp: 30 tablet, Rfl: 0    hydrOXYzine (VISTARIL) 25 mg capsule, Take 25 mg by mouth 2 (two) times a day., Disp: , Rfl:     lurasidone (LATUDA) 80 mg tablet, Take 1 tablet (80 mg total) by mouth nightly., Disp: 30 tablet, Rfl: 0    mirabegron (MYRBETRIQ) 25 mg  "ER tablet, Take 25 mg by mouth daily., Disp: , Rfl:     Past Medical History:  has a past medical history of A-fib (CMS/HCC), A-fib (CMS/HCC), Abscess (06/2023), Bipolar 1 disorder (CMS/HCC), Hypertension, and Sleep apnea.    Objective   Vitals:   Visit Vitals  /76 (BP Location: Left upper arm, Patient Position: Sitting)   Pulse 97   Temp 36.6 °C (97.9 °F) (Temporal)   Ht 1.676 m (5' 6\")   Wt 113 kg (250 lb)   LMP  (LMP Unknown)   SpO2 98%   BMI 40.35 kg/m²     Physical Exam       On exam there is no erythema but her suture has loosened along a small gape.  This was tightened in today in the office.    Pathology:  Final Diagnosis   A.  Subcutaneous mass right upper quadrant:    Dermal scar.  No evidence of malignancy in the specimen provided.  See immunohistochemistry     Assessment/plan: Stable postoperative course/exam following excision subcu mass right upper quadrant.  Postop restrictions and wound care instructions were again discussed with care today in the office.  I did prescribe an empiric course of oral Levaquin and advised Katalina to apply warm soaks to see 3 times daily.  She would turn her on 11/20/2 and 23 to remove her sutures.    Rubén Emerson MD              "

## 2023-11-10 ENCOUNTER — TELEPHONE (OUTPATIENT)
Dept: SURGERY | Facility: CLINIC | Age: 48
End: 2023-11-10
Payer: MEDICARE

## 2023-11-10 NOTE — TELEPHONE ENCOUNTER
"Jillian: [phone]    Patient is calling stating that in the center of the incision area there is a \"alittle hole\" with puss like (white crust) and the incision  is seeping.  No fever but red and sore to the touch.    Please advise  "

## 2023-11-15 ENCOUNTER — TELEPHONE (OUTPATIENT)
Dept: SURGERY | Facility: CLINIC | Age: 48
End: 2023-11-15
Payer: MEDICARE

## 2023-11-15 ENCOUNTER — APPOINTMENT (OUTPATIENT)
Dept: LAB | Facility: HOSPITAL | Age: 48
End: 2023-11-15
Attending: INTERNAL MEDICINE
Payer: MEDICARE

## 2023-11-15 ENCOUNTER — APPOINTMENT (EMERGENCY)
Dept: RADIOLOGY | Facility: HOSPITAL | Age: 48
End: 2023-11-15
Payer: MEDICARE

## 2023-11-15 ENCOUNTER — TRANSCRIBE ORDERS (OUTPATIENT)
Dept: REGISTRATION | Facility: HOSPITAL | Age: 48
End: 2023-11-15

## 2023-11-15 ENCOUNTER — HOSPITAL ENCOUNTER (EMERGENCY)
Facility: HOSPITAL | Age: 48
Discharge: HOME | End: 2023-11-15
Attending: EMERGENCY MEDICINE
Payer: MEDICARE

## 2023-11-15 VITALS
HEIGHT: 66 IN | BODY MASS INDEX: 40.18 KG/M2 | HEART RATE: 94 BPM | WEIGHT: 250 LBS | OXYGEN SATURATION: 100 % | SYSTOLIC BLOOD PRESSURE: 156 MMHG | DIASTOLIC BLOOD PRESSURE: 71 MMHG | TEMPERATURE: 98.6 F | RESPIRATION RATE: 18 BRPM

## 2023-11-15 DIAGNOSIS — M25.50 PAIN IN UNSPECIFIED JOINT: ICD-10-CM

## 2023-11-15 DIAGNOSIS — R25.2 BILATERAL LEG CRAMPS: Primary | ICD-10-CM

## 2023-11-15 DIAGNOSIS — Z00.01 ENCOUNTER FOR GENERAL ADULT MEDICAL EXAMINATION WITH ABNORMAL FINDINGS: Primary | ICD-10-CM

## 2023-11-15 DIAGNOSIS — Z86.39 PERSONAL HISTORY OF OTHER ENDOCRINE, NUTRITIONAL AND METABOLIC DISEASE: ICD-10-CM

## 2023-11-15 DIAGNOSIS — E53.8 DEFICIENCY OF OTHER SPECIFIED B GROUP VITAMINS: ICD-10-CM

## 2023-11-15 DIAGNOSIS — Z00.01 ENCOUNTER FOR GENERAL ADULT MEDICAL EXAMINATION WITH ABNORMAL FINDINGS: ICD-10-CM

## 2023-11-15 LAB
ALBUMIN SERPL-MCNC: 3.9 G/DL (ref 3.5–5.7)
ALP SERPL-CCNC: 65 IU/L (ref 34–125)
ALT SERPL-CCNC: 14 IU/L (ref 7–52)
ANION GAP SERPL CALC-SCNC: 6 MEQ/L (ref 3–15)
AST SERPL-CCNC: 10 IU/L (ref 13–39)
BACTERIA URNS QL MICRO: ABNORMAL /HPF
BASOPHILS # BLD: 0.03 K/UL (ref 0.01–0.1)
BASOPHILS NFR BLD: 0.5 %
BILIRUB SERPL-MCNC: 0.5 MG/DL (ref 0.3–1.2)
BILIRUB UR QL STRIP.AUTO: NEGATIVE MG/DL
BUN SERPL-MCNC: 13 MG/DL (ref 7–25)
CALCIUM SERPL-MCNC: 9.3 MG/DL (ref 8.6–10.3)
CHLORIDE SERPL-SCNC: 106 MEQ/L (ref 98–107)
CLARITY UR REFRACT.AUTO: CLEAR
CO2 SERPL-SCNC: 25 MEQ/L (ref 21–31)
COLOR UR AUTO: YELLOW
CREAT SERPL-MCNC: 0.6 MG/DL (ref 0.6–1.2)
DIFFERENTIAL METHOD BLD: NORMAL
EOSINOPHIL # BLD: 0.18 K/UL (ref 0.04–0.36)
EOSINOPHIL NFR BLD: 2.9 %
ERYTHROCYTE [DISTWIDTH] IN BLOOD BY AUTOMATED COUNT: 13.2 % (ref 11.7–14.4)
EST. AVERAGE GLUCOSE BLD GHB EST-MCNC: 111 MG/DL
FERRITIN SERPL-MCNC: 109 NG/ML (ref 11–250)
FOLATE SERPL-MCNC: 11.8 NG/ML
GFR SERPL CREATININE-BSD FRML MDRD: >60 ML/MIN/1.73M*2
GLUCOSE SERPL-MCNC: 85 MG/DL (ref 70–99)
GLUCOSE UR STRIP.AUTO-MCNC: NEGATIVE MG/DL
HBA1C MFR BLD: 5.5 %
HCT VFR BLDCO AUTO: 41.9 % (ref 35–45)
HGB BLD-MCNC: 13.9 G/DL (ref 11.8–15.7)
HGB UR QL STRIP.AUTO: NEGATIVE
HYALINE CASTS #/AREA URNS LPF: ABNORMAL /LPF
IMM GRANULOCYTES # BLD AUTO: 0.07 K/UL (ref 0–0.08)
IMM GRANULOCYTES NFR BLD AUTO: 1.1 %
KETONES UR STRIP.AUTO-MCNC: NEGATIVE MG/DL
LEUKOCYTE ESTERASE UR QL STRIP.AUTO: NEGATIVE
LYMPHOCYTES # BLD: 1.39 K/UL (ref 1.2–3.5)
LYMPHOCYTES NFR BLD: 22.1 %
MCH RBC QN AUTO: 29.5 PG (ref 28–33.2)
MCHC RBC AUTO-ENTMCNC: 33.2 G/DL (ref 32.2–35.5)
MCV RBC AUTO: 89 FL (ref 83–98)
MONOCYTES # BLD: 0.58 K/UL (ref 0.28–0.8)
MONOCYTES NFR BLD: 9.2 %
MUCOUS THREADS URNS QL MICRO: 1 /LPF
NEUTROPHILS # BLD: 4.04 K/UL (ref 1.7–7)
NEUTS SEG NFR BLD: 64.2 %
NITRITE UR QL STRIP.AUTO: NEGATIVE
NRBC BLD-RTO: 0 %
PDW BLD AUTO: 10.8 FL (ref 9.4–12.3)
PH UR STRIP.AUTO: 6 [PH]
PLATELET # BLD AUTO: 193 K/UL (ref 150–369)
POTASSIUM SERPL-SCNC: 4.6 MEQ/L (ref 3.5–5.1)
PROT SERPL-MCNC: 6.4 G/DL (ref 6–8.2)
PROT UR QL STRIP.AUTO: ABNORMAL
RBC # BLD AUTO: 4.71 M/UL (ref 3.93–5.22)
RBC #/AREA URNS HPF: ABNORMAL /HPF
SODIUM SERPL-SCNC: 137 MEQ/L (ref 136–145)
SP GR UR REFRACT.AUTO: 1.03
SQUAMOUS URNS QL MICRO: 1 /HPF
TSH SERPL DL<=0.05 MIU/L-ACNC: 2.1 MIU/L (ref 0.34–5.6)
UROBILINOGEN UR STRIP-ACNC: 0.2 EU/DL
VIT B12 SERPL-MCNC: 390 PG/ML (ref 180–914)
WBC # BLD AUTO: 6.29 K/UL (ref 3.8–10.5)
WBC #/AREA URNS HPF: ABNORMAL /HPF

## 2023-11-15 PROCEDURE — 86618 LYME DISEASE ANTIBODY: CPT

## 2023-11-15 PROCEDURE — 80053 COMPREHEN METABOLIC PANEL: CPT

## 2023-11-15 PROCEDURE — 85025 COMPLETE CBC W/AUTO DIFF WBC: CPT

## 2023-11-15 PROCEDURE — 93970 EXTREMITY STUDY: CPT

## 2023-11-15 PROCEDURE — 99281 EMR DPT VST MAYX REQ PHY/QHP: CPT

## 2023-11-15 PROCEDURE — 99283 EMERGENCY DEPT VISIT LOW MDM: CPT | Mod: 25

## 2023-11-15 PROCEDURE — 82746 ASSAY OF FOLIC ACID SERUM: CPT

## 2023-11-15 PROCEDURE — 81001 URINALYSIS AUTO W/SCOPE: CPT

## 2023-11-15 PROCEDURE — 82607 VITAMIN B-12: CPT

## 2023-11-15 PROCEDURE — 83036 HEMOGLOBIN GLYCOSYLATED A1C: CPT

## 2023-11-15 PROCEDURE — 82728 ASSAY OF FERRITIN: CPT

## 2023-11-15 PROCEDURE — 36415 COLL VENOUS BLD VENIPUNCTURE: CPT

## 2023-11-15 PROCEDURE — 84443 ASSAY THYROID STIM HORMONE: CPT

## 2023-11-15 NOTE — TELEPHONE ENCOUNTER
Jillian:  [phone]    Patients in calling today with pains and cramping in her legs legs, feels like weights on legs, and she is having water retention.  She Started on Levaquin 500mg on 11/8/2023  Patient believes they are side effects side effects from prescription.  Pains and cramping started on Friday    - 11/10/2023     Patient has only two pills left -  does she have to continue the Medication?    Please advise

## 2023-11-16 NOTE — ED PROVIDER NOTES
Emergency Medicine Note  HPI   HISTORY OF PRESENT ILLNESS     48-year-old female with past medical history of bipolar disorder, schizoaffective disorder, hypertension, A-fib on aspirin presents today for bilateral leg cramps x 6 days.  Patient reports right breast cyst removal on 11/9.  She was recently placed on Levaquin prophylactically.  Patient spoke with her surgeon who stated that she is okay to discontinue Levaquin.  Patient denies redness, swelling, chest pain, shortness of breath, headache, dizziness, hx of DVT.  She reports that her pain has improved since being in the ER and walking.            Patient History   PAST HISTORY     Reviewed from Nursing Triage:       Past Medical History:   Diagnosis Date    A-fib (CMS/HCC)     A-fib (CMS/HCC)     Abscess 06/2023    Bipolar 1 disorder (CMS/HCC)     Hypertension     Schizoaffective disorder (CMS/HCC)     Sleep apnea        Past Surgical History:   Procedure Laterality Date    ANAL FISSURECTOMY      CHOLECYSTECTOMY      HYSTERECTOMY         Family History   Problem Relation Age of Onset    Diabetes Biological Mother     Heart disease Biological Father        Social History     Tobacco Use    Smoking status: Former     Packs/day: .25     Types: Cigarettes    Smokeless tobacco: Never   Vaping Use    Vaping Use: Never used   Substance Use Topics    Alcohol use: No    Drug use: No         Review of Systems   REVIEW OF SYSTEMS     Review of Systems      VITALS     ED Vitals    Date/Time Temp Pulse Resp BP SpO2 [facility]   11/15/23 1637 37 °C (98.6 °F) 107 20 158/56 100 % CB        Pulse Ox %: 100 % (11/15/23 1954)  Pulse Ox Interpretation: Normal (11/15/23 1954)           Physical Exam   PHYSICAL EXAM     Physical Exam  Vitals and nursing note reviewed.   Constitutional:       General: She is not in acute distress.     Appearance: She is well-developed. She is not ill-appearing, toxic-appearing or diaphoretic.   HENT:      Head: Normocephalic and  atraumatic.      Nose: No congestion or rhinorrhea.   Eyes:      Conjunctiva/sclera: Conjunctivae normal.   Cardiovascular:      Rate and Rhythm: Tachycardia present.      Pulses: Normal pulses.      Heart sounds: Normal heart sounds.   Pulmonary:      Effort: Pulmonary effort is normal. No respiratory distress.      Breath sounds: Normal breath sounds.   Musculoskeletal:         General: Tenderness present. No swelling, deformity or signs of injury. Normal range of motion.      Right lower leg: No edema.      Left lower leg: No edema.      Comments: Normal ankle range of motion with 5/5 strength   Skin:     General: Skin is warm and dry.      Capillary Refill: Capillary refill takes less than 2 seconds.      Findings: No bruising or erythema.   Neurological:      Mental Status: She is alert and oriented to person, place, and time.           PROCEDURES     Procedures     DATA     Results     None          Imaging Results          US venous leg bilateral (Final result)  Result time 11/15/23 19:56:25    Final result                 Impression:    IMPRESSION:    No evidence of bilateral femoropopliteal deep vein thrombosis.             Narrative:    CLINICAL HISTORY: b/l leg pain/swelling post op    COMPARISON: None.    COMMENT:  Technique: Ultrasound of bilateral lower extremity with compression and Duplex  Doppler.    RIGHT Leg:  Common femoral vein:  Normally compressible with spontaneous phasic flow.  Femoral vein:  Normally compressible with spontaneous phasic flow.  Popliteal vein:  Normally compressible with spontaneous phasic flow.  Calf veins:  Visualized veins are normal.    LEFT Leg:  Common femoral vein:  Normally compressible with spontaneous phasic flow.  Femoral vein:  Normally compressible with spontaneous phasic flow.  Popliteal vein:  Normally compressible with spontaneous phasic flow.  Calf veins:  Visualized veins are normal.                                No orders to display       Scoring tools                                   ED Course & MDM   MDM / ED COURSE / CLINICAL IMPRESSION / DISPO     Medical Decision Making  Patient evaluated.  Ultrasound not reveal DVT.  Patient pain improved during ER visit.  Patient stable and okay for discharge with PCP follow-up as needed.    Bilateral leg cramps: acute illness or injury  Amount and/or Complexity of Data Reviewed  Radiology: ordered. Decision-making details documented in ED Course.          ED Course as of 11/15/23 2032   Wed Nov 15, 2023   1953 Heart Rate(!): 107 [MS]   2018 US venous leg bilateral  IMPRESSION:     No evidence of bilateral femoropopliteal deep vein thrombosis. [MS]      ED Course User Index  [MS] Marisela Hill PA C     Clinical Impression      Bilateral leg cramps     _________________     ED Disposition   Discharge                   Marisela Hill PA C  11/15/23 2032

## 2023-11-16 NOTE — ED ATTESTATION NOTE
The patient was evaluated and managed by the physician assistant / nurse practitioner.       Joe Haq MD  11/15/23 4728

## 2023-11-17 LAB — B BURGDOR AB SER IA-ACNC: 0.14 RATIO

## 2023-11-20 ENCOUNTER — OFFICE VISIT (OUTPATIENT)
Dept: SURGERY | Facility: CLINIC | Age: 48
End: 2023-11-20
Payer: MEDICARE

## 2023-11-20 VITALS
WEIGHT: 264 LBS | BODY MASS INDEX: 42.43 KG/M2 | DIASTOLIC BLOOD PRESSURE: 82 MMHG | HEIGHT: 66 IN | HEART RATE: 88 BPM | OXYGEN SATURATION: 98 % | TEMPERATURE: 98.2 F | SYSTOLIC BLOOD PRESSURE: 126 MMHG

## 2023-11-20 DIAGNOSIS — Z09 POSTOP CHECK: Primary | ICD-10-CM

## 2023-11-20 PROCEDURE — 99024 POSTOP FOLLOW-UP VISIT: CPT | Performed by: SURGERY

## 2023-11-20 ASSESSMENT — PAIN SCALES - GENERAL: PAINLEVEL: 0-NO PAIN

## 2023-11-21 NOTE — PROGRESS NOTES
General Surgery Consult    Chief complaint:  Status post excision right upper quadrant mass 10/24/1023, follow-up exam       HPI: Jillian Acosta is a 48 y.o. old female with a past medical history as noted below who returns for excision of a subcutaneous mass in the right upper quadrant on 10/24/2023.    She had some scant drainage to the time of her follow-up appointment on 11/8/2023 and was treated with an empiric course of oral Levaquin and advised apply warm soaks to the wound 3 times daily.    She had called on 11/15/2023 complaining of some leg swelling and was sent to the emergency room where she had a Doppler study done that showed no evidence of DVT.    She returns today for follow-up.  She denies any fevers or chills.  She is only noted scant drainage from her incision.    Medical History:   Past Medical History:   Diagnosis Date    A-fib (CMS/HCC)     A-fib (CMS/HCC)     Abscess 06/2023    Bipolar 1 disorder (CMS/HCC)     Hypertension     Schizoaffective disorder (CMS/HCC)     Sleep apnea        Surgical History:   Past Surgical History:   Procedure Laterality Date    ANAL FISSURECTOMY      CHOLECYSTECTOMY      HYSTERECTOMY         Social History:   Social History     Social History Narrative    Not on file       Family History:   Family History   Problem Relation Age of Onset    Diabetes Biological Mother     Heart disease Biological Father        Allergies: Azithromycin, Doxycycline, Penicillins, and Amoxicillin    Home Medications:  Not in a hospital admission.    Current Medications:    aspirin    atorvastatin    cholecalciferol (vitamin D3)    divalproex    lurasidone    mirabegron    albuterol HFA    ascorbic acid    fluticasone propionate    hydrOXYzine    hydrOXYzine    Review of Systems: A complete review of systems is negative except as noted above    Objective     Physicial Exam  Visit Vitals  /82 (BP Location: Left upper arm, Patient Position: Sitting)   Pulse 88  "  Temp 36.8 °C (98.2 °F) (Temporal)   Ht 1.676 m (5' 6\")   Wt 120 kg (264 lb)   LMP  (LMP Unknown)   SpO2 98%   BMI 42.61 kg/m²       General appearance: Well-developed well-nourished, no acute distress  On exam her incision is healing well there is still slightly gaped.  I did remove all her sutures today in the office.    Doppler study:  Impression:     IMPRESSION:     No evidence of bilateral femoropopliteal deep vein thrombosis.        Pathology  Final Diagnosis   A.  Subcutaneous mass right upper quadrant:    Dermal scar.  No evidence of malignancy in the specimen provided.  See immunohistochemistry         Assessment/plan: Stable exam following excision right upper quadrant mass.  Wound care instructions were again discussed with Jillian today in the office.  She does not require further antibiotics.  She will continue warm soaks for 20 minutes 3 times daily until the drainage ceases.  She will follow-up if she has any further issues with the wound.        Rubén Emerson MD  "

## 2023-12-06 ENCOUNTER — TELEPHONE (OUTPATIENT)
Dept: SURGERY | Facility: CLINIC | Age: 48
End: 2023-12-06
Payer: MEDICARE

## 2023-12-06 ENCOUNTER — OFFICE VISIT (OUTPATIENT)
Dept: SURGERY | Facility: CLINIC | Age: 48
End: 2023-12-06
Payer: MEDICARE

## 2023-12-06 VITALS
HEART RATE: 104 BPM | WEIGHT: 264 LBS | DIASTOLIC BLOOD PRESSURE: 88 MMHG | BODY MASS INDEX: 42.43 KG/M2 | OXYGEN SATURATION: 97 % | HEIGHT: 66 IN | SYSTOLIC BLOOD PRESSURE: 140 MMHG

## 2023-12-06 DIAGNOSIS — N61.1 LEFT BREAST ABSCESS: Primary | ICD-10-CM

## 2023-12-06 PROCEDURE — 99213 OFFICE O/P EST LOW 20 MIN: CPT | Performed by: NURSE PRACTITIONER

## 2023-12-06 PROCEDURE — G8753 SYS BP > OR = 140: HCPCS | Performed by: NURSE PRACTITIONER

## 2023-12-06 PROCEDURE — G8754 DIAS BP LESS 90: HCPCS | Performed by: NURSE PRACTITIONER

## 2023-12-06 RX ORDER — SULFAMETHOXAZOLE AND TRIMETHOPRIM 800; 160 MG/1; MG/1
1 TABLET ORAL 2 TIMES DAILY
Qty: 14 TABLET | Refills: 0 | Status: SHIPPED | OUTPATIENT
Start: 2023-12-06 | End: 2023-12-13 | Stop reason: SDUPTHER

## 2023-12-06 NOTE — TELEPHONE ENCOUNTER
Pt called, she has a cyst on her breast and she is asking for an appt asap. She thinks its infected. She has a hx of cysts. Referred her to Dr Blunt and sent a secure chat to reach out to the patient.

## 2023-12-06 NOTE — PROGRESS NOTES
[facility]  Dr. Kayli COLEY Sizer  [address] [place] [address]  Phone: [phone]  Fax: [phone]      Patient ID: Jillian Acosta                              : 1975    Visit Date: 2023  Referring Provider: Alan Davila DO   PCP: Alan Davila DO  GYN: No care team member to display    Chief Complaint: Consult (Left breast cyst )      Jillian Acosta is a 48 y.o.  female who presents to the office with new onset left breast redness and palpable lump.  Patient reports she noticed a palpable lump to her lower left breast last night.  She recently underwent a right upper abdominal quadrant excisional biopsy under care of Dr. Quach on 10/24/2023 for a subcutaneous mass that revealed dermal scar, no evidence of malignancy.  Currently, denies any fever, chills, pain.  She denies any nipple discharge or retraction.  She denies any family history of breast or ovarian cancer.    Breast Health:  Age at menarche: 13  Age at first live birth: NA  Age of menopause: 47     Allergies: Azithromycin, Doxycycline, Penicillins, and Amoxicillin    Current Medications: has a current medication list which includes the following prescription(s): aspirin, atorvastatin, cholecalciferol (vitamin d3), divalproex, fluticasone propionate, hydroxyzine, lurasidone, mirabegron, albuterol hfa, ascorbic acid, and hydroxyzine.    Past Medical History:  has a past medical history of A-fib (CMS/[facility]), A-fib (CMS/[facility]), Abscess (2023), Bipolar 1 disorder (CMS/[facility]), Hypertension, Schizoaffective disorder (CMS/[facility]), and Sleep apnea.    Past Surgical History:  has a past surgical history that includes Anal fissurectomy (2016); Cholecystectomy (2016); and Hysterectomy (2018).    Social History:   Social History     Tobacco Use   • Smoking status: Former     Packs/day: .25     Types: Cigarettes   • Smokeless tobacco: Never   Vaping Use   • Vaping Use: Never used   Substance Use Topics   • Alcohol  "use: No   • Drug use: No       Family History: family history includes Diabetes in her biological mother; Heart disease in her biological father.        Physical Exam:    Vitals:   Visit Vitals  Ht 1.676 m (5' 6\")   Wt 120 kg (264 lb)   LMP  (LMP Unknown)   BMI 42.61 kg/m²     Body mass index is 42.61 kg/m².    Physical Exam  Physical Examination performed in the supine and sitting position  BREAST SIZE: very large, pendulous  SYMMETRY no, right mildly larger than left      SKIN - Skin color, texture, turgor normal. No rashes or lesions Skin is without tethering, dimpling, retraction or increased vascularity  AREOLA - normal    NIPPLES -  normal without retraction and without discharge  LYMPH NODES: infra, supra, cervical, parasternal and axillary nodes normal bilaterally  BREAST TISSUE: Right breast normal without discrete lesions. Left Breast 2cm erythematous superficial mass 6:00 3cmfn.      Clinical Chart under media tab  Office ultrasound revealed superficial small hypoechoic area within the skin      Breast Imaging: I have personally reviewed the reports and images as follows.  No recent imaging  Impression:  48-year-old female at average risk for breast cancer with left breast superficial skin abscess  Recommendation and Plan:   Patient has a penicillin allergy  Given her recent hospital encounters for excisional biopsy, Bactrim prescribed for MRSA coverage  Advised patient to take antibiotics twice a day, use warm compress to area  Patient will return to the office in 1 week for follow-up or sooner with any worsening signs or symptoms  Advised patient would avoid applying adhesive Band-Aid unless draining    All of the questions were answered.  The patient is in agreement with the treatment plan.      No follow-ups on file.        12/6/2023   3:02 PM      ELISHA Rodriguez                  "

## 2023-12-08 ENCOUNTER — TELEPHONE (OUTPATIENT)
Dept: SURGERY | Facility: CLINIC | Age: 48
End: 2023-12-08
Payer: MEDICARE

## 2023-12-08 NOTE — TELEPHONE ENCOUNTER
Called patient, reports she is feeling improvement to abscess site, now smaller.  Denies any worsening signs of infection.  She will keep her appointment scheduled for next week for follow-up

## 2023-12-13 ENCOUNTER — TELEPHONE (OUTPATIENT)
Dept: SURGERY | Facility: CLINIC | Age: 48
End: 2023-12-13

## 2023-12-13 ENCOUNTER — OFFICE VISIT (OUTPATIENT)
Dept: SURGERY | Facility: CLINIC | Age: 48
End: 2023-12-13
Payer: MEDICARE

## 2023-12-13 VITALS
SYSTOLIC BLOOD PRESSURE: 134 MMHG | TEMPERATURE: 98.2 F | OXYGEN SATURATION: 97 % | WEIGHT: 264 LBS | HEIGHT: 66 IN | HEART RATE: 94 BPM | DIASTOLIC BLOOD PRESSURE: 80 MMHG | BODY MASS INDEX: 42.43 KG/M2

## 2023-12-13 DIAGNOSIS — N61.1 LEFT BREAST ABSCESS: Primary | ICD-10-CM

## 2023-12-13 PROCEDURE — 87075 CULTR BACTERIA EXCEPT BLOOD: CPT | Performed by: NURSE PRACTITIONER

## 2023-12-13 PROCEDURE — G8752 SYS BP LESS 140: HCPCS | Performed by: NURSE PRACTITIONER

## 2023-12-13 PROCEDURE — 99213 OFFICE O/P EST LOW 20 MIN: CPT | Mod: 25 | Performed by: NURSE PRACTITIONER

## 2023-12-13 PROCEDURE — 19020 MASTOTOMY EXPL DRG ABSC DP: CPT | Performed by: NURSE PRACTITIONER

## 2023-12-13 PROCEDURE — G8754 DIAS BP LESS 90: HCPCS | Performed by: NURSE PRACTITIONER

## 2023-12-13 RX ORDER — LIDOCAINE HYDROCHLORIDE 10 MG/ML
1 INJECTION, SOLUTION INFILTRATION; PERINEURAL ONCE
Status: COMPLETED | OUTPATIENT
Start: 2023-12-13 | End: 2023-12-13

## 2023-12-13 RX ORDER — SULFAMETHOXAZOLE AND TRIMETHOPRIM 800; 160 MG/1; MG/1
1 TABLET ORAL 2 TIMES DAILY
Qty: 14 TABLET | Refills: 0 | Status: SHIPPED | OUTPATIENT
Start: 2023-12-13 | End: 2023-12-20

## 2023-12-13 RX ADMIN — LIDOCAINE HYDROCHLORIDE 1 ML: 10 INJECTION, SOLUTION INFILTRATION; PERINEURAL at 15:57

## 2023-12-13 NOTE — PROGRESS NOTES
[facility]  Dr. Kayli COLEY Sizer  [address] [place] [address]  Phone: [phone]  Fax: [phone]      Patient ID: Jillian Acosta                              : 1975    Visit Date: 2023  Referring Provider: No ref. provider found   PCP: Alan Davila,   GYN: No care team member to display    Chief Complaint: Breast Check      Jillian Acosta is a 48 y.o.  female who presents to the office for follow-up visit.  Patient was seen in the office 1 week ago with new onset left breast redness and palpable lump.   She recently underwent a right upper abdominal quadrant excisional biopsy under care of Dr. Quach on 10/24/2023 for a subcutaneous mass that revealed dermal scar, no evidence of malignancy.  Today, she notes that the area has decreased in size on the Bactrim antibiotic but does note persistent redness and a palpable lump.  She denies any fever and chills.  She does note some pain to the area.  No family history of breast or ovarian cancer.    Allergies: Azithromycin, Doxycycline, Penicillins, and Amoxicillin    Current Medications: has a current medication list which includes the following prescription(s): aspirin, atorvastatin, cholecalciferol (vitamin d3), fluticasone propionate, hydroxyzine, mirabegron, sulfamethoxazole-trimethoprim, albuterol hfa, ascorbic acid, divalproex, hydroxyzine, and lurasidone.    Past Medical History:  has a past medical history of A-fib (CMS/[facility]), A-fib (CMS/[facility]), Abscess (2023), Bipolar 1 disorder (CMS/[facility]), Hypertension, Schizoaffective disorder (CMS/[facility]), and Sleep apnea.    Past Surgical History:  has a past surgical history that includes Anal fissurectomy (2016); Cholecystectomy (2016); and Hysterectomy (2018).    Social History:   Social History     Tobacco Use   • Smoking status: Former     Packs/day: .25     Types: Cigarettes   • Smokeless tobacco: Never   Vaping Use   • Vaping Use: Never used   Substance Use Topics  "  • Alcohol use: No   • Drug use: No       Family History: family history includes Diabetes in her biological mother; Heart disease in her biological father.        Physical Exam:    Vitals:   Visit Vitals  /80   Pulse 94   Temp 36.8 °C (98.2 °F)   Ht 1.676 m (5' 6\")   Wt 120 kg (264 lb)   LMP  (LMP Unknown)   SpO2 97%   BMI 42.61 kg/m²     Body mass index is 42.61 kg/m².    Physical Exam  Physical Examination performed in the supine and sitting position  BREAST SIZE: very large, pendulous  SYMMETRY no, right mildly larger than left      SKIN - Skin color, texture, turgor normal. No rashes or lesions Skin is without tethering, dimpling, retraction or increased vascularity  AREOLA - normal    NIPPLES -  normal without retraction and without discharge  LYMPH NODES: infra, supra, cervical, parasternal and axillary nodes normal bilaterally  BREAST TISSUE: Right breast normal without discrete lesions. Left Breast 2cm now well-demarcated erythematous fluctuant superficial mass 6:00 3cmfn, the skin overlying lesion now appears thin and mildly necrotic.  There is overall all improvement in redness surrounding the lesion.     Clinical Chart under media tab  Office ultrasound left breast 6:00 3cmfn reveals superficial 1.5cm complex hypoechoic area within the skin    Options reviewed with patient including incision and drainage given that her skin is now thin and fluctuant.  Alternative options reviewed including observation with continued antibiotic therapy.  Patient opted for I&D, benefits and risk reviewed.  Procedure consent obtained.  Please see procedure note.  Patient tolerated well.    Breast Imaging: I have personally reviewed the reports and images as follows.  No recent imaging  Impression:  48-year-old female at average risk for breast cancer with left breast superficial skin abscess  Recommendation and Plan:   Patient has a penicillin allergy  Culture obtained and sent for evaluation, I did review with " patient possibility of negative results given that she has been on antibiotic therapy for 1 week  Recent hospital encounters for excisional biopsy, will continue Bactrim for an additional week  Continue warm compress to area  Patient will return to the office in 1 week for follow-up or sooner with any worsening signs or symptoms    Prescriptions for mammogram and ultrasound will be provided at the next office visit.    All of the questions were answered.  The patient is in agreement with the treatment plan.      No follow-ups on file.        12/13/2023   2:39 PM      ELISHA Rodriguez

## 2023-12-13 NOTE — PROCEDURES
Procedures  Date/Time: 4/26/2023 4:10 PM     Performed by:  Evert Rollins  Consent:     Consent obtained:  Written    Consent given by:  Patient    Risks, benefits, and alternatives were discussed: yes      Risks discussed:  Bleeding, infection, pain and incomplete drainage    Alternatives discussed:  Alternative treatment, observation, delayed treatment and no treatment  Universal protocol:     Procedure explained and questions answered to patient or proxy's satisfaction: yes      Relevant documents present and verified: yes      Patient identity confirmed:  Verbally with patient  Indications:     Indications: Left breast skin abscess  Pre-procedure details:     Skin preparation:  Chlorhexidine with alcohol  Sedation:     Sedation type:  None  Anesthesia:     Anesthesia method:  Local infiltration    Local anesthetic: 1% lidocaine  Procedure specific details:      The left breast 6:00 3cmfn superficial abscess was cleaned with alcohol swab.   1 cc of lidocaine 1% was used to anesthetize the skin.  #11 blade was used to make a small incision.  Approximately 1 cc of serosanguineous purulent drainage expelled.  Hemostasis was achieved. A bandaid was applied.   This was well tolerated by the patient and there were no immediate complications.     Post-procedure details:     Procedure completion:  Tolerated well, no immediate complications

## 2023-12-19 LAB
GRAM STN SPEC: NORMAL
GRAM STN SPEC: NORMAL
MICROORGANISM SPEC CULT: NORMAL

## 2023-12-20 ENCOUNTER — OFFICE VISIT (OUTPATIENT)
Dept: SURGERY | Facility: CLINIC | Age: 48
End: 2023-12-20
Payer: MEDICARE

## 2023-12-20 VITALS
DIASTOLIC BLOOD PRESSURE: 80 MMHG | HEIGHT: 66 IN | OXYGEN SATURATION: 96 % | HEART RATE: 83 BPM | BODY MASS INDEX: 42.61 KG/M2 | SYSTOLIC BLOOD PRESSURE: 140 MMHG

## 2023-12-20 DIAGNOSIS — N61.1 LEFT BREAST ABSCESS: Primary | ICD-10-CM

## 2023-12-20 PROCEDURE — 99024 POSTOP FOLLOW-UP VISIT: CPT | Performed by: NURSE PRACTITIONER

## 2023-12-20 NOTE — PROGRESS NOTES
.      [facility]  Dr. Kayli COLEY Sizer  [address] [place] [address]  Phone: [phone]  Fax: [phone]      Patient ID: Jillian Acosta                              : 1975    Visit Date: 2023  Referring Provider: No ref. provider found   PCP: Alan Davila DO  GYN: No care team member to display    Chief Complaint: Breast Check      Jillian Acosta is a 48 y.o.  female who presents to the office for follow-up visit.  Patient was seen in the office 1 week ago in which I&D was performed to superficial left breast abscess.  Purulent discharge was expelled and cultured with no bacterial growth.  Today, patient reports improvement in redness, no longer feels palpable area.  Patient is currently taking Bactrim started on 2023, she has 2 days left.     Patient was recently underwent a right upper abdominal quadrant excisional biopsy under care of Dr. Chamberlain on 10/24/2023 for a subcutaneous mass that revealed dermal scar, no evidence of malignancy.    Allergies: Azithromycin, Doxycycline, Penicillins, and Amoxicillin    Current Medications: has a current medication list which includes the following prescription(s): aspirin, atorvastatin, cholecalciferol (vitamin d3), fluticasone propionate, hydroxyzine, mirabegron, sulfamethoxazole-trimethoprim, albuterol hfa, ascorbic acid, divalproex, hydroxyzine, and lurasidone.    Past Medical History:  has a past medical history of A-fib (CMS/[facility]), A-fib (CMS/[facility]), Abscess (2023), Bipolar 1 disorder (CMS/[facility]), Hypertension, Schizoaffective disorder (CMS/[facility]), and Sleep apnea.    Past Surgical History:  has a past surgical history that includes Anal fissurectomy (2016); Cholecystectomy (2016); and Hysterectomy (2018).    Social History:   Social History     Tobacco Use   • Smoking status: Former     Packs/day: .25     Types: Cigarettes   • Smokeless tobacco: Never   Vaping Use   • Vaping Use: Never used   Substance Use Topics  "  • Alcohol use: No   • Drug use: No       Family History: family history includes Diabetes in her biological mother; Heart disease in her biological father.        Physical Exam:    Vitals:   Visit Vitals  /80   Pulse 83   Ht 1.676 m (5' 6\")   LMP  (LMP Unknown)   SpO2 96%   BMI 42.61 kg/m²     Body mass index is 42.61 kg/m².    Physical Exam  Physical Examination performed in the supine and sitting position  Focal exam:  BREAST SIZE: very large, pendulous  SYMMETRY no, right mildly larger than left  AREOLA - normal    NIPPLES -  normal without retraction and without discharge  LYMPH NODES: infra, supra, cervical, parasternal and axillary nodes normal bilaterally  BREAST TISSUE:  Left Breast significant improvement and overlying skin changes, now 1 cm area of mild erythema with no palpable mass.     Breast Imaging: I have personally reviewed the reports and images as follows.  No recent imaging  Impression:  48-year-old female at average risk for breast cancer with left breast superficial skin abscess    Recommendation and Plan:   Advised patient to complete antibiotic  Recommend patient undergo bilateral diagnostic mammogram, she can push this out for 1 to 2-week  If mammogram is benign, patient will resume annual clinical breast exams and mammogram screenings with her GYN    All of the questions were answered.  The patient is in agreement with the treatment plan.      No follow-ups on file.        12/20/2023   11:18 AM      ELISHA Rodriguez                  "

## 2024-01-09 ENCOUNTER — HOSPITAL ENCOUNTER (EMERGENCY)
Facility: HOSPITAL | Age: 49
DRG: 885 | End: 2024-01-09
Attending: STUDENT IN AN ORGANIZED HEALTH CARE EDUCATION/TRAINING PROGRAM | Admitting: PSYCHIATRY & NEUROLOGY
Payer: MEDICARE

## 2024-01-09 ENCOUNTER — HOSPITAL ENCOUNTER (INPATIENT)
Facility: HOSPITAL | Age: 49
LOS: 24 days | Discharge: HOME | DRG: 885 | End: 2024-02-02
Attending: PSYCHIATRY & NEUROLOGY | Admitting: PSYCHIATRY & NEUROLOGY
Payer: MEDICARE

## 2024-01-09 VITALS
HEIGHT: 66 IN | SYSTOLIC BLOOD PRESSURE: 130 MMHG | TEMPERATURE: 98.8 F | WEIGHT: 240 LBS | OXYGEN SATURATION: 97 % | DIASTOLIC BLOOD PRESSURE: 66 MMHG | HEART RATE: 91 BPM | RESPIRATION RATE: 16 BRPM | BODY MASS INDEX: 38.57 KG/M2

## 2024-01-09 DIAGNOSIS — F29 PSYCHOSIS, UNSPECIFIED PSYCHOSIS TYPE (CMS/HCC): Primary | ICD-10-CM

## 2024-01-09 DIAGNOSIS — F28 OTHER PSYCHOTIC DISORDER NOT DUE TO SUBSTANCE OR KNOWN PHYSIOLOGICAL CONDITION: ICD-10-CM

## 2024-01-09 LAB
ALBUMIN SERPL-MCNC: 4.3 G/DL (ref 3.5–5.7)
ALP SERPL-CCNC: 67 IU/L (ref 34–125)
ALT SERPL-CCNC: 12 IU/L (ref 7–52)
AMPHET UR QL SCN: NOT DETECTED
ANION GAP SERPL CALC-SCNC: 10 MEQ/L (ref 3–15)
APAP SERPL-MCNC: <0.1 UG/ML (ref 10–30)
AST SERPL-CCNC: 11 IU/L (ref 13–39)
BARBITURATES UR QL SCN: NOT DETECTED
BASOPHILS # BLD: 0.04 K/UL (ref 0.01–0.1)
BASOPHILS NFR BLD: 0.5 %
BENZODIAZ UR QL SCN: NOT DETECTED
BILIRUB SERPL-MCNC: 0.4 MG/DL (ref 0.3–1.2)
BUN SERPL-MCNC: 13 MG/DL (ref 7–25)
CALCIUM SERPL-MCNC: 9.7 MG/DL (ref 8.6–10.3)
CANNABINOIDS UR QL SCN: NOT DETECTED
CHLORIDE SERPL-SCNC: 105 MEQ/L (ref 98–107)
CO2 SERPL-SCNC: 24 MEQ/L (ref 21–31)
COCAINE UR QL SCN: NOT DETECTED
CREAT SERPL-MCNC: 0.8 MG/DL (ref 0.6–1.2)
DIFFERENTIAL METHOD BLD: NORMAL
EGFRCR SERPLBLD CKD-EPI 2021: >60 ML/MIN/1.73M*2
EOSINOPHIL # BLD: 0.12 K/UL (ref 0.04–0.36)
EOSINOPHIL NFR BLD: 1.4 %
ERYTHROCYTE [DISTWIDTH] IN BLOOD BY AUTOMATED COUNT: 13.2 % (ref 11.7–14.4)
ETHANOL SERPL-MCNC: <10 MG/DL
FENTANYL URINE SCR: NOT DETECTED
GLUCOSE SERPL-MCNC: 115 MG/DL (ref 70–99)
HCG UR QL: NEGATIVE
HCT VFR BLDCO AUTO: 44.3 % (ref 35–45)
HGB BLD-MCNC: 14.3 G/DL (ref 11.8–15.7)
IMM GRANULOCYTES # BLD AUTO: 0.04 K/UL (ref 0–0.08)
IMM GRANULOCYTES NFR BLD AUTO: 0.5 %
LYMPHOCYTES # BLD: 1.34 K/UL (ref 1.2–3.5)
LYMPHOCYTES NFR BLD: 15.7 %
MCH RBC QN AUTO: 29.4 PG (ref 28–33.2)
MCHC RBC AUTO-ENTMCNC: 32.3 G/DL (ref 32.2–35.5)
MCV RBC AUTO: 91.2 FL (ref 83–98)
MONOCYTES # BLD: 0.77 K/UL (ref 0.28–0.8)
MONOCYTES NFR BLD: 9 %
NEUTROPHILS # BLD: 6.25 K/UL (ref 1.7–7)
NEUTS SEG NFR BLD: 72.9 %
NRBC BLD-RTO: 0 %
OPIATES UR QL SCN: NOT DETECTED
PCP UR QL SCN: NOT DETECTED
PDW BLD AUTO: 10.2 FL (ref 9.4–12.3)
PLATELET # BLD AUTO: 234 K/UL (ref 150–369)
POTASSIUM SERPL-SCNC: 3.7 MEQ/L (ref 3.5–5.1)
PROT SERPL-MCNC: 7.3 G/DL (ref 6–8.2)
RBC # BLD AUTO: 4.86 M/UL (ref 3.93–5.22)
SALICYLATES SERPL-MCNC: <1.5 MG/DL
SARS-COV-2 AG RESP QL IA.RAPID: NORMAL
SODIUM SERPL-SCNC: 139 MEQ/L (ref 136–145)
WBC # BLD AUTO: 8.56 K/UL (ref 3.8–10.5)

## 2024-01-09 PROCEDURE — 63700000 HC SELF-ADMINISTRABLE DRUG: Performed by: PSYCHIATRY & NEUROLOGY

## 2024-01-09 PROCEDURE — 80307 DRUG TEST PRSMV CHEM ANLYZR: CPT | Performed by: STUDENT IN AN ORGANIZED HEALTH CARE EDUCATION/TRAINING PROGRAM

## 2024-01-09 PROCEDURE — 85025 COMPLETE CBC W/AUTO DIFF WBC: CPT | Performed by: STUDENT IN AN ORGANIZED HEALTH CARE EDUCATION/TRAINING PROGRAM

## 2024-01-09 PROCEDURE — 12400000 HC ROOM AND CARE SEMIPRIVATE PSYCH

## 2024-01-09 PROCEDURE — 36415 COLL VENOUS BLD VENIPUNCTURE: CPT | Performed by: STUDENT IN AN ORGANIZED HEALTH CARE EDUCATION/TRAINING PROGRAM

## 2024-01-09 PROCEDURE — 87426 SARSCOV CORONAVIRUS AG IA: CPT | Performed by: PHYSICIAN ASSISTANT

## 2024-01-09 PROCEDURE — 84703 CHORIONIC GONADOTROPIN ASSAY: CPT | Performed by: STUDENT IN AN ORGANIZED HEALTH CARE EDUCATION/TRAINING PROGRAM

## 2024-01-09 PROCEDURE — 80053 COMPREHEN METABOLIC PANEL: CPT | Performed by: STUDENT IN AN ORGANIZED HEALTH CARE EDUCATION/TRAINING PROGRAM

## 2024-01-09 PROCEDURE — G0480 DRUG TEST DEF 1-7 CLASSES: HCPCS | Performed by: STUDENT IN AN ORGANIZED HEALTH CARE EDUCATION/TRAINING PROGRAM

## 2024-01-09 PROCEDURE — 99285 EMERGENCY DEPT VISIT HI MDM: CPT

## 2024-01-09 RX ORDER — HALOPERIDOL 5 MG/ML
5 INJECTION INTRAMUSCULAR EVERY 6 HOURS PRN
Status: DISCONTINUED | OUTPATIENT
Start: 2024-01-09 | End: 2024-02-02 | Stop reason: HOSPADM

## 2024-01-09 RX ORDER — ACETAMINOPHEN 325 MG/1
650 TABLET ORAL EVERY 4 HOURS PRN
Status: DISCONTINUED | OUTPATIENT
Start: 2024-01-09 | End: 2024-02-02 | Stop reason: HOSPADM

## 2024-01-09 RX ORDER — DIPHENHYDRAMINE HCL 50 MG/ML
50 VIAL (ML) INJECTION EVERY 6 HOURS PRN
Status: DISCONTINUED | OUTPATIENT
Start: 2024-01-09 | End: 2024-02-02 | Stop reason: HOSPADM

## 2024-01-09 RX ORDER — LURASIDONE HYDROCHLORIDE 40 MG/1
80 TABLET, FILM COATED ORAL NIGHTLY
Status: DISCONTINUED | OUTPATIENT
Start: 2024-01-09 | End: 2024-01-10

## 2024-01-09 RX ORDER — SENNOSIDES 8.6 MG/1
1 TABLET ORAL 2 TIMES DAILY PRN
Status: DISCONTINUED | OUTPATIENT
Start: 2024-01-09 | End: 2024-02-02 | Stop reason: HOSPADM

## 2024-01-09 RX ORDER — TRAZODONE HYDROCHLORIDE 50 MG/1
50 TABLET ORAL NIGHTLY PRN
Status: DISCONTINUED | OUTPATIENT
Start: 2024-01-09 | End: 2024-01-11

## 2024-01-09 RX ORDER — ATORVASTATIN CALCIUM 40 MG/1
40 TABLET, FILM COATED ORAL NIGHTLY
Status: DISCONTINUED | OUTPATIENT
Start: 2024-01-09 | End: 2024-02-02 | Stop reason: HOSPADM

## 2024-01-09 RX ORDER — ACETAMINOPHEN 325 MG/1
650 TABLET ORAL EVERY 4 HOURS PRN
Status: CANCELLED | OUTPATIENT
Start: 2024-01-09

## 2024-01-09 RX ORDER — TOLTERODINE 2 MG/1
2 CAPSULE, EXTENDED RELEASE ORAL DAILY
Status: DISCONTINUED | OUTPATIENT
Start: 2024-01-09 | End: 2024-01-09

## 2024-01-09 RX ORDER — TRAZODONE HYDROCHLORIDE 50 MG/1
50 TABLET ORAL NIGHTLY PRN
Status: CANCELLED | OUTPATIENT
Start: 2024-01-09

## 2024-01-09 RX ORDER — DIPHENHYDRAMINE HCL 50 MG/ML
50 VIAL (ML) INJECTION EVERY 6 HOURS PRN
Status: CANCELLED | OUTPATIENT
Start: 2024-01-09

## 2024-01-09 RX ORDER — ALUMINUM HYDROXIDE, MAGNESIUM HYDROXIDE, AND SIMETHICONE 1200; 120; 1200 MG/30ML; MG/30ML; MG/30ML
30 SUSPENSION ORAL EVERY 4 HOURS PRN
Status: DISCONTINUED | OUTPATIENT
Start: 2024-01-09 | End: 2024-02-02 | Stop reason: HOSPADM

## 2024-01-09 RX ORDER — HYDROXYZINE HYDROCHLORIDE 25 MG/1
50 TABLET, FILM COATED ORAL EVERY 6 HOURS PRN
Status: DISCONTINUED | OUTPATIENT
Start: 2024-01-09 | End: 2024-02-02 | Stop reason: HOSPADM

## 2024-01-09 RX ORDER — HYDROXYZINE HYDROCHLORIDE 25 MG/1
50 TABLET, FILM COATED ORAL EVERY 6 HOURS PRN
Status: CANCELLED | OUTPATIENT
Start: 2024-01-09

## 2024-01-09 RX ORDER — SENNOSIDES 8.6 MG/1
1 TABLET ORAL 2 TIMES DAILY PRN
Status: CANCELLED | OUTPATIENT
Start: 2024-01-09

## 2024-01-09 RX ORDER — HALOPERIDOL 5 MG/1
5 TABLET ORAL EVERY 6 HOURS PRN
Status: DISCONTINUED | OUTPATIENT
Start: 2024-01-09 | End: 2024-02-02 | Stop reason: HOSPADM

## 2024-01-09 RX ORDER — DIVALPROEX SODIUM 250 MG/1
1500 TABLET, DELAYED RELEASE ORAL NIGHTLY
Status: DISCONTINUED | OUTPATIENT
Start: 2024-01-09 | End: 2024-01-11

## 2024-01-09 RX ORDER — ALUMINUM HYDROXIDE, MAGNESIUM HYDROXIDE, AND SIMETHICONE 1200; 120; 1200 MG/30ML; MG/30ML; MG/30ML
30 SUSPENSION ORAL EVERY 4 HOURS PRN
Status: CANCELLED | OUTPATIENT
Start: 2024-01-09

## 2024-01-09 RX ORDER — ONDANSETRON 4 MG/1
4 TABLET, ORALLY DISINTEGRATING ORAL EVERY 8 HOURS PRN
Status: DISCONTINUED | OUTPATIENT
Start: 2024-01-09 | End: 2024-02-02 | Stop reason: HOSPADM

## 2024-01-09 RX ADMIN — ATORVASTATIN CALCIUM 40 MG: 40 TABLET, FILM COATED ORAL at 21:50

## 2024-01-09 RX ADMIN — LURASIDONE HYDROCHLORIDE 80 MG: 40 TABLET, COATED ORAL at 21:50

## 2024-01-09 RX ADMIN — DIVALPROEX SODIUM 1500 MG: 250 TABLET, DELAYED RELEASE ORAL at 21:50

## 2024-01-09 ASSESSMENT — COGNITIVE AND FUNCTIONAL STATUS - GENERAL
LIBIDO: NON-CONTRIBUTORY
PSYCHOMOTOR FUNCTIONING: RESTLESS
AROUSAL LEVEL: ALERT
PERCEPTUAL FUNCTION: NORMAL
INSIGHT: IMPAIRED, MINIMALLY
THOUGHT_CONTENT: OTHER:
MOOD: EUPHORIC/HYPOMANIC
REMOTE MEMORY: MILD LOSS
EYE_CONTACT: OTHER:
ATTENTION: IMPAIRED, MODERATE
DELUSIONS: FIXED
ORIENTATION: FULLY ORIENTED
CONCENTRATION: IMPAIRED, MODERATE
SPEECH: PRESSURED
RECENT MEMORY: MILD LOSS
IMPULSE CONTROL: INTACT
JUDGEMENT: IMPAIRED, MINIMALLY
AFFECT: LABILE
APPETITE: INCREASED
APPEARANCE: WELL GROOMED
THOUGHT_PROCESS: TANGENTIAL
SLEEP_WAKE_CYCLE: INCREASED

## 2024-01-09 ASSESSMENT — ENCOUNTER SYMPTOMS
SHORTNESS OF BREATH: 0
CHEST TIGHTNESS: 0
NERVOUS/ANXIOUS: 0
CHILLS: 0
AGITATION: 0
NAUSEA: 0
COUGH: 0
DIZZINESS: 0
HALLUCINATIONS: 0
SLEEP DISTURBANCE: 0
FEVER: 0
VOMITING: 0
ABDOMINAL PAIN: 0
WOUND: 0
HEADACHES: 0
LIGHT-HEADEDNESS: 0

## 2024-01-09 NOTE — ED PROVIDER NOTES
Emergency Medicine Note  HPI   HISTORY OF PRESENT ILLNESS     HPI   Patient is a 48-year-old female with past medical history of bipolar disorder hypertension schizoaffective disorder that is presenting the emergency room today for a psych evaluation and a possible medication adjustment.  The patient has some flight of ideas but is redirectable reports that her mother is concerned about her but she reports that she met a man who has changed her life and tells us that that man is Rebel.  She is very acutely denies any suicidal or homicidal ideations.  Has been hospitalized 9 times so far as an inpatient.  Has not had any recent changes in her medications.  Reports sleeping okay eating and drinking okay it was mostly her mother's idea to come in.        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   Respiratory: Negative for cough, chest tightness and shortness of breath.    Cardiovascular: Negative for chest pain.   Gastrointestinal: Negative for abdominal pain, nausea and vomiting.   Skin: Negative for rash and wound.    Neurological: Negative for dizziness, syncope, light-headedness and headaches.   Psychiatric/Behavioral: Negative for agitation, hallucinations, self-injury, sleep disturbance and suicidal ideas. The patient is not nervous/anxious.          VITALS     ED Vitals    Date/Time Temp Pulse Resp BP SpO2 [facility]   01/09/24 1751 -- 91 16 130/66 97 % LT   01/09/24 1429 37.1 °C (98.8 °F) 110 18 151/66 97 % DMM        Pulse Ox %: 97 % (01/09/24 1429)  Pulse Ox Interpretation: Normal (01/09/24 1429)           Physical Exam   PHYSICAL EXAM     Physical Exam  Vitals and nursing note reviewed.   Constitutional:       Appearance: Normal appearance.   HENT:      Head: Normocephalic and atraumatic.   Cardiovascular:      Rate and Rhythm: Normal rate and regular rhythm.      Pulses: Normal pulses.      Heart sounds: Normal heart sounds.   Pulmonary:      Effort: Pulmonary effort is normal.      Breath sounds: Normal breath sounds. No wheezing, rhonchi or rales.   Skin:     General: Skin is warm and dry.   Neurological:      General: No focal deficit present.      Mental Status: She is alert.   Psychiatric:         Mood and Affect: Mood normal.           PROCEDURES     Procedures     DATA     Results     Procedure Component Value Units Date/Time    UDS (Drug screen panel) emergency [[id number]]  (Normal) Collected: 01/09/24 1555    Specimen: Urine, Clean Catch Updated: 01/09/24 1632     PCP Scrn, Ur Not Detected     Comment: Assay Detects: phencyclidine in urine. Lowest detectable concentration is 25 ng/mL of phencyclidine.        Benzodiazepine Ur Qual Not Detected     Comment: Assay Detects: benzodiazepines and metabolites at varying concentrations. Lowest detectable concentration is 200 ng/mL of oxazepam.        Cocaine Screen, Urine Not Detected     Comment: Assay Detects: benzoylecgonine and cocaine in urine. Lowest detectable concentration is 300 ng/mL of benzoylecgonine.        Amphetamine+Methamphetamine Screen, Ur Not Detected      Comment: Assay Detects: d-methamphetamine, d-amphetamine, methlyenedioxyamphetamine (MDA), and methlyenendioxymethamphetamine (MDMA) in urine. Lowest detectable concentration is 1000 ng/mL of d-methamphetamine.  Assay is less sensitive to MDA and MDMA (lowest detectable concentration, 2500 ng/mL) and could produce a false negative result. If MDMA overdose is suspected and the result is negative, a more specific test should be requested.        Cannabinoid Screen, Urine Not Detected     Comment: Assay Detects: cannabinoid metabolites in urine. Lowest detectable concentration is 50 ng/mL        Opiate Scrn, Ur Not Detected     Comment: Assay Detects: codeine, dihydrocodeine, hydrocodone, hydromorphone, levorphanol, morphine, morphine-3-glucuronide, norcodeine, oxycodone in urine. Lowest detectable concentration is 300 ng/mL of morphine.        Barbiturate Screen, Ur Not Detected     Comment: Assay Detects: alphenal, amobarbital, aprobarbital, barbital, butabarbital, butalbital, butethal, diallybarbital, pentobarbital, secobarbital,talbutal, and thiopental. Lowest detectable concentration is 200 ng/mL of secobarbital.        Fentanyl Screen, Urine Not Detected     Comment: Assay Detects: fentanyl metabolite in urine, lowest detectable concentration is 5 ng/mL of norfentanyl.       SARS-CoV-2 (COVID-19) Nares [[id number]]  (Normal) Collected: 01/09/24 1549    Specimen: Nasal Swab from Nares Updated: 01/09/24 1617    Narrative:      The following orders were created for panel order SARS-CoV-2 (COVID-19) Nares.  Procedure                               Abnormality         Status                     ---------                               -----------         ------                     SARS-COV-2, ANTIGEN Nares[[id number]]    Normal              Final result                 Please view results for these tests on the individual orders.    SARS-COV-2, ANTIGEN Nares [[id number]]  (Normal) Collected: 01/09/24 1549    Specimen:  Nasal Swab from Nares Updated: 01/09/24 1617     SARS-COV-2 (COVID-19), Antigen Presumptive Negative, no Ag detected    Narrative:      This test is approved by FDA under EUA use. The performance of this test is not validated for asymptomatic patients and test results should be correlated with patient's condition.        Toxicology Screen, serum [[id number]]  (Abnormal) Collected: 01/09/24 1437    Specimen: Blood, Venous Updated: 01/09/24 1527     Salicylate <1.5 mg/dL      Acetaminophen <0.1 ug/mL      Ethanol <10 mg/dL     Comprehensive metabolic panel [[id number]]  (Abnormal) Collected: 01/09/24 1437    Specimen: Blood, Venous Updated: 01/09/24 1524     Sodium 139 mEQ/L      Potassium 3.7 mEQ/L      Comment: Results obtained on plasma. Plasma Potassium values may be up to 0.4 mEQ/L less than serum values. The differences may be greater for patients with high platelet or white cell counts.        Chloride 105 mEQ/L      CO2 24 mEQ/L      BUN 13 mg/dL      Creatinine 0.8 mg/dL      Glucose 115 mg/dL      Calcium 9.7 mg/dL      AST (SGOT) 11 IU/L      ALT (SGPT) 12 IU/L      Alkaline Phosphatase 67 IU/L      Total Protein 7.3 g/dL      Comment: Test performed on plasma which typically contains approximately 0.4 g/dL more protein than serum.        Albumin 4.3 g/dL      Bilirubin, Total 0.4 mg/dL      eGFR >60.0 mL/min/1.73m*2      Comment: Calculation based on the Chronic Kidney Disease Epidemiology Collaboration (CKD-EPI) equation refit without adjustment for race.        Anion Gap 10 mEQ/L     BhCG, Serum, Qual (if menstruating female and no urine) [[id number]]  (Normal) Collected: 01/09/24 1437    Specimen: Blood, Venous Updated: 01/09/24 1517     Preg Test, Serum Negative    CBC and differential [[id number]] Collected: 01/09/24 1437    Specimen: Blood, Venous Updated: 01/09/24 1458     WBC 8.56 K/uL      RBC 4.86 M/uL      Hemoglobin 14.3 g/dL      Hematocrit 44.3 %      MCV 91.2 fL      MCH 29.4 pg      MCHC 32.3  g/dL      RDW 13.2 %      Platelets 234 K/uL      MPV 10.2 fL      Differential Type Auto     nRBC 0.0 %      Immature Granulocytes 0.5 %      Neutrophils 72.9 %      Lymphocytes 15.7 %      Monocytes 9.0 %      Eosinophils 1.4 %      Basophils 0.5 %      Immature Granulocytes, Absolute 0.04 K/uL      Neutrophils, Absolute 6.25 K/uL      Lymphocytes, Absolute 1.34 K/uL      Monocytes, Absolute 0.77 K/uL      Eosinophils, Absolute 0.12 K/uL      Basophils, Absolute 0.04 K/uL           Imaging Results    None         No orders to display       Scoring tools                                  ED Course & MDM   MDM / ED COURSE / CLINICAL IMPRESSION / DISPO     MDM    ED Course as of 01/09/24 2241   Tue Jan 09, 2024   1511 Pt seen and evaluated along with STEPHANIA. States family believes needs inpatient psychiatric hospitalization for medication adjustment given unusual behavior and thoughts (psychosis, unspecified). Pt denies self injury or dangerous behavior. Denies SI/HI. Clinically sober; no report of drug or alcohol use. Medically cleared at this time. Huddle with SW (Amina Mueller Logan) for evaluation.  [NS]      ED Course User Index  [NS] Tony Reynolds, DO     Clinical Impression      Psychosis, unspecified psychosis type (CMS/HCC)     _________________     ED Disposition   Transfer to Behavioral Health                   Colton Sauceda PA C  01/09/24 2241

## 2024-01-09 NOTE — BEHAVIORAL HEALTH CRISIS PROGRESS NOTE
Attempt to call patient's sister Misty, [phone].  No answer left voicemail requesting call back for collateral.    201 signed, 201 rights and Bill of Rights provided.    5pm Covid and UDS are negative, alerted Dr. Benavides who has been reviewing for admission to [facility].    5:25p Due to after hours, will request telepsych for admission orders. Placed in queue.    5:45 PM patient accepted to [facility] per Dr. Benavides.  Call to transfer Center patient placed on the board for 3 E.  RN to call report.    Dr Mccord completing orders.

## 2024-01-09 NOTE — ED ATTESTATION NOTE
"I have personally seen and examined Jillian Acosta.  I was involved in the care, management and medical decision making for this patient.  I reviewed and agree with physician assistant (PA-C) assessment and plan of care; we discussed the case and the treatment plan. Any exceptions are documented below.    Exam:  Patient Vitals for the past 72 hrs:   BP Temp Temp src Pulse Resp SpO2 Height Weight   01/09/24 1429 (!) 151/66 37.1 °C (98.8 °F) Tympanic (!) 110 18 97 % 1.676 m (5' 6\") 109 kg (240 lb)     VS reviewed, NAD, nontoxic, head at/nc, normal speech, no resp distress, normal skin tone, coordinated movement. Sitting upright in bed, no distress. Expresses Taoist obsession at the moment. Denies self injury, dangerous behavior or SI/HI. States lives with mother and that family thinks needs inpatient psych hospitalization for medication adjustment.      Tony Reynolds DO  01/09/24 1511    "
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
## 2024-01-10 PROBLEM — F25.0 SCHIZOAFFECTIVE DISORDER, BIPOLAR TYPE (CMS/HCC): Status: ACTIVE | Noted: 2024-01-10

## 2024-01-10 LAB
ATRIAL RATE: 86
P AXIS: 67
PR INTERVAL: 144
QRS DURATION: 104
QT INTERVAL: 370
QTC CALCULATION(BAZETT): 442
R AXIS: 32
T WAVE AXIS: 31
VENTRICULAR RATE: 86

## 2024-01-10 PROCEDURE — 93005 ELECTROCARDIOGRAM TRACING: CPT | Performed by: STUDENT IN AN ORGANIZED HEALTH CARE EDUCATION/TRAINING PROGRAM

## 2024-01-10 PROCEDURE — 63700000 HC SELF-ADMINISTRABLE DRUG: Performed by: PSYCHIATRY & NEUROLOGY

## 2024-01-10 PROCEDURE — 90792 PSYCH DIAG EVAL W/MED SRVCS: CPT | Performed by: STUDENT IN AN ORGANIZED HEALTH CARE EDUCATION/TRAINING PROGRAM

## 2024-01-10 PROCEDURE — 200200 PR NO CHARGE

## 2024-01-10 PROCEDURE — 63700000 HC SELF-ADMINISTRABLE DRUG: Performed by: STUDENT IN AN ORGANIZED HEALTH CARE EDUCATION/TRAINING PROGRAM

## 2024-01-10 PROCEDURE — 63600000 HC DRUGS/DETAIL CODE: Mod: JZ | Performed by: PSYCHIATRY & NEUROLOGY

## 2024-01-10 PROCEDURE — 93010 ELECTROCARDIOGRAM REPORT: CPT | Performed by: INTERNAL MEDICINE

## 2024-01-10 PROCEDURE — 12400000 HC ROOM AND CARE SEMIPRIVATE PSYCH

## 2024-01-10 RX ORDER — LORAZEPAM 2 MG/ML
2 INJECTION INTRAMUSCULAR EVERY 6 HOURS PRN
Status: DISCONTINUED | OUTPATIENT
Start: 2024-01-10 | End: 2024-02-02 | Stop reason: HOSPADM

## 2024-01-10 RX ORDER — LURASIDONE HYDROCHLORIDE 40 MG/1
120 TABLET, FILM COATED ORAL
Status: DISCONTINUED | OUTPATIENT
Start: 2024-01-10 | End: 2024-01-19

## 2024-01-10 RX ORDER — LURASIDONE HYDROCHLORIDE 40 MG/1
120 TABLET, FILM COATED ORAL NIGHTLY
Status: DISCONTINUED | OUTPATIENT
Start: 2024-01-10 | End: 2024-01-10

## 2024-01-10 RX ADMIN — HALOPERIDOL LACTATE 5 MG: 5 INJECTION, SOLUTION INTRAMUSCULAR at 01:34

## 2024-01-10 RX ADMIN — HALOPERIDOL LACTATE 5 MG: 5 INJECTION, SOLUTION INTRAMUSCULAR at 17:45

## 2024-01-10 RX ADMIN — DIPHENHYDRAMINE HYDROCHLORIDE 50 MG: 50 INJECTION INTRAMUSCULAR; INTRAVENOUS at 01:34

## 2024-01-10 RX ADMIN — DIPHENHYDRAMINE HYDROCHLORIDE 50 MG: 50 INJECTION INTRAMUSCULAR; INTRAVENOUS at 17:45

## 2024-01-10 RX ADMIN — LURASIDONE HYDROCHLORIDE 120 MG: 40 TABLET, COATED ORAL at 17:03

## 2024-01-10 RX ADMIN — TRAZODONE HYDROCHLORIDE 50 MG: 50 TABLET ORAL at 00:09

## 2024-01-10 RX ADMIN — LORAZEPAM 2 MG: 2 INJECTION INTRAMUSCULAR; INTRAVENOUS at 17:45

## 2024-01-10 RX ADMIN — ACETAMINOPHEN 650 MG: 325 TABLET ORAL at 11:52

## 2024-01-10 NOTE — CONSULTS
[facility] Medicine Service     Psychiatric Unit Medical Evaluation         Requesting:  Inpatient Psychiatric Unit Team    Reason for Consultation:  Initial Medical Evaluation     HISTORY OF PRESENT ILLNESS        This is a 48 y.o. female admitted to the psychiatric unit for psychiatric evaluation. The hospitalist medicine service department is consulted for routine medical evaluation. History obtained by chart review and patient interview.       Patient’s past medical history is significant for HTN, HLD, PAF (not prescribed AC; follows with Dr. Tabares), AMINA (uses CPAP), recent right breast abscess drained on 6/26, left breast abscess s/p I/D in 12/2023, anxiety, depression and bipolar disorder.        Upon assessment, patient is alert and in no distress. She is seen out of bed ambulating in room without difficulty. Patient currently denies any current or recent symptoms of chest pain, shortness of breath, dizziness, lightheadedness. No reports of syncope or diaphoresis. She denies any exertional chest pain or discomfort. No symptoms of orthopnea or PND reported. She denies any worsening peripheral edema. Patient denies headaches, blurred vision, and orthopnea. Patient also denies abdominal pain, constipation, nausea/vomiting, diarrhea, or dysuria symptoms. No recent fevers/chills or sick contacts.      Medication dispense history reviewed. She did not report taking any OTC medications.       The patient denies any medical concerns currently.     PAST MEDICAL AND SURGICAL HISTORY        Past Medical History:   Diagnosis Date   • A-fib (CMS/HCC)    • A-fib (CMS/HCC)    • Abscess 06/2023   • Bipolar 1 disorder (CMS/[facility])    • Hypertension    • Schizoaffective disorder (CMS/[facility])    • Sleep apnea        Past Surgical History:   Procedure Laterality Date   • ANAL FISSURECTOMY  2016   • CHOLECYSTECTOMY  2016   • HYSTERECTOMY  2018    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       PCP: Alan Davila, DO    MEDICATIONS         Home Medications:  Medications Prior to Admission   Medication Sig Dispense Refill Last Dose   • atorvastatin (LIPITOR) 40 mg tablet    1/8/2024   • divalproex (DEPAKOTE) 500 mg EC tablet Take 3 tablets (1,500 mg total) by mouth nightly. Please get a level between 7/17 and 7/19'please get script from PCP or outpatient pscyhiatrist 90 tablet 0 1/8/2024   • lurasidone (LATUDA) 80 mg tablet Take 1 tablet (80 mg total) by mouth nightly. 30 tablet 0 1/8/2024   • mirabegron (MYRBETRIQ) 25 mg ER tablet Take 25 mg by mouth daily.   1/8/2024   • albuterol HFA (VENTOLIN HFA) 90 mcg/actuation inhaler Inhale 2 puffs every 6 (six) hours as needed for wheezing or shortness of breath. (Patient not taking: Reported on 11/20/2023) 1 Inhaler 1    • ascorbic acid (VITAMIN C) 500 mg tablet Take 1 tablet (500 mg total) by mouth 2 (two) times a day. (Patient not taking: Reported on 11/20/2023) 60 tablet 0    • aspirin 81 mg enteric coated tablet Take 81 mg by mouth daily.   Unknown   • cholecalciferol, vitamin D3, 5,000 unit (125 mcg) tablet Take 5,000 Units by mouth daily.   Unknown   • fluticasone propionate (FLONASE) 50 mcg/actuation nasal spray 2 sprays daily.   Unknown   • hydrOXYzine (ATARAX) 50 mg tablet Take 0.5 tablets (25 mg total) by mouth every 6 (six) hours as needed for anxiety. (Patient not taking: Reported on 11/20/2023) 30 tablet 0    • hydrOXYzine (VISTARIL) 25 mg capsule Take 25 mg by mouth 2 (two) times a day.   Unknown       Current inpatient medications were personally reviewed.    ALLERGIES        Azithromycin, Doxycycline, Penicillins, and Amoxicillin    FAMILY HISTORY        Family History   Problem Relation Age of Onset   • Diabetes Biological Mother    • Heart disease Biological Father    • Breast cancer Neg Hx        SOCIAL HISTORY        Social History     Tobacco Use   • Smoking status: Former     Packs/day: .25     Types: Cigarettes   • Smokeless tobacco: Never   Vaping Use   • Vaping Use: Never used    Substance Use Topics   • Alcohol use: No   • Drug use: No        REVIEW OF SYSTEMS        All other systems reviewed and negative except as noted in HPI    PHYSICAL EXAMINATION        Visit Vitals  BP (P) 133/82   Pulse (P) 98   Temp (P) 36.7 °C (98.1 °F) (Oral)   Resp (P) 16   LMP  (LMP Unknown)   SpO2 (P) 97%     There is no height or weight on file to calculate BMI.      Physical Exam  Vitals reviewed.   Constitutional:       General: She is not in acute distress.     Appearance: She is obese. She is not diaphoretic.   Cardiovascular:      Rate and Rhythm: Normal rate.      Pulses: Normal pulses.      Heart sounds: Normal heart sounds, S1 normal and S2 normal.      Comments: Trace edema bilateral lower extremities  Pulmonary:      Effort: Pulmonary effort is normal. No respiratory distress.      Breath sounds: No stridor. No wheezing.      Comments: diminished at bases   Chest:      Comments: Deferred as patient declined; no concerns reported  Abdominal:      Tenderness: There is no abdominal tenderness.   Skin:     General: Skin is warm.   Neurological:      General: No focal deficit present.      Mental Status: She is alert and oriented to person, place, and time.         LABS / EKG        Labs  Results from last 7 days   Lab Units 01/09/24  1437   WBC K/uL 8.56   HEMOGLOBIN g/dL 14.3   HEMATOCRIT % 44.3   PLATELETS K/uL 234       Results from last 7 days   Lab Units 01/09/24  1437   SODIUM mEQ/L 139   POTASSIUM mEQ/L 3.7   CHLORIDE mEQ/L 105   CO2 mEQ/L 24   BUN mg/dL 13   CREATININE mg/dL 0.8   CALCIUM mg/dL 9.7   ALBUMIN g/dL 4.3   BILIRUBIN TOTAL mg/dL 0.4   ALK PHOS IU/L 67   ALT IU/L 12   AST IU/L 11*   GLUCOSE mg/dL 115*       Lab Results   Component Value Date    TSH 2.10 11/15/2023        Urine Drug Screen Results       01/09/24     1555    PCP Scrn Ur Not Detected    Benzodiazepine Ur Qual Not Detected    Amphetamine+Methamphetamine Scrn Ur Not Detected    Cannabinoid Screen Ur Not Detected     Opiate Scrn Ur Not Detected    Barbiturate Screen Ur Not Detected         Comment for PCP Scrn Ur at 1555 on 24: Assay Detects: phencyclidine in urine. Lowest detectable concentration is 25 ng/mL of phencyclidine.    Comment for Benzodiazepine Ur Qual at 1555 on 24: Assay Detects: benzodiazepines and metabolites at varying concentrations. Lowest detectable concentration is 200 ng/mL of oxazepam.    Comment for Amphetamine+Methamphetamine Scrn Ur at 1555 on 24: Assay Detects: d-methamphetamine, d-amphetamine, methlyenedioxyamphetamine (MDA), and methlyenendioxymethamphetamine (MDMA) in urine. Lowest detectable concentration is 1000 ng/mL of d-methamphetamine.  Assay is less sensitive to MDA and MDMA (lowest detectable concentration, 2500 ng/mL) and could produce a false negative result. If MDMA overdose is suspected and the result is negative, a more specific test should be requested.    Comment for Cannabinoid Screen Ur at 1555 on 24: Assay Detects: cannabinoid metabolites in urine. Lowest detectable concentration is 50 ng/mL    Comment for Opiate Scrn Ur at 1555 on 24: Assay Detects: codeine, dihydrocodeine, hydrocodone, hydromorphone, levorphanol, morphine, morphine-3-glucuronide, norcodeine, oxycodone in urine. Lowest detectable concentration is 300 ng/mL of morphine.    Comment for Barbiturate Screen Ur at 1555 on 24: Assay Detects: alphenal, amobarbital, aprobarbital, barbital, butabarbital, butalbital, butethal, diallybarbital, pentobarbital, secobarbital,talbutal, and thiopental. Lowest detectable concentration is 200 ng/mL of secobarbital.           SARS-CoV-2 (COVID-19) (no units)   Date/Time Value   2020 1442 Positive (AA)          EK/10: No baseline EKG in chart at time of consultation.       ASSESSMENT AND RECOMMENDATIONS           * Mood disorder (CMS/[facility])  Assessment & Plan  Management per psychiatry     Hyperlipidemia  Assessment & Plan  Hx HLD  Home  regimen: Lipitor 40 mg PO daily.       Plan-  - Continue statin if LFTs normal.   - Recommend follow up with PCP for hospital follow up and fasting lipid panel for stroke prevention    AMINA (obstructive sleep apnea)  Assessment & Plan  Hx AMINA uses CPAP.       Plan-  - Continue CPAP   - Recommend respiratory therapy evaluate home CPAP machine.  - Follow up with PCP in 1 week for hospital follow up appointment and age appropriate cancer screenings.    Paroxysmal atrial fibrillation (CMS/HCC)  Assessment & Plan  Hx PAF noted in 2020   Follows with Dr. Tabares cardiologist.   Not prescribed AC.   She reports she no longer takes Cardizem or ASA.     SR on exam. She denies chest pain, SOB, dizziness, lightheadedness. No heart palpitations reported.       Plan-  - Maintain K+ > 4 and Mg > 2  - Continue to monitor vital signs, SpO2%, fever.   - Follow up with primary cardiologist at discharge.        Please communicate acute concerns with the covering hospitalist.    The patient should follow up with Primary Care upon discharge from the [facility].        ELISHA Rosario  1/10/2024

## 2024-01-10 NOTE — ASSESSMENT & PLAN NOTE
Hx HLD  Home regimen: Lipitor 40 mg PO daily.       Plan-  - Continue statin if LFTs normal.   - Recommend follow up with PCP for hospital follow up and fasting lipid panel for stroke prevention

## 2024-01-10 NOTE — ASSESSMENT & PLAN NOTE
Hx AMINA uses CPAP.       Plan-  - Continue CPAP   - Recommend respiratory therapy evaluate home CPAP machine.  - Follow up with PCP in 1 week for hospital follow up appointment and age appropriate cancer screenings.

## 2024-01-10 NOTE — PROGRESS NOTES
01/10/24 1500   Activity/Group Checklist   Group Title Interactive therapy   Attendance Did not attend

## 2024-01-10 NOTE — ASSESSMENT & PLAN NOTE
Hx PAF noted in 2020   Follows with Dr. Tabares cardiologist.   Not prescribed AC.   She reports she no longer takes Cardizem or ASA.     SR on exam. She denies chest pain, SOB, dizziness, lightheadedness. No heart palpitations reported.       Plan-  - Maintain K+ > 4 and Mg > 2  - Continue to monitor vital signs, SpO2%, fever.   - Follow up with primary cardiologist at discharge.
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
## 2024-01-10 NOTE — PROGRESS NOTES
01/10/24 1300   Activity/Group Checklist   Group Title Wellness tools   Attendance Attended   Attendance Duration (min) 16-30   Follows Direction Unable to follow directions   Interactions Disorganized interaction   Affect/Mood Range Wide   Affect/Mood Display Alert;Bright   Goals Achieved Able to listen to others;Able to engage in interactions     This group involved self-reflection and creative expression. Group members worked together to develop a list of common items people carry with them in their metaphorical backpack, including guilt, shame, trauma, responsibility, etc. Jillian was disorganized and religiously focused. She frequently interrupted peers and spoke at high volume. Jillian benefited from significant redirection throughout.

## 2024-01-10 NOTE — PLAN OF CARE
"  Problem: Adult Behavioral Health Plan of Care  Goal: Plan of Care Review  Outcome: Progressing  Flowsheets (Taken 1/9/2024 2101)  Progress: improving  Patient Agreement with Plan of Care: agrees  Outcome Evaluation: Jillian was oriented to unit and programming. Visible to the milieu and conversing with staff and peers, observed some poor boundaries but easily redirectable and pleasant.   Intense, prolonged eye contact, unprompted shared with this writer that \"sometimes I get horny when I think about my boyfriend... but I haven't even met him!\"     Per report from [facility], patient tried to get into [facility]. When not allowed, patient asked him if she could hug him or touch his beard, received boundary setting.   Per report from 3rd RN staff, patient charged toward her as she opened the door into the second nurses station, prompting the RN to close the door swiftly so as not to let the patient in.  Behaviors consistent with patient being an elopement risk, staff notified.    Observed to be speaking derogatorily directed at 2nd RN staff, redirected with poor effect, patient raised voice at staff demanding that the nurse do not speak with another patient; patient mistaking RN for someone in her life.     Later in the evening, patient wrote out long apology note for 2nd RN for her behavior, note placed in chart. Observably labile.    Observed to be randomly raising her arms while sitting down in the milieu.     Can be heard hysterically laughing inside her room 396 on the opposite side of the unit from main nurses station on occasion.     Laurel to be slamming her room door multiple times.    Did not endorse experiencing thoughts of suicide, self-harm, or hallucinations. Care plan updated.      0130 Update    Patient stomped toward nurses station, hit the glass with her hand, shouting that she is \"done,\" going to sign herself out, demanding release. Verbal deescalation attempted to poor effect, patient continues to " "raise voice stating she does not care if she wakes other patients.     Patient screams, swearing up and down the halls, yells \"Faggot\" at this nurse writer, returns to stand at nurses station and states threats that if security comes and she gets medicated, \"You'll see what happens, I'll shoot you too.\"    Code green level 2 called, security arrived, patient is escorted to room and becomes tearful. IM Haldol 5mg and Benadryl 50mg administered in right and left deltoids for extreme agitation to positive effect.     As of approx 0200, patient observed to be resting quietly in bed.    Plan of Care Reviewed With: patient  Intervention(s): Nursing assessment, medication management, emotional support offered, positive reinforcement given, encouraged patient to make needs known to staff     Problem: Psychotic Signs/Symptoms  Goal: Improved Behavioral Control (Psychotic Signs/Symptoms)  Outcome: Progressing  Flowsheets (Taken 1/9/2024 2101)  [place] Goal: identifies future-oriented goal  Individual Goal: Patient will identify a future-oriented goal.     "

## 2024-01-10 NOTE — ASSESSMENT & PLAN NOTE
Improved hyperreligiosity, improved lability.No PRNs over the weekend.  Lethargy improved, continues to be AXO x 4.     1.  Patient admitted on a 201 commitment.  Currently has symptoms of manic decompensation, likely rapid cycling.  Hyperreligious, hypersexual, tangential some disorganization, agitation and labile.  Off and on utilization of PRNs often targets female staff members believing there acting like her sister.    Radha improving, sleeping, less labile/agitated    2.  Increasing Seroquel XR to 700 mg QHS     Sexual preoccupation and hyperreligiosity improved this morning exam still referential about staff    3.  Continue Depakote at 1750 mg nightly recheck Depakote level showed within normal limits of 80.  We will continue to titrate to effective dosing and monitor serial Depakote levels    4.  Continue Ativan at 2 times daily by the weekend team will continue for now as she was sleeping mid day.  Can hold for lethagy    5.  Continue haloperidol 5 mg every 6 hours IM/p.o. as needed as needed for agitation and psychosis.  Recommend continuing offering this patient if she continues to display psychotic symptoms as well as agitation    6.  Safety: Continue to encourage staff to approach with caution if she has significant radha psychosis at this time.  Ongoing significant intrusiveness and lability.  If she continues to act out and may need a higher level of supervision..

## 2024-01-10 NOTE — PLAN OF CARE
Problem: Adult Behavioral Health Plan of Care  Goal: Plan of Care Review  Outcome: Progressing  Flowsheets (Taken 1/10/2024 4807)  Consent Given to Review Plan with: pt  Progress: improving  Patient Agreement with Plan of Care: agrees  Outcome Evaluation: Jillian was labile at times. She was calm at some points, but slightly animated at other points. She required redirection throughout the shift for poor boundaries and and inappropriateness. She was able to make needs known, though she approached staff very frequently. She didn't require prn medication at the time of this writing, and staff are monitoring on q15min rounds.   Plan of Care Reviewed With: patient  Intervention(s): RN assessment, positive behavior and boundaries reinforced, reassruance provided
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
## 2024-01-10 NOTE — NURSING NOTE
Pt was brought into the admission suite at 1900. She was not on 1:1. She denied having any suicidal thoughts or urges to self harm, currently and in the past 30 days. Body search was completed and she was brought to her room by MHT. RN assessment will be endorsed to incoming shift and patient will be monitored on q15min rounds.

## 2024-01-10 NOTE — PROGRESS NOTES
"   01/10/24 1015   Activity/Group Checklist   Group Title Other (Comment)  (Music Therapy)   Attendance Other (Comment)  (Jillian was invited to this music therapy group, however Pt presented disorganized at this time. Jillian asked this therapist, \"can you pull up my sock?\" and this therapist redirected Pt.)       "

## 2024-01-11 LAB
CHOLEST SERPL-MCNC: 143 MG/DL
EST. AVERAGE GLUCOSE BLD GHB EST-MCNC: 108 MG/DL
HBA1C MFR BLD: 5.4 %
HDLC SERPL-MCNC: 39 MG/DL
HDLC SERPL: 3.7 {RATIO}
LDLC SERPL CALC-MCNC: 78 MG/DL
NONHDLC SERPL-MCNC: 104 MG/DL
TRIGL SERPL-MCNC: 128 MG/DL
TSH SERPL DL<=0.05 MIU/L-ACNC: 2.58 MIU/L (ref 0.34–5.6)
VALPROATE SERPL-MCNC: 24 UG/ML (ref 50–100)

## 2024-01-11 PROCEDURE — 63700000 HC SELF-ADMINISTRABLE DRUG: Performed by: STUDENT IN AN ORGANIZED HEALTH CARE EDUCATION/TRAINING PROGRAM

## 2024-01-11 PROCEDURE — 80164 ASSAY DIPROPYLACETIC ACD TOT: CPT | Performed by: STUDENT IN AN ORGANIZED HEALTH CARE EDUCATION/TRAINING PROGRAM

## 2024-01-11 PROCEDURE — 80061 LIPID PANEL: CPT | Performed by: PSYCHIATRY & NEUROLOGY

## 2024-01-11 PROCEDURE — 36415 COLL VENOUS BLD VENIPUNCTURE: CPT | Performed by: STUDENT IN AN ORGANIZED HEALTH CARE EDUCATION/TRAINING PROGRAM

## 2024-01-11 PROCEDURE — 99232 SBSQ HOSP IP/OBS MODERATE 35: CPT | Performed by: STUDENT IN AN ORGANIZED HEALTH CARE EDUCATION/TRAINING PROGRAM

## 2024-01-11 PROCEDURE — 12400000 HC ROOM AND CARE SEMIPRIVATE PSYCH

## 2024-01-11 PROCEDURE — 83036 HEMOGLOBIN GLYCOSYLATED A1C: CPT | Performed by: PSYCHIATRY & NEUROLOGY

## 2024-01-11 PROCEDURE — 84443 ASSAY THYROID STIM HORMONE: CPT | Performed by: PSYCHIATRY & NEUROLOGY

## 2024-01-11 PROCEDURE — 63700000 HC SELF-ADMINISTRABLE DRUG: Performed by: PSYCHIATRY & NEUROLOGY

## 2024-01-11 RX ORDER — LORAZEPAM 2 MG/ML
1 INJECTION INTRAMUSCULAR ONCE
Status: DISCONTINUED | OUTPATIENT
Start: 2024-01-11 | End: 2024-01-11

## 2024-01-11 RX ORDER — DIVALPROEX SODIUM 250 MG/1
1500 TABLET, DELAYED RELEASE ORAL ONCE
Status: COMPLETED | OUTPATIENT
Start: 2024-01-11 | End: 2024-01-11

## 2024-01-11 RX ORDER — DIPHENHYDRAMINE HCL 50 MG
50 CAPSULE ORAL ONCE
Status: DISCONTINUED | OUTPATIENT
Start: 2024-01-11 | End: 2024-01-11

## 2024-01-11 RX ORDER — LORAZEPAM 1 MG/1
1 TABLET ORAL EVERY 6 HOURS PRN
Status: DISCONTINUED | OUTPATIENT
Start: 2024-01-11 | End: 2024-01-12

## 2024-01-11 RX ORDER — LORAZEPAM 1 MG/1
1 TABLET ORAL ONCE
Status: COMPLETED | OUTPATIENT
Start: 2024-01-11 | End: 2024-01-11

## 2024-01-11 RX ORDER — DIVALPROEX SODIUM 250 MG/1
1500 TABLET, DELAYED RELEASE ORAL NIGHTLY
Status: DISCONTINUED | OUTPATIENT
Start: 2024-01-12 | End: 2024-01-15

## 2024-01-11 RX ORDER — HALOPERIDOL 5 MG/ML
5 INJECTION INTRAMUSCULAR ONCE
Status: DISCONTINUED | OUTPATIENT
Start: 2024-01-11 | End: 2024-01-11

## 2024-01-11 RX ADMIN — LORAZEPAM 1 MG: 1 TABLET ORAL at 14:27

## 2024-01-11 RX ADMIN — HALOPERIDOL 5 MG: 5 TABLET ORAL at 14:27

## 2024-01-11 RX ADMIN — LORAZEPAM 1 MG: 1 TABLET ORAL at 14:44

## 2024-01-11 RX ADMIN — DIVALPROEX SODIUM 1500 MG: 250 TABLET, DELAYED RELEASE ORAL at 09:02

## 2024-01-11 RX ADMIN — ACETAMINOPHEN 650 MG: 325 TABLET ORAL at 03:07

## 2024-01-11 RX ADMIN — LORAZEPAM 1 MG: 1 TABLET ORAL at 09:03

## 2024-01-11 RX ADMIN — HALOPERIDOL 5 MG: 5 TABLET ORAL at 09:03

## 2024-01-11 RX ADMIN — LURASIDONE HYDROCHLORIDE 120 MG: 40 TABLET, COATED ORAL at 18:19

## 2024-01-11 NOTE — PLAN OF CARE
"Problem: Adult Behavioral Health Plan of Care  Goal: Plan of Care Review  Outcome: Progressing  Flowsheets (Taken 1/11/2024 0334)  Progress: improving  Patient Agreement with Plan of Care: (pt declined HS medications) agrees with comment (describe)  Outcome Evaluation: Jillian was received sleeping at the start of this shift. Pt declined her HS medications, stating \"I don't want to take any more medications, I'm tired\", and fell back asleep. She woke up around 0215. Is religiously preoccupied, disorganized, and tangential. She frequently talks about her boyfriend \"Candido\". Pt states she is not having suicidal thoughts, self harm thoughts, or thoughts of hurting others. She denied having hallucinations, but also said she \"sees people I know\". Offered pt her HS medications again, but she still declined. Pt calling staff by different names.  Pt listened to music and read her bible. She showered this shift,   Plan of Care Reviewed With: patient  Intervention(s): Emotional support provided. Relaxation techniques promoted. Q15 minute rounds.     "

## 2024-01-11 NOTE — NURSING NOTE
"Jillian transferred on 2E from 3E unit around 1535 with staff and security.    Upon arrival on unit, pt was calm. Oriented her to unit and her room. She stated \"wasn't I was here last time\". \" I would rather be on this floor than upper level\". Pt remembered her last admission and being on 2E unit. Pt given snacks which she requested. Later she went to her room to rest.     Around dinner time Jillian came out in hallways and yelling \" I can't do this, I am signing out myself\". She was paranoid, angry, delusional about her sister being on \"upstairs with her minions\". Pt was talking about her boyfriend \"Candido who is doctor\". She stated only thing is going to calm her down is to \" have sex with my boyfriend\". Pt assisted to her room in calmly manner. Verbally redirected. Therapeutic communication provided. Music given to use as coping skills.  "

## 2024-01-11 NOTE — PROGRESS NOTES
01/11/24 1015   Activity/Group Checklist   Group Title Other (Comment)  (Music Therapy)   Attendance Other (Comment)  (Jillian is not appropriate for group therapy at this time.)

## 2024-01-11 NOTE — PROGRESS NOTES
01/11/24 1315   Activity/Group Checklist   Group Title Wellness tools   Attendance Other (Comment)  (Jillian is not appropriate for group therapy at this time)
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
## 2024-01-11 NOTE — PROGRESS NOTES
"PSYCHIATRIC PROGRESS NOTE    Chief Complaint/Reason for follow-up: Schizoaffective mari    Interval History: Patient was seen in room today where she was calm and cooperative with interview.  Continues to be religiously and sexually preoccupied.  She discussed that various staff members or the antichrist and that she was having issues with him overnight.  I counseled her that if she were to have thoughts that people or the devil or evil that she should talk with us and ask for medications rather than acting upon these.  She was agreeable to this.  She also is discussing the attractiveness of both her father and uncle.  She also states that she listens to the voice of her father which I encouraged her to discuss with us if she was having any command hallucinations which she agreed to.  Overnight she received multiple PRNs threw a tray breaking said tray.  In discussing this with her she states she was having difficulties with staff and required as needed/restraint.  Continues to have some grandiosity.  Denies any SI or HI.  Continues to endorse visual hallucinations stating that she sees people \"in a different light.\"  She did feel that there were \"dead Caodaism babies underneath her bed.\"  I encouraged her to not try and look at them and there were no such things and to inform staff of such thoughts.  She was compliant with her morning medications as well as PRNs allowing her Depakote level to be assessed.  She had asked for discharge Friday at which I encouraged her likely is not going to happen while she is still displaying symptoms of mari and psychosis. Poor sleep overnight per staff report.     Yesterday patient received as needed Haldol, Ativan and Benadryl twice throughout the day.  As per chart review she was shouting at staff and patients threatening to fight others and threw her tray subsequently breaking it.  Code grains were called and she received IM antipsychotics/Ativan.    Vital Signs for the last 24 " hours:  Temp:  [36.6 °C (97.9 °F)-36.7 °C (98.1 °F)] 36.7 °C (98.1 °F)  Heart Rate:  [91-95] 91  Resp:  [16-17] 17  BP: (118)/(74-86) 118/86    Scheduled Meds:  • atorvastatin  40 mg oral Nightly   • [START ON 1/12/2024] divalproex  1,500 mg oral Nightly   • lurasidone  120 mg oral Daily with dinner       Labs:  Selected Electrolytes  Results from last 7 days   Lab Units 01/09/24  1437   POTASSIUM mEQ/L 3.7   SODIUM mEQ/L 139   CREATININE mg/dL 0.8       Recent EKG HR/QTC  Lab Results   Component Value Date    VENTRICRATE 86 01/10/2024    QTCCALCULAT 442 01/10/2024       Lab Results   Component Value Date    VPA 24.0 (L) 01/11/2024    VPA 95.1 10/07/2023    LITHIUM <0.1 (L) 11/29/2020    LITHIUM 0.5 07/22/2020       Mental Status Exam  Appearance:    in hospital attire   Attention:  attends appropriately during exam   Behavior (General):  Staring,  pacing the unit   Behavior (Motoric):  typical coordinated movements appropriate to situation   Speech:  excessive speech and pressured   Language:  fluent   Affect:  labile, expansive and Euphoric   Mood:  great   Associations:  illogical and loose   Thought Process:  tangential and off-topic and non sequitur, responses not related to questions or topic   Thought Content:  grandiosity, hyperreligiosity and Hypersexual.  States she occasionally does hear command hallucinations of her father nonactive.   Suicidality:  denies desire or thoughts to harm or kill self   Orientation:  person, day, month, year, name of place and situation   Insight/Judgement:  Poor/poor   Memory:  recalls recent events and recalls remote events       Assessment/Plan  Schizoaffective disorder, bipolar type (CMS/HCC)  Assessment & Plan  Zuleyma Acosta is a 48-year-old female past medical history of seizure with active bipolar type who presents with manic decompensation psychosis lability and aggression.  Patient has a longstanding mental health history with approximately 10 prior inpatient admissions  currently sees Dr. Fall outside the hospital as her psychiatrist prescribed Latuda 80 mg nightly and Depakote 1250 nightly.  Questionable compliance with medications outside of the hospital.  Approximately 2-week history of decompensation.  Increasing goal directed activity at night hyper fixated on a new relationship she has developed outside of the hospital online, hyperreligiosity, culminating in lability and agitation Tuesday night with her family.  Sister called the  after an altercation.  Plan is to restart medications and increasing Latuda and checking Depakote level to assess compliance and will titrate to minimal effective dosing of both these medications. Since hospitalized, ongoing lability, hypersexuality, hyper religiousity, poor bounadaries, will titrated to effective dosing of Depakote, utilizing prns for safety and symptom control.    1.  Patient admitted on a 201 commitment.  Currently has symptoms of manic decompensation.  Hyperreligious, hypersexual, tangential some disorganization, agitation and labile.  Receiving two PRNs yesterday    Still actively manic, cooperative with the evaluation although continues to have hyperreligiosity and hypersexuality.    2.  Continue Latuda to 120 mg every afternoon with meals.  Checking Depakote level and will assess Depakote dosing following labs.  Continue Depakote 1500 mg nightly for now.  Reordered a dose of Depakote for this morning since yesterday evening's dose was not given.     We will continue Depakote 1500 mg nightly tomorrow evening we will titrate to effective dosing based on levels.    3.  Continue haloperidol 5 mg every 6 hours IM/p.o. as needed as needed for agitation and psychosis.  Recommend continuing offering this patient if she continues to display psychotic symptoms as well as agitation    I added 1 mg of Ativan p.o. every 6 hours as needed in addition to her 2 mg IM as needed as needed    4.  EKG checked yesterday and QTc 442 no  overt abnormalities.  Valproic acid level lab obtained this morning showing a level of 24 which is to be expected given her symptoms and likely noncompliance.  We will continue to follow to help better management.    5.  Patient currently on a 201 likely would need involuntary psychiatric commitment if she attempts to sign herself out.    6.  Safety: Continue to encourage staff to approach with caution if she has significant amri psychosis at this time.  If she continues to act out and may need a higher level of supervision..

## 2024-01-11 NOTE — PLAN OF CARE
"  Problem: Psychotic Signs/Symptoms  Goal: Improved Behavioral Control (Psychotic Signs/Symptoms)  Outcome: Not progressing  Flowsheets (Taken 1/11/2024 2605)  Lawai Goal: identifies symptoms triggers  Individual Goal: Jillian will try and utilize two coping skills     Problem: Violence Risk or Actual  Goal: Anger and Impulse Control  Outcome: Not progressing   Plan of Care Review  Plan of Care Reviewed With: patient  Patient Agreement with Plan of Care: agrees  Progress: improving  Intervention(s): Nursing assessment, medication management, encourage participation in treatment and Q 15 minute rounds for safety  Outcome Evaluation: Jillian was received in the morning hyper verbal, demanding, yelling and agitated. Jillian was able to utilize coping skills of listening to music and walking. She received blood work and after, again, became hyper verbal and agitated. Doctor notified and recommended as needed medications. She received as needed medications with scheduled medications. Medications were effective, and through out the morning was able to socialize, listen to music and remain in control of behavior.     Around 2:30 pm, Jillian was outside in the milieu, tipping furniture and staring at staff in an aggressive manor, (wide eyed). Staff asked Jillian what was going on and why was she flipping furniture. She went to hand another staff the headphones but was charging at her. She was redirected. Security present on the unit came out. At the same time, another patient on the unit was labile and screaming that she wanted to fight the staff. Code green level 2 called. Jillian began screaming at the other patient that \"You need to be put down\" \"she needs a shot\". Jillian and this patient were screaming at each other. Jillian then put her fists up and charged at this nurse. Security stepped in front of nurse and yelled \"stop\". Jillian then charged at behavioral health tech. This nurse also told Jillian to put her fists down and stop. iJllian complied. " Staff stated that this Jillian was not under control of herself, and Jillian needed to walk to the quiet room. Jillian complied. More security showed up, manager, and additional staff came to help.  As needed medication was administered and doctor notified. Doctor ordered one more dose of medication to ensure safety on the unit. Jillian sat with the nurse manager in the quiet room while medications took effect. Jillian was debriefed and we discussed the importance of never laying hands on anyone or threatening peers or staff. Jillian agreed to spend afternoon in room and rest while medication took effect.     As there have been some problems with peer on the unit, it was decided by treatment team that Jillian be moved to the second floor. Report called to the nurse and security called to transfer Jillian. Staff accompanied Jillian down to the second floor, and she was introduced to new nurse as well as given some snacks. She agreed to stay in room and rest. No other issues or concerns at this time and treatment plan remains ongoing.

## 2024-01-11 NOTE — PROGRESS NOTES
01/11/24 1445   Activity/Group Checklist   Group Title Interactive therapy   Attendance Other (Comment)  (Not appopriate for group therapy at this time)

## 2024-01-12 PROCEDURE — 63700000 HC SELF-ADMINISTRABLE DRUG: Performed by: STUDENT IN AN ORGANIZED HEALTH CARE EDUCATION/TRAINING PROGRAM

## 2024-01-12 PROCEDURE — 63700000 HC SELF-ADMINISTRABLE DRUG: Performed by: PSYCHIATRY & NEUROLOGY

## 2024-01-12 PROCEDURE — 12400000 HC ROOM AND CARE SEMIPRIVATE PSYCH

## 2024-01-12 PROCEDURE — 99232 SBSQ HOSP IP/OBS MODERATE 35: CPT | Performed by: STUDENT IN AN ORGANIZED HEALTH CARE EDUCATION/TRAINING PROGRAM

## 2024-01-12 RX ORDER — CARBOXYMETHYLCELLULOSE SODIUM 5 MG/ML
1 SOLUTION/ DROPS OPHTHALMIC 2 TIMES DAILY PRN
Status: DISCONTINUED | OUTPATIENT
Start: 2024-01-12 | End: 2024-02-02 | Stop reason: HOSPADM

## 2024-01-12 RX ORDER — LORAZEPAM 1 MG/1
1 TABLET ORAL EVERY 4 HOURS PRN
Status: DISCONTINUED | OUTPATIENT
Start: 2024-01-12 | End: 2024-02-02 | Stop reason: HOSPADM

## 2024-01-12 RX ADMIN — LORAZEPAM 1 MG: 1 TABLET ORAL at 22:12

## 2024-01-12 RX ADMIN — DIVALPROEX SODIUM 1500 MG: 250 TABLET, DELAYED RELEASE ORAL at 22:11

## 2024-01-12 RX ADMIN — ACETAMINOPHEN 650 MG: 325 TABLET ORAL at 05:48

## 2024-01-12 RX ADMIN — HALOPERIDOL 5 MG: 5 TABLET ORAL at 09:27

## 2024-01-12 RX ADMIN — ATORVASTATIN CALCIUM 40 MG: 40 TABLET, FILM COATED ORAL at 03:20

## 2024-01-12 RX ADMIN — CARBOXYMETHYLCELLULOSE SODIUM 1 DROP: 5 SOLUTION/ DROPS OPHTHALMIC at 09:28

## 2024-01-12 RX ADMIN — LORAZEPAM 1 MG: 1 TABLET ORAL at 09:27

## 2024-01-12 RX ADMIN — ATORVASTATIN CALCIUM 40 MG: 40 TABLET, FILM COATED ORAL at 22:12

## 2024-01-12 RX ADMIN — LURASIDONE HYDROCHLORIDE 120 MG: 40 TABLET, COATED ORAL at 17:11

## 2024-01-12 NOTE — PLAN OF CARE
Plan of Care Review  Plan of Care Reviewed With: patient  Patient Agreement with Plan of Care: agrees  Progress: no change  Intervention(s): Encouraged pt to try and use positive coping skills  Outcome Evaluation: Jillian has been visible. Frequently at the nurses station with random requests and just wanting to chat with staff. Denies suicide. Tangential, Illogical, loose associations, disorganized, labile. Speech loud at times. Elevated, animated. Can easily become agitated. Poor boundaries but redirectable. Pt with increased agitation at 930, Haldol 5 mg, Ativan 1 mg given with positive result. Pt is using headphones this shift, positive calming effect. Needs encouragement and interaction with staff. Makes needs known to staff. Will continue to closely monitor, offer much support

## 2024-01-12 NOTE — PLAN OF CARE
Problem: Adult Behavioral Health Plan of Care  Goal: Plan of Care Review  Outcome: Progressing  Flowsheets (Taken 1/12/2024 0328)  Progress: no change  Patient Agreement with Plan of Care: agrees  Outcome Evaluation: Jillian  Plan of Care Reviewed With: patient  Intervention(s):  • Nursing/suicide assessment  • Medication maintenance  • Monitored for safety Q 15 minutes   Plan of Care Review  Plan of Care Reviewed With: patient  Patient Agreement with Plan of Care: agrees  Progress: no change  Intervention(s): Nursing/suicide assessment; Medication maintenance; Monitored for safety Q 15 minutes  Outcome Evaluation: Jillian was withdrawn to her room, sleeping at the beginning of the evening. She awoke around 0300 showered and then approached the nurses station and stated she wanted to leave today. Receptive to encouragement to speak with her doctor in the morning. Requested and was given coffee, a hair brush, and the headphones. Compliant with Lipitor at this time. Will continue to provide support as needed.    0600: Jillian has been at the nurses station constantly since she awoke to make various requests and talk with staff. She has remained in behavioral control and did not require PRNs.

## 2024-01-12 NOTE — PROGRESS NOTES
01/12/24 1500   Activity/Group Checklist   Group Title Interactive therapy   Attendance Did not attend

## 2024-01-12 NOTE — PROGRESS NOTES
01/12/24 1485   Activity/Group Checklist   Group Title Other (Comment)  (Music Therapy: After a verbal check-in, group members will discuss and characterize emotions in relationship to their mental health recovery. Group members will be assigned an emotion and be instructed to recreate that emotion on an instrument)   Attendance Did not attend

## 2024-01-12 NOTE — PROGRESS NOTES
PSYCHIATRIC PROGRESS NOTE    Chief Complaint/Reason for follow-up: Schizoaffective mari    Interval History: Patient was seen in room today where she was calm and cooperative with interview.  Lethargic on approach but after discussion more interactive and engaged.  Continues to be religiously and sexually preoccupied.  Yesterday, Adan called given multiple PRNs.  Altercation with another patient verbally had to be de-escalated.  Subsequently, moved to the second floor.  Continues to tell me about how God is her guide and she listens to God's will.  Asked me if she could be  to her boyfriend outside of the hospital by interviewer.  Redirected.  Continues to have some grandiosity.  Denies any SI or HI.  Perseveration that her sister is somewhere in the hospital on the third floor.  I encouraged her to not try and look at them and there were no such things and to inform staff of such thoughts.  She was given morning PRNs for ongoing psychosis no agitation this a.m.  She was asking for discharge which I redirected her telling her that she continues to have significant symptoms and likely is not a safe option at this time.  Redirectable and agreeable to stay over the weekend.  She did sleep and 8 to 3 AM but has been awake since 3 AM.  I further counseled patient that if she were to have command hallucinations or agitated delusional to work with staff and taking medications and redirecting these thoughts rather than acting on them.  Verbally agreeable to this with me.        Vital Signs for the last 24 hours:  Temp:  [36.8 °C (98.3 °F)] 36.8 °C (98.3 °F)  Heart Rate:  [] 99  Resp:  [18] 18  BP: (112-135)/(70-86) 135/70    Scheduled Meds:  • atorvastatin  40 mg oral Nightly   • divalproex  1,500 mg oral Nightly   • lurasidone  120 mg oral Daily with dinner       Labs:  Selected Electrolytes  Results from last 7 days   Lab Units 01/09/24  1437   POTASSIUM mEQ/L 3.7   SODIUM mEQ/L 139   CREATININE mg/dL 0.8        Recent EKG HR/QTC  Lab Results   Component Value Date    VENTRICRATE 86 01/10/2024    QTCCALCULAT 442 01/10/2024       Lab Results   Component Value Date    VPA 24.0 (L) 01/11/2024    VPA 95.1 10/07/2023    LITHIUM <0.1 (L) 11/29/2020    LITHIUM 0.5 07/22/2020       Mental Status Exam  Appearance:    in hospital attire   Attention:  attends appropriately during exam   Behavior (General):  Staring,  pacing the unit initially lethargic on approach   Behavior (Motoric):  typical coordinated movements appropriate to situation   Speech:  normal rate/rhythm/volume   Language:  fluent   Affect:  labile, expansive and Euphoric   Mood:  great   Associations:  illogical and loose   Thought Process:   Perseverative, tangentiality times   Thought Content:  grandiosity, hyperreligiosity and Hypersexual.  Referential towards things in the hospital.  States she occasionally does hear command hallucinations of her father nonactive.   Suicidality:  denies desire or thoughts to harm or kill self   Orientation:  person, day, month, year, name of place and situation   Insight/Judgement:  Poor/poor   Memory:  recalls recent events and recalls remote events       Assessment/Plan  Schizoaffective disorder, bipolar type (CMS/HCC)  Assessment & Plan  Zuleyma Acosta is a 48-year-old female past medical history of seizure with active bipolar type who presents with manic decompensation psychosis lability and aggression.  Patient has a longstanding mental health history with approximately 10 prior inpatient admissions currently sees Dr. Fall outside the hospital as her psychiatrist prescribed Latuda 80 mg nightly and Depakote 1250 nightly.  Questionable compliance with medications outside of the hospital.  Approximately 2-week history of decompensation.  Increasing goal directed activity at night hyper fixated on a new relationship she has developed outside of the hospital online, hyperreligiosity, culminating in lability and agitation  Tuesday night with her family.  Sister called the  after an altercation.  Plan is to restart medications and increasing Latuda and checking Depakote level to assess compliance and will titrate to minimal effective dosing of both these medications. Since hospitalized, ongoing lability, hypersexuality, hyper religiousity, poor bounadaries, will titrated to effective dosing of Depakote, utilizing prns for safety and symptom control.  Ongoing lability, disinhibition, multiple PRNs since hospitalization.    1.  Patient admitted on a 201 commitment.  Currently has symptoms of manic decompensation.  Hyperreligious, hypersexual, tangential some disorganization, agitation and labile.  Receiving two PRNs yesterday    Still actively manic, cooperative with the evaluation although continues to have hyperreligiosity and hypersexuality.    2.  Continue Latuda to 120 mg every afternoon with meals.  Checking Depakote level and will assess Depakote dosing following labs.  Continue Depakote 1500 mg nightly for now.  Reordered a dose of Depakote for this morning since yesterday evening's dose was not given.     We will continue Depakote 1500 mg nightly tomorrow evening we will titrate to effective dosing based on levels.    3.  Continue haloperidol 5 mg every 6 hours IM/p.o. as needed as needed for agitation and psychosis.  Recommend continuing offering this patient if she continues to display psychotic symptoms as well as agitation    I added 1 mg of Ativan p.o. every 6 hours as needed in addition to her 2 mg IM as needed as needed    4.  EKG checked yesterday and QTc 442 no overt abnormalities.  Valproic acid level lab obtained this morning showing a level of 24 which is to be expected given her symptoms and likely noncompliance.  We will continue to follow to help better management.    5.  Patient currently on a 201 likely would need involuntary psychiatric commitment if she attempts to sign herself out.    6.  Safety: Continue  to encourage staff to approach with caution if she has significant mari psychosis at this time.  If she continues to act out and may need a higher level of supervision..

## 2024-01-12 NOTE — PROGRESS NOTES
01/12/24 1030   Activity/Group Checklist   Group Title Wellness tools   Attendance Attended   Attendance Duration (min) 16-30   Follows Direction Unable to follow directions   Interactions Disorganized interaction   Affect/Mood Range Wide   Affect/Mood Display Alert;Bright   Goals Achieved Able to listen to others;Identified feelings;Able to engage in interactions     Jillian attended portion of group, during which she participated in check-in, sharing that she feels happy today. She benefited from redirection at times, as she was hyperverbal and religiously preoccupied. Jillian declined to participate in emotion bingo, reporting that it was inappropriate. She walked around group room while listening to music and dancing, though minimally disruptive.

## 2024-01-13 PROCEDURE — 63700000 HC SELF-ADMINISTRABLE DRUG: Performed by: PSYCHIATRY & NEUROLOGY

## 2024-01-13 PROCEDURE — 63700000 HC SELF-ADMINISTRABLE DRUG: Performed by: STUDENT IN AN ORGANIZED HEALTH CARE EDUCATION/TRAINING PROGRAM

## 2024-01-13 PROCEDURE — 12400000 HC ROOM AND CARE SEMIPRIVATE PSYCH

## 2024-01-13 PROCEDURE — 99232 SBSQ HOSP IP/OBS MODERATE 35: CPT | Performed by: PSYCHIATRY & NEUROLOGY

## 2024-01-13 RX ADMIN — LORAZEPAM 1 MG: 1 TABLET ORAL at 09:36

## 2024-01-13 RX ADMIN — HALOPERIDOL 5 MG: 5 TABLET ORAL at 09:36

## 2024-01-13 RX ADMIN — ATORVASTATIN CALCIUM 40 MG: 40 TABLET, FILM COATED ORAL at 22:33

## 2024-01-13 RX ADMIN — LURASIDONE HYDROCHLORIDE 120 MG: 40 TABLET, COATED ORAL at 17:17

## 2024-01-13 RX ADMIN — ACETAMINOPHEN 650 MG: 325 TABLET ORAL at 09:36

## 2024-01-13 RX ADMIN — HALOPERIDOL 5 MG: 5 TABLET ORAL at 02:27

## 2024-01-13 RX ADMIN — DIVALPROEX SODIUM 1500 MG: 250 TABLET, DELAYED RELEASE ORAL at 22:33

## 2024-01-13 RX ADMIN — LORAZEPAM 1 MG: 1 TABLET ORAL at 12:40

## 2024-01-13 RX ADMIN — LORAZEPAM 1 MG: 1 TABLET ORAL at 02:27

## 2024-01-13 RX ADMIN — ALUMINUM HYDROXIDE, MAGNESIUM HYDROXIDE, AND DIMETHICONE 30 ML: 200; 20; 200 SUSPENSION ORAL at 16:17

## 2024-01-13 NOTE — PROGRESS NOTES
01/13/24 1430   Activity/Group Checklist   Group Title Support and Self-expression  (Family Feud)   Attendance Did not attend

## 2024-01-13 NOTE — PLAN OF CARE
"Plan of Care Review  Plan of Care Reviewed With: patient  Patient Agreement with Plan of Care: agrees  Consent Given to Review Plan with: pt  Progress: no change  Intervention(s): Suicide assessment, med administration and education, encouraged boundaries  Outcome Evaluation: Jason Denies anxiety and depression, denies suicidal or homicidal ideations, denies hallucination on initial interview.    Jason is intrusive towards peers and at times obnoxious with demands from staff.     2212: Jason requested Ativan PRN with night time medications, to help her anxiety and sleep.     0200 Jason approached nursing requesting something to eat, stating, \"I'll be able to go back to bed if I can eat something...\" RN provided turkey sandwich.     0225 Jason stating she is interested in taking medication, calling this RN Patrizia, stating this RN is friends with her sister, and \"I know you...\" further accusing staff of lying to her about their identities, redirection provided, escalating agitation prompted offering medication, Jason accepted the offer and RN administered PRN Ativan & Haldol per order... RN encouraged Jason to go back to bed, Jason complied.         "

## 2024-01-13 NOTE — PROGRESS NOTES
01/13/24 1100   Activity/Group Checklist   Group Title Support and Self-expression   Attendance Did not attend

## 2024-01-13 NOTE — PROGRESS NOTES
PSYCHIATRIC PROGRESS NOTE    Chief Complaint/Reason for follow-up: Schizoaffective mari    Interval History:   Per overnight documentation in EMR patient presented as paranoid and disorganized.     On interview patient is seen in her room laying on bed with a  towel wrapped around her head. She is pleasant yet disorganized and intrusive and tangential. She reports tolerating medication well.     Vital Signs for the last 24 hours:  Temp:  [36.6 °C (97.9 °F)] 36.6 °C (97.9 °F)  Heart Rate:  [89] 89  Resp:  [16] 16  BP: (127)/(60) 127/60    Scheduled Meds:  • atorvastatin  40 mg oral Nightly   • divalproex  1,500 mg oral Nightly   • lurasidone  120 mg oral Daily with dinner       Labs:  Selected Electrolytes  Results from last 7 days   Lab Units 01/09/24  1437   POTASSIUM mEQ/L 3.7   SODIUM mEQ/L 139   CREATININE mg/dL 0.8       Recent EKG HR/QTC  Lab Results   Component Value Date    VENTRICRATE 86 01/10/2024    QTCCALCULAT 442 01/10/2024       Lab Results   Component Value Date    VPA 24.0 (L) 01/11/2024    VPA 95.1 10/07/2023    LITHIUM <0.1 (L) 11/29/2020    LITHIUM 0.5 07/22/2020       Mental Status Exam  Appearance:    in hospital attire   Attention:  attends appropriately during exam   Behavior (General):  awake and alert and engaged in interview    Behavior (Motoric):  typical coordinated movements appropriate to situation   Speech:  excessive speech   Language:  fluent   Affect:  labile, expansive and Euphoric   Mood:  great   Associations:  illogical and loose   Thought Process:  Perseverative, tangentiality times   Thought Content:  grandiosity, hyperreligiosity and Hypersexual.  Referential towards things in the hospital.  States she occasionally does hear command hallucinations of her father nonactive.   Suicidality:  denies desire or thoughts to harm or kill self   Orientation:  person, day, month, year, name of place and situation   Insight/Judgement:  Poor/poor   Memory:  recalls recent events and  recalls remote events       Assessment/Plan  Schizoaffective disorder, bipolar type (CMS/HCC)  Assessment & Plan  Zuleyma Acosta is a 48-year-old female past medical history of seizure with active bipolar type who presents with manic decompensation psychosis lability and aggression.  Patient has a longstanding mental health history with approximately 10 prior inpatient admissions currently sees Dr. aFll outside the hospital as her psychiatrist prescribed Latuda 80 mg nightly and Depakote 1250 nightly.  Questionable compliance with medications outside of the hospital.  Approximately 2-week history of decompensation.  Increasing goal directed activity at night hyper fixated on a new relationship she has developed outside of the hospital online, hyperreligiosity, culminating in lability and agitation Tuesday night with her family.  Sister called the  after an altercation.  Plan is to restart medications and increasing Latuda and checking Depakote level to assess compliance and will titrate to minimal effective dosing of both these medications. Since hospitalized, ongoing lability, hypersexuality, hyper religiousity, poor bounadaries, will titrated to effective dosing of Depakote, utilizing prns for safety and symptom control.  Ongoing lability, disinhibition, multiple PRNs since hospitalization.    1.  Patient admitted on a 201 commitment.  Currently has symptoms of manic decompensation.  Hyperreligious, hypersexual, tangential some disorganization, agitation and labile.  Receiving two PRNs yesterday    Still actively manic, cooperative with the evaluation although continues to have hyperreligiosity and hypersexuality.    2.  Continue Latuda to 120 mg every afternoon with meals.  Checking Depakote level and will assess Depakote dosing following labs.  Continue Depakote 1500 mg nightly for now.  Reordered a dose of Depakote for this morning since yesterday evening's dose was not given.     We will continue Depakote  1500 mg nightly tomorrow evening we will titrate to effective dosing based on levels.    3.  Continue haloperidol 5 mg every 6 hours IM/p.o. as needed as needed for agitation and psychosis.  Recommend continuing offering this patient if she continues to display psychotic symptoms as well as agitation    I added 1 mg of Ativan p.o. every 6 hours as needed in addition to her 2 mg IM as needed as needed    4.  EKG checked yesterday and QTc 442 no overt abnormalities.  Valproic acid level lab obtained this morning showing a level of 24 which is to be expected given her symptoms and likely noncompliance.  We will continue to follow to help better management.    5.  Patient currently on a 201 likely would need involuntary psychiatric commitment if she attempts to sign herself out.    6.  Safety: Continue to encourage staff to approach with caution if she has significant mari psychosis at this time.  If she continues to act out and may need a higher level of supervision..

## 2024-01-13 NOTE — PLAN OF CARE
"Plan of Care Review  Plan of Care Reviewed With: patient  Patient Agreement with Plan of Care: agrees  Progress: improving  Intervention(s): Encouraged pt to use positive coping skills  Outcome Evaluation: Jillian remains disorganized, tangential. Continues with poor boundaries. Intrusive. Misinterpreting what others have said, believes others are talking about her. Pt is redirectable. Religiously preoccupied.  Required PRN Haldol and Ativan this morning at 930, agitation. Showered late this morning, took a short nap. Frequently at nurses station. Speech loud, rambling at times. 1240 pt became agitated, directing anger towards others Ativan 1 mg offered and given. Pt at this time stated, \"I feel like punching someone\".  Pt chose to remain in her room at this time, once again napping. Using headphones throughout the day. Seen making phone calls to family. Affect elevated, animated, labile. Will continue to closely monitor and encourage pt to seek staff when feeling agitated or angry  "

## 2024-01-14 PROCEDURE — 63700000 HC SELF-ADMINISTRABLE DRUG: Performed by: PSYCHIATRY & NEUROLOGY

## 2024-01-14 PROCEDURE — 63700000 HC SELF-ADMINISTRABLE DRUG: Performed by: STUDENT IN AN ORGANIZED HEALTH CARE EDUCATION/TRAINING PROGRAM

## 2024-01-14 PROCEDURE — 12400000 HC ROOM AND CARE SEMIPRIVATE PSYCH

## 2024-01-14 PROCEDURE — 99232 SBSQ HOSP IP/OBS MODERATE 35: CPT | Performed by: PSYCHIATRY & NEUROLOGY

## 2024-01-14 RX ORDER — LORAZEPAM 1 MG/1
1 TABLET ORAL 3 TIMES DAILY
Status: DISCONTINUED | OUTPATIENT
Start: 2024-01-14 | End: 2024-01-16

## 2024-01-14 RX ADMIN — HALOPERIDOL 5 MG: 5 TABLET ORAL at 09:21

## 2024-01-14 RX ADMIN — ATORVASTATIN CALCIUM 40 MG: 40 TABLET, FILM COATED ORAL at 21:37

## 2024-01-14 RX ADMIN — DIVALPROEX SODIUM 1500 MG: 250 TABLET, DELAYED RELEASE ORAL at 21:37

## 2024-01-14 RX ADMIN — ONDANSETRON 4 MG: 4 TABLET, ORALLY DISINTEGRATING ORAL at 19:27

## 2024-01-14 RX ADMIN — LORAZEPAM 1 MG: 1 TABLET ORAL at 09:21

## 2024-01-14 RX ADMIN — LORAZEPAM 1 MG: 1 TABLET ORAL at 17:27

## 2024-01-14 RX ADMIN — LORAZEPAM 1 MG: 1 TABLET ORAL at 12:52

## 2024-01-14 RX ADMIN — ACETAMINOPHEN 650 MG: 325 TABLET ORAL at 06:35

## 2024-01-14 RX ADMIN — LURASIDONE HYDROCHLORIDE 120 MG: 40 TABLET, COATED ORAL at 17:27

## 2024-01-14 RX ADMIN — LORAZEPAM 1 MG: 1 TABLET ORAL at 06:28

## 2024-01-14 NOTE — PLAN OF CARE
"  Problem: Adult Behavioral Health Plan of Care  Goal: Plan of Care Review  Outcome: Progressing  Flowsheets (Taken 1/14/2024 0009)  Progress: improving  Patient Agreement with Plan of Care: agrees  Outcome Evaluation: Jillian was sleeping in the beginning of the shift. She woke up ~2130 and came to the nursing station to socialize with staff.  She remains elevated, euphoric, tangential, and referential, yet is calmer (slightly lethargic) and more cooperative than previous encounters. Continues to claim that her father is the \"devil.\" She describes her current hospitalization as different than previous stays, as those were \"demonic\" and this is \"pure.\" Denies dep/anx/SI/AVH. Compliant with meds and declined PRNs.     Retired to bed after receiving standing meds and slept until 0330 when she presented for towels and a wash cloth because, \"Showering is my coping mechanism.\" She has been awake since, presenting for various needs or to pass letters with Buddhist content to staff. States, \"I don't think I need a pill to sleep, I'm tired. And I don't think I'm manic anymore.\" Requested/received new bed linens and underwear d/t report of night sweats. ~0500 she presented to [facility] in just a t-shirt stating, \"my shorts are smelly.\" She was redirected back to her room and reminded of the clean underwear provided. She has since been pacing the unit listening to headphones, pleasant and redirectable when raising voice.      Plan of Care Reviewed With: patient  Intervention(s):   15-minute checks   medication management   suicide check-in   therapeutic communication     "

## 2024-01-14 NOTE — PROGRESS NOTES
01/14/24 1330   Activity/Group Checklist   Group Title Interactive therapy  (Stain Glass)   Attendance Attended   Attendance Duration (min) 0-15   Follows Direction Followed directions   Interactions Initiates interaction   Affect/Mood Range Normal range   Affect/Mood Display Irritable   Goals Achieved Able to engage in interactions

## 2024-01-14 NOTE — PROGRESS NOTES
01/14/24 0930   Activity/Group Checklist   Group Title Community meeting   Attendance Attended   Attendance Duration (min) 31-45   Follows Direction Followed directions   Interactions Interacted reciprocally   Affect/Mood Range Normal range   Affect/Mood Display Alert   Goals Achieved Able to engage in interactions;Able to listen to others     Jillian set a goal to interact with peers.

## 2024-01-14 NOTE — PLAN OF CARE
"Plan of Care Review  Plan of Care Reviewed With: patient  Patient Agreement with Plan of Care: agrees  Progress: improving  Intervention(s): Encouraged pt to use positive coping skills when feeling anxious and stressed  Outcome Evaluation: Jillian has been visible on the unit. Remains loud, intrusive with peers/staff. Attended community meeting. Disruptive in morning group and left early. Tangential, disorganized. Inappropriate laughter. Speech loud and rambling. Redirectable. Medication compliant. PRN Haldol 5 mg, Ativan 1mg given at 930. Needs frequent reminders to lower voice. Poor boundaries. Frequently at nurses station with requests. Encouraged pt to take short nap as to not disrupt night time sleep. Focused on discharge, believe she's discharging on tomorrow Monday. Does report feeling better with the medications. Mostly polite and pleasant with staff. Can become easily agitated. Makes need known. Stated she feels she's statting to become depressed, \"this is how I know it's time for me to go home. I just need to keep taking my meds when I get home and see my boyfriend\". Denies suicide. Will continue to monitor, offer support.    At 1540 Jillian was on the phone with her mother when she suddenly yelled and slammed the phone down. Easily explosive. Yelling about her family, \"they're the reason I'm here. This is my time, my time\".  Able to calm pt quickly with reassuring talk and empathy. Pt chose to shower, lavender essential oil cotton guaze given. Pt found the lavender helpful and calming. Pt continues to be religiously preoccupied, believing that, \"my boyfriend Candido and me, I think we're part of the [facility].     "

## 2024-01-14 NOTE — PROGRESS NOTES
01/14/24 1030   Activity/Group Checklist   Group Title Distress tolerance   Attendance Duration (min) 31-45   Follows Direction Did not follow directions   Interactions Disorganized interaction   Affect/Mood Range Wide   Affect/Mood Display Incongruent;Irritable;Angry   Goals Achieved Able to listen to others

## 2024-01-14 NOTE — NURSING NOTE
"0625 Pt slammed a door to another pt's room. When asked about this she says, \"I'm mad. It's between me and god.\" Received PRN Ativan 1 mg po with pending results.   "

## 2024-01-15 LAB — VALPROATE SERPL-MCNC: 68.8 UG/ML (ref 50–100)

## 2024-01-15 PROCEDURE — 99232 SBSQ HOSP IP/OBS MODERATE 35: CPT | Performed by: STUDENT IN AN ORGANIZED HEALTH CARE EDUCATION/TRAINING PROGRAM

## 2024-01-15 PROCEDURE — 12400000 HC ROOM AND CARE SEMIPRIVATE PSYCH

## 2024-01-15 PROCEDURE — 63700000 HC SELF-ADMINISTRABLE DRUG: Performed by: PSYCHIATRY & NEUROLOGY

## 2024-01-15 PROCEDURE — 63700000 HC SELF-ADMINISTRABLE DRUG: Performed by: STUDENT IN AN ORGANIZED HEALTH CARE EDUCATION/TRAINING PROGRAM

## 2024-01-15 PROCEDURE — 80164 ASSAY DIPROPYLACETIC ACD TOT: CPT | Performed by: STUDENT IN AN ORGANIZED HEALTH CARE EDUCATION/TRAINING PROGRAM

## 2024-01-15 PROCEDURE — 36415 COLL VENOUS BLD VENIPUNCTURE: CPT | Performed by: STUDENT IN AN ORGANIZED HEALTH CARE EDUCATION/TRAINING PROGRAM

## 2024-01-15 RX ORDER — DIVALPROEX SODIUM 250 MG/1
1750 TABLET, DELAYED RELEASE ORAL NIGHTLY
Status: DISCONTINUED | OUTPATIENT
Start: 2024-01-15 | End: 2024-01-17

## 2024-01-15 RX ADMIN — LURASIDONE HYDROCHLORIDE 120 MG: 40 TABLET, COATED ORAL at 17:58

## 2024-01-15 RX ADMIN — HALOPERIDOL 5 MG: 5 TABLET ORAL at 07:27

## 2024-01-15 RX ADMIN — ATORVASTATIN CALCIUM 40 MG: 40 TABLET, FILM COATED ORAL at 21:22

## 2024-01-15 RX ADMIN — DIVALPROEX SODIUM 1750 MG: 250 TABLET, DELAYED RELEASE ORAL at 21:22

## 2024-01-15 RX ADMIN — LORAZEPAM 1 MG: 1 TABLET ORAL at 07:27

## 2024-01-15 RX ADMIN — LORAZEPAM 1 MG: 1 TABLET ORAL at 17:58

## 2024-01-15 RX ADMIN — LORAZEPAM 1 MG: 1 TABLET ORAL at 12:51

## 2024-01-15 NOTE — PLAN OF CARE
"  Problem: Adult Behavioral Health Plan of Care  Goal: Plan of Care Review  Outcome: Progressing  Flowsheets (Taken 1/14/2024 2204)  Progress: improving  Patient Agreement with Plan of Care: agrees  Outcome Evaluation: Jillian remains intrusive and inappropriate, yet was less euphoric and more irritable in the early evening, demanding of staff at times (ie, \"I need a pen ASAP,\" or, \"I need this song changed ASAP\"). Redirectable. Perseverating on speaking with her boyfriend \"at 9 pm because he's a doctor.\"     She denies SI/HI/AVH. Reports nausea \"from the Ativan.\" Administered PRN Zofran with moderate relief per pt. Compliant with standing meds and slept from [id number]. Presented to RN station reporting wakefulness and that \"I could be a missionary full time.\" Hyperverbal, calling staff by her family members' names, and discusses how she threw father on the couch because he said that she was not [employer]. Took a shower ~0345 and has been up pacing and listening to music on the headphones. Labile, crying at times, calling RN \"Meghna.\"     Plan of Care Reviewed With: patient  Intervention(s):   15-minute checks   Reinforced proper boundaries     Administered PRN Zofran    Provided toiletries, snacks, and beverages      "

## 2024-01-15 NOTE — PROGRESS NOTES
01/15/24 1015   Activity/Group Checklist   Group Title Distress tolerance  (DBT- IMPROVE the Moment: Pts will review activities that can help through emotionally difficult situations. Pts will explore what activities are the most effective when emotionally elevated)   Attendance Did not attend

## 2024-01-15 NOTE — PROGRESS NOTES
01/15/24 1315   Activity/Group Checklist   Group Title Wellness tools   Attendance Did not attend

## 2024-01-15 NOTE — PROGRESS NOTES
01/15/24 1500   Activity/Group Checklist   Group Title Interactive therapy  (Reverse Coloring)   Attendance Did not attend

## 2024-01-15 NOTE — PROGRESS NOTES
"PSYCHIATRIC PROGRESS NOTE    Chief Complaint/Reason for follow-up: Schizoaffective mari    Interval History: Patient was seen in room today where she was calm and cooperative with interview.  Patient was attempting to see me early in the day stating \"I need to talk to you.\"  Later on following up with her in her room she states that she needs to get out of this hospital and I promised to allow her out of here after \"a few days.\"  Encouraged her that she continues to display symptoms of mari and needs further stabilization and treatment.  She voiced frustration with this asking for a different psychiatrist.  I encouraged her to continue to comply with her medications and that we will need a Depakote level this morning treat her symptoms.  She continues to be hyperreligious.  Received PRNs this morning.  As per discussions with staff ongoing intrusiveness but redirectable receiving multiple PRNs of haloperidol and Ativan over the weekend.  We continued scheduled lorazepam 1 mg 3 times daily.  Patient denies any suicidal ideation, homicidal ideation or audiovisual hallucinations.  Continues to ruminate about her \"boyfriend, Candido\" outside of the hospital and hyperfocused on God.  She denies any side effects from medication at this time.        Vital Signs for the last 24 hours:  Temp:  [36.6 °C (97.8 °F)-36.8 °C (98.2 °F)] 36.8 °C (98.2 °F)  Heart Rate:  [] 91  Resp:  [16-18] 18  BP: (139-147)/(66-67) 139/66    Scheduled Meds:  • atorvastatin  40 mg oral Nightly   • divalproex  1,750 mg oral Nightly   • LORazepam  1 mg oral TID   • lurasidone  120 mg oral Daily with dinner       Labs:  Selected Electrolytes  Results from last 7 days   Lab Units 01/09/24  1437   POTASSIUM mEQ/L 3.7   SODIUM mEQ/L 139   CREATININE mg/dL 0.8       Recent EKG HR/QTC  Lab Results   Component Value Date    VENTRICRATE 86 01/10/2024    QTCCALCULAT 442 01/10/2024       Lab Results   Component Value Date    VPA 68.8 01/15/2024    VPA " 24.0 (L) 01/11/2024    LITHIUM <0.1 (L) 11/29/2020    LITHIUM 0.5 07/22/2020       Mental Status Exam  Appearance:    in hospital attire   Attention:  attends appropriately during exam   Behavior (General):  Staring,  pacing the unit at times intrusive   Behavior (Motoric):  typical coordinated movements appropriate to situation   Speech:  normal rate/rhythm/volume   Language:  fluent   Affect:  labile, expansive and Irritable   Mood:  I want to go home   Associations:  illogical and loose   Thought Process:   Perseverative, tangentiality times   Thought Content:  grandiosity, hyperreligiosity and Hypersexual.  Referential towards things in the hospital believing various people or her siblings.  No overt auditory visual hallucinations   Suicidality:  denies desire or thoughts to harm or kill self   Orientation:  person, day, month, year, name of place and situation   Insight/Judgement:  Poor/poor   Memory:  recalls recent events and recalls remote events       Assessment/Plan  Schizoaffective disorder, bipolar type (CMS/HCC)  Assessment & Plan  Zuleyma Acosta is a 48-year-old female past medical history of seizure with active bipolar type who presents with manic decompensation psychosis lability and aggression.  Patient has a longstanding mental health history with approximately 10 prior inpatient admissions currently sees Dr. Fall outside the hospital as her psychiatrist prescribed Latuda 80 mg nightly and Depakote 1250 nightly.  Questionable compliance with medications outside of the hospital.  Approximately 2-week history of decompensation.  Increasing goal directed activity at night hyper fixated on a new relationship she has developed outside of the hospital online, hyperreligiosity, culminating in lability and agitation Tuesday night with her family.  Sister called the  after an altercation.  Plan is to restart medications and increasing Latuda and checking Depakote level to assess compliance and will  titrate to minimal effective dosing of both these medications. Since hospitalized, ongoing lability, hypersexuality, hyper religiousity, poor bounadaries, will titrated to effective dosing of Depakote, utilizing prns for safety and symptom control.  Ongoing lability, disinhibition, multiple PRNs since hospitalization.    1.  Patient admitted on a 201 commitment.  Currently has symptoms of manic decompensation.  Hyperreligious, hypersexual, tangential some disorganization, agitation and labile.  Receiving two PRNs yesterday    Still actively manic, cooperative with the evaluation although continues to have hyperreligiosity and hypersexuality.    2.  Continue Latuda to 120 mg every afternoon with meals.  Checking Depakote level and will assess Depakote dosing following labs.  Continue Depakote 1500 mg nightly for now.  Reordered a dose of Depakote for this morning since yesterday evening's dose was not given.     Rechecked Depakote level to a level of 68 were discussed with patient and increasing her Depakote to 1750 nightly.     Was started on Ativan 1 mg 3 times daily by the weekend team will continue for now    3.  Continue haloperidol 5 mg every 6 hours IM/p.o. as needed as needed for agitation and psychosis.  Recommend continuing offering this patient if she continues to display psychotic symptoms as well as agitation    I added 1 mg of Ativan p.o. every 6 hours as needed in addition to her 2 mg IM as needed as needed    4.  EKG checked yesterday and QTc 442 no overt abnormalities.  Valproic acid level lab obtained this morning showing a level of 24 which is to be expected given her symptoms and likely noncompliance.  We will continue to follow to help better management.    5.  Patient currently on a 201 likely would need involuntary psychiatric commitment if she attempts to sign herself out.    6.  Safety: Continue to encourage staff to approach with caution if she has significant mari psychosis at this time.   If she continues to act out and may need a higher level of supervision..

## 2024-01-15 NOTE — PLAN OF CARE
Plan of Care Review  Plan of Care Reviewed With: patient  Patient Agreement with Plan of Care: agrees  Progress: improving  Intervention(s): Encouraged pt to use positive coping skills  Outcome Evaluation: Jillian has been visible on unit. Became very loud and screaming at 720 this morning. Upset over believing a staff member was her brother. Haldol 5mg, Ativan 1 mg given. Pt able to calm down speaking with RN. Easily explosive, labile, but redirectable. Medication compliant. Poor boundaries, inappropriate language, intrusive. Disorganized, religiously preoccupied, tangential. Restless. Napping during the day. Frequently at the nurses station. Will continue to monitor

## 2024-01-16 PROCEDURE — 63600000 HC DRUGS/DETAIL CODE: Mod: JZ | Performed by: PSYCHIATRY & NEUROLOGY

## 2024-01-16 PROCEDURE — 12400000 HC ROOM AND CARE SEMIPRIVATE PSYCH

## 2024-01-16 PROCEDURE — 63700000 HC SELF-ADMINISTRABLE DRUG: Performed by: PSYCHIATRY & NEUROLOGY

## 2024-01-16 PROCEDURE — 63700000 HC SELF-ADMINISTRABLE DRUG: Performed by: STUDENT IN AN ORGANIZED HEALTH CARE EDUCATION/TRAINING PROGRAM

## 2024-01-16 PROCEDURE — 99232 SBSQ HOSP IP/OBS MODERATE 35: CPT | Performed by: STUDENT IN AN ORGANIZED HEALTH CARE EDUCATION/TRAINING PROGRAM

## 2024-01-16 RX ORDER — LORAZEPAM 1 MG/1
1 TABLET ORAL 2 TIMES DAILY
Status: DISCONTINUED | OUTPATIENT
Start: 2024-01-16 | End: 2024-01-30

## 2024-01-16 RX ADMIN — HALOPERIDOL LACTATE 5 MG: 5 INJECTION, SOLUTION INTRAMUSCULAR at 13:36

## 2024-01-16 RX ADMIN — LORAZEPAM 1 MG: 1 TABLET ORAL at 10:37

## 2024-01-16 RX ADMIN — DIPHENHYDRAMINE HYDROCHLORIDE 50 MG: 50 INJECTION INTRAMUSCULAR; INTRAVENOUS at 13:37

## 2024-01-16 RX ADMIN — DIVALPROEX SODIUM 1750 MG: 250 TABLET, DELAYED RELEASE ORAL at 22:22

## 2024-01-16 RX ADMIN — ATORVASTATIN CALCIUM 40 MG: 40 TABLET, FILM COATED ORAL at 22:22

## 2024-01-16 RX ADMIN — HALOPERIDOL 5 MG: 5 TABLET ORAL at 10:37

## 2024-01-16 RX ADMIN — LORAZEPAM 2 MG: 2 INJECTION INTRAMUSCULAR; INTRAVENOUS at 13:37

## 2024-01-16 RX ADMIN — LORAZEPAM 1 MG: 1 TABLET ORAL at 08:51

## 2024-01-16 RX ADMIN — ACETAMINOPHEN 650 MG: 325 TABLET ORAL at 08:51

## 2024-01-16 RX ADMIN — ACETAMINOPHEN 650 MG: 325 TABLET ORAL at 04:27

## 2024-01-16 NOTE — PLAN OF CARE
Problem: Adult Behavioral Health Plan of Care  Goal: Plan of Care Review  Outcome: Progressing  Flowsheets (Taken 1/16/2024 0200)  Progress: improving  Patient Agreement with Plan of Care: agrees  Outcome Evaluation: Jillian  Plan of Care Reviewed With: patient  Intervention(s):  • Nursing/suicide assessment  • Medication maintenance  • Monitored for safety Q 15 minutes   Plan of Care Review  Plan of Care Reviewed With: patient  Patient Agreement with Plan of Care: agrees  Progress: improving  Intervention(s): Nursing/suicide assessment; Medication maintenance; Monitored for safety Q 15 minutes  Outcome Evaluation: Jillian was visible on the unit this evening, observed walking the halls and spending time in the living room, selectively social. She continues to present as labile, demanding, intrusive, and religiously preoccupied. Compliant with night time medication without incident and offered no c/o side effects. Will continue to provide support as needed.

## 2024-01-16 NOTE — PLAN OF CARE
"Plan of Care Review  Plan of Care Reviewed With: patient  Patient Agreement with Plan of Care: agrees  Consent Given to Review Plan with: pt  Progress: improving  Intervention(s): Nursing assessment, medication management, emotional support offered, positive reinforcement given, encouraged patient to make needs known to staff  Outcome Evaluation: Jillian reports moderate anxiety, denied depression, denied suicidal or homicidal ideations, denied hallucinations.     1030 Jillian had an outburst after community meeting, she was shouting at the  whom was watching over the group, stating \"She's the devil and she will spend all of eternity in hell.\" RN, & nurse manager were able to verbally redirect the patient and offer oral medications which Jillian accepted after speaking to Dr. Zhao.     1330 Jillian got upset after being told she would not be able to use the phone during group times, she threw all of the items off of the TV in the community area and started to make a run for the emergency doors. Staff was able to redirect her with assistance of security to get her to her room, IM medication administered per orders, psychiatrist at bedside.     1800 Jillian resting comfortably in room, rise and fall of chest witnessed.   "

## 2024-01-16 NOTE — PROGRESS NOTES
01/16/24 1500   Activity/Group Checklist   Group Title Interactive therapy   Attendance Other (Comment)  (Not appropriate for group sessions at this time)

## 2024-01-16 NOTE — PROGRESS NOTES
01/16/24 1015   Activity/Group Checklist   Group Title Other (Comment)  (Music Therapy)   Attendance Other (Comment)  (Jillian is not appropriate for groups at this time.)
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
## 2024-01-16 NOTE — PROGRESS NOTES
01/16/24 1300   Activity/Group Checklist   Group Title Wellness tools   Attendance Other (Comment)  (Not appropriate for group at this time)

## 2024-01-17 PROCEDURE — 63700000 HC SELF-ADMINISTRABLE DRUG: Performed by: PSYCHIATRY & NEUROLOGY

## 2024-01-17 PROCEDURE — 99232 SBSQ HOSP IP/OBS MODERATE 35: CPT | Performed by: STUDENT IN AN ORGANIZED HEALTH CARE EDUCATION/TRAINING PROGRAM

## 2024-01-17 PROCEDURE — 12400000 HC ROOM AND CARE SEMIPRIVATE PSYCH

## 2024-01-17 PROCEDURE — 63700000 HC SELF-ADMINISTRABLE DRUG: Performed by: STUDENT IN AN ORGANIZED HEALTH CARE EDUCATION/TRAINING PROGRAM

## 2024-01-17 RX ORDER — DIVALPROEX SODIUM 250 MG/1
2000 TABLET, DELAYED RELEASE ORAL NIGHTLY
Status: DISCONTINUED | OUTPATIENT
Start: 2024-01-17 | End: 2024-01-24

## 2024-01-17 RX ADMIN — LORAZEPAM 1 MG: 1 TABLET ORAL at 14:54

## 2024-01-17 RX ADMIN — ACETAMINOPHEN 650 MG: 325 TABLET ORAL at 08:19

## 2024-01-17 RX ADMIN — LORAZEPAM 1 MG: 1 TABLET ORAL at 07:54

## 2024-01-17 RX ADMIN — ATORVASTATIN CALCIUM 40 MG: 40 TABLET, FILM COATED ORAL at 20:00

## 2024-01-17 RX ADMIN — HALOPERIDOL 5 MG: 5 TABLET ORAL at 14:54

## 2024-01-17 RX ADMIN — HALOPERIDOL 5 MG: 5 TABLET ORAL at 20:11

## 2024-01-17 RX ADMIN — DIVALPROEX SODIUM 2000 MG: 250 TABLET, DELAYED RELEASE ORAL at 20:10

## 2024-01-17 RX ADMIN — LURASIDONE HYDROCHLORIDE 120 MG: 40 TABLET, COATED ORAL at 19:32

## 2024-01-17 RX ADMIN — LORAZEPAM 1 MG: 1 TABLET ORAL at 19:32

## 2024-01-17 NOTE — PROGRESS NOTES
"   01/17/24 1300   Activity/Group Checklist   Group Title Wellness tools   Attendance Attended   Attendance Duration (min) 0-15   Follows Direction Did not follow directions   Interactions Disorganized interaction   Affect/Mood Range Wide   Affect/Mood Display Alert   Goals Achieved Able to listen to others;Able to engage in interactions     Jillian briefly attended coping skills group before leaving. When asked to ID a coping skill she shared \"having sex\" and was unable to identify any other skills when further prompted.   "

## 2024-01-17 NOTE — PLAN OF CARE
Problem: Adult Behavioral Health Plan of Care  Goal: Plan of Care Review  Flowsheets (Taken 1/17/2024 0046)  Progress: improving  Outcome Evaluation: Jillian was visible on the unit watching television and walking the halls and frequently at the nursing station. She remains labile, loud and demanding. The patient refused her HS Ativan stating that she is still sedated from her IM injection earlier in the day. Patient slept this shift without incident. Will continue to monitor and support as needed.  Plan of Care Reviewed With: patient  Intervention(s): Medication management, safety rounds q 15 minutes, suicide check-in completed

## 2024-01-17 NOTE — PROGRESS NOTES
"   01/17/24 1015   Activity/Group Checklist   Group Title Distress tolerance  (Grounding Techniques: Pts will identify grounding techniques to help control uncomfortable symptoms by tuning attention away from thoughts/memories and practicing refocusing on the present.)   Attendance Other (Comment)     Jillian appeared to observe the session space, but did not participate during this intervention or contribute to the group discussion at this time. Pt accepted the provided handout as an alternative intervention. Afterwards, Jillian was observed in the common area and stated to this therapist, \"I like your glasses... Jarret [therapy dog] is cute and so are you, but you're not a dog.\"     "

## 2024-01-17 NOTE — PROGRESS NOTES
01/17/24 1500   Activity/Group Checklist   Group Title Interactive therapy   Attendance Did not attend

## 2024-01-17 NOTE — PLAN OF CARE
Plan of Care Review  Plan of Care Reviewed With: patient  Patient Agreement with Plan of Care: agrees  Consent Given to Review Plan with: pt  Progress: improving  Intervention(s): Nursing assessment, medication management, emotional support offered, positive reinforcement given, encouraged patient to make needs known to staff  Outcome Evaluation: Jillian Denies anxiety and depression, denies suicidal or homicidal ideations, denies hallucination.     1450: Jillian had one meltdown this shift, crying about her mother and how she misses her mother, she has a UTI and she needs cranberry juice. PRN medication administered and psychiatrist notified.     1800 Pt resting comfortably in room, rise and fall of chest witnessed.

## 2024-01-17 NOTE — PLAN OF CARE
Plan of Care Review  Plan of Care Reviewed With: sibling  Consent Given to Review Plan with: pt- sister is on HIPAA form  Progress: improving  Intervention(s): Collateral  Outcome Evaluation: Jillian and treatment team to review and sign tx plan update.      TRISTIN called pt's sisterMisty [phone] to connect and provide update. TRISTIN left .
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
## 2024-01-18 PROCEDURE — 99232 SBSQ HOSP IP/OBS MODERATE 35: CPT | Performed by: STUDENT IN AN ORGANIZED HEALTH CARE EDUCATION/TRAINING PROGRAM

## 2024-01-18 PROCEDURE — 63600000 HC DRUGS/DETAIL CODE: Mod: JZ | Performed by: PSYCHIATRY & NEUROLOGY

## 2024-01-18 PROCEDURE — 63700000 HC SELF-ADMINISTRABLE DRUG: Performed by: PSYCHIATRY & NEUROLOGY

## 2024-01-18 PROCEDURE — 12400000 HC ROOM AND CARE SEMIPRIVATE PSYCH

## 2024-01-18 PROCEDURE — 63700000 HC SELF-ADMINISTRABLE DRUG: Performed by: STUDENT IN AN ORGANIZED HEALTH CARE EDUCATION/TRAINING PROGRAM

## 2024-01-18 RX ADMIN — LORAZEPAM 1 MG: 1 TABLET ORAL at 08:21

## 2024-01-18 RX ADMIN — HALOPERIDOL LACTATE 5 MG: 5 INJECTION, SOLUTION INTRAMUSCULAR at 11:42

## 2024-01-18 RX ADMIN — LURASIDONE HYDROCHLORIDE 120 MG: 40 TABLET, COATED ORAL at 17:19

## 2024-01-18 RX ADMIN — DIPHENHYDRAMINE HYDROCHLORIDE 50 MG: 50 INJECTION INTRAMUSCULAR; INTRAVENOUS at 11:42

## 2024-01-18 RX ADMIN — ACETAMINOPHEN 650 MG: 325 TABLET ORAL at 08:53

## 2024-01-18 RX ADMIN — DIVALPROEX SODIUM 2000 MG: 250 TABLET, DELAYED RELEASE ORAL at 21:59

## 2024-01-18 RX ADMIN — HALOPERIDOL 5 MG: 5 TABLET ORAL at 18:19

## 2024-01-18 RX ADMIN — ATORVASTATIN CALCIUM 40 MG: 40 TABLET, FILM COATED ORAL at 22:00

## 2024-01-18 RX ADMIN — LORAZEPAM 1 MG: 1 TABLET ORAL at 18:19

## 2024-01-18 RX ADMIN — LORAZEPAM 2 MG: 2 INJECTION INTRAMUSCULAR; INTRAVENOUS at 11:42

## 2024-01-18 NOTE — PROGRESS NOTES
01/18/24 1015   Activity/Group Checklist   Group Title Other (Comment)  (Music Therapy)   Attendance Other (Comment)     Jillian attempted to attend group during the correction point of the session, however Pt is not appropriate for groups at this time. Pt was receptive to verbal redirection and accepted a handout as an alternative intervention.
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
## 2024-01-18 NOTE — PROGRESS NOTES
01/18/24 1315   Activity/Group Checklist   Group Title Wellness tools   Attendance Other (Comment)  (Jillian is not appropriate for group at this olga)

## 2024-01-18 NOTE — PLAN OF CARE
Problem: Adult Behavioral Health Plan of Care  Goal: Plan of Care Review  Outcome: Progressing  Flowsheets (Taken 1/18/2024 1609)  Progress: improving  Patient Agreement with Plan of Care: agrees  Outcome Evaluation: Jillian is visible on the unit. Compliant with meds and meals. Disorganized. Delusional at times. Some perseveration. Loud but was redirectable. Attention seeking. At the nursing station often. Pt became agitated, banging and yelling in her room. Pt was given Ativan 2 mg, Haldol 5 mg and Benadryl 50 mg im @ 1142 for increased agitation with relief. Pt did speak of frustration and tearfulness of having this illness. Pt given reassurance and able to calm down. Denies si/hi. Will continue to monitor on q 15 minute checks.  Addendum:1823 Pt getting loud on phone. Perseverating over her sister. Religiously preoccupied. Did take 1900 Ativan 1 mg and Haldol 5 mg po for increased agitation. Awaiting results. Reports feeling angry. Needs some reassurance.   Plan of Care Reviewed With: patient  Intervention(s): Medication management, Therapeutic communications, Encourage group participation and interction with peers.

## 2024-01-18 NOTE — PROGRESS NOTES
Jillian did not attend Q and A group.   01/18/24 7201   Activity/Group Checklist   Group Title Interactive therapy   Attendance Did not attend
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
## 2024-01-19 PROCEDURE — 63600000 HC DRUGS/DETAIL CODE: Mod: JZ | Performed by: PSYCHIATRY & NEUROLOGY

## 2024-01-19 PROCEDURE — 12400000 HC ROOM AND CARE SEMIPRIVATE PSYCH

## 2024-01-19 PROCEDURE — 63700000 HC SELF-ADMINISTRABLE DRUG: Performed by: STUDENT IN AN ORGANIZED HEALTH CARE EDUCATION/TRAINING PROGRAM

## 2024-01-19 PROCEDURE — 63700000 HC SELF-ADMINISTRABLE DRUG: Performed by: PSYCHIATRY & NEUROLOGY

## 2024-01-19 PROCEDURE — 99232 SBSQ HOSP IP/OBS MODERATE 35: CPT | Performed by: STUDENT IN AN ORGANIZED HEALTH CARE EDUCATION/TRAINING PROGRAM

## 2024-01-19 RX ORDER — QUETIAPINE FUMARATE 50 MG/1
100 TABLET, EXTENDED RELEASE ORAL NIGHTLY
Status: COMPLETED | OUTPATIENT
Start: 2024-01-19 | End: 2024-01-19

## 2024-01-19 RX ORDER — LURASIDONE HYDROCHLORIDE 40 MG/1
80 TABLET, FILM COATED ORAL
Status: DISCONTINUED | OUTPATIENT
Start: 2024-01-19 | End: 2024-01-19

## 2024-01-19 RX ORDER — LURASIDONE HYDROCHLORIDE 40 MG/1
80 TABLET, FILM COATED ORAL
Status: COMPLETED | OUTPATIENT
Start: 2024-01-19 | End: 2024-01-19

## 2024-01-19 RX ORDER — QUETIAPINE 200 MG/1
200 TABLET, FILM COATED, EXTENDED RELEASE ORAL NIGHTLY
Status: COMPLETED | OUTPATIENT
Start: 2024-01-20 | End: 2024-01-20

## 2024-01-19 RX ORDER — QUETIAPINE FUMARATE 50 MG/1
100 TABLET, EXTENDED RELEASE ORAL NIGHTLY
Status: DISCONTINUED | OUTPATIENT
Start: 2024-01-19 | End: 2024-01-19

## 2024-01-19 RX ADMIN — DIVALPROEX SODIUM 2000 MG: 250 TABLET, DELAYED RELEASE ORAL at 20:32

## 2024-01-19 RX ADMIN — ATORVASTATIN CALCIUM 40 MG: 40 TABLET, FILM COATED ORAL at 20:32

## 2024-01-19 RX ADMIN — DIPHENHYDRAMINE HYDROCHLORIDE 50 MG: 50 INJECTION INTRAMUSCULAR; INTRAVENOUS at 03:36

## 2024-01-19 RX ADMIN — HALOPERIDOL 5 MG: 5 TABLET ORAL at 08:53

## 2024-01-19 RX ADMIN — HALOPERIDOL LACTATE 5 MG: 5 INJECTION, SOLUTION INTRAMUSCULAR at 03:36

## 2024-01-19 RX ADMIN — LORAZEPAM 1 MG: 1 TABLET ORAL at 08:53

## 2024-01-19 RX ADMIN — LURASIDONE HYDROCHLORIDE 80 MG: 40 TABLET, COATED ORAL at 17:23

## 2024-01-19 RX ADMIN — QUETIAPINE FUMARATE 100 MG: 50 TABLET, EXTENDED RELEASE ORAL at 20:32

## 2024-01-19 RX ADMIN — LORAZEPAM 1 MG: 1 TABLET ORAL at 20:32

## 2024-01-19 RX ADMIN — ACETAMINOPHEN 650 MG: 325 TABLET ORAL at 20:32

## 2024-01-19 RX ADMIN — LORAZEPAM 2 MG: 2 INJECTION INTRAMUSCULAR; INTRAVENOUS at 03:35

## 2024-01-19 NOTE — PROGRESS NOTES
01/19/24 0787   Activity/Group Checklist   Group Title Community meeting  (Scavenger Hunt: Pts were provided a handout with several questions in order to familiarize themselves with the unit and to practice various coping skills discussed during groups.)   Attendance Did not attend

## 2024-01-19 NOTE — PROGRESS NOTES
01/19/24 9233   Activity/Group Checklist   Group Title Wellness tools   Attendance Other (Comment)  (Not appropriate for groups at this time)

## 2024-01-19 NOTE — PLAN OF CARE
Problem: Adult Behavioral Health Plan of Care  Goal: Plan of Care Review  Outcome: Progressing  Flowsheets (Taken 1/19/2024 1836)  Progress: improving  Patient Agreement with Plan of Care: agrees  Outcome Evaluation: Jillian is visible on the unit. Feeling angry at times. Given Haldol 5 mg po @ 0853 with some relief. Pt spoke of being incontinent at times. Felt embarrassed. Given a lot of reassurance this shift. Perseverates over sister . Religiously preoccupied. Assisted pt in helping her clean her room. Very messy and cups all over room. Labile mood. Very Disorganized. Feels she can't shower today but presently doing laundry. Denies si/hi. Will continue to monitor on q 15 minute checks.     Plan of Care Reviewed With: patient  Intervention(s): Medication management, Therapeutic communication, Encourage group participation and interaction with peers.
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
## 2024-01-19 NOTE — PROGRESS NOTES
01/19/24 1030   Activity/Group Checklist   Group Title Distress tolerance  (Identifying and Managing Stress: After the initial check-in, pts will be prompted to read through a list of 13 tips to better manage stress. Afterwards, members will identify their top four means of coping with stress in the future.)   Attendance Did not attend

## 2024-01-20 PROCEDURE — 63700000 HC SELF-ADMINISTRABLE DRUG: Performed by: PSYCHIATRY & NEUROLOGY

## 2024-01-20 PROCEDURE — 63700000 HC SELF-ADMINISTRABLE DRUG: Performed by: STUDENT IN AN ORGANIZED HEALTH CARE EDUCATION/TRAINING PROGRAM

## 2024-01-20 PROCEDURE — 99232 SBSQ HOSP IP/OBS MODERATE 35: CPT | Performed by: PSYCHIATRY & NEUROLOGY

## 2024-01-20 PROCEDURE — 12400000 HC ROOM AND CARE SEMIPRIVATE PSYCH

## 2024-01-20 RX ORDER — LURASIDONE HYDROCHLORIDE 40 MG/1
40 TABLET, FILM COATED ORAL
Status: COMPLETED | OUTPATIENT
Start: 2024-01-20 | End: 2024-01-20

## 2024-01-20 RX ADMIN — DIVALPROEX SODIUM 2000 MG: 250 TABLET, DELAYED RELEASE ORAL at 22:13

## 2024-01-20 RX ADMIN — LORAZEPAM 1 MG: 1 TABLET ORAL at 11:18

## 2024-01-20 RX ADMIN — LORAZEPAM 1 MG: 1 TABLET ORAL at 06:35

## 2024-01-20 RX ADMIN — HALOPERIDOL 5 MG: 5 TABLET ORAL at 11:18

## 2024-01-20 RX ADMIN — ATORVASTATIN CALCIUM 40 MG: 40 TABLET, FILM COATED ORAL at 22:13

## 2024-01-20 RX ADMIN — QUETIAPINE FUMARATE 200 MG: 200 TABLET, EXTENDED RELEASE ORAL at 22:13

## 2024-01-20 RX ADMIN — LURASIDONE HYDROCHLORIDE 40 MG: 40 TABLET, COATED ORAL at 17:09

## 2024-01-20 NOTE — PLAN OF CARE
"  Problem: Adult Behavioral Health Plan of Care  Goal: Plan of Care Review  Outcome: Progressing  Flowsheets (Taken 1/20/2024 1723)  Progress: improving  Patient Agreement with Plan of Care: agrees  Outcome Evaluation: Jillian is visible on the unit. Compliant with meds and meals. Became paranoid in groups and became loud. Did come to nursing station to vent and asked for Haldol. Pt was given Haldol 5 mg po @ 1118 with some relief and pt took a nap. Sleeping on and off. Adl's needs improvement. Religiously preoccupied \" the rapture.\" + pi. Disorganized. Denies si/hi. Will continue to monitor on q 15 minute checks.   Plan of Care Reviewed With: patient  Intervention(s): Medication management, Therapeutic communications, Encourage group participation and interaction with peers.     "
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
## 2024-01-20 NOTE — PROGRESS NOTES
01/20/24 1434   Activity/Group Checklist   Group Title Interactive therapy   Attendance Did not attend

## 2024-01-20 NOTE — PLAN OF CARE
Plan of Care Review  Plan of Care Reviewed With: patient  Patient Agreement with Plan of Care: agrees  Progress: improving  Intervention(s): RN assessment, q 15 min safety rounds  Outcome Evaluation: Jillian was visible on the unit. Selectively social, attended evening group. In control of behavior, calm, cooperative. Medication compliant. Used phone without anger issues or outbursts after. Did laundry. Able to make needs known. Denies SI. Will continue to support.

## 2024-01-20 NOTE — PROGRESS NOTES
01/20/24 0930   Activity/Group Checklist   Group Title Community meeting   Attendance Did not attend

## 2024-01-21 PROCEDURE — 63600000 HC DRUGS/DETAIL CODE: Mod: JZ | Performed by: PSYCHIATRY & NEUROLOGY

## 2024-01-21 PROCEDURE — 63700000 HC SELF-ADMINISTRABLE DRUG: Performed by: PSYCHIATRY & NEUROLOGY

## 2024-01-21 PROCEDURE — 99232 SBSQ HOSP IP/OBS MODERATE 35: CPT | Performed by: PSYCHIATRY & NEUROLOGY

## 2024-01-21 PROCEDURE — 63700000 HC SELF-ADMINISTRABLE DRUG: Performed by: STUDENT IN AN ORGANIZED HEALTH CARE EDUCATION/TRAINING PROGRAM

## 2024-01-21 PROCEDURE — 12400000 HC ROOM AND CARE SEMIPRIVATE PSYCH

## 2024-01-21 RX ORDER — QUETIAPINE 150 MG/1
300 TABLET, FILM COATED, EXTENDED RELEASE ORAL NIGHTLY
Status: DISCONTINUED | OUTPATIENT
Start: 2024-01-21 | End: 2024-01-22

## 2024-01-21 RX ADMIN — DIVALPROEX SODIUM 2000 MG: 250 TABLET, DELAYED RELEASE ORAL at 20:38

## 2024-01-21 RX ADMIN — ATORVASTATIN CALCIUM 40 MG: 40 TABLET, FILM COATED ORAL at 20:39

## 2024-01-21 RX ADMIN — SENNOSIDES 1 TABLET: 8.6 TABLET, FILM COATED ORAL at 12:55

## 2024-01-21 RX ADMIN — LORAZEPAM 1 MG: 1 TABLET ORAL at 20:39

## 2024-01-21 RX ADMIN — LORAZEPAM 1 MG: 1 TABLET ORAL at 08:07

## 2024-01-21 RX ADMIN — QUETIAPINE FUMARATE 300 MG: 150 TABLET, EXTENDED RELEASE ORAL at 20:38

## 2024-01-21 RX ADMIN — LORAZEPAM 2 MG: 2 INJECTION INTRAMUSCULAR; INTRAVENOUS at 21:02

## 2024-01-21 NOTE — PLAN OF CARE
"Plan of Care Review  Plan of Care Reviewed With: patient  Patient Agreement with Plan of Care: agrees  Progress: improving  Intervention(s): Nursing assessment, medication management, suicide screening, Q15m safety rounds, emotional support provided  Outcome Evaluation: Jillian was visible on the unit and social with select peers. She was observed taking a nap earlier in the shift and then listening to music in the community room in the middle of the night. She denied feeling anxious and depressed and stated, \"I'm just tired today\". Patient denied having suicidal thoughts and denied having auditory and visual hallucinations. She was medication compliant with her nighttime medications, but denied her 7 pm ativan dose.     While talking to staff, patient was aware of her actions on the unit this past week and stated, \"You know that nurse Dianna? I keep thinking she is my sister, I need to work on that\". Active listening utilized. Emotional support provided.     Later in the night, patient came out to staff requesting for a bible to read. She also requested to listen to music. As she listened to music, she became tearful and begged staff to let her call her sister to \"apologize\" to her. Reassurance provided. Patient was calm and cooperative.   "

## 2024-01-21 NOTE — PLAN OF CARE
"  Problem: Adult Behavioral Health Plan of Care  Goal: Plan of Care Review  Outcome: Progressing  Flowsheets (Taken 1/21/2024 1750)  Patient Agreement with Plan of Care: agrees  Outcome Evaluation: Jillian is visible on the unit. Compliant with meds and meals. Became upset and vented to this writer about another agitated male pt. +pi. Became religiously preoccupied \" he is the Anti Doc.\" Pt was redirected to go to her room and rest. She did take a nap and affect was much brighter. Was at the nursing station often and joking with staff. Calm and in control. Denies si/hi. Will continue to monitor on q 15 minute checks.  Plan of Care Reviewed With: patient  Intervention(s): Medication management, Therapeutic ommunications, Encourage group participation and interaction with peers.     "

## 2024-01-21 NOTE — PROGRESS NOTES
01/21/24 1030   Activity/Group Checklist   Group Title Support and Self-expression  (Anger Management)   Attendance Attended   Attendance Duration (min) 0-15   Follows Direction Followed directions   Interactions Disorganized interaction   Affect/Mood Range Wide   Affect/Mood Display Irritable   Goals Achieved Identified feelings;Identified triggers;Discussed coping/wellness tools;Able to listen to others;Able to engage in interactions     Jillian attended group. Jillian learned new coping skills and techniques to manager their anger. Jillian participated in questions about signs they are getting angry, primary vs. secondary emotions, anger responses learned during childhood and positive ways to express anger.

## 2024-01-21 NOTE — PROGRESS NOTES
01/21/24 0930   Activity/Group Checklist   Group Title Community meeting   Attendance Attended   Attendance Duration (min) 31-45   Follows Direction Followed directions   Interactions Initiates interaction   Affect/Mood Range Wide   Affect/Mood Display Alert   Goals Achieved Identified feelings;Able to listen to others;Able to engage in interactions       Mood: worn out  Goal:get better and not to call my mom  Which artist would you like to see in concert: griselda Mccormick

## 2024-01-21 NOTE — PROGRESS NOTES
"PSYCHIATRIC PROGRESS NOTE    Chief Complaint/Reason for follow-up: Schizophrenia    Interval History: She took Ativan 2mg and Haldol 5mg yesterday PRN in addition to standing medications.  She was out of her room more today, and social with peers. Affect was brighter. Denies SI/HI. Not agitated.  Denies AVH.  Did not spontaneous talk about delusional materal. Eating well. Slept 4 hours overnight.     Vital Signs for the last 24 hours:  Temp:  [36.8 °C (98.3 °F)] 36.8 °C (98.3 °F)  Heart Rate:  [108] 108  Resp:  [18] 18  BP: (133)/(74) 133/74    Scheduled Meds:  • atorvastatin  40 mg oral Nightly   • divalproex  2,000 mg oral Nightly   • LORazepam  1 mg oral BID       Labs:  Selected Electrolytes        Recent EKG HR/QTC  Lab Results   Component Value Date    VENTRICRATE 86 01/10/2024    QTCCALCULAT 442 01/10/2024       Lab Results   Component Value Date    VPA 68.8 01/15/2024    VPA 24.0 (L) 01/11/2024    LITHIUM <0.1 (L) 11/29/2020    LITHIUM 0.5 07/22/2020       Mental Status Exam  Appearance:    appropriate grooming, dress, and hygiene    Attention:  attends appropriately during exam   Behavior (General):  awake and alert and calm and cooperative   Behavior (Motoric):  typical coordinated movements appropriate to situation   Speech:  normal rate/rhythm/volume   Language:  fluent   Affect:  euthymic   Mood:  \"annoyed\" 'tired of being here\"   Associations:  coherent   Thought Process:  goal-directed   Thought Content:  no auditory or visual hallucinations on exam and no paranoia or delusions on exam   Suicidality:  denies desire or thoughts to harm or kill self   Orientation:  person and name of place   Insight/Judgement:  minimizes severity level of illness       Assessment/Plan  Schizoaffective disorder, bipolar type (CMS/HCC)  Overview  Zuleyma Acosta is a 48-year-old female past medical history of seizure with active bipolar type who presents with manic decompensation psychosis lability and aggression.  Patient " has a longstanding mental health history with approximately 10 prior inpatient admissions currently sees Dr. Fall outside the hospital as her psychiatrist prescribed Latuda 80 mg nightly and Depakote 1250 nightly.  Questionable compliance with medications outside of the hospital.  Approximately 2-week history of decompensation    Assessment & Plan  Less overtly delusional today. Accepting medications. Continues to take PRN medications for symptoms    1.  Patient admitted on a 201 commitment.  Currently has symptoms of manic decompensation, likely rapid cycling.  Hyperreligious, hypersexual, tangential some disorganization, agitation and labile.  Struck a staff in the shoulder yesterday receiving two PRNs yesterday    Still actively manic, cooperative with the evaluation although continues to have hyperreligiosity and hypersexuality.    2.  Discontinue Lautda 40mg PO QHS     3.   Increase to Quetiapine XR 300mg PO QHS -- plan to increase as tolerated and to symtpoms    4.  continue with Depakote at 2000 mg nightly.  Will recheck Depakote level on Monday.     5.  Continue Ativan at 2 times daily by the weekend team will continue for now as she was sleeping mid day    6.  Continue haloperidol 5 mg every 6 hours IM/p.o. as needed as needed for agitation and psychosis.  Recommend continuing offering this patient if she continues to display psychotic symptoms as well as agitation    7.  Safety: Continue to encourage staff to approach with caution if she has significant mari psychosis at this time.  Ongoing significant intrusiveness and lability.  If she continues to act out and may need a higher level of supervision..

## 2024-01-21 NOTE — PROGRESS NOTES
01/21/24 1400   Activity/Group Checklist   Group Title Interactive therapy  (Wily)   Attendance Did not attend

## 2024-01-22 LAB — VALPROATE SERPL-MCNC: 95.9 UG/ML (ref 50–100)

## 2024-01-22 PROCEDURE — 36415 COLL VENOUS BLD VENIPUNCTURE: CPT | Performed by: STUDENT IN AN ORGANIZED HEALTH CARE EDUCATION/TRAINING PROGRAM

## 2024-01-22 PROCEDURE — 12400000 HC ROOM AND CARE SEMIPRIVATE PSYCH

## 2024-01-22 PROCEDURE — 80164 ASSAY DIPROPYLACETIC ACD TOT: CPT | Performed by: STUDENT IN AN ORGANIZED HEALTH CARE EDUCATION/TRAINING PROGRAM

## 2024-01-22 PROCEDURE — 63700000 HC SELF-ADMINISTRABLE DRUG: Performed by: STUDENT IN AN ORGANIZED HEALTH CARE EDUCATION/TRAINING PROGRAM

## 2024-01-22 PROCEDURE — 99232 SBSQ HOSP IP/OBS MODERATE 35: CPT | Performed by: STUDENT IN AN ORGANIZED HEALTH CARE EDUCATION/TRAINING PROGRAM

## 2024-01-22 PROCEDURE — 63700000 HC SELF-ADMINISTRABLE DRUG: Performed by: PSYCHIATRY & NEUROLOGY

## 2024-01-22 RX ORDER — QUETIAPINE 200 MG/1
400 TABLET, FILM COATED, EXTENDED RELEASE ORAL NIGHTLY
Status: DISCONTINUED | OUTPATIENT
Start: 2024-01-22 | End: 2024-01-25

## 2024-01-22 RX ORDER — DM/P-EPHED/ACETAMINOPH/DOXYLAM 30-7.5/3
2 LIQUID (ML) ORAL EVERY 2 HOUR PRN
Status: DISCONTINUED | OUTPATIENT
Start: 2024-01-22 | End: 2024-01-22

## 2024-01-22 RX ADMIN — ATORVASTATIN CALCIUM 40 MG: 40 TABLET, FILM COATED ORAL at 20:51

## 2024-01-22 RX ADMIN — DIVALPROEX SODIUM 2000 MG: 250 TABLET, DELAYED RELEASE ORAL at 20:50

## 2024-01-22 RX ADMIN — QUETIAPINE FUMARATE 400 MG: 200 TABLET, EXTENDED RELEASE ORAL at 20:51

## 2024-01-22 NOTE — PROGRESS NOTES
01/22/24 1500   Activity/Group Checklist   Group Title Interactive therapy   Attendance Other (Comment)  (Jillian is not appropriate for group at this time)

## 2024-01-22 NOTE — PLAN OF CARE
"Plan of Care Review  Plan of Care Reviewed With: patient  Patient Agreement with Plan of Care: agrees  Progress: improving  Intervention(s): Nursing assessment, medication management, emotional support provided, Q15m safety rounds  Outcome Evaluation: Jillian was visible and social on the unit. She was observed laughing and smiling with peers when participating in the wrap-up group. She denied feeling anxious and denied feeling depressed. Patient denied having suicidal thoughts and denied having auditory and visual hallucinations. She was compliant with her nighttime medications.     2030:   Patient was getting her nighttime medications when she suddenly shouted \"I don't like that girl's attitude and I'm done!\". Staff asked who she was talking about and patient pointed to another staff member in the milieu. Jillian then stormed to her room and slammed the door. Minutes later she came back out and verbally threatened to hurt the staff member.     2100:   Staff and security were able to direct the patient to her room safely. Patient began to be tearful and stated, \"I just wanted to be loved. I just want to be understood\". Emotional support provided. Patient then began to verbally threaten the staff member and stated, \"I hate her so much!\" while crushing a snack in her hand. Staff administered PRN ativan 2 mg IM for agitation. Patient is now asleep in her bed with no signs of distress.   "

## 2024-01-22 NOTE — PROGRESS NOTES
01/22/24 1030   Activity/Group Checklist   Group Title Wellness tools   Attendance Other (Comment)  (Jillian is not appropriate for groups at this time)

## 2024-01-22 NOTE — PROGRESS NOTES
PSYCHIATRIC PROGRESS NOTE    Chief Complaint/Reason for follow-up: Schizoaffective mari    Interval History: Patient was seen in room today where she was calm and cooperative with interview.  She was very lethargic this morning secondary to multiple as needed she received over the weekend.  She received PRNs last evening due to agitation and yelling.  Continues to be fixated about various family members and feeling that various staff are her sisters.  I again redirected her to speak with staff prior to acting on any sort of agitation impulsions.  She told me she no longer wants to  [facility] if she told me she was going to  yesterday.  given her ongoing manic symptoms and she was agreeable to an increase to 400 mg  I did discuss with her about increasing her Seroquel daily.  She told me that she has previously trialed doses as high as 500 daily.  Encouraged her that she continues to display symptoms of mari and needs further stabilization and treatment.  I discussed with her that likely she needs further stabelization and was agreeable to stay over the weekend. Patient denies any suicidal ideation, homicidal ideation or audiovisual hallucinations.  Continues to be hyperreligious talk about God being her father.    Vital Signs for the last 24 hours:  Temp:  [36.7 °C (98 °F)-36.8 °C (98.3 °F)] 36.8 °C (98.3 °F)  Heart Rate:  [105-108] 105  Resp:  [16-18] 18  BP: (119-127)/(63-81) 127/81    Scheduled Meds:  • atorvastatin  40 mg oral Nightly   • divalproex  2,000 mg oral Nightly   • LORazepam  1 mg oral BID   • QUEtiapine XR  400 mg oral Nightly       Labs:  Selected Electrolytes        Recent EKG HR/QTC  Lab Results   Component Value Date    VENTRICRATE 86 01/10/2024    QTCCALCULAT 442 01/10/2024       Lab Results   Component Value Date    VPA 95.9 01/22/2024    VPA 68.8 01/15/2024    LITHIUM <0.1 (L) 11/29/2020    LITHIUM 0.5 07/22/2020       Mental Status Exam  Appearance:    in hospital attire   Attention:   attends appropriately during exam   Behavior (General):  Resting in bed somnolent    Behavior (Motoric):  typical coordinated movements appropriate to situation   Speech:  normal rate/rhythm/volume   Language:  fluent   Affect:  labile,and expansive    Mood:  Can be in here any longer   Associations:  coherent and loose   Thought Process:   Perseverative, tangentiality times   Thought Content: hyperreligiosity and Hypersexual.  Referential towards things in the hospital believing various people or her siblings.  No overt auditory visual hallucinations   Suicidality:  denies desire or thoughts to harm or kill self   Orientation:  person, day, month, year, name of place and situation   Insight/Judgement:  Poor/poor   Memory:  recalls recent events and recalls remote events       Assessment/Plan  Schizoaffective disorder, bipolar type (CMS/HCC)  Overview  Zuleyma Acosta is a 48-year-old female past medical history of seizure with active bipolar type who presents with manic decompensation psychosis lability and aggression.  Patient has a longstanding mental health history with approximately 10 prior inpatient admissions currently sees Dr. Fall outside the hospital as her psychiatrist prescribed Latuda 80 mg nightly and Depakote 1250 nightly.  Questionable compliance with medications outside of the hospital.  Approximately 2-week history of decompensation    Assessment & Plan  Less overtly delusional today. Accepting medications. Continues to take PRN medications for symptoms    1.  Patient admitted on a 201 commitment.  Currently has symptoms of manic decompensation, likely rapid cycling.  Hyperreligious, hypersexual, tangential some disorganization, agitation and labile.  Struck a staff in the shoulder yesterday receiving two PRNs yesterday    Still actively manic, cooperative with the evaluation although continues to have hyperreligiosity and hypersexuality.    2.  Discontinue Lautda 40mg PO QHS     3.   Increaing to  Quetiapine XR  To 400mg PO QHS -- plan to increase as tolerated and to symtpoms    4.  continue with Depakote at 2000 mg nightly.  Depakote level on recheck was 95 will hold at this level.    5.  Continue Ativan at 2 times daily by the weekend team will continue for now as she was sleeping mid day    6.  Continue haloperidol 5 mg every 6 hours IM/p.o. as needed as needed for agitation and psychosis.  Recommend continuing offering this patient if she continues to display psychotic symptoms as well as agitation    7.  Safety: Continue to encourage staff to approach with caution if she has significant mari psychosis at this time.  Ongoing significant intrusiveness and lability.  If she continues to act out and may need a higher level of supervision..

## 2024-01-22 NOTE — PLAN OF CARE
Plan of Care Review  Plan of Care Reviewed With: patient  Patient Agreement with Plan of Care: agrees  Progress: improving  Intervention(s): Encouraged pt to come to staff if experiencing increase in anxiety  Outcome Evaluation: Jillian has been visible at times on unit. Pt appears sedated this morning, requesting to go back to bed. Pt napped most of morning. Continues to confuse patients/staff  with family members. Redirectable. Am Ativan dose held d/t sedation, Dr Bone aware. C/o sore throat secondary to screaming last night. Frequently at nurse station. Tearful at times, upset she's still here in the hospital, doesn't think the medications are helping. No interaction with peers. Has spent most of shift in her room. Will continue to monitor, offer support and positive encouragement.

## 2024-01-22 NOTE — PROGRESS NOTES
01/22/24 9885   Activity/Group Checklist   Group Title Other (Comment)  (Music Therapy: Pts will listen to a guided meditation about the physical health and wellbeing of ourselves and loved ones, understanding the importance of taking care of our minds.)   Attendance Did not attend

## 2024-01-23 LAB
AMMONIA PLAS-SCNC: 32 UMOL/L (ref 18–72)
VALPROATE SERPL-MCNC: 103.3 UG/ML (ref 50–100)

## 2024-01-23 PROCEDURE — 80164 ASSAY DIPROPYLACETIC ACD TOT: CPT | Performed by: STUDENT IN AN ORGANIZED HEALTH CARE EDUCATION/TRAINING PROGRAM

## 2024-01-23 PROCEDURE — 82140 ASSAY OF AMMONIA: CPT | Performed by: STUDENT IN AN ORGANIZED HEALTH CARE EDUCATION/TRAINING PROGRAM

## 2024-01-23 PROCEDURE — 12400000 HC ROOM AND CARE SEMIPRIVATE PSYCH

## 2024-01-23 PROCEDURE — 36415 COLL VENOUS BLD VENIPUNCTURE: CPT | Performed by: STUDENT IN AN ORGANIZED HEALTH CARE EDUCATION/TRAINING PROGRAM

## 2024-01-23 PROCEDURE — 99232 SBSQ HOSP IP/OBS MODERATE 35: CPT | Performed by: STUDENT IN AN ORGANIZED HEALTH CARE EDUCATION/TRAINING PROGRAM

## 2024-01-23 PROCEDURE — 63700000 HC SELF-ADMINISTRABLE DRUG: Performed by: PSYCHIATRY & NEUROLOGY

## 2024-01-23 PROCEDURE — 63700000 HC SELF-ADMINISTRABLE DRUG: Performed by: STUDENT IN AN ORGANIZED HEALTH CARE EDUCATION/TRAINING PROGRAM

## 2024-01-23 RX ADMIN — ALUMINUM HYDROXIDE, MAGNESIUM HYDROXIDE, AND DIMETHICONE 30 ML: 200; 20; 200 SUSPENSION ORAL at 13:33

## 2024-01-23 RX ADMIN — ACETAMINOPHEN 650 MG: 325 TABLET ORAL at 22:30

## 2024-01-23 RX ADMIN — HYDROXYZINE HYDROCHLORIDE 50 MG: 25 TABLET ORAL at 22:49

## 2024-01-23 RX ADMIN — DIVALPROEX SODIUM 2000 MG: 250 TABLET, DELAYED RELEASE ORAL at 20:50

## 2024-01-23 RX ADMIN — ATORVASTATIN CALCIUM 40 MG: 40 TABLET, FILM COATED ORAL at 20:50

## 2024-01-23 RX ADMIN — QUETIAPINE FUMARATE 400 MG: 200 TABLET, EXTENDED RELEASE ORAL at 20:50

## 2024-01-23 RX ADMIN — BENZOCAINE AND MENTHOL 1 LOZENGE: 15; 2.6 LOZENGE ORAL at 08:08

## 2024-01-23 NOTE — PLAN OF CARE
Problem: Adult Behavioral Health Plan of Care  Goal: Plan of Care Review  Outcome: Progressing  Flowsheets (Taken 1/23/2024 0221)  Progress: improving  Patient Agreement with Plan of Care: agrees  Outcome Evaluation: Jillian was visible on the unit this shift mostly at the nursing station. She became visibly aggitated once this shift, but was able to be redirected without incident. Jillian declined her evening dose Ativan due to her still feeling over sedated. She is compliant with meals and medications. Will continue to monitor and support as needed.  Plan of Care Reviewed With: patient  Intervention(s): Medication management, safety rounds q 15 minutes, suicide check-in completed

## 2024-01-23 NOTE — PROGRESS NOTES
"PSYCHIATRIC PROGRESS NOTE    Chief Complaint/Reason for follow-up: Schizoaffective mari    Interval History: Patient was seen in room today where she was calm and cooperative with interview.  She was lethargic this morning but has per nursing slept most of the day yesterday and this morning.  She was alert and oriented in all spheres but did say she felt foggy today.  No PRNs yesterday.  Continues to be fixated about various family members and feeling that various staff are her sisters.  I again redirected her to speak with staff prior to acting on any sort of agitation impulsions.  She continues to talk about people outside of the hospital and commenting on Candido who she states her boyfriend and how she previously wanted to  [place].  She does state ongoing concerns about her inability to contact her mother.  Continues to be hyperreligious stating that \"I am Rebel.\"  She was requesting to leave the hospital.  Encouraged her that she continues to display symptoms of mari and needs further stabilization and treatment.  I discussed with her that likely she needs further stabelization and was agreeable to stay over the weekend. Patient denies any suicidal ideation, homicidal ideation or audiovisual hallucinations.  Continues to be hyperreligious talk about God being her father.    Vital Signs for the last 24 hours:  Temp:  [36.5 °C (97.7 °F)] 36.5 °C (97.7 °F)  Heart Rate:  [104] 104  BP: (117)/(57) 117/57    Scheduled Meds:  • atorvastatin  40 mg oral Nightly   • divalproex  2,000 mg oral Nightly   • LORazepam  1 mg oral BID   • QUEtiapine XR  400 mg oral Nightly       Labs:  Selected Electrolytes        Recent EKG HR/QTC  Lab Results   Component Value Date    VENTRICRATE 86 01/10/2024    QTCCALCULAT 442 01/10/2024       Lab Results   Component Value Date    VPA 95.9 01/22/2024    VPA 68.8 01/15/2024    LITHIUM <0.1 (L) 11/29/2020    LITHIUM 0.5 07/22/2020       Mental Status Exam  Appearance:    in hospital " attire   Attention:  attends appropriately during exam   Behavior (General):  Resting in bed somnolent    Behavior (Motoric):  typical coordinated movements appropriate to situation   Speech:  normal rate/rhythm/volume   Language:  fluent   Affect:  Constricted less labile   Mood:  I'm good   Associations:  coherent and loose   Thought Process:   Perseverative, tangentiality times   Thought Content: hyperreligiosity and Hypersexual.  Referential towards things in the hospital believing various people or her siblings.  No overt auditory visual hallucinations   Suicidality:  denies desire or thoughts to harm or kill self   Orientation:  person, day, month, year, name of place and situation   Insight/Judgement:  Poor/poor   Memory:  recalls recent events and recalls remote events       Assessment/Plan  Schizoaffective disorder, bipolar type (CMS/HCC)  Overview  Zuleyma Acosta is a 48-year-old female past medical history of seizure with active bipolar type who presents with manic decompensation psychosis lability and aggression.  Patient has a longstanding mental health history with approximately 10 prior inpatient admissions currently sees Dr. Fall outside the hospital as her psychiatrist prescribed Latuda 80 mg nightly and Depakote 1250 nightly.  Questionable compliance with medications outside of the hospital.  Approximately 2-week history of decompensation    Assessment & Plan  Less overtly delusional today. Accepting medications. Now more lethargic, but AXO x 4. No prns    1.  Patient admitted on a 201 commitment.  Currently has symptoms of manic decompensation, likely rapid cycling.  Hyperreligious, hypersexual, tangential some disorganization, agitation and labile.  Struck a staff in the shoulder yesterday receiving two PRNs yesterday    Still actively manic, cooperative with the evaluation although continues to have hyperreligiosity and hypersexuality.    2.  Discontinued Lautda 40mg PO QHS cross tapered over to  Seroqeul XR  400mg PO QHS -- plan to increase as tolerated and to symtpoms    3. Continue with Depakote at 2000 mg nightly.  Depakote level on recheck was 95 will hold at this level.  Checking ammonia and Depakote level due to lethargy.    4.  Continue Ativan at 2 times daily by the weekend team will continue for now as she was sleeping mid day    5.  Continue haloperidol 5 mg every 6 hours IM/p.o. as needed as needed for agitation and psychosis.  Recommend continuing offering this patient if she continues to display psychotic symptoms as well as agitation    6.  Safety: Continue to encourage staff to approach with caution if she has significant mrai psychosis at this time.  Ongoing significant intrusiveness and lability.  If she continues to act out and may need a higher level of supervision..

## 2024-01-23 NOTE — PROGRESS NOTES
01/23/24 1500   Activity/Group Checklist   Group Title Interactive therapy   Attendance Other (Comment)  (sleeping during this session)

## 2024-01-23 NOTE — PROGRESS NOTES
01/23/24 5615   Activity/Group Checklist   Group Title Creative Art therapy  (Music Therapy: It's Okay to be Not Okay)   Attendance Did not attend

## 2024-01-23 NOTE — PLAN OF CARE
Plan of Care Review  Plan of Care Reviewed With: patient  Patient Agreement with Plan of Care: agrees  Progress: no change  Intervention(s): Encouraged pt to remain medication compliant  Outcome Evaluation: Jillian has been mostly isolative to her room. Continues to appear sedated, sleeping most of shift. No outbursts. Tearful at times throughout the day. Depressed. Denies suicide. Upset unable to contact her mother, feels she has blocked her. Continues with [facility] preoccupation. Believes that this writer is an Awais and has been to [place]. Morning Ativan held due to sedation, DR Bone aware. Makes needs known to staff. Will continue to monitor.

## 2024-01-23 NOTE — PLAN OF CARE
Problem: Adult Behavioral Health Plan of Care  Goal: Plan of Care Review  Flowsheets (Taken 1/23/2024 1357)  Progress: no change  Outcome Evaluation: Pt screened for LOS.  Plan of Care Reviewed With: (nurse) other (see comments)     Per discussion with nursing pt is eating well on regular diet and does not need any nutrition intervention.  Per psychiatry note, pt continue with symptoms of mari.      Goals:  Pt to meet % of needs via PO    Recommendations:  Continue regular diet  Monitor weight weekly  Please consult if needed

## 2024-01-23 NOTE — PROGRESS NOTES
01/23/24 1030   Activity/Group Checklist   Group Title Wellness tools   Attendance Other (Comment)  (Jillian is not appropriate for groups at this time)        Immediate Post OP Note    PreOp Diagnosis: Aging face and neck with platysmal bands, lipodystrophy of the chin/neck, lower eyelid dermatochalasis and brow ptosis    PostOp Diagnosis: Same    Procedure(s):  RHYTIDECTOMY - REFRESH LIFT - Wound Class: Clean  PLATYSMALPLASTY - Wound Class: Clean  LIPOSUCTION - CHIN AND NECK - Wound Class: Clean  BLEPHAROPLASTY LOWER - Wound Class: Clean  BROW LIFT - ENDOSCOPIC - Wound Class: Clean    Surgeon(s):  Calderon Miranda M.D.    Anesthesiologist/Type of Anesthesia:  Anesthesiologist: Tereso Delgado M.D./General    Surgical Staff:  Circulator: Renetta Arceo R.N.  Scrub Person: Domenico Zurita    Specimens:  * No specimens in log *    Estimated Blood Loss: 125 cc    Findings: See dictation    Complications: None        11/20/2017 3:05 PM Calderon Miranda

## 2024-01-24 LAB — VALPROATE SERPL-MCNC: 128.4 UG/ML (ref 50–100)

## 2024-01-24 PROCEDURE — 12400000 HC ROOM AND CARE SEMIPRIVATE PSYCH

## 2024-01-24 PROCEDURE — 36415 COLL VENOUS BLD VENIPUNCTURE: CPT | Performed by: STUDENT IN AN ORGANIZED HEALTH CARE EDUCATION/TRAINING PROGRAM

## 2024-01-24 PROCEDURE — 63700000 HC SELF-ADMINISTRABLE DRUG: Performed by: STUDENT IN AN ORGANIZED HEALTH CARE EDUCATION/TRAINING PROGRAM

## 2024-01-24 PROCEDURE — 99232 SBSQ HOSP IP/OBS MODERATE 35: CPT | Performed by: STUDENT IN AN ORGANIZED HEALTH CARE EDUCATION/TRAINING PROGRAM

## 2024-01-24 PROCEDURE — 80164 ASSAY DIPROPYLACETIC ACD TOT: CPT | Performed by: STUDENT IN AN ORGANIZED HEALTH CARE EDUCATION/TRAINING PROGRAM

## 2024-01-24 PROCEDURE — 63700000 HC SELF-ADMINISTRABLE DRUG: Performed by: PSYCHIATRY & NEUROLOGY

## 2024-01-24 RX ORDER — QUETIAPINE FUMARATE 50 MG/1
100 TABLET, EXTENDED RELEASE ORAL ONCE
Status: COMPLETED | OUTPATIENT
Start: 2024-01-24 | End: 2024-01-24

## 2024-01-24 RX ORDER — DIVALPROEX SODIUM 250 MG/1
1750 TABLET, DELAYED RELEASE ORAL NIGHTLY
Status: DISCONTINUED | OUTPATIENT
Start: 2024-01-24 | End: 2024-02-02 | Stop reason: HOSPADM

## 2024-01-24 RX ADMIN — QUETIAPINE FUMARATE 400 MG: 200 TABLET, EXTENDED RELEASE ORAL at 20:18

## 2024-01-24 RX ADMIN — ATORVASTATIN CALCIUM 40 MG: 40 TABLET, FILM COATED ORAL at 20:19

## 2024-01-24 RX ADMIN — DIVALPROEX SODIUM 1750 MG: 250 TABLET, DELAYED RELEASE ORAL at 20:18

## 2024-01-24 RX ADMIN — ACETAMINOPHEN 650 MG: 325 TABLET ORAL at 03:19

## 2024-01-24 RX ADMIN — ALUMINUM HYDROXIDE, MAGNESIUM HYDROXIDE, AND DIMETHICONE 30 ML: 200; 20; 200 SUSPENSION ORAL at 19:36

## 2024-01-24 RX ADMIN — LORAZEPAM 1 MG: 1 TABLET ORAL at 23:34

## 2024-01-24 RX ADMIN — HALOPERIDOL 5 MG: 5 TABLET ORAL at 23:34

## 2024-01-24 RX ADMIN — QUETIAPINE FUMARATE 100 MG: 50 TABLET, EXTENDED RELEASE ORAL at 12:30

## 2024-01-24 RX ADMIN — HYDROXYZINE HYDROCHLORIDE 50 MG: 25 TABLET ORAL at 21:43

## 2024-01-24 NOTE — PLAN OF CARE
Plan of Care Review  Plan of Care Reviewed With: patient  Patient Agreement with Plan of Care: agrees  Progress: improving  Intervention(s): Encouraged pt to remain medication compliant  Outcome Evaluation: Jillian has been visible on unit. Refused morning Ativan, DR Rendon aware. Continues with religiosity. Most of the day, calm cooperative. 1500 pt became loud, upset she's still in the hospital, wanting to go home. Continues to believe others are people in her family, demons, or other heavenly beings. Easily redirected this shift. Denies suicide. Tearful at times when on phone. Will continue to monitor.

## 2024-01-24 NOTE — PROGRESS NOTES
01/24/24 1000   Activity/Group Checklist   Group Title Wellness tools  (Community Meeting + Coping Skills Group)   Attendance Did not attend

## 2024-01-24 NOTE — PROGRESS NOTES
01/24/24 1500   Activity/Group Checklist   Group Title Interactive therapy   Attendance Did not attend
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
## 2024-01-24 NOTE — PROGRESS NOTES
"   01/24/24 2946   Activity/Group Checklist   Group Title Creative Art therapy  (Music Therapy: Pts will be prompted to listen to 6 different instrumental songs of varying styles and sounds. While listening, pts can either draw, write, or journal things they hear within the music.)   Attendance Attended   Attendance Duration (min) 16-30   Follows Direction Other (Comment)  (Somewhat followed directions)   Interactions Disorganized interaction  (Talked out of turn and receptive to verbal redirection)   Affect/Mood Range Constricted   Affect/Mood Display Calm   Goals Achieved Able to engage in interactions;Able to listen to others     Jillian engaged in the intervention by coloring on the provided worksheet, drawing responses in relationship to the selected recorded music. As the session progressed, Pt reported, \"I'm thirsty\" and excused herself early and did not return.   "

## 2024-01-24 NOTE — PLAN OF CARE
Plan of Care Review  Patient Agreement with Plan of Care: agrees    Pt visible on unit. Talkative and flirtatious with staff. Pt denies SI. Denies AVH. Pt showered. Changed linens and was med compliant. Pt c/o headache and given tylenol x2. Pt Depakote level from 14:00 1/23 (103.3). HS Depakote given and AM Valproic Acid level order added.

## 2024-01-25 LAB
AMMONIA PLAS-SCNC: 54 UMOL/L (ref 18–72)
VALPROATE SERPL-MCNC: 80.5 UG/ML (ref 50–100)

## 2024-01-25 PROCEDURE — 63700000 HC SELF-ADMINISTRABLE DRUG: Performed by: STUDENT IN AN ORGANIZED HEALTH CARE EDUCATION/TRAINING PROGRAM

## 2024-01-25 PROCEDURE — 36415 COLL VENOUS BLD VENIPUNCTURE: CPT | Performed by: STUDENT IN AN ORGANIZED HEALTH CARE EDUCATION/TRAINING PROGRAM

## 2024-01-25 PROCEDURE — 99232 SBSQ HOSP IP/OBS MODERATE 35: CPT | Performed by: STUDENT IN AN ORGANIZED HEALTH CARE EDUCATION/TRAINING PROGRAM

## 2024-01-25 PROCEDURE — 82140 ASSAY OF AMMONIA: CPT | Performed by: STUDENT IN AN ORGANIZED HEALTH CARE EDUCATION/TRAINING PROGRAM

## 2024-01-25 PROCEDURE — 12400000 HC ROOM AND CARE SEMIPRIVATE PSYCH

## 2024-01-25 PROCEDURE — 80164 ASSAY DIPROPYLACETIC ACD TOT: CPT | Performed by: STUDENT IN AN ORGANIZED HEALTH CARE EDUCATION/TRAINING PROGRAM

## 2024-01-25 PROCEDURE — 63700000 HC SELF-ADMINISTRABLE DRUG: Performed by: PSYCHIATRY & NEUROLOGY

## 2024-01-25 RX ADMIN — ATORVASTATIN CALCIUM 40 MG: 40 TABLET, FILM COATED ORAL at 20:54

## 2024-01-25 RX ADMIN — QUETIAPINE FUMARATE 500 MG: 150 TABLET, EXTENDED RELEASE ORAL at 20:54

## 2024-01-25 RX ADMIN — DIVALPROEX SODIUM 1750 MG: 250 TABLET, DELAYED RELEASE ORAL at 20:54

## 2024-01-25 NOTE — PROGRESS NOTES
"   01/25/24 7100   Activity/Group Checklist   Group Title Creative Art therapy  (Creating Change: After a verbal check-in, group members recreated the song \"Man in the Mirror\" & explored the song's meaning to develop self-insight. Group members shared lyrics they could relate to in their own lives and identified personal goals.)   Attendance Did not attend       "
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
## 2024-01-25 NOTE — PROGRESS NOTES
01/25/24 1030   Activity/Group Checklist   Group Title Wellness tools   Attendance Did not attend

## 2024-01-25 NOTE — PLAN OF CARE
"  Problem: Violence Risk or Actual  Goal: Anger and Impulse Control  Outcome: Progressing     Problem: Psychotic Signs/Symptoms  Goal: Improved Behavioral Control (Psychotic Signs/Symptoms)  Outcome: Progressing  Flowsheets (Taken 1/25/2024 1337)  [place] Goal: verbalizes personal treatment goal  Individual Goal: Jillian will able to verbalize her feelings and goal for the day to staff.     Problem: Adult Behavioral Health Plan of Care  Goal: Plan of Care Review  Outcome: Progressing  Flowsheets (Taken 1/25/2024 1337)  Progress: improving  Patient Agreement with Plan of Care: agrees  Outcome Evaluation: Jillian is visible on unit. Mostly calm and cooperative. Labile affect at times. Easily redirectable verbally. She is preoccupied with religiosity. Tangential and disorganized. Pt stated \"people think I'm hilda, which I am\". Pt was talking about phrases in Bible. Talking about her uncle how he saved her. Also talking about how \"soy\" made her manic. Talking about her sister Candace who never apologized to pt for her bad behavior. Emotional support provided.  Pt denies anxiety, depression, SI/HI/AVH. Pt refused her schedule AM ativan. Dr. Bone made aware. Pt reports eating and sleeping good. Pt assisted with laundry.  Pt was cooperative with her lab drawn.   Plan of Care Reviewed With: patient  Intervention(s): Nursing assessment, medication management, emotional support provided.     "

## 2024-01-25 NOTE — PROGRESS NOTES
01/25/24 1500   Activity/Group Checklist   Group Title Interactive therapy   Attendance Did not attend

## 2024-01-25 NOTE — PLAN OF CARE
"  Problem: Adult Behavioral Health Plan of Care  Goal: Plan of Care Review  Outcome: Progressing  Flowsheets (Taken 1/24/2024 7112)  Progress: improving  Patient Agreement with Plan of Care: agrees  Outcome Evaluation: Jillian was visible on the unit this evening. She remains religiously preoccupied and labile. She had two small outbursts this evening, but was redirectable and later apologized to staff. Jillian was refused her scheduled ativan, but was otherwise medication compliant. She later approached the nurses station asking for \"a shot\". Jillian stated she was thinking \"she knew people here\" and this RN if I was her cousin, Jillian was receptive to verbal deescalation, cooperative with staff. She was given PRN haldol 5 mg and ativan 1 mg for anxiety/ agitation @2334.               Plan of Care Reviewed With: patient  Intervention(s): Nursing assessment, medication management, emotional support provided     "

## 2024-01-26 ENCOUNTER — APPOINTMENT (INPATIENT)
Dept: RADIOLOGY | Facility: HOSPITAL | Age: 49
DRG: 885 | End: 2024-01-26
Payer: MEDICARE
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
## 2024-01-26 PROCEDURE — 99232 SBSQ HOSP IP/OBS MODERATE 35: CPT | Performed by: STUDENT IN AN ORGANIZED HEALTH CARE EDUCATION/TRAINING PROGRAM

## 2024-01-26 PROCEDURE — 70450 CT HEAD/BRAIN W/O DYE: CPT | Mod: ME

## 2024-01-26 PROCEDURE — G1004 CDSM NDSC: HCPCS

## 2024-01-26 PROCEDURE — 63700000 HC SELF-ADMINISTRABLE DRUG: Performed by: STUDENT IN AN ORGANIZED HEALTH CARE EDUCATION/TRAINING PROGRAM

## 2024-01-26 PROCEDURE — 12400000 HC ROOM AND CARE SEMIPRIVATE PSYCH

## 2024-01-26 PROCEDURE — 63700000 HC SELF-ADMINISTRABLE DRUG: Performed by: PSYCHIATRY & NEUROLOGY

## 2024-01-26 PROCEDURE — 200200 PR NO CHARGE

## 2024-01-26 PROCEDURE — 73562 X-RAY EXAM OF KNEE 3: CPT | Mod: RT

## 2024-01-26 RX ADMIN — HALOPERIDOL 5 MG: 5 TABLET ORAL at 06:24

## 2024-01-26 RX ADMIN — QUETIAPINE FUMARATE 500 MG: 150 TABLET, EXTENDED RELEASE ORAL at 20:36

## 2024-01-26 RX ADMIN — LORAZEPAM 1 MG: 1 TABLET ORAL at 06:24

## 2024-01-26 RX ADMIN — ATORVASTATIN CALCIUM 40 MG: 40 TABLET, FILM COATED ORAL at 20:37

## 2024-01-26 RX ADMIN — DIVALPROEX SODIUM 1750 MG: 250 TABLET, DELAYED RELEASE ORAL at 20:36

## 2024-01-26 NOTE — PROGRESS NOTES
Hospital Medicine Service -  Daily Progress Note       SUBJECTIVE   Interval History: Received notification from night shift RN that patient reported she slipped and fell in her bathroom this morning at approximately 5:30 AM. Per night shift nurse, patient did not notify nursing staff until approximately 6:30 AM today. Patient reported she did not hit her head or neck; and primarily complained of right knee pain. On my evaluation, patient denies chest pain, shortness of breath, dizziness and lightheadedness. She reports left sided headache and left sided neck discomfort, in addition to right knee pain. She remains afebrile and hemodynamically stable. Day shift RN present during exam and encounter today.    OBJECTIVE      Vital signs in last 24 hours:  Temp:  [36.7 °C (98 °F)-36.9 °C (98.4 °F)] 36.9 °C (98.4 °F)  Heart Rate:  [106-107] 107  Resp:  [16-18] 18  BP: (128-130)/(61-82) 130/82  No intake or output data in the 24 hours ending 01/26/24 1132    PHYSICAL EXAMINATION      Physical Exam  Vitals reviewed.   Constitutional:       General: She is not in acute distress.     Appearance: She is obese. She is not diaphoretic.   Cardiovascular:      Rate and Rhythm: Normal rate and regular rhythm.      Pulses: Normal pulses.      Heart sounds: Normal heart sounds, S1 normal and S2 normal.      Comments: + trace edema bilateral lower extremities   Pulmonary:      Effort: Pulmonary effort is normal. No respiratory distress.      Breath sounds: No wheezing.      Comments: Diminished at bases   Musculoskeletal:      Comments: R knee discomfort; no significant swelling on exam.    Skin:     General: Skin is warm.   Neurological:      General: No focal deficit present.      Mental Status: She is alert and oriented to person, place, and time.      Cranial Nerves: No dysarthria.      Comments: + 5/5 strength RUE and LUE  + 5/5 strength RLE and LLE   Pupils equal, round and reactive.    Psychiatric:         Mood and Affect:  Affect is labile.            LINES, CATHETERS, DRAINS, AIRWAYS, AND WOUNDS   Lines, Drains, and Airways:  Wounds (agree with documentation and present on admission):         Comments:      LABS / IMAGING / TELE      Labs  No new labs.                Imaging  Not applicable    ECG/Telemetry  Patient is not on telemetry.    ASSESSMENT AND PLAN      * Mood disorder (CMS/[facility])  Assessment & Plan  Management per psychiatry     Fall  Assessment & Plan  Nursing reports patient notified staff at approximately 6:30 AM that she slipped in her bathroom at approximately 5:30 AM.  Per night shift RN- patient reported to nursing staff she did not hit her head or neck; patient did not report any prior symptoms of CP/SOB, dizziness/LH prior to her fall; patient reported to nurse only complaint was her right knee.   Patient is not prescribed blood thinners or ASA.     Patient seen and examined with day shift RN present during encounter. Upon my evaluation this morning, patient reported to me that she was in the shower and after she was done, she stepped out of the shower and slipped, landing on the bathroom door and down to her knees. She again denied hitting her head or neck, but now states she has a headache localized to her left posterior head in addition to her left side of her neck. ROM intact. No focal neuro deficits on exam this morning. Her pupils are round and equal. Equal strength upper and lower extremities.     Sounds more like mechanical fall given no symptoms reported prior to her fall. However, given her reported sx of headache and neck pain, would recommend further imagining.       Plan-  - Check STAT CT head and C-spine to rule out any acute intracranial abnormalities.   - Check right knee Xray due to reports of pain   - Continue to monitor vital signs, SpO2%, fevers.   - Continue to monitor symptoms   - Maintain fall precautions     Schizoaffective disorder, bipolar type (CMS/[facility])  Overview  Zuleyma Acosta is a  48-year-old female past medical history of seizure with active bipolar type who presents with manic decompensation psychosis lability and aggression.  Patient has a longstanding mental health history with approximately 10 prior inpatient admissions currently sees Dr. Fall outside the hospital as her psychiatrist prescribed Latuda 80 mg nightly and Depakote 1250 nightly.  Questionable compliance with medications outside of the hospital.  Approximately 2-week history of decompensation    Hyperlipidemia  Assessment & Plan  Hx HLD  Home regimen: Lipitor 40 mg PO daily.       Plan-  - Continue statin if LFTs normal.   - Recommend follow up with PCP for hospital follow up and fasting lipid panel for stroke prevention    AMINA (obstructive sleep apnea)  Assessment & Plan  Hx AMINA uses CPAP.       Plan-  - Continue CPAP   - Recommend respiratory therapy evaluate home CPAP machine.  - Follow up with PCP in 1 week for hospital follow up appointment and age appropriate cancer screenings.    Paroxysmal atrial fibrillation (CMS/HCC)  Assessment & Plan  Hx PAF noted in 2020   Follows with Dr. Tabares cardiologist.   Not prescribed AC.   She reports she no longer takes Cardizem or ASA.     SR on exam. She denies chest pain, SOB, dizziness, lightheadedness. No heart palpitations reported.       Plan-  - Maintain K+ > 4 and Mg > 2  - Continue to monitor vital signs, SpO2%, fever.   - Follow up with primary cardiologist at discharge.        VTE Assessment: [place]    VTE Prophylaxis:  Current anticoagulants:    •None      Code Status: Full Code      Estimated Discharge Date: 1/17/2024       Disposition Planning: Per psychiatry      ELISHA Rosario  1/26/2024

## 2024-01-26 NOTE — PROGRESS NOTES
01/26/24 0930   Activity/Group Checklist   Group Title Community meeting   Attendance Did not attend

## 2024-01-26 NOTE — PROGRESS NOTES
PSYCHIATRIC PROGRESS NOTE    Chief Complaint/Reason for follow-up: Schizoaffective mari    Interval History: Patient was seen in room today where she was calm and cooperative with interview.  Overall, yesterday seem like an improved day not requiring any PRNs.  Unfortunately this morning during her blood pressure test she became emotionally dysregulated subsequently requiring as needed medication.   She also had a fall this morning striking her knee.  [facility] [place] ordered imaging no lethargy this morning alert and oriented in all spheres.  No behavioral issues overnight.  She was compliant with valproic acid lab draw this a.m.  She did continue to ask me for discharge and told her that as long as she continues to have these episodes of emotional dysregulation and outbursts that likely she needs further stabilization.  She told me that she would work on keeping her outbursts under control.    She continues to talk about people outside of the hospital and commenting on Candido who she states her boyfriend and how she wants to proceed with seeing him outside of the hospital.   Less sexually and religiously preoccupied this morning.  Encouraged her that she continues to display symptoms of mari and needs further stabilization and treatment.  She was agreeable to the titration of her Seroquel to 500 mg nightly denying any side effects this morning.  I discussed with her that likely she needs further stabelization and was agreeable to stay over the weekend. Patient denies any suicidal ideation, homicidal ideation or audiovisual hallucinations.    Vital Signs for the last 24 hours:  Temp:  [36.7 °C (98 °F)-36.9 °C (98.4 °F)] 36.9 °C (98.4 °F)  Heart Rate:  [106-107] 107  Resp:  [16-18] 18  BP: (128-130)/(61-82) 130/82    Scheduled Meds:  • atorvastatin  40 mg oral Nightly   • divalproex  1,750 mg oral Nightly   • LORazepam  1 mg oral BID   • QUEtiapine XR  500 mg oral Nightly       Labs:  Selected Electrolytes        Recent  EKG HR/QTC  Lab Results   Component Value Date    VENTRICRATE 86 01/10/2024    QTCCALCULAT 442 01/10/2024       Lab Results   Component Value Date    VPA 80.5 01/25/2024    .4 (HH) 01/24/2024    LITHIUM <0.1 (L) 11/29/2020    LITHIUM 0.5 07/22/2020       Mental Status Exam  Appearance:    in hospital attire   Attention:  attends appropriately during exam   Behavior (General):  awake and alert and calm and cooperative    Behavior (Motoric):  typical coordinated movements appropriate to situation   Speech:  normal rate/rhythm/volume   Language:  fluent   Affect:  Expansive euthymic, labile   Mood:  I cannot be here anymore   Associations:  coherent and logical   Thought Process:  Goal-directed and linear this a.m.   Thought Content: hyperreligiosity and Hypersexual improved today.  Referential towards things in the hospital believing various people or her siblings.  No overt auditory visual hallucinations   Suicidality:  denies desire or thoughts to harm or kill self   Orientation:  person, day, month, year, name of place and situation   Insight/Judgement:  Poor/poor   Memory:  recalls recent events and recalls remote events       Assessment/Plan  Schizoaffective disorder, bipolar type (CMS/[facility])  Overview  Zuleyma Acosta is a 48-year-old female past medical history of seizure with active bipolar type who presents with manic decompensation psychosis lability and aggression.  Patient has a longstanding mental health history with approximately 10 prior inpatient admissions currently sees Dr. Fall outside the hospital as her psychiatrist prescribed Latuda 80 mg nightly and Depakote 1250 nightly.  Questionable compliance with medications outside of the hospital.  Approximately 2-week history of decompensation    Assessment & Plan  Improved hyperreligiosity, ongoing lability.  Parents given this morning accepting medications.  Lethargy improved, but AXO x 4.     1.  Patient admitted on a 201 commitment.  Currently has  symptoms of manic decompensation, likely rapid cycling.  Hyperreligious, hypersexual, tangential some disorganization, agitation and labile.  Off and on utilization of PRNs often targets female staff members believing there acting like her sister.    Still actively manic, cooperative with the evaluation although continues to have hyperreligiosity and hypersexuality.    2.  Continue Seroqeul XR to 500mg PO QHS -- plan to increase as tolerated and to symtpoms.    If she continues to have ongoing delusions and mood lability would increase to 600 mg of the weekend    Sexual preoccupation and hyperreligiosity improved this morning exam still referential about staff    3.  Continue Depakote at 1750 mg nightly recheck Depakote level showed within normal limits of 80.  We will continue to titrate to effective dosing and monitor serial Depakote levels    4.  Continue Ativan at 2 times daily by the weekend team will continue for now as she was sleeping mid day.  Can hold for lethagy    5.  Continue haloperidol 5 mg every 6 hours IM/p.o. as needed as needed for agitation and psychosis.  Recommend continuing offering this patient if she continues to display psychotic symptoms as well as agitation    6.  Safety: Continue to encourage staff to approach with caution if she has significant mari psychosis at this time.  Ongoing significant intrusiveness and lability.  If she continues to act out and may need a higher level of supervision..

## 2024-01-26 NOTE — PROGRESS NOTES
01/26/24 7990   Activity/Group Checklist   Group Title Creative Art therapy  ([employer]: Pts will engage in group music-making through playing the Clear Vascularulele. Pts instruction on how to play various chords on the Avitidele as a means of utilizing music to cope and fostering positive interpersonal connections by learning an instrument)   Attendance Did not attend

## 2024-01-26 NOTE — PLAN OF CARE
"  Problem: Violence Risk or Actual  Goal: Anger and Impulse Control  Outcome: Progressing     Problem: Psychotic Signs/Symptoms  Goal: Improved Behavioral Control (Psychotic Signs/Symptoms)  Outcome: Progressing  Flowsheets (Taken 1/26/2024 1241)  [place] Goal: identifies symptoms triggers  Individual Goal: Jillian will able to control her outburt and use positive coping skills before acting out.     Problem: Adult Behavioral Health Plan of Care  Goal: Plan of Care Review  Outcome: Progressing  Flowsheets (Taken 1/26/2024 1241)  Progress: improving  Patient Agreement with Plan of Care: agrees  Outcome Evaluation: Jillian is visible on unit with labile mood. No agressive outburst in morning; however was with labile mood and easily getting distracted and thought some of her peers are devil. Redirected verbally. Less preoccupied with Worship thoughts compare to yesterday. Denies SI/HI/AVH, anxiety. Pt denies any pain on right knee. Pt was ordered CT of head, spine and right knee x-ray post fall. Initially pt refused to go for test stating \" I'm fine, nothing happened to me\". Pt encouraged to go which she eventually agreed and completed her tests. Pt refused her schedule AM ativan, DR. Bone made aware. Reports eating and sleeping good.  Plan of Care Reviewed With: patient  Intervention(s): Nursing assessment, encouraged to go for x-rays this morning, encouraged to use positive coping skills.     "

## 2024-01-26 NOTE — PLAN OF CARE
"  Problem: Adult Behavioral Health Plan of Care  Goal: Plan of Care Review  Outcome: Progressing  Flowsheets (Taken 1/25/2024 2030)  Progress: improving  Patient Agreement with Plan of Care: agrees  Outcome Evaluation: Jillian was visible on the unit. She was high energy this evening, joking and dancing in the milieu. She has been pleasant with staff. Remains religiously preoccupied, also inappropriate at times, but redirectable. She refused her scheduled ativan, however took the rest of her scheduled meds.       0650: This morning during vitals Jillian became increasingly agitated and delusional. She was focused on mental health tech, screaming and cursing. \"Get the fuck away from me, Candace!\"  She ripped her blood pressure cuff off her arm and threw it at staff member. Jillian was escorted to her room by multiple RNs, PO PRN haldol 5 mg and ativan 1 mg given @ 0624 for agitation. Appropriate behaviors and boundaries were reinforced. Jillian then began to cry stating , \"You know I fell onto the shower door on the ground!\" Jillian reported that she fell over an hour ago onto her right knee and landed on the shower door. She did not alert staff, this was unwitnessed. Upon examination no bruise was noted, right knee appears slightly swollen. Vital signs taken, slightly tachy at 107. [facility] [place], [place]. Schmitz was notified.     Plan of Care Reviewed With: patient  Intervention(s): Nursing assessment, medication management, emotional support provided     "

## 2024-01-26 NOTE — PROGRESS NOTES
01/26/24 1030   Activity/Group Checklist   Group Title Wellness tools   Attendance Did not attend

## 2024-01-26 NOTE — ASSESSMENT & PLAN NOTE
Nursing reports patient notified staff at approximately 6:30 AM that she slipped in her bathroom at approximately 5:30 AM.  Per night shift RN- patient reported to nursing staff she did not hit her head or neck; patient did not report any prior symptoms of CP/SOB, dizziness/LH prior to her fall; patient reported to nurse only complaint was her right knee.   Patient is not prescribed blood thinners or ASA.     Patient seen and examined with day shift RN present during encounter. Upon my evaluation this morning, patient reported to me that she was in the shower and after she was done, she stepped out of the shower and slipped, landing on the bathroom door and down to her knees. She again denied hitting her head or neck, but now states she has a headache localized to her left posterior head in addition to her left side of her neck. ROM intact. No focal neuro deficits on exam this morning. Her pupils are round and equal. Equal strength upper and lower extremities.     Sounds more like mechanical fall given no symptoms reported prior to her fall. However, given her reported sx of headache and neck pain, would recommend further imagining.       Plan-  - Check STAT CT head and C-spine to rule out any acute intracranial abnormalities.   - Check right knee Xray due to reports of pain   - Continue to monitor vital signs, SpO2%, fevers.   - Continue to monitor symptoms   - Maintain fall precautions

## 2024-01-26 NOTE — ASSESSMENT & PLAN NOTE
Hx of AMINA, uses CPAP at night time.     Plan-  - Recommend respiratory therapy consult for evaluation of home CPAP machine   - Follow up with PCP at discharge for hospital follow up and age appropriate cancer screenings.   
Hx of PAF in 2020   Follows with Dr. Tabares cardiologist.   Not prescribed AC.   She reports she no longer takes Cardizem. Last RX = Diltiazem  mg PO daily dispensed 90 day supply on 3/3/23 with 1 refill.   Patient reports intermittent palpitations and fatigue over the last week. HRR on exam.   Orthostatic vital signs: negative       Plan-  - Ordered repeat EKG for evaluation   - Maintain K+ > 4 and Mg > 2  - Check orthostatic vital signs  - If patient continues to have sx of palpitations or any other cardiac sx, would recommend cardiology evaluate   - Follow up with PCP and primary cardiologist at discharge to continue routine management and monitoring.   - Please contact [facility] with any questions or concerns.     
Management per psychiatry   
Mg 1.7 on admission labs  Ordered Mg replacement x 1.       Plan-  - No acute interventions indicated at this time.   - If clinical concern, could consider repeat BMP with Mg.  - Monitor intake/output closely.   
Patient reports paranoia, feeling the Devil is trying to destroy her. Thinking people in the ER are angels and Devils. History of being admitted to [facility] for 56 days for mari in 2020,  Punching wall and difficult to control. Admits to not using her CPAP for 2 1/2 weeks up until the past 3 days.     Wed 7/12, Continue current plan for now    Thursday: plan for dc in am. Pt has signed a 72 hour notice, is not a danger to self or others and is able to care for self. Will advise her to get a depakote level 7/17.       -patient agreed to increase Depakote to 1500 mg HS and Latuda to 80 mg HS for psychosis and potential mari  - 15 minute checks  - group and milieu therapy  - collateral- VM left for DR Weeks  - safe dc plan  - see AMINA plan but CPAP compliance jabier to improve mental status  
Patient uses CPAP at home but has not brought it. Ordered hospital machine with virtual jose angel due to hose/cords.  
Recent lipid panel results in 3/2023: LDL 78    Plan-  - Continue statin   
Recently tx outpatient for right upper quadrant (below right breast) abscess.   Recently evaluated by Dr. Rubén Emerson MD general surgery on 6/26/2023  S/P I&D in the office  Currently prescribed Bactrim for antibiotics; last day of antibiotics to be tomorrow- 7/12 (to complete a 10-day course of treatment).    On exam, RUQ/R breast incision appears clean dry and intact; no drainage noted on exam. No recent fevers or chills per pt. No symptoms of N/V or diarrhea.       Plan-  - Continue to monitor for any s/s infection   - Complete Bactrim as prescribed.   - Continue outpatient follow up with Dr. Emerson.   
Reported by patient; said she is no longer on Cardizem however per CRNP it was filled as recently as 3/23. Will consult cardiology, especially as she did not use CPAP for 2 1/2 weeks.   
Quality 431: Preventive Care And Screening: Unhealthy Alcohol Use - Screening: Patient not identified as an unhealthy alcohol user when screened for unhealthy alcohol use using a systematic screening method
Detail Level: Detailed
Quality 226: Preventive Care And Screening: Tobacco Use: Screening And Cessation Intervention: Patient screened for tobacco use and is an ex/non-smoker
Quality 47: Advance Care Plan: Advance Care Planning discussed and documented in the medical record; patient did not wish or was not able to name a surrogate decision maker or provide an advance care plan.

## 2024-01-27 PROCEDURE — 99232 SBSQ HOSP IP/OBS MODERATE 35: CPT | Performed by: STUDENT IN AN ORGANIZED HEALTH CARE EDUCATION/TRAINING PROGRAM

## 2024-01-27 PROCEDURE — 12400000 HC ROOM AND CARE SEMIPRIVATE PSYCH

## 2024-01-27 PROCEDURE — 63700000 HC SELF-ADMINISTRABLE DRUG: Performed by: PSYCHIATRY & NEUROLOGY

## 2024-01-27 PROCEDURE — 63700000 HC SELF-ADMINISTRABLE DRUG: Performed by: STUDENT IN AN ORGANIZED HEALTH CARE EDUCATION/TRAINING PROGRAM

## 2024-01-27 RX ADMIN — DIVALPROEX SODIUM 1750 MG: 250 TABLET, DELAYED RELEASE ORAL at 20:28

## 2024-01-27 RX ADMIN — ATORVASTATIN CALCIUM 40 MG: 40 TABLET, FILM COATED ORAL at 20:27

## 2024-01-27 RX ADMIN — QUETIAPINE FUMARATE 500 MG: 150 TABLET, EXTENDED RELEASE ORAL at 20:27

## 2024-01-27 NOTE — PLAN OF CARE
"  Problem: Adult Behavioral Health Plan of Care  Goal: Plan of Care Review  Outcome: Progressing  Flowsheets (Taken 1/27/2024 1125)  Progress: no change  Patient Agreement with Plan of Care: agrees  Outcome Evaluation: Jillian has been visible on the unit today- some outbursts toward staff stating \"don't make me call security on you! When asked to take her scheduled ativan, which she refused.  made aware. Labile mood. She has been religiously preoccupied. No reported knee pain. Feels she is \"ready for discharge\".  Q15 minute rounding maintained  Plan of Care Reviewed With: patient  Intervention(s): RN assessment, suicide assessment, boundary reinforcement, medication education     Problem: Violence Risk or Actual  Goal: Anger and Impulse Control  Outcome: Progressing  Intervention: Minimize Safety Risk  Flowsheets (Taken 1/27/2024 1125)  Behavior Management: boundaries reinforced  De-Escalation Techniques:  • appropriate behavior reinforced  • reoriented  • verbally redirected     "

## 2024-01-27 NOTE — PROGRESS NOTES
01/27/24 0930   Activity/Group Checklist   Group Title Community meeting   Attendance Attended   Follows Direction Followed directions   Interactions Initiates interaction   Affect/Mood Range Blunted/flat   Affect/Mood Display Alert   Goals Achieved Identified feelings;Able to listen to others;Able to engage in interactions     Jillian attended community meeting. Jillian stated that today she feels fine. Her goal for today is to control her tongue. Jillian answered the question of the day, Where is your dream vacation spot. [facility] [place].

## 2024-01-27 NOTE — PLAN OF CARE
"  Problem: Adult Behavioral Health Plan of Care  Goal: Plan of Care Review  Outcome: Progressing  Flowsheets (Taken 1/26/2024 2029)  Progress: improving  Patient Agreement with Plan of Care: agrees  Outcome Evaluation: Jillian has been visible on the unit this evening, listening to headphones. She has been religiously preoccupied. She is in good behavioral control. No reported knee pain. Feels she is \"ready for discharge\". She refused her scheduled ativan, but otherwise is medication compliant. Q15 minute rounding maintained.  Plan of Care Reviewed With: patient  Intervention(s): [place] assessment, mdication management, emotional support provided     "

## 2024-01-27 NOTE — PROGRESS NOTES
"PSYCHIATRIC PROGRESS NOTE    Chief Complaint/Reason for follow-up: Schizoaffective mari    Interval History: Pt slept 5.75 hours last evening per nursing. Accepting Depakote and Seroquel. No PRNs required overnight. On interview when I introduce myself she is initially suspicious but quickly says, \"I'll trust you then.\" She reports feeling \"fine, I just want to rest. I'm singing to the lord.\" When asked about the speed and content of her thoughts, she replies, \"I'm worshipping the lord in my thoughts and that's all I need.\" She reports feeling tired during the day. She is not attending group. She is wearing a [employer] sweatshirt and so I ask if she is a fan. She tells me that she could play point guard for the [employer]. \"Seriously, I'm out of shape but I'm really good.\"     Vital Signs for the last 24 hours:  Temp:  [36.7 °C (98 °F)-36.8 °C (98.2 °F)] 36.7 °C (98 °F)  Heart Rate:  [] 104  Resp:  [17] 17  BP: (131)/(69-72) 131/72    Scheduled Meds:  • atorvastatin  40 mg oral Nightly   • divalproex  1,750 mg oral Nightly   • LORazepam  1 mg oral BID   • QUEtiapine XR  500 mg oral Nightly       Labs:  Selected Electrolytes        Recent EKG HR/QTC  Lab Results   Component Value Date    VENTRICRATE 86 01/10/2024    QTCCALCULAT 442 01/10/2024       Lab Results   Component Value Date    VPA 80.5 01/25/2024    .4 (HH) 01/24/2024    LITHIUM <0.1 (L) 11/29/2020    LITHIUM 0.5 07/22/2020       Mental Status Exam  Appearance:    in hospital attire   Attention:  attends appropriately during exam   Behavior (General):  awake, alert, initially suspicious but cooperative    Behavior (Motoric):  typical coordinated movements appropriate to situation   Speech:  normal rate/rhythm/volume   Language:  fluent   Affect: Labile    Mood:  \"fine, I'm singing to the lord\"   Associations:  coherent and logical   Thought Process:  linear   Thought Content: Hyperreligious, grandiose    Suicidality:  denies desire or thoughts to harm " or kill self   Orientation:  person, day, month, year, name of place and situation   Insight/Judgement:  Poor/poor   Memory:  recalls recent events and recalls remote events       Assessment/Plan  Schizoaffective disorder, bipolar type (CMS/[facility])  Overview  Zuleyma Acosta is a 48-year-old female past medical history of seizure with active bipolar type who presents with manic decompensation psychosis lability and aggression.  Patient has a longstanding mental health history with approximately 10 prior inpatient admissions currently sees Dr. Fall outside the hospital as her psychiatrist prescribed Latuda 80 mg nightly and Depakote 1250 nightly.  Questionable compliance with medications outside of the hospital.  Approximately 2-week history of decompensation    Assessment & Plan  Improved hyperreligiosity, ongoing lability.  Parents given this morning accepting medications.  Lethargy improved, but AXO x 4.     1.  Patient admitted on a 201 commitment.  Currently has symptoms of manic decompensation, likely rapid cycling.  Hyperreligious, hypersexual, tangential some disorganization, agitation and labile.  Off and on utilization of PRNs often targets female staff members believing there acting like her sister.    Still actively manic, cooperative with the evaluation although continues to have hyperreligiosity and hypersexuality.    2.  Continue Seroqeul XR to 500mg PO QHS -- plan to increase as tolerated and to symtpoms.    If she continues to have ongoing delusions and mood lability would increase to 600 mg of the weekend    Sexual preoccupation and hyperreligiosity improved this morning exam still referential about staff    3.  Continue Depakote at 1750 mg nightly recheck Depakote level showed within normal limits of 80.  We will continue to titrate to effective dosing and monitor serial Depakote levels    4.  Continue Ativan at 2 times daily by the weekend team will continue for now as she was sleeping mid day.  Can  hold for lethagy    5.  Continue haloperidol 5 mg every 6 hours IM/p.o. as needed as needed for agitation and psychosis.  Recommend continuing offering this patient if she continues to display psychotic symptoms as well as agitation    6.  Safety: Continue to encourage staff to approach with caution if she has significant mari psychosis at this time.  Ongoing significant intrusiveness and lability.  If she continues to act out and may need a higher level of supervision..    I spent 35 minutes on this date of service performing the following activities: obtaining history, performing examination, documenting, preparing for visit, obtaining / reviewing records, providing counseling and education and coordinating care.

## 2024-01-27 NOTE — PROGRESS NOTES
01/27/24 1100   Activity/Group Checklist   Group Title Support and Self-expression  (self care house)   Attendance Attended   Attendance Duration (min) 31-45   Follows Direction Unable to follow directions   Interactions Initiates interaction   Affect/Mood Range Wide   Affect/Mood Display Alert   Goals Achieved Identified feelings;Identified triggers;Discussed coping/wellness tools;Able to engage in interactions;Able to listen to others

## 2024-01-27 NOTE — PROGRESS NOTES
01/27/24 1400   Activity/Group Checklist   Group Title Interactive therapy  (open art studio)   Attendance Attended   Attendance Duration (min) 0-15   Follows Direction Followed directions   Interactions Initiates interaction   Affect/Mood Range Normal range   Affect/Mood Display Alert   Goals Achieved Identified feelings;Discussed coping/wellness tools;Able to listen to others;Able to engage in interactions

## 2024-01-28 PROCEDURE — 12400000 HC ROOM AND CARE SEMIPRIVATE PSYCH

## 2024-01-28 PROCEDURE — 63700000 HC SELF-ADMINISTRABLE DRUG: Performed by: STUDENT IN AN ORGANIZED HEALTH CARE EDUCATION/TRAINING PROGRAM

## 2024-01-28 PROCEDURE — 99232 SBSQ HOSP IP/OBS MODERATE 35: CPT | Performed by: STUDENT IN AN ORGANIZED HEALTH CARE EDUCATION/TRAINING PROGRAM

## 2024-01-28 PROCEDURE — 63700000 HC SELF-ADMINISTRABLE DRUG: Performed by: PSYCHIATRY & NEUROLOGY

## 2024-01-28 RX ORDER — QUETIAPINE 200 MG/1
600 TABLET, FILM COATED, EXTENDED RELEASE ORAL NIGHTLY
Status: DISCONTINUED | OUTPATIENT
Start: 2024-01-28 | End: 2024-01-30

## 2024-01-28 RX ADMIN — LORAZEPAM 1 MG: 1 TABLET ORAL at 17:39

## 2024-01-28 RX ADMIN — ATORVASTATIN CALCIUM 40 MG: 40 TABLET, FILM COATED ORAL at 20:37

## 2024-01-28 RX ADMIN — DIVALPROEX SODIUM 1750 MG: 250 TABLET, DELAYED RELEASE ORAL at 20:37

## 2024-01-28 RX ADMIN — QUETIAPINE FUMARATE 600 MG: 200 TABLET, EXTENDED RELEASE ORAL at 20:37

## 2024-01-28 NOTE — PROGRESS NOTES
01/28/24 0110   Activity/Group Checklist   Group Title Interpersonal skills  (Forgiveness)   Attendance Did not attend     Jillian did not attend group at this time. This SW offered the group materials for them to review individually.

## 2024-01-28 NOTE — PROGRESS NOTES
"PSYCHIATRIC PROGRESS NOTE    Chief Complaint/Reason for follow-up: Schizoaffective mari    Interval History: Pt slept 5.5 hours last evening per nursing. Accepting Depakote and Seroquel. No PRNs required overnight. The patient reports feeling \"wonderful, I'm having a really good day.\" She is pleased to report that she attended all groups yesterday and did not require any PRNs, states she is \"following all of Dr. Bone's commandments.\" She asks if I am able to \"miraculously\" discharge her today because she just wants to go home and hold her mother's hand. When I indicate that she will meet with the team for rounds tomorrow she replies, \"The thing is I might not be here. It depends on what god has in store.\" She denies SI.     Vital Signs for the last 24 hours:  Temp:  [36.4 °C (97.6 °F)-36.9 °C (98.4 °F)] 36.4 °C (97.6 °F)  Heart Rate:  [102-107] 107  Resp:  [18-20] 18  BP: (125-139)/(65) 125/65    Scheduled Meds:  • atorvastatin  40 mg oral Nightly   • divalproex  1,750 mg oral Nightly   • LORazepam  1 mg oral BID   • QUEtiapine XR  500 mg oral Nightly       Labs:  Selected Electrolytes        Recent EKG HR/QTC  Lab Results   Component Value Date    VENTRICRATE 86 01/10/2024    QTCCALCULAT 442 01/10/2024       Lab Results   Component Value Date    VPA 80.5 01/25/2024    .4 (HH) 01/24/2024    LITHIUM <0.1 (L) 11/29/2020    LITHIUM 0.5 07/22/2020       Mental Status Exam  Appearance:    in hospital attire   Attention:  attends appropriately during exam   Behavior (General):  awake, alert, pleasant   Behavior (Motoric):  typical coordinated movements appropriate to situation   Speech:  normal rate/rhythm/volume   Language:  fluent   Affect: Expansive    Mood:  \"wonderful\"   Associations:  illogical   Thought Process:  linear   Thought Content: Hyperreligious, grandiose    Suicidality:  denies desire or thoughts to harm or kill self   Orientation:  person, day, month, year, name of place and situation "   Insight/Judgement:  Poor/poor   Memory:  recalls recent events and recalls remote events       Assessment/Plan  Schizoaffective disorder, bipolar type (CMS/[facility])  Overview  Zuleyma Acosta is a 48-year-old female past medical history of seizure with active bipolar type who presents with manic decompensation psychosis lability and aggression.  Patient has a longstanding mental health history with approximately 10 prior inpatient admissions currently sees Dr. Fall outside the hospital as her psychiatrist prescribed Latuda 80 mg nightly and Depakote 1250 nightly.  Questionable compliance with medications outside of the hospital.  Approximately 2-week history of decompensation    Assessment & Plan  Improved hyperreligiosity, ongoing lability.  Parents given this morning accepting medications.  Lethargy improved, but AXO x 4.     1.  Patient admitted on a 201 commitment.  Currently has symptoms of manic decompensation, likely rapid cycling.  Hyperreligious, hypersexual, tangential some disorganization, agitation and labile.  Off and on utilization of PRNs often targets female staff members believing there acting like her sister.    Still actively manic, cooperative with the evaluation although continues to have hyperreligiosity and hypersexuality.    2.  Increase Seroquel XR to 600 mg QHS     Sexual preoccupation and hyperreligiosity improved this morning exam still referential about staff    3.  Continue Depakote at 1750 mg nightly recheck Depakote level showed within normal limits of 80.  We will continue to titrate to effective dosing and monitor serial Depakote levels    4.  Continue Ativan at 2 times daily by the weekend team will continue for now as she was sleeping mid day.  Can hold for lethagy    5.  Continue haloperidol 5 mg every 6 hours IM/p.o. as needed as needed for agitation and psychosis.  Recommend continuing offering this patient if she continues to display psychotic symptoms as well as agitation    6.   Safety: Continue to encourage staff to approach with caution if she has significant mari psychosis at this time.  Ongoing significant intrusiveness and lability.  If she continues to act out and may need a higher level of supervision..    I spent 35 minutes on this date of service performing the following activities: obtaining history, performing examination, documenting, preparing for visit, obtaining / reviewing records, providing counseling and education and coordinating care.

## 2024-01-28 NOTE — PROGRESS NOTES
01/28/24 0930   Activity/Group Checklist   Group Title Community meeting   Attendance Did not attend

## 2024-01-28 NOTE — PLAN OF CARE
Plan of Care Review  Patient Agreement with Plan of Care: agrees    VSS. Pt visible on unit at times inappropriate and or preoccupied religiously. Took part in evening group and was medication compliant.

## 2024-01-28 NOTE — PROGRESS NOTES
01/28/24 1400   Activity/Group Checklist   Group Title Interactive therapy  (Mind Maps)   Attendance Attended   Attendance Duration (min) 31-45  (Jillian came in late)   Follows Direction Did not follow directions  (Jillian sat in the outside of the room and spoke to others while they did the activity.)   Interactions Initiates interaction   Affect/Mood Range Wide   Affect/Mood Display Alert;Calm   Goals Achieved Able to listen to others;Able to engage in interactions;Able to reflect/comment on own behavior     Jillian came into the room while interactive group was in progress. She spoke to others in the group but did not participate in the activity. Toward the end of group Jillian asked to share something, then stated she feels she's been mean to members of the community. She apologized for her behaviors and this was received well by the group.

## 2024-01-28 NOTE — PLAN OF CARE
"  Problem: Adult Behavioral Health Plan of Care  Goal: Plan of Care Review  Outcome: Progressing  Flowsheets (Taken 1/28/2024 1149)  Progress: improving  Patient Agreement with Plan of Care: agrees  Outcome Evaluation: Jillian has been visible on the unit today- some outbursts toward peers, able to be redirected. Refused scheduled Ativan. Labile mood, boundaries reinforced. She has been religiously preoccupied. No reported knee pain. Feels she is \"ready for discharge\". Pt was disruptive to the milieu later in the shift, upsetting peers. Took 1900 Ativan early at 1800, retired to her room after being redirected multiple times by staff. Q15 minute rounding maintained    Plan of Care Reviewed With: patient  Intervention(s): Rn assessment, suicide assessment, boundaries reinforced, coping skills reviewed     Problem: Violence Risk or Actual  Goal: Anger and Impulse Control  Outcome: Progressing  Intervention: Minimize Safety Risk  Flowsheets (Taken 1/28/2024 1149)  Behavior Management:  • boundaries reinforced  • impulse control promoted  De-Escalation Techniques:  • appropriate behavior reinforced  • stimulation decreased  • verbally redirected  • medication offered     "

## 2024-01-29 PROCEDURE — 12400000 HC ROOM AND CARE SEMIPRIVATE PSYCH

## 2024-01-29 PROCEDURE — 99232 SBSQ HOSP IP/OBS MODERATE 35: CPT | Performed by: STUDENT IN AN ORGANIZED HEALTH CARE EDUCATION/TRAINING PROGRAM

## 2024-01-29 PROCEDURE — 63700000 HC SELF-ADMINISTRABLE DRUG: Performed by: STUDENT IN AN ORGANIZED HEALTH CARE EDUCATION/TRAINING PROGRAM

## 2024-01-29 PROCEDURE — 63700000 HC SELF-ADMINISTRABLE DRUG: Performed by: PSYCHIATRY & NEUROLOGY

## 2024-01-29 RX ADMIN — ATORVASTATIN CALCIUM 40 MG: 40 TABLET, FILM COATED ORAL at 20:35

## 2024-01-29 RX ADMIN — QUETIAPINE FUMARATE 600 MG: 200 TABLET, EXTENDED RELEASE ORAL at 20:35

## 2024-01-29 RX ADMIN — DIVALPROEX SODIUM 1750 MG: 250 TABLET, DELAYED RELEASE ORAL at 20:35

## 2024-01-29 RX ADMIN — HYDROXYZINE HYDROCHLORIDE 50 MG: 25 TABLET ORAL at 22:56

## 2024-01-29 NOTE — PROGRESS NOTES
"   01/29/24 1045   Activity/Group Checklist   Group Title Creative Art therapy  (Meditations: Pts will listen to a guided meditation about the physical health and wellbeing of ourselves and loved ones, understanding the importance of taking care of our minds.)   Attendance Attended   Attendance Duration (min) 0-15   Follows Direction Followed directions   Interactions Interacted reciprocally   Affect/Mood Range Blunted/flat;Constricted   Affect/Mood Display Alert;Irritable     Jillian attended group during the second half for approx. 2 minutes. Pt entered the session space and stated, \"why are you sitting in the dark?\" This therapist provided education and redirection. Afterwards, Jillian left the group early, reporting, \"it's too hot in here.\"   "

## 2024-01-29 NOTE — PLAN OF CARE
"Plan of Care Review  Plan of Care Reviewed With: patient  Patient Agreement with Plan of Care: agrees  Progress: improving  Intervention(s): Encouraged pt to use positive coping skills to help manage her anger  Outcome Evaluation: Jillian has been visible on unit. Continues to be religiously preoccupied. Apologized for behaviors of last night. Acknowledged that she does become easily angered, feels she needs more medications. Refused morning Ativan stating, \"I don't need it\". One small outburst of raising voice but was able to regain composure quickly. Focused on discharge. 1:1 discontinued this afternoon. Makes needs known to staff. Isolates to room throughout the day. Will continue to monitor, offer positive reinforcement.   "

## 2024-01-29 NOTE — PLAN OF CARE
"  Problem: Adult Behavioral Health Plan of Care  Goal: Plan of Care Review  Outcome: Progressing  Flowsheets (Taken 1/28/2024 2300)  Progress: no change  Patient Agreement with Plan of Care: agrees  Outcome Evaluation: Jillian  Plan of Care Reviewed With: patient  Intervention(s):  • Nursing assessment  • Medication maintenance  • 1:1 for safety maintained   Plan of Care Review  Plan of Care Reviewed With: patient  Patient Agreement with Plan of Care: agrees  Progress: no change  Intervention(s): Nursing assessment; Medication maintenance; 1:1 for safety maintained  Outcome Evaluation: Jillian remains on a 1:1 for safety. She continues to present as labile and childlike and is religiously and sexually preoccupied. She approached the nurses station and asked \"why do I have a person with me?\" Staff explained it was for safety and she stated \"Ok. Well I am leaving tomorrow anyway.\" Later in the evening she asked this RN \"are you mad at me\" followed by \"I was good today.\" Compliant with night time medication without incident and offered no c/o side effects. Will continue to provide support as needed.   "

## 2024-01-29 NOTE — PROGRESS NOTES
"PSYCHIATRIC PROGRESS NOTE    Chief Complaint/Reason for follow-up: Schizoaffective mari    Interval History: Patient was seen in room today where she was calm and cooperative with interview.  Today she states she feels okay.  She is thinking about discharge asking when this would be.  Later in the afternoon she states she talked to her mom who mentioned that she did better on lithium in the past.  Less lability less hypersexuality and hyperreligiosity today.  She still discusses Yarsani but less preoccupied.  She states she is tolerating her Seroquel and Depakote without issue.  She denies any suicidal ideation, homicidal ideation or audiovisual hallucinations.  No PRNs over the weekend she was put on a one-to-one after she \"body checked,\" another patient.  Today I discussed this with her and she says she just had her chair try redirected telling her that any kind of contact her aggressive actions towards another patient are acceptable.  She remained agreeable to talk with staff if she were to have further decompensations.    Vital Signs for the last 24 hours:  Temp:  [36.3 °C (97.3 °F)-36.5 °C (97.7 °F)] 36.5 °C (97.7 °F)  Heart Rate:  [108-111] 111  Resp:  [16] 16  BP: (119-125)/(66-72) 125/66    Scheduled Meds:  • atorvastatin  40 mg oral Nightly   • divalproex  1,750 mg oral Nightly   • LORazepam  1 mg oral BID   • QUEtiapine XR  600 mg oral Nightly       Labs:  Selected Electrolytes        Recent EKG HR/QTC  Lab Results   Component Value Date    VENTRICRATE 86 01/10/2024    QTCCALCULAT 442 01/10/2024       Lab Results   Component Value Date    VPA 80.5 01/25/2024    .4 (HH) 01/24/2024    LITHIUM <0.1 (L) 11/29/2020    LITHIUM 0.5 07/22/2020       Mental Status Exam  Appearance:    in hospital attire   Attention:  attends appropriately during exam   Behavior (General):  awake and alert and calm and cooperative    Behavior (Motoric):  typical coordinated movements appropriate to situation   Speech:  " normal rate/rhythm/volume   Language:  fluent   Affect:  Expansive euthymic, labile   Mood:  I feel okay   Associations:  coherent and logical   Thought Process:  Goal-directed and linear this a.m.   Thought Content: hyperreligiosity and Hypersexual improved today.  Referential towards things in the hospital believing various people or her siblings.  No overt auditory visual hallucinations   Suicidality:  denies desire or thoughts to harm or kill self   Orientation:  person, day, month, year, name of place and situation   Insight/Judgement:  Poor/poor   Memory:  recalls recent events and recalls remote events       Assessment/Plan  Schizoaffective disorder, bipolar type (CMS/[facility])  Overview  Zuleyma Acosta is a 48-year-old female past medical history of seizure with active bipolar type who presents with manic decompensation psychosis lability and aggression.  Patient has a longstanding mental health history with approximately 10 prior inpatient admissions currently sees Dr. Fall outside the hospital as her psychiatrist prescribed Latuda 80 mg nightly and Depakote 1250 nightly.  Questionable compliance with medications outside of the hospital.  Approximately 2-week history of decompensation    Assessment & Plan  Improved hyperreligiosity, ongoing lability, also improved.  No PRNs over the weekend.  Lethargy improved, but AXO x 4.     1.  Patient admitted on a 201 commitment.  Currently has symptoms of manic decompensation, likely rapid cycling.  Hyperreligious, hypersexual, tangential some disorganization, agitation and labile.  Off and on utilization of PRNs often targets female staff members believing there acting like her sister.    Still actively manic, cooperative with the evaluation although continues to have hyperreligiosity and hypersexuality.    2.  Continue Seroquel XR to 600 mg QHS     Sexual preoccupation and hyperreligiosity improved this morning exam still referential about staff    3.  Continue Depakote  at 1750 mg nightly recheck Depakote level showed within normal limits of 80.  We will continue to titrate to effective dosing and monitor serial Depakote levels    4.  Continue Ativan at 2 times daily by the weekend team will continue for now as she was sleeping mid day.  Can hold for lethagy    5.  Continue haloperidol 5 mg every 6 hours IM/p.o. as needed as needed for agitation and psychosis.  Recommend continuing offering this patient if she continues to display psychotic symptoms as well as agitation    6.  Safety: Continue to encourage staff to approach with caution if she has significant mari psychosis at this time.  Ongoing significant intrusiveness and lability.  If she continues to act out and may need a higher level of supervision..

## 2024-01-29 NOTE — PROGRESS NOTES
01/29/24 1445   Activity/Group Checklist   Group Title Interactive therapy   Attendance Attended   Attendance Duration (min) 16-30   Follows Direction Followed directions   Interactions Disorganized interaction   Affect/Mood Range Blunted/flat;Constricted   Affect/Mood Display Alert;Calm   Goals Achieved Able to listen to others;Able to engage in interactions     Jillian engaged in this interactive group by using the provided art materials as a means of practicing mindfulness, reduce stress, and enhancing mood.

## 2024-01-29 NOTE — PROGRESS NOTES
01/29/24 0945   Activity/Group Checklist   Group Title Community meeting  (Stretching: Pts contributed various exercises during this holistic service.)   Attendance Did not attend

## 2024-01-29 NOTE — PROGRESS NOTES
01/29/24 1315   Activity/Group Checklist   Group Title Wellness tools   Attendance Did not attend

## 2024-01-30 PROBLEM — M79.602 LEFT ARM PAIN: Status: ACTIVE | Noted: 2024-01-30

## 2024-01-30 PROCEDURE — 63700000 HC SELF-ADMINISTRABLE DRUG: Performed by: STUDENT IN AN ORGANIZED HEALTH CARE EDUCATION/TRAINING PROGRAM

## 2024-01-30 PROCEDURE — 63700000 HC SELF-ADMINISTRABLE DRUG: Performed by: PSYCHIATRY & NEUROLOGY

## 2024-01-30 PROCEDURE — 200200 PR NO CHARGE: Performed by: HOSPITALIST

## 2024-01-30 PROCEDURE — 12400000 HC ROOM AND CARE SEMIPRIVATE PSYCH

## 2024-01-30 PROCEDURE — 99232 SBSQ HOSP IP/OBS MODERATE 35: CPT | Performed by: STUDENT IN AN ORGANIZED HEALTH CARE EDUCATION/TRAINING PROGRAM

## 2024-01-30 RX ADMIN — ACETAMINOPHEN 650 MG: 325 TABLET ORAL at 19:31

## 2024-01-30 RX ADMIN — ATORVASTATIN CALCIUM 40 MG: 40 TABLET, FILM COATED ORAL at 20:56

## 2024-01-30 RX ADMIN — QUETIAPINE FUMARATE 700 MG: 150 TABLET, EXTENDED RELEASE ORAL at 20:56

## 2024-01-30 RX ADMIN — DIVALPROEX SODIUM 1750 MG: 250 TABLET, DELAYED RELEASE ORAL at 20:56

## 2024-01-30 NOTE — PROGRESS NOTES
01/30/24 1000   Activity/Group Checklist   Group Title Community meeting  (Cristhian Exercise)   Attendance Did not attend

## 2024-01-30 NOTE — PLAN OF CARE
Problem: Violence Risk or Actual  Goal: Anger and Impulse Control  Outcome: Progressing     Problem: Psychotic Signs/Symptoms  Goal: Improved Behavioral Control (Psychotic Signs/Symptoms)  Outcome: Progressing  Flowsheets (Taken 1/30/2024 1025)  [place] Goal: identifies symptoms triggers  Individual Goal: Jillian will use positive coping skills and able to control outburst.     Problem: Adult Behavioral Health Plan of Care  Goal: Plan of Care Review  Outcome: Progressing  Flowsheets (Taken 1/30/2024 1025)  Progress: improving  Patient Agreement with Plan of Care: agrees  Outcome Evaluation: Jillian is visible on unit. Mood is calm, affect is labile. Pt is discharged focus. Pt is less preoccupied with her delusions, religiosity. Using headphones to listen music. Pt refused her schedule Ativan. Dr. Bone made aware. She is eating and sleeping good. No aggressive outburst.  Plan of Care Reviewed With: patient  Intervention(s): Nursing assessment,offer emotional support as needed, encouraged to use positive coping skills.

## 2024-01-30 NOTE — PROGRESS NOTES
PSYCHIATRIC PROGRESS NOTE    Chief Complaint/Reason for follow-up: Schizoaffective mari    Interval History: Patient was seen in room today where she was calm and cooperative with interview.  Today she states she feels okay.  She continues to ask about discharge, stating that keeping her here further would worsen her mood.  Less lability, thoughts more organized today.  Still religiously preoccupied, but not telling me she is god or hilda.   Less lability less hypersexuality and hyperreligiosity today.   She states she is tolerating her Seroquel and Depakote without issue.  Endorsing improved sleep.  She denies any suicidal ideation, homicidal ideation or audiovisual hallucinations.  Today I discussed this with her and she says she just had her chair try redirected telling her that any kind of contact her aggressive actions towards another patient are acceptable.  She remained agreeable to talk with staff if she were to have further decompensations.    Vital Signs for the last 24 hours:  Temp:  [36.6 °C (97.9 °F)] 36.6 °C (97.9 °F)  Heart Rate:  [97] 97  Resp:  [17] 17  BP: (118)/(71) 118/71    Scheduled Meds:  • atorvastatin  40 mg oral Nightly   • divalproex  1,750 mg oral Nightly   • QUEtiapine XR  700 mg oral Nightly       Labs:  Selected Electrolytes        Recent EKG HR/QTC  Lab Results   Component Value Date    VENTRICRATE 86 01/10/2024    QTCCALCULAT 442 01/10/2024       Lab Results   Component Value Date    VPA 80.5 01/25/2024    .4 (HH) 01/24/2024    LITHIUM <0.1 (L) 11/29/2020    LITHIUM 0.5 07/22/2020       Mental Status Exam  Appearance:    in hospital attire   Attention:  attends appropriately during exam   Behavior (General):  awake and alert and calm and cooperative    Behavior (Motoric):  typical coordinated movements appropriate to situation   Speech:  normal rate/rhythm/volume   Language:  fluent   Affect:  Expansive euthymic   Mood:  I need to get out of here   Associations:  coherent and  logical   Thought Process:  Goal-directed and linear this a.m.   Thought Content: hyperreligiosity and Hypersexual improved today.  Referential towards things in the hospital believing various people or her siblings.  No overt auditory visual hallucinations   Suicidality:  denies desire or thoughts to harm or kill self   Orientation:  person, day, month, year, name of place and situation   Insight/Judgement:  Poor/poor   Memory:  recalls recent events and recalls remote events       Assessment/Plan  Schizoaffective disorder, bipolar type (CMS/[facility])  Overview  Zuleyma Acosta is a 48-year-old female past medical history of seizure with active bipolar type who presents with manic decompensation psychosis lability and aggression.  Patient has a longstanding mental health history with approximately 10 prior inpatient admissions currently sees Dr. Fall outside the hospital as her psychiatrist prescribed Latuda 80 mg nightly and Depakote 1250 nightly.  Questionable compliance with medications outside of the hospital.  Approximately 2-week history of decompensation    Assessment & Plan  Improved hyperreligiosity, improved lability.No PRNs over the weekend.  Lethargy improved, continues to be AXO x 4.     1.  Patient admitted on a 201 commitment.  Currently has symptoms of manic decompensation, likely rapid cycling.  Hyperreligious, hypersexual, tangential some disorganization, agitation and labile.  Off and on utilization of PRNs often targets female staff members believing there acting like her sister.    Radha improving, sleeping, less labile/agitated    2.  Increasing Seroquel XR to 700 mg QHS     Sexual preoccupation and hyperreligiosity improved this morning exam still referential about staff    3.  Continue Depakote at 1750 mg nightly recheck Depakote level showed within normal limits of 80.  We will continue to titrate to effective dosing and monitor serial Depakote levels    4.  Continue Ativan at 2 times daily by the  weekend team will continue for now as she was sleeping mid day.  Can hold for lethagy    5.  Continue haloperidol 5 mg every 6 hours IM/p.o. as needed as needed for agitation and psychosis.  Recommend continuing offering this patient if she continues to display psychotic symptoms as well as agitation    6.  Safety: Continue to encourage staff to approach with caution if she has significant mari psychosis at this time.  Ongoing significant intrusiveness and lability.  If she continues to act out and may need a higher level of supervision..

## 2024-01-30 NOTE — PROGRESS NOTES
01/30/24 1500   Activity/Group Checklist   Group Title Interactive therapy   Attendance Did not attend

## 2024-01-30 NOTE — PROGRESS NOTES
"   01/30/24 1300   Activity/Group Checklist   Group Title Wellness tools   Attendance Did not attend     This therapist checked in with Jillian and provided group handouts an alternative intervention. Jillian appeared tearful, reporting \"I've tried all of this before, I don't get it.\" Jillian was receptive to validation and support.   "

## 2024-01-30 NOTE — ASSESSMENT & PLAN NOTE
Mild discomfort around her neck and upper arm  Roping musculature  New this morning  Probably slept wrong and has some muscle spasm  For now would just monitor

## 2024-01-30 NOTE — PLAN OF CARE
"  Problem: Adult Behavioral Health Plan of Care  Goal: Plan of Care Review  Outcome: Progressing  Flowsheets (Taken 1/30/2024 0201)  Progress: improving  Patient Agreement with Plan of Care: agrees  Outcome Evaluation: Jillian  Plan of Care Reviewed With: patient  Intervention(s):   Nursing assessment   Medication maintenance   Monitored for safety Q 15 minutes   Plan of Care Review  Plan of Care Reviewed With: patient  Patient Agreement with Plan of Care: agrees  Progress: improving  Intervention(s): Nursing assessment; Medication maintenance; Monitored for safety Q 15 minutes  Outcome Evaluation: Jillian was visible on the unit this evening to have needs met and talk on the phone, otherwise withdrawn to her room. Presented as less irritable and reported \"I'm doing better now.\" Refused scheduled Ativan stating \"I don't think I need it.\" Compliant with night time medication without incident and offered no c/o side effects. Given PRN Atarax for c/o anxiety with positive effect. Will continue to provide support as needed.   "

## 2024-01-30 NOTE — PROGRESS NOTES
Hospital Medicine Service -  Daily Progress Note       SUBJECTIVE   Interval History: Patient was seen and examined this morning. She was complaining of left arm pain/upper shoulder/neck area. She didn't have it yesterday. She woke up with the discomfort. It's worse with movement. No discomfort going down her arm. No other issues.     OBJECTIVE      Vital signs in last 24 hours:  Temp:  [36.6 °C (97.9 °F)] 36.6 °C (97.9 °F)  Heart Rate:  [97] 97  Resp:  [17] 17  BP: (118)/(71) 118/71  No intake or output data in the 24 hours ending 01/30/24 1158    PHYSICAL EXAMINATION      Physical Exam  Eyes:      Pupils: Pupils are equal, round, and reactive to light.   Cardiovascular:      Rate and Rhythm: Normal rate and regular rhythm.   Pulmonary:      Effort: Pulmonary effort is normal.      Breath sounds: Normal breath sounds.   Abdominal:      General: Bowel sounds are normal.      Palpations: Abdomen is soft.   Musculoskeletal:      Comments: Left shoulder area and cervical discomfort with palpation. Ropiness of muscles in cervical area.   Skin:     General: Skin is warm and dry.   Neurological:      Mental Status: She is alert.            LINES, CATHETERS, DRAINS, AIRWAYS, AND WOUNDS   Lines, Drains, and Airways:  Wounds (agree with documentation and present on admission):         Comments:      LABS / IMAGING / TELE      Labs  No new labs.      Imaging  Not applicable    ECG/Telemetry  Patient is not on telemetry.    ASSESSMENT AND PLAN      Left arm pain  Assessment & Plan  Mild discomfort around her neck and upper arm  Roping musculature  New this morning  Probably slept wrong and has some muscle spasm  For now would just monitor      Fall  Assessment & Plan  Nursing reports patient notified staff at approximately 6:30 AM that she slipped in her bathroom at approximately 5:30 AM.  Per night shift RN- patient reported to nursing staff she did not hit her head or neck; patient did not report any prior symptoms of  CP/SOB, dizziness/LH prior to her fall; patient reported to nurse only complaint was her right knee.   Patient is not prescribed blood thinners or ASA.     Patient seen and examined with day shift RN present during encounter. Upon my evaluation this morning, patient reported to me that she was in the shower and after she was done, she stepped out of the shower and slipped, landing on the bathroom door and down to her knees. She again denied hitting her head or neck, but now states she has a headache localized to her left posterior head in addition to her left side of her neck. ROM intact. No focal neuro deficits on exam this morning. Her pupils are round and equal. Equal strength upper and lower extremities.     Sounds more like mechanical fall given no symptoms reported prior to her fall. However, given her reported sx of headache and neck pain, would recommend further imagining.       Plan-  - Check STAT CT head and C-spine to rule out any acute intracranial abnormalities.   - Check right knee Xray due to reports of pain   - Continue to monitor vital signs, SpO2%, fevers.   - Continue to monitor symptoms   - Maintain fall precautions     Schizoaffective disorder, bipolar type (CMS/[facility])  Overview  Zuleyma Acosta is a 48-year-old female past medical history of seizure with active bipolar type who presents with manic decompensation psychosis lability and aggression.  Patient has a longstanding mental health history with approximately 10 prior inpatient admissions currently sees Dr. Fall outside the hospital as her psychiatrist prescribed Latuda 80 mg nightly and Depakote 1250 nightly.  Questionable compliance with medications outside of the hospital.  Approximately 2-week history of decompensation    Hyperlipidemia  Assessment & Plan  Hx HLD  Home regimen: Lipitor 40 mg PO daily.       Plan-  - Continue statin if LFTs normal.   - Recommend follow up with PCP for hospital follow up and fasting lipid panel for stroke  prevention    AMINA (obstructive sleep apnea)  Assessment & Plan  Hx AMINA uses CPAP.       Plan-  - Continue CPAP   - Recommend respiratory therapy evaluate home CPAP machine.  - Follow up with PCP in 1 week for hospital follow up appointment and age appropriate cancer screenings.    Paroxysmal atrial fibrillation (CMS/HCC)  Assessment & Plan  Hx PAF noted in 2020   Follows with Dr. Tabares cardiologist.   Not prescribed AC.   She reports she no longer takes Cardizem or ASA.     SR on exam. She denies chest pain, SOB, dizziness, lightheadedness. No heart palpitations reported.       Plan-  - Maintain K+ > 4 and Mg > 2  - Continue to monitor vital signs, SpO2%, fever.   - Follow up with primary cardiologist at discharge.     * Mood disorder (CMS/HCC)  Assessment & Plan  Management per psychiatry          VTE Assessment: Padua    VTE Prophylaxis:  Current anticoagulants:    •None      Code Status: Full Code      Estimated Discharge Date: 1/17/2024       Disposition Planning: As per psychiatrist     Patrizia Wagner, DO  1/30/2024

## 2024-01-31 PROCEDURE — 12400000 HC ROOM AND CARE SEMIPRIVATE PSYCH

## 2024-01-31 PROCEDURE — 63700000 HC SELF-ADMINISTRABLE DRUG: Performed by: STUDENT IN AN ORGANIZED HEALTH CARE EDUCATION/TRAINING PROGRAM

## 2024-01-31 PROCEDURE — 99232 SBSQ HOSP IP/OBS MODERATE 35: CPT | Performed by: STUDENT IN AN ORGANIZED HEALTH CARE EDUCATION/TRAINING PROGRAM

## 2024-01-31 PROCEDURE — 63700000 HC SELF-ADMINISTRABLE DRUG: Performed by: PSYCHIATRY & NEUROLOGY

## 2024-01-31 RX ADMIN — ATORVASTATIN CALCIUM 40 MG: 40 TABLET, FILM COATED ORAL at 21:51

## 2024-01-31 RX ADMIN — QUETIAPINE FUMARATE 700 MG: 150 TABLET, EXTENDED RELEASE ORAL at 21:51

## 2024-01-31 RX ADMIN — DIVALPROEX SODIUM 1750 MG: 250 TABLET, DELAYED RELEASE ORAL at 21:51

## 2024-01-31 NOTE — PROGRESS NOTES
01/31/24 1315   Activity/Group Checklist   Group Title Wellness tools   Attendance Did not attend

## 2024-01-31 NOTE — PROGRESS NOTES
"   01/31/24 1030   Activity/Group Checklist   Group Title Other (Comment)  (Zentangles: A method of pattern drawing to reduce stress and improve focus. Pts will be provided with a practice sheet and instructed to copy the patterns.)   Attendance Attended   Attendance Duration (min) 16-30  (Paced in/out 1-2x)   Follows Direction   (Passively participated)   Interactions Disorganized interaction   Affect/Mood Range Constricted;Blunted/flat   Affect/Mood Display Alert;Calm   Goals Achieved Able to listen to others;Able to engage in interactions     Jillian appeared to observe the session space, but did not directly participate during this intervention. Pt intermittently interacted with peers and this therapist during group, reporting, \"did I fail this class?\" And this therapist provided redirection.   "

## 2024-01-31 NOTE — PLAN OF CARE
Problem: Adult Behavioral Health Plan of Care  Goal: Plan of Care Review  Outcome: Progressing  Flowsheets (Taken 1/31/2024 1740)  Progress: improving  Patient Agreement with Plan of Care: agrees  Outcome Evaluation: Jillian is visible on the unit. Compliant with meds and meals. Up at the nursing station frequently. Calmer. Denies si/hi/avh. Religiously preoccupied. Less paranoid and needs reassurance. No outbursts today. Pt laughing tonight with other peers in living area. Denies si/hi/avh. Will continue to monitor on q 15 minute checks.  Plan of Care Reviewed With: patient  Intervention(s): Medication management, Therapeutic communications, Encourage group participation and interaction with peers.

## 2024-01-31 NOTE — PLAN OF CARE
"Plan of Care Review  Plan of Care Reviewed With: patient  Patient Agreement with Plan of Care: agrees  Progress: improving  Intervention(s): Nursing assessment, medication managment, suicide screening, Q15m safety rounds  Outcome Evaluation: Jillian was social and visible on the unit. She rated her anxiety a 5/10 and her depression an 8/10, with 10 being the worst. Patient denied having suicidal thoughts and denied having auditory and visual hallucinations. She was calm, cooperative, and compliant with her medications. In the beginning of the shift, she became tearful with a patient in the milieu. Provided emotional support. Staff asked her how her day was and she stated, \"Good, I took quicker showers today which was good. I had more energy\". Patient remains religiously preoccupied. At 2049, Jillian came to the nurse's station and requested for her sister Patrizia to be taken off her HIPAA form. Staff assisted Jillian with this task. Jillian then talked to staff about Patrizia and became paranoid that \"she is behind certain things\". Active listening utilized. Emotional support provided. PRN tylenol was given for her headache pain at 1931.     Addendum: At around 0115, patient began to get paranoid about other patients on the unit. When she saw a new patient recently got admitted, she came up to staff and asked, \"Is that the bad cooper from downstairs?\". Staff redirected her verbally.     Addendum: At around 0430, patient came out to the community room saying, \"I think I forgive Patrizia now. Can you add her back on the HIPAA form?\". Staff assisted patient with task and added Patrizia back on HIPAA.   "

## 2024-01-31 NOTE — PROGRESS NOTES
01/31/24 1500   Activity/Group Checklist   Group Title Interactive therapy   Attendance Duration (min) 46-60   Follows Direction Followed directions   Interactions Interacted reciprocally   Affect/Mood Range Normal range   Affect/Mood Display Calm   Goals Achieved Able to listen to others;Able to engage in interactions  (Jillian attended interactive group and participated in activity appropriately.)

## 2024-01-31 NOTE — PROGRESS NOTES
01/31/24 0945   Activity/Group Checklist   Group Title Community meeting   Attendance Did not attend

## 2024-01-31 NOTE — PROGRESS NOTES
PSYCHIATRIC PROGRESS NOTE    Chief Complaint/Reason for follow-up: Schizoaffective mari    Interval History: Patient was seen in room today where she was calm and cooperative with interview.  Today she states she feels okay.  She continues to ask about discharge, stating that keeping her here further would worsen her mood.  Less lability, thoughts more organized today.  Still religiously preoccupied, but not telling me she is god or hilda.   Less lability less hypersexuality and hyperreligiosity today.  She did have an episode of paranoia around 1 AM feeling that someone was admitted downstairs that she was fearful.  When questioned that she told me she had a nightmare and carried that over into staff conversation.  She states she is tolerating her Seroquel and Depakote without issue.  Endorsing improved sleep.  She denies any suicidal ideation, homicidal ideation or audiovisual hallucinations.  Today I discussed this with her and she says she just had her chair try redirected telling her that any kind of contact her aggressive actions towards another patient are acceptable.  She remained agreeable to talk with staff if she were to have further decompensations.    Vital Signs for the last 24 hours:  Temp:  [36.6 °C (97.9 °F)] 36.6 °C (97.9 °F)  Heart Rate:  [104-109] 104  Resp:  [18] 18  BP: (128-143)/(62-89) 143/62    Scheduled Meds:  • atorvastatin  40 mg oral Nightly   • divalproex  1,750 mg oral Nightly   • QUEtiapine XR  700 mg oral Nightly       Labs:  Selected Electrolytes        Recent EKG HR/QTC  Lab Results   Component Value Date    VENTRICRATE 86 01/10/2024    QTCCALCULAT 442 01/10/2024       Lab Results   Component Value Date    VPA 80.5 01/25/2024    .4 (HH) 01/24/2024    LITHIUM <0.1 (L) 11/29/2020    LITHIUM 0.5 07/22/2020       Mental Status Exam  Appearance:    in hospital attire   Attention:  attends appropriately during exam   Behavior (General):  awake and alert and calm and cooperative     Behavior (Motoric):  typical coordinated movements appropriate to situation   Speech:  normal rate/rhythm/volume   Language:  fluent   Affect:  Expansive euthymic   Mood:  I feel better   Associations:  coherent and logical   Thought Process:  Goal-directed and linear this a.m.   Thought Content: hyperreligiosity and Hypersexual improved today.  Referential towards things in the hospital believing various people or her siblings.  No overt auditory visual hallucinations   Suicidality:  denies desire or thoughts to harm or kill self   Orientation:  person, day, month, year, name of place and situation   Insight/Judgement:  Poor/poor   Memory:  recalls recent events and recalls remote events       Assessment/Plan  Schizoaffective disorder, bipolar type (CMS/HCC)  Overview  Zuleyma Acosta is a 48-year-old female past medical history of seizure with active bipolar type who presents with manic decompensation psychosis lability and aggression.  Patient has a longstanding mental health history with approximately 10 prior inpatient admissions currently sees Dr. Fall outside the hospital as her psychiatrist prescribed Latuda 80 mg nightly and Depakote 1250 nightly.  Questionable compliance with medications outside of the hospital.  Approximately 2-week history of decompensation    Assessment & Plan  Improved hyperreligiosity, improved lability.No PRNs over the weekend.  Lethargy improved, continues to be AXO x 4.     1.  Patient admitted on a 201 commitment.  Currently has symptoms of manic decompensation, likely rapid cycling.  Hyperreligious, hypersexual, tangential some disorganization, agitation and labile.  Off and on utilization of PRNs often targets female staff members believing there acting like her sister.    Radha improving, sleeping, less labile/agitated    2.  Continue Seroquel XR at 700 mg QHS     Sexual preoccupation and hyperreligiosity improved this morning exam still referential about staff    3.  Continue  Depakote at 1750 mg nightly recheck Depakote level showed within normal limits of 80.  We will continue to titrate to effective dosing and monitor serial Depakote levels    4.  Continue Ativan at 2 times daily by the weekend team will continue for now as she was sleeping mid day.  Can hold for lethagy    5.  Continue haloperidol 5 mg every 6 hours IM/p.o. as needed as needed for agitation and psychosis.  Recommend continuing offering this patient if she continues to display psychotic symptoms as well as agitation    6.  Safety: Continue to encourage staff to approach with caution if she has significant mari psychosis at this time.  Ongoing significant intrusiveness and lability.  If she continues to act out and may need a higher level of supervision..

## 2024-01-31 NOTE — NURSING NOTE
"A code green was called on the hospital intercom overnight, Jillian became increasingly paranoid/agitated. Insisting the code was on her sister, Candace. Jillian then began to state how she needs to get out of here and \" is keeping me here against my will\". Jillian proceeded to call patients on the unit \"Barack Obama\" and \"My boyfriend\". She continued to perseverate on Code Green that was called, \"sometimes we need to show you who's boss\".  She was able to follow verbal redirection, she refused to take PO PRNs that were offered. Q15 minute rounding maintained.   "

## 2024-02-01 PROCEDURE — 99232 SBSQ HOSP IP/OBS MODERATE 35: CPT | Performed by: STUDENT IN AN ORGANIZED HEALTH CARE EDUCATION/TRAINING PROGRAM

## 2024-02-01 PROCEDURE — 63700000 HC SELF-ADMINISTRABLE DRUG: Performed by: PSYCHIATRY & NEUROLOGY

## 2024-02-01 PROCEDURE — 12400000 HC ROOM AND CARE SEMIPRIVATE PSYCH

## 2024-02-01 PROCEDURE — 63700000 HC SELF-ADMINISTRABLE DRUG: Performed by: STUDENT IN AN ORGANIZED HEALTH CARE EDUCATION/TRAINING PROGRAM

## 2024-02-01 RX ADMIN — SENNOSIDES 1 TABLET: 8.6 TABLET, FILM COATED ORAL at 16:56

## 2024-02-01 RX ADMIN — ATORVASTATIN CALCIUM 40 MG: 40 TABLET, FILM COATED ORAL at 20:46

## 2024-02-01 RX ADMIN — ACETAMINOPHEN 650 MG: 325 TABLET ORAL at 12:39

## 2024-02-01 RX ADMIN — ALUMINUM HYDROXIDE, MAGNESIUM HYDROXIDE, AND DIMETHICONE 30 ML: 200; 20; 200 SUSPENSION ORAL at 23:31

## 2024-02-01 RX ADMIN — QUETIAPINE FUMARATE 700 MG: 150 TABLET, EXTENDED RELEASE ORAL at 20:46

## 2024-02-01 RX ADMIN — DIVALPROEX SODIUM 1750 MG: 250 TABLET, DELAYED RELEASE ORAL at 20:45

## 2024-02-01 NOTE — PROGRESS NOTES
02/01/24 1315   Activity/Group Checklist   Group Title Wellness tools   Attendance Did not attend

## 2024-02-01 NOTE — PROGRESS NOTES
02/01/24 0945   Activity/Group Checklist   Group Title Community meeting  (Scavenger Hunt: Pts were provided a handout with several questions in order to familiarize themselves with the unit and to practice various coping skills discussed during groups.)   Attendance Did not attend

## 2024-02-01 NOTE — PLAN OF CARE
Problem: Violence Risk or Actual  Goal: Anger and Impulse Control  Outcome: Progressing     Problem: Psychotic Signs/Symptoms  Goal: Improved Behavioral Control (Psychotic Signs/Symptoms)  Outcome: Progressing  Flowsheets (Taken 2/1/2024 1317)  Jadwin Goal: identifies symptoms triggers  Individual Goal: Jillian will remains in behavior control.     Problem: Adult Behavioral Health Plan of Care  Goal: Plan of Care Review  Outcome: Progressing  Flowsheets (Taken 2/1/2024 1317)  Progress: improving  Patient Agreement with Plan of Care: agrees  Outcome Evaluation: Jillian is visible on unit. Calm and cooperative. Social with selective peers. Discharged focus. Less labile mood. Less religiously preoccupied. No aggressive outburst. Pt c/o Headache. PRN tylenol given with positive effect on re-assessment. She is using headphones to listen to music.  Pt c/o constipation. PRN senna given, effect is pending.  Plan of Care Reviewed With: patient  Intervention(s): Nurisng assessment, medication management, safety rounds.

## 2024-02-01 NOTE — PROGRESS NOTES
02/01/24 1030   Activity/Group Checklist   Group Title Creative Art therapy   Attendance Did not attend

## 2024-02-01 NOTE — PLAN OF CARE
Problem: Adult Behavioral Health Plan of Care  Goal: Plan of Care Review  Outcome: Progressing  Flowsheets (Taken 1/31/2024 2129)  Progress: improving  Patient Agreement with Plan of Care: agrees  Outcome Evaluation: Jillian was visible on the unit this evening and social with peers. She is frequently at the nurses station. She remains religiously preoccupied. She is in good behavioral control and calm when speaking with staff and peers. She is medication compliant.   Plan of Care Reviewed With: patient  Intervention(s): Nursing assessment, medication management, emotional support provided

## 2024-02-01 NOTE — PROGRESS NOTES
"   02/01/24 1445   Activity/Group Checklist   Group Title Interactive therapy   Attendance Attended   Attendance Duration (min) 0-15   Follows Direction Followed directions   Interactions Interacted reciprocally   Affect/Mood Range Constricted   Affect/Mood Display Alert;Calm   Goals Achieved Able to listen to others;Able to engage in interactions     Jillian contributed a highlight of the day, reporting, \"talking to my ex-doctor.\"   "

## 2024-02-01 NOTE — PROGRESS NOTES
PSYCHIATRIC PROGRESS NOTE    Chief Complaint/Reason for follow-up: Schizoaffective mari    Interval History: Patient was seen in room today where she was calm and cooperative with interview.  Today she states she good okay.  She states that she feels ready for discharge tomorrow.  States that her mood is better and she has not been receiving any PRNs recently.  She states she is tolerating her Seroquel and Depakote well without issue.  I advised her likely should continue on this medication for some time.  She also has an intake on Tuesday.  She also requested that I talk to her mom.  She denies any suicidal ideation, homicidal ideation or audiovisual hallucinations.  She states she is sleeping well and eating well and attending groups.  Thoughts and lability is significantly improved.  Ongoing Evangelical preoccupation but improved.  Denies feeling that she is God or Rebel.  She states that she is Jillian Arc.  She continues to feel safe and ready for discharge home excited about the prospect of discharge tomorrow.      Vital Signs for the last 24 hours:  Temp:  [36.4 °C (97.5 °F)-36.8 °C (98.3 °F)] 36.4 °C (97.5 °F)  Heart Rate:  [] 79  Resp:  [16-18] 16  BP: (110-122)/(76-78) 110/78    Scheduled Meds:  • atorvastatin  40 mg oral Nightly   • divalproex  1,750 mg oral Nightly   • QUEtiapine XR  700 mg oral Nightly       Labs:  Selected Electrolytes        Recent EKG HR/QTC  Lab Results   Component Value Date    VENTRICRATE 86 01/10/2024    QTCCALCULAT 442 01/10/2024       Lab Results   Component Value Date    VPA 80.5 01/25/2024    .4 (HH) 01/24/2024    LITHIUM <0.1 (L) 11/29/2020    LITHIUM 0.5 07/22/2020       Mental Status Exam  Appearance:    in hospital attire   Attention:  attends appropriately during exam   Behavior (General):  awake and alert and calm and cooperative    Behavior (Motoric):  typical coordinated movements appropriate to situation   Speech:  normal rate/rhythm/volume   Language:   fluent   Affect:  Expansive euthymic   Mood:  I feel better   Associations:  coherent and logical   Thought Process:  Goal-directed and linear this a.m.   Thought Content: hyperreligiosity and Hypersexual improved today.  Reference reality improving as well no overt auditory visual hallucinations   Suicidality:  denies desire or thoughts to harm or kill self   Orientation:  person, day, month, year, name of place and situation   Insight/Judgement:  Poor/poor   Memory:  recalls recent events and recalls remote events       Assessment/Plan  Schizoaffective disorder, bipolar type (CMS/[facility])  Overview  Zuleyma Acosta is a 48-year-old female past medical history of seizure with active bipolar type who presents with manic decompensation psychosis lability and aggression.  Patient has a longstanding mental health history with approximately 10 prior inpatient admissions currently sees Dr. Fall outside the hospital as her psychiatrist prescribed Latuda 80 mg nightly and Depakote 1250 nightly.  Questionable compliance with medications outside of the hospital.  Approximately 2-week history of decompensation    Assessment & Plan  Improved hyperreligiosity, improved lability.No PRNs over the weekend.  Lethargy improved, continues to be AXO x 4.     1.  Patient admitted on a 201 commitment.  Currently has symptoms of manic decompensation, likely rapid cycling.  Hyperreligious, hypersexual, tangential some disorganization, agitation and labile.  Off and on utilization of PRNs often targets female staff members believing there acting like her sister.    Radha improving, sleeping, less labile/agitated    2.  Continue Seroquel XR at 700 mg QHS     Sexual preoccupation and hyperreligiosity improved this morning exam still referential about staff    3.  Continue Depakote at 1750 mg nightly recheck Depakote level showed within normal limits of 80.  We will continue to titrate to effective dosing and monitor serial Depakote levels    4.   Continue Ativan at 2 times daily by the weekend team will continue for now as she was sleeping mid day.  Can hold for lethagy    5.  Continue haloperidol 5 mg every 6 hours IM/p.o. as needed as needed for agitation and psychosis.  Recommend continuing offering this patient if she continues to display psychotic symptoms as well as agitation    6.  Safety: Continue to encourage staff to approach with caution if she has significant mari psychosis at this time.  Ongoing significant intrusiveness and lability.  If she continues to act out and may need a higher level of supervision..

## 2024-02-02 VITALS
HEART RATE: 103 BPM | OXYGEN SATURATION: 97 % | SYSTOLIC BLOOD PRESSURE: 128 MMHG | BODY MASS INDEX: 41.4 KG/M2 | HEIGHT: 66 IN | DIASTOLIC BLOOD PRESSURE: 78 MMHG | TEMPERATURE: 97.9 F | WEIGHT: 257.6 LBS | RESPIRATION RATE: 18 BRPM

## 2024-02-02 LAB — VALPROATE SERPL-MCNC: 98 UG/ML (ref 50–100)

## 2024-02-02 PROCEDURE — 99238 HOSP IP/OBS DSCHRG MGMT 30/<: CPT | Performed by: STUDENT IN AN ORGANIZED HEALTH CARE EDUCATION/TRAINING PROGRAM

## 2024-02-02 PROCEDURE — 36415 COLL VENOUS BLD VENIPUNCTURE: CPT | Performed by: STUDENT IN AN ORGANIZED HEALTH CARE EDUCATION/TRAINING PROGRAM

## 2024-02-02 PROCEDURE — 80164 ASSAY DIPROPYLACETIC ACD TOT: CPT | Performed by: STUDENT IN AN ORGANIZED HEALTH CARE EDUCATION/TRAINING PROGRAM

## 2024-02-02 RX ORDER — DIVALPROEX SODIUM 250 MG/1
1750 TABLET, DELAYED RELEASE ORAL NIGHTLY
Qty: 210 TABLET | Refills: 0 | Status: SHIPPED | OUTPATIENT
Start: 2024-02-02 | End: 2025-01-20

## 2024-02-02 RX ORDER — QUETIAPINE 300 MG/1
TABLET, FILM COATED, EXTENDED RELEASE ORAL
Qty: 30 TABLET | Refills: 0 | Status: SHIPPED | OUTPATIENT
Start: 2024-02-02

## 2024-02-02 NOTE — PLAN OF CARE
Problem: Adult Behavioral Health Plan of Care  Goal: Plan of Care Review  2/1/2024 2352 by Linda Christian, RN  Outcome: Progressing  Flowsheets (Taken 2/1/2024 2352)  Progress: improving  Patient Agreement with Plan of Care: agrees  Outcome Evaluation: Jillian was visible in the community, social with select peers. Patient remains elevated and less irritable/labile.  She is discharge focused and  is excited to go home to her mom's house tomorrow. Jillian is compliant with meals and medications. Will continue to monitor and support as needed.  Plan of Care Reviewed With: patient  Intervention(s): Medication management, safety rounds q 15 minutes, suicide check-in completed
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
## 2024-02-02 NOTE — PROGRESS NOTES
02/02/24 1030   Activity/Group Checklist   Group Title Creative Art therapy  (Anger Management: Pts will engage in a ball toss while listening to recorded music as a means to establish + interpersonal connections, develop insight, and increase emotion regulation skills.)   Attendance Other (Comment)     Jillian appeared to observe the session space, but did not participate during this intervention due to leaving early.

## 2024-02-02 NOTE — NURSING NOTE
Jillian is AAOx3, VSS and WNL.  She has signed for and received her belongings and discharge paperwork.  She will ambulate off the unit.

## 2024-02-02 NOTE — PLAN OF CARE
Plan of Care Review  Plan of Care Reviewed With: patient, family  Patient Agreement with Plan of Care: agrees  Consent Given to Review Plan with: pt  Progress: improving  Intervention(s): family call; tx plan update  Outcome Evaluation: Jillian completed safety plan.     SW spoke with pts sister regarding discharge. Sister is in agreement with plan. Sister can  pt at 12:30.
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
## 2024-02-02 NOTE — PROGRESS NOTES
02/02/24 0930   Activity/Group Checklist   Group Title Community meeting  (Wordle: Pts will collaborate to guess the unknown five-letter word within six attempts. This holistic service will promote creative means of problem solving, sense of community, and socialization.)   Attendance Did not attend

## 2024-02-09 ENCOUNTER — HOSPITAL ENCOUNTER (EMERGENCY)
Facility: HOSPITAL | Age: 49
Discharge: HOME | End: 2024-02-10
Attending: STUDENT IN AN ORGANIZED HEALTH CARE EDUCATION/TRAINING PROGRAM
Payer: MEDICARE

## 2024-02-09 DIAGNOSIS — F29 PSYCHOSIS, UNSPECIFIED PSYCHOSIS TYPE (CMS/HCC): Primary | ICD-10-CM

## 2024-02-09 LAB
ALBUMIN SERPL-MCNC: 3.8 G/DL (ref 3.5–5.7)
ALP SERPL-CCNC: 65 IU/L (ref 34–125)
ALT SERPL-CCNC: 14 IU/L (ref 7–52)
ANION GAP SERPL CALC-SCNC: 7 MEQ/L (ref 3–15)
APAP SERPL-MCNC: <0.1 UG/ML (ref 10–30)
AST SERPL-CCNC: 12 IU/L (ref 13–39)
BASOPHILS # BLD: 0.02 K/UL (ref 0.01–0.1)
BASOPHILS NFR BLD: 0.4 %
BILIRUB SERPL-MCNC: 0.3 MG/DL (ref 0.3–1.2)
BUN SERPL-MCNC: 16 MG/DL (ref 7–25)
CALCIUM SERPL-MCNC: 9.5 MG/DL (ref 8.6–10.3)
CHLORIDE SERPL-SCNC: 106 MEQ/L (ref 98–107)
CO2 SERPL-SCNC: 27 MEQ/L (ref 21–31)
CREAT SERPL-MCNC: 0.7 MG/DL (ref 0.6–1.2)
DIFFERENTIAL METHOD BLD: NORMAL
EGFRCR SERPLBLD CKD-EPI 2021: >60 ML/MIN/1.73M*2
EOSINOPHIL # BLD: 0.12 K/UL (ref 0.04–0.36)
EOSINOPHIL NFR BLD: 2.1 %
ERYTHROCYTE [DISTWIDTH] IN BLOOD BY AUTOMATED COUNT: 13.5 % (ref 11.7–14.4)
ETHANOL SERPL-MCNC: <10 MG/DL
GLUCOSE SERPL-MCNC: 131 MG/DL (ref 70–99)
HCG UR QL: NEGATIVE
HCT VFR BLD AUTO: 40.5 % (ref 35–45)
HGB BLD-MCNC: 13.1 G/DL (ref 11.8–15.7)
IMM GRANULOCYTES # BLD AUTO: 0.04 K/UL (ref 0–0.08)
IMM GRANULOCYTES NFR BLD AUTO: 0.7 %
LYMPHOCYTES # BLD: 1.36 K/UL (ref 1.2–3.5)
LYMPHOCYTES NFR BLD: 24.1 %
MCH RBC QN AUTO: 29.9 PG (ref 28–33.2)
MCHC RBC AUTO-ENTMCNC: 32.3 G/DL (ref 32.2–35.5)
MCV RBC AUTO: 92.5 FL (ref 83–98)
MONOCYTES # BLD: 0.58 K/UL (ref 0.28–0.8)
MONOCYTES NFR BLD: 10.3 %
NEUTROPHILS # BLD: 3.52 K/UL (ref 1.7–7)
NEUTS SEG NFR BLD: 62.4 %
NRBC BLD-RTO: 0 %
PDW BLD AUTO: 9.9 FL (ref 9.4–12.3)
PLATELET # BLD AUTO: 238 K/UL (ref 150–369)
POTASSIUM SERPL-SCNC: 4.2 MEQ/L (ref 3.5–5.1)
PROT SERPL-MCNC: 6.3 G/DL (ref 6–8.2)
RBC # BLD AUTO: 4.38 M/UL (ref 3.93–5.22)
SALICYLATES SERPL-MCNC: <1.5 MG/DL
SODIUM SERPL-SCNC: 140 MEQ/L (ref 136–145)
VALPROATE SERPL-MCNC: 57.4 UG/ML (ref 50–100)
WBC # BLD AUTO: 5.64 K/UL (ref 3.8–10.5)

## 2024-02-09 PROCEDURE — 80164 ASSAY DIPROPYLACETIC ACD TOT: CPT | Performed by: STUDENT IN AN ORGANIZED HEALTH CARE EDUCATION/TRAINING PROGRAM

## 2024-02-09 PROCEDURE — G0480 DRUG TEST DEF 1-7 CLASSES: HCPCS

## 2024-02-09 PROCEDURE — 36415 COLL VENOUS BLD VENIPUNCTURE: CPT

## 2024-02-09 PROCEDURE — G0480 DRUG TEST DEF 1-7 CLASSES: HCPCS | Performed by: STUDENT IN AN ORGANIZED HEALTH CARE EDUCATION/TRAINING PROGRAM

## 2024-02-09 PROCEDURE — 80053 COMPREHEN METABOLIC PANEL: CPT | Performed by: STUDENT IN AN ORGANIZED HEALTH CARE EDUCATION/TRAINING PROGRAM

## 2024-02-09 PROCEDURE — 85025 COMPLETE CBC W/AUTO DIFF WBC: CPT | Performed by: STUDENT IN AN ORGANIZED HEALTH CARE EDUCATION/TRAINING PROGRAM

## 2024-02-09 PROCEDURE — 80164 ASSAY DIPROPYLACETIC ACD TOT: CPT

## 2024-02-09 PROCEDURE — 80053 COMPREHEN METABOLIC PANEL: CPT

## 2024-02-09 PROCEDURE — 85025 COMPLETE CBC W/AUTO DIFF WBC: CPT

## 2024-02-09 PROCEDURE — 84703 CHORIONIC GONADOTROPIN ASSAY: CPT

## 2024-02-09 PROCEDURE — 84703 CHORIONIC GONADOTROPIN ASSAY: CPT | Performed by: STUDENT IN AN ORGANIZED HEALTH CARE EDUCATION/TRAINING PROGRAM

## 2024-02-09 PROCEDURE — 99285 EMERGENCY DEPT VISIT HI MDM: CPT

## 2024-02-09 ASSESSMENT — ENCOUNTER SYMPTOMS
FEVER: 0
VOMITING: 0
LIGHT-HEADEDNESS: 0
SHORTNESS OF BREATH: 0
HALLUCINATIONS: 1
NERVOUS/ANXIOUS: 1
DYSPHORIC MOOD: 1
DIZZINESS: 0
NAUSEA: 0
HEMATURIA: 0
DYSURIA: 0
ABDOMINAL PAIN: 0
FLANK PAIN: 0
CHILLS: 0
WEAKNESS: 0
SLEEP DISTURBANCE: 0
DECREASED CONCENTRATION: 0
FREQUENCY: 0
BACK PAIN: 0
DIARRHEA: 0

## 2024-02-10 VITALS
TEMPERATURE: 98.9 F | SYSTOLIC BLOOD PRESSURE: 136 MMHG | OXYGEN SATURATION: 99 % | RESPIRATION RATE: 18 BRPM | HEART RATE: 85 BPM | DIASTOLIC BLOOD PRESSURE: 70 MMHG

## 2024-02-10 LAB
AMPHET UR QL SCN: NOT DETECTED
BARBITURATES UR QL SCN: NOT DETECTED
BENZODIAZ UR QL SCN: NOT DETECTED
CANNABINOIDS UR QL SCN: NOT DETECTED
COCAINE UR QL SCN: NOT DETECTED
FENTANYL URINE SCR: NOT DETECTED
OPIATES UR QL SCN: POSITIVE
PCP UR QL SCN: NOT DETECTED

## 2024-02-10 PROCEDURE — 80307 DRUG TEST PRSMV CHEM ANLYZR: CPT | Performed by: STUDENT IN AN ORGANIZED HEALTH CARE EDUCATION/TRAINING PROGRAM

## 2024-02-10 PROCEDURE — 63700000 HC SELF-ADMINISTRABLE DRUG

## 2024-02-10 RX ORDER — ACETAMINOPHEN 325 MG/1
975 TABLET ORAL ONCE
Status: COMPLETED | OUTPATIENT
Start: 2024-02-10 | End: 2024-02-10

## 2024-02-10 RX ADMIN — ACETAMINOPHEN 975 MG: 325 TABLET ORAL at 01:17

## 2024-02-10 NOTE — DISCHARGE INSTRUCTIONS
Follow up with IOP/POP resources provided by crises team    Return for any thoughts or feelings of wanting to hurt yourself or worsening symptoms.     Please follow-up with a primary care provider (PCP) regularly.  Please bring all your discharge paperwork with you to your follow up appointment. Your primary care physician will go over all of your results again including any incidental findings.  Your primary care provider can also help you implement the recommendations we gave you today, coordinate care among your specialist, as well as make sure you are up-to-date with wellness exams, immunizations, and preventive screenings.   Your PCP can also help when you are feeling sick, potentially avoiding the need for urgent care or emergency department visits.  For these reasons, it is important that you follow-up with your PCP at least annually or more often based upon your medical conditions.  If you do not have a PCP, please call [phone] ([phone]) for help finding one.
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
## 2024-02-10 NOTE — ED ATTESTATION NOTE
I personally saw and evaluated the patient, participated in the management, and agree with the findings in the note above except as where stated. The physician assistant and I discussed the case, workup, and disposition.     48-year-old female past medical history significant for A-fib, bipolar disorder, schizoaffective disorder, hypertension presenting for psychiatric evaluation.  Patient reports that she has been hearing voices telling her that her aunt is a beast.  She reports that she bought something for $1.74 and since this is her birthday she has paranoid thoughts that someone is doing things.  She denies any homicidal ideation, suicidal ideation.  Denies any medical complaints.    Patient is awake alert resting comfortably  Heart regular rate rhythm normal S1-S2  Lungs clear to auscultation bilaterally  Abdomen soft nontender    48-year-old female presenting for psychiatric evaluation.  Patient medically cleared for crisis evaluation.     Abel Noe MD  02/10/24 0046

## 2024-02-10 NOTE — ED PROVIDER NOTES
Emergency Medicine Note  HPI   HISTORY OF PRESENT ILLNESS     Patient is a 48-year-old female with a past medical history of A-fib, bipolar 1 disorder, schizoaffective disorder, hypertension, AMINA presenting for psychiatric valuation.  Patient reports that she has been feeling as though she is not pleasing God for a while now and that she is hearing voices that are telling her that her aunt is the beast.  She reports that she has been feeling discouraged lately as she is having difficulty finding her boyfriend but denies any SI/HI.  She currently sees a psychiatrist outpatient but was told by her family to come inpatient due to persistent symptoms even though patient has been compliant with her medications.  Denies any alcohol or drug use.  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   Respiratory: Negative for shortness of breath.    Cardiovascular: Negative for chest pain.   Gastrointestinal: Negative for abdominal pain, diarrhea, nausea and vomiting.   Genitourinary:  Negative for dysuria, flank pain, frequency, hematuria and urgency.   Musculoskeletal: Negative for back pain.   Neurological: Negative for dizziness, syncope, weakness and light-headedness.   Psychiatric/Behavioral: Positive for dysphoric mood and hallucinations. Negative for decreased concentration, sleep disturbance and suicidal ideas. The patient is nervous/anxious.          VITALS     ED Vitals    Date/Time Temp Pulse Resp BP SpO2 [facility]   02/10/24 0125 -- -- -- 131/72 -- AR   02/09/24 2304 -- 90 20 147/81 98 % LT   02/09/24 2144 36.7 °C (98.1 °F) 100 16 136/93 95 % BC        Pulse Ox %: 95 % (02/09/24 2144)  Pulse Ox Interpretation: Normal (02/09/24 2144)           Physical Exam   PHYSICAL EXAM     Physical Exam  Constitutional:       General: She is not in acute distress.     Appearance: Normal appearance. She is not ill-appearing, toxic-appearing or diaphoretic.   HENT:      Head: Normocephalic and atraumatic.      Mouth/Throat:      Mouth: Mucous membranes are moist.      Pharynx: Oropharynx is clear.   Eyes:      Extraocular Movements: Extraocular movements intact.      Conjunctiva/sclera: Conjunctivae normal.      Pupils: Pupils are equal, round, and reactive to light.   Cardiovascular:      Rate and Rhythm: Normal rate and regular rhythm.   Pulmonary:      Effort: Pulmonary effort is normal. No respiratory distress.      Breath sounds: Normal breath sounds. No wheezing, rhonchi or rales.   Musculoskeletal:      Cervical back: Normal range of motion and neck supple.   Skin:     General: Skin is warm and dry.      Capillary Refill: Capillary refill takes less than 2 seconds.   Neurological:      Mental Status: She is alert and oriented to person, place, and time.   Psychiatric:         Attention and Perception: She is attentive. She perceives auditory hallucinations. She does not perceive visual hallucinations.         Mood and Affect: Mood and affect normal.         Speech: Speech normal.         Behavior:  Behavior normal. Behavior is cooperative.         Thought Content: Thought content is paranoid and delusional. Thought content does not include homicidal or suicidal ideation. Thought content does not include homicidal or suicidal plan.           PROCEDURES     Procedures     DATA     Results     Procedure Component Value Units Date/Time    Urine drug screen (UDS) [[id number]]  (Abnormal) Collected: 02/10/24 0042    Specimen: Urine, Clean Catch Updated: 02/10/24 0121     PCP Scrn, Ur Not Detected     Comment: Assay Detects: phencyclidine in urine. Lowest detectable concentration is 25 ng/mL of phencyclidine.        Benzodiazepine Ur Qual Not Detected     Comment: Assay Detects: benzodiazepines and metabolites at varying concentrations. Lowest detectable concentration is 200 ng/mL of oxazepam.        Cocaine Screen, Urine Not Detected     Comment: Assay Detects: benzoylecgonine and cocaine in urine. Lowest detectable concentration is 300 ng/mL of benzoylecgonine.        Amphetamine+Methamphetamine Screen, Ur Not Detected     Comment: Assay Detects: d-methamphetamine, d-amphetamine, methlyenedioxyamphetamine (MDA), and methlyenendioxymethamphetamine (MDMA) in urine. Lowest detectable concentration is 1000 ng/mL of d-methamphetamine.  Assay is less sensitive to MDA and MDMA (lowest detectable concentration, 2500 ng/mL) and could produce a false negative result. If MDMA overdose is suspected and the result is negative, a more specific test should be requested.        Cannabinoid Screen, Urine Not Detected     Comment: Assay Detects: cannabinoid metabolites in urine. Lowest detectable concentration is 50 ng/mL        Opiate Scrn, Ur Positive     Comment: Assay Detects: codeine, dihydrocodeine, hydrocodone, hydromorphone, levorphanol, morphine, morphine-3-glucuronide, norcodeine, oxycodone in urine. Lowest detectable concentration is 300 ng/mL of morphine.        Barbiturate Screen, Ur Not Detected     Comment: Assay  Detects: alphenal, amobarbital, aprobarbital, barbital, butabarbital, butalbital, butethal, diallybarbital, pentobarbital, secobarbital,talbutal, and thiopental. Lowest detectable concentration is 200 ng/mL of secobarbital.        Fentanyl Screen, Urine Not Detected     Comment: Assay Detects: fentanyl metabolite in urine, lowest detectable concentration is 5 ng/mL of norfentanyl.       ER toxicology screen, serum [[id number]]  (Abnormal) Collected: 02/09/24 2202    Specimen: Blood, Venous Updated: 02/09/24 2251     Salicylate <1.5 mg/dL      Acetaminophen <0.1 ug/mL      Ethanol <10 mg/dL     Valproic acid [[id number]]  (Normal) Collected: 02/09/24 2202    Specimen: Blood, Venous Updated: 02/09/24 2251     Valproic Acid 57.4 ug/mL     Comprehensive metabolic panel [[id number]]  (Abnormal) Collected: 02/09/24 2202    Specimen: Blood, Venous Updated: 02/09/24 2250     Sodium 140 mEQ/L      Potassium 4.2 mEQ/L      Comment: Results obtained on plasma. Plasma Potassium values may be up to 0.4 mEQ/L less than serum values. The differences may be greater for patients with high platelet or white cell counts.        Chloride 106 mEQ/L      CO2 27 mEQ/L      BUN 16 mg/dL      Creatinine 0.7 mg/dL      Glucose 131 mg/dL      Calcium 9.5 mg/dL      AST (SGOT) 12 IU/L      ALT (SGPT) 14 IU/L      Alkaline Phosphatase 65 IU/L      Total Protein 6.3 g/dL      Comment: Test performed on plasma which typically contains approximately 0.4 g/dL more protein than serum.        Albumin 3.8 g/dL      Bilirubin, Total 0.3 mg/dL      eGFR >60.0 mL/min/1.73m*2      Comment: Calculation based on the Chronic Kidney Disease Epidemiology Collaboration (CKD-EPI) equation refit without adjustment for race.        Anion Gap 7 mEQ/L     BhCG, Serum, Qual (if menstruating female and no urine) [[id number]]  (Normal) Collected: 02/09/24 2202    Specimen: Blood, Venous Updated: 02/09/24 2241     Preg Test, Serum Negative    CBC and differential  [[id number]] Collected: 02/09/24 2202    Specimen: Blood, Venous Updated: 02/09/24 2229     WBC 5.64 K/uL      RBC 4.38 M/uL      Hemoglobin 13.1 g/dL      Hematocrit 40.5 %      MCV 92.5 fL      MCH 29.9 pg      MCHC 32.3 g/dL      RDW 13.5 %      Platelets 238 K/uL      MPV 9.9 fL      Differential Type Auto     nRBC 0.0 %      Immature Granulocytes 0.7 %      Neutrophils 62.4 %      Lymphocytes 24.1 %      Monocytes 10.3 %      Eosinophils 2.1 %      Basophils 0.4 %      Immature Granulocytes, Absolute 0.04 K/uL      Neutrophils, Absolute 3.52 K/uL      Lymphocytes, Absolute 1.36 K/uL      Monocytes, Absolute 0.58 K/uL      Eosinophils, Absolute 0.12 K/uL      Basophils, Absolute 0.02 K/uL           Imaging Results    None         No orders to display       Scoring tools                                  ED Course & MDM   MDM / ED COURSE / CLINICAL IMPRESSION / DISPO     Medical Decision Making  Psychosis, unspecified psychosis type (CMS/HCC): acute illness or injury  Amount and/or Complexity of Data Reviewed  Labs: ordered.      Risk  OTC drugs.  Decision regarding hospitalization.          ED Course as of 02/10/24 0252   Sat Feb 10, 2024   0249 Patient seen by telepsychiatrist and cleared for DC home  [RK]      ED Course User Index  [RK] Abel Noe MD     Clinical Impression      Psychosis, unspecified psychosis type (CMS/HCC)     _________________     ED Disposition   Discharge                   Marisol Rose PA C  02/10/24 0209       Marisol Rose PA C  02/10/24 0252
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
## 2024-04-08 ENCOUNTER — TRANSCRIBE ORDERS (OUTPATIENT)
Dept: LAB | Facility: HOSPITAL | Age: 49
End: 2024-04-08

## 2024-04-08 ENCOUNTER — APPOINTMENT (OUTPATIENT)
Dept: LAB | Facility: HOSPITAL | Age: 49
End: 2024-04-08
Attending: PSYCHIATRY & NEUROLOGY
Payer: COMMERCIAL

## 2024-04-08 DIAGNOSIS — F25.9 SCHIZOAFFECTIVE DISORDER, UNSPECIFIED: Primary | ICD-10-CM

## 2024-04-08 DIAGNOSIS — F25.9 SCHIZOAFFECTIVE DISORDER, UNSPECIFIED: ICD-10-CM

## 2024-04-08 LAB
ALBUMIN SERPL-MCNC: 3.9 G/DL (ref 3.5–5.7)
ALP SERPL-CCNC: 74 IU/L (ref 34–125)
ALT SERPL-CCNC: 17 IU/L (ref 7–52)
ANION GAP SERPL CALC-SCNC: 8 MEQ/L (ref 3–15)
AST SERPL-CCNC: 12 IU/L (ref 13–39)
BILIRUB SERPL-MCNC: 0.4 MG/DL (ref 0.3–1.2)
BUN SERPL-MCNC: 8 MG/DL (ref 7–25)
CALCIUM SERPL-MCNC: 9.8 MG/DL (ref 8.6–10.3)
CHLORIDE SERPL-SCNC: 104 MEQ/L (ref 98–107)
CO2 SERPL-SCNC: 27 MEQ/L (ref 21–31)
CREAT SERPL-MCNC: 0.7 MG/DL (ref 0.6–1.2)
EGFRCR SERPLBLD CKD-EPI 2021: >60 ML/MIN/1.73M*2
GLUCOSE SERPL-MCNC: 90 MG/DL (ref 70–99)
LITHIUM SERPL-SCNC: 0.4 MEQ/L (ref 0.6–1.2)
POTASSIUM SERPL-SCNC: 4.4 MEQ/L (ref 3.5–5.1)
PROT SERPL-MCNC: 6.5 G/DL (ref 6–8.2)
SODIUM SERPL-SCNC: 139 MEQ/L (ref 136–145)
VALPROATE SERPL-MCNC: 99.8 UG/ML (ref 50–100)

## 2024-04-08 PROCEDURE — 80164 ASSAY DIPROPYLACETIC ACD TOT: CPT

## 2024-04-08 PROCEDURE — 80178 ASSAY OF LITHIUM: CPT

## 2024-04-08 PROCEDURE — 80053 COMPREHEN METABOLIC PANEL: CPT

## 2024-04-08 PROCEDURE — 36415 COLL VENOUS BLD VENIPUNCTURE: CPT

## 2024-04-23 ENCOUNTER — HOSPITAL ENCOUNTER (EMERGENCY)
Facility: HOSPITAL | Age: 49
End: 2024-04-24
Attending: EMERGENCY MEDICINE | Admitting: EMERGENCY MEDICINE
Payer: COMMERCIAL

## 2024-04-23 DIAGNOSIS — F29 PSYCHOSIS, UNSPECIFIED PSYCHOSIS TYPE (CMS/HCC): Primary | ICD-10-CM

## 2024-04-23 LAB
ANION GAP SERPL CALC-SCNC: 8 MEQ/L (ref 3–15)
APAP SERPL-MCNC: 0.2 UG/ML (ref 10–30)
BASOPHILS # BLD: 0.06 K/UL (ref 0.01–0.1)
BASOPHILS NFR BLD: 0.9 %
BUN SERPL-MCNC: 11 MG/DL (ref 7–25)
CALCIUM SERPL-MCNC: 9 MG/DL (ref 8.6–10.3)
CHLORIDE SERPL-SCNC: 107 MEQ/L (ref 98–107)
CO2 SERPL-SCNC: 25 MEQ/L (ref 21–31)
CREAT SERPL-MCNC: 0.7 MG/DL (ref 0.6–1.2)
DIFFERENTIAL METHOD BLD: ABNORMAL
EGFRCR SERPLBLD CKD-EPI 2021: >60 ML/MIN/1.73M*2
EOSINOPHIL # BLD: 0.14 K/UL (ref 0.04–0.36)
EOSINOPHIL NFR BLD: 2.2 %
ERYTHROCYTE [DISTWIDTH] IN BLOOD BY AUTOMATED COUNT: 13.1 % (ref 11.7–14.4)
ETHANOL SERPL-MCNC: <10 MG/DL
GLUCOSE SERPL-MCNC: 133 MG/DL (ref 70–99)
HCG UR QL: NEGATIVE
HCT VFR BLD AUTO: 40.8 % (ref 35–45)
HGB BLD-MCNC: 13.3 G/DL (ref 11.8–15.7)
IMM GRANULOCYTES # BLD AUTO: 0.1 K/UL (ref 0–0.08)
IMM GRANULOCYTES NFR BLD AUTO: 1.6 %
LITHIUM SERPL-SCNC: 0.2 MEQ/L (ref 0.6–1.2)
LYMPHOCYTES # BLD: 1.39 K/UL (ref 1.2–3.5)
LYMPHOCYTES NFR BLD: 21.6 %
MCH RBC QN AUTO: 30 PG (ref 28–33.2)
MCHC RBC AUTO-ENTMCNC: 32.6 G/DL (ref 32.2–35.5)
MCV RBC AUTO: 92.1 FL (ref 83–98)
MONOCYTES # BLD: 0.68 K/UL (ref 0.28–0.8)
MONOCYTES NFR BLD: 10.6 %
NEUTROPHILS # BLD: 4.06 K/UL (ref 1.7–7)
NEUTS SEG NFR BLD: 63.1 %
NRBC BLD-RTO: 0 %
PDW BLD AUTO: 10 FL (ref 9.4–12.3)
PLATELET # BLD AUTO: 195 K/UL (ref 150–369)
POTASSIUM SERPL-SCNC: 4.1 MEQ/L (ref 3.5–5.1)
RBC # BLD AUTO: 4.43 M/UL (ref 3.93–5.22)
SALICYLATES SERPL-MCNC: <1.5 MG/DL
SARS-COV-2 AG RESP QL IA.RAPID: NORMAL
SODIUM SERPL-SCNC: 140 MEQ/L (ref 136–145)
VALPROATE SERPL-MCNC: 66.4 UG/ML (ref 50–100)
WBC # BLD AUTO: 6.43 K/UL (ref 3.8–10.5)

## 2024-04-23 PROCEDURE — 80048 BASIC METABOLIC PNL TOTAL CA: CPT | Performed by: EMERGENCY MEDICINE

## 2024-04-23 PROCEDURE — 80178 ASSAY OF LITHIUM: CPT | Performed by: EMERGENCY MEDICINE

## 2024-04-23 PROCEDURE — 80164 ASSAY DIPROPYLACETIC ACD TOT: CPT

## 2024-04-23 PROCEDURE — 36415 COLL VENOUS BLD VENIPUNCTURE: CPT | Performed by: EMERGENCY MEDICINE

## 2024-04-23 PROCEDURE — 87426 SARSCOV CORONAVIRUS AG IA: CPT

## 2024-04-23 PROCEDURE — 85025 COMPLETE CBC W/AUTO DIFF WBC: CPT | Performed by: EMERGENCY MEDICINE

## 2024-04-23 PROCEDURE — G0480 DRUG TEST DEF 1-7 CLASSES: HCPCS | Performed by: EMERGENCY MEDICINE

## 2024-04-23 PROCEDURE — 84703 CHORIONIC GONADOTROPIN ASSAY: CPT | Performed by: EMERGENCY MEDICINE

## 2024-04-23 PROCEDURE — 99285 EMERGENCY DEPT VISIT HI MDM: CPT

## 2024-04-24 VITALS
RESPIRATION RATE: 18 BRPM | SYSTOLIC BLOOD PRESSURE: 149 MMHG | DIASTOLIC BLOOD PRESSURE: 78 MMHG | HEIGHT: 66 IN | WEIGHT: 245 LBS | BODY MASS INDEX: 39.37 KG/M2 | HEART RATE: 88 BPM | TEMPERATURE: 98.5 F | OXYGEN SATURATION: 99 %

## 2024-04-24 LAB
AMPHET UR QL SCN: NOT DETECTED
BARBITURATES UR QL SCN: NOT DETECTED
BENZODIAZ UR QL SCN: NOT DETECTED
CANNABINOIDS UR QL SCN: NOT DETECTED
COCAINE UR QL SCN: NOT DETECTED
FENTANYL URINE SCR: NOT DETECTED
OPIATES UR QL SCN: NOT DETECTED
PCP UR QL SCN: NOT DETECTED

## 2024-04-24 PROCEDURE — 63700000 HC SELF-ADMINISTRABLE DRUG

## 2024-04-24 PROCEDURE — 80307 DRUG TEST PRSMV CHEM ANLYZR: CPT | Performed by: EMERGENCY MEDICINE

## 2024-04-24 RX ORDER — LITHIUM CARBONATE 300 MG/1
300 CAPSULE ORAL 2 TIMES DAILY
Status: DISCONTINUED | OUTPATIENT
Start: 2024-04-24 | End: 2024-04-24

## 2024-04-24 RX ORDER — QUETIAPINE FUMARATE 300 MG/1
300 TABLET, FILM COATED ORAL NIGHTLY
COMMUNITY

## 2024-04-24 RX ORDER — ATORVASTATIN CALCIUM 40 MG/1
40 TABLET, FILM COATED ORAL
Status: DISCONTINUED | OUTPATIENT
Start: 2024-04-24 | End: 2024-04-24 | Stop reason: HOSPADM

## 2024-04-24 RX ORDER — ONDANSETRON 4 MG/1
4 TABLET, ORALLY DISINTEGRATING ORAL ONCE
Status: COMPLETED | OUTPATIENT
Start: 2024-04-24 | End: 2024-04-24

## 2024-04-24 RX ORDER — NAPROXEN SODIUM 220 MG/1
81 TABLET, FILM COATED ORAL ONCE
Status: COMPLETED | OUTPATIENT
Start: 2024-04-24 | End: 2024-04-24

## 2024-04-24 RX ORDER — DIVALPROEX SODIUM 500 MG/1
1500 TABLET, FILM COATED, EXTENDED RELEASE ORAL DAILY
Status: DISCONTINUED | OUTPATIENT
Start: 2024-04-24 | End: 2024-04-24 | Stop reason: HOSPADM

## 2024-04-24 RX ORDER — LITHIUM CARBONATE 300 MG/1
600 CAPSULE ORAL 2 TIMES DAILY
Status: DISCONTINUED | OUTPATIENT
Start: 2024-04-24 | End: 2024-04-24 | Stop reason: HOSPADM

## 2024-04-24 RX ADMIN — LITHIUM CARBONATE 300 MG: 300 CAPSULE, GELATIN COATED ORAL at 00:49

## 2024-04-24 RX ADMIN — ATORVASTATIN CALCIUM 40 MG: 40 TABLET, FILM COATED ORAL at 00:52

## 2024-04-24 RX ADMIN — ASPIRIN 81 MG CHEWABLE TABLET 81 MG: 81 TABLET CHEWABLE at 01:13

## 2024-04-24 RX ADMIN — DIVALPROEX SODIUM 1500 MG: 500 TABLET, FILM COATED, EXTENDED RELEASE ORAL at 01:12

## 2024-04-24 RX ADMIN — ONDANSETRON 4 MG: 4 TABLET, ORALLY DISINTEGRATING ORAL at 00:51

## 2024-04-24 RX ADMIN — QUETIAPINE FUMARATE 300 MG: 100 TABLET ORAL at 00:48

## 2024-04-24 ASSESSMENT — COGNITIVE AND FUNCTIONAL STATUS - GENERAL
EYE_CONTACT: WNL
JUDGEMENT: IMPAIRED, MODERATELY
DELUSIONS: SPENDS MOST OF WAKING TIME IN DELUSION
MOOD: ANXIOUS;DEPRESSED
AFFECT: FULL RANGE;TEARFUL
LIBIDO: DECREASED
IMPULSE CONTROL: INTACT
APPETITE: INCREASED
APPEARANCE: WELL GROOMED
SPEECH: REGULAR
THOUGHT_CONTENT: OTHER:
PERCEPTUAL FUNCTION: TACTILE HALLUCINATIONS
ATTENTION: WNL
RECENT MEMORY: WNL
REMOTE MEMORY: WNL
CONCENTRATION: WNL
THOUGHT_PROCESS: WNL
AROUSAL LEVEL: ALERT
SLEEP_WAKE_CYCLE: INCREASED
PSYCHOMOTOR FUNCTIONING: WNL
INSIGHT: IMPAIRED, MODERATELY
ORIENTATION: FULLY ORIENTED

## 2024-04-24 ASSESSMENT — ENCOUNTER SYMPTOMS
DIZZINESS: 0
CHILLS: 0
HEADACHES: 0
DYSURIA: 0
FREQUENCY: 0
DIARRHEA: 0
SHORTNESS OF BREATH: 0
ARTHRALGIAS: 0
FEVER: 0
MYALGIAS: 0
DIFFICULTY URINATING: 0
LIGHT-HEADEDNESS: 0
VOMITING: 0
NAUSEA: 0
ABDOMINAL PAIN: 0
HEMATURIA: 0
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
## 2024-04-24 NOTE — BEHAVIORAL HEALTH CRISIS PROGRESS NOTE
Assessment complete.  Patient initially tearful about not being readmitted to the U but ultimately agreeable to external inpatient psychiatric treatment.  Provided support and encouragement, emphasized the strength in that she is seeking help before her symptoms are more severe and she is unable to maintain behavioral control.    Spoke with:  Irene at [facility], no bed  Raneka at [facility], can review  Jazzmine at [facility], can review    140a Faxed to [facility] and [facility].     155a Spoke with Jazzmine at [facility], pt accepted for am, pending uds. Requested 8a pickup #[id number].    Spoke with Pao at [facility] and cancelled referral.     315a Faxed uds to [facility]. Spoke with Lizette and confirmed receipt. Accepting is Dr Candelaria. Pt is scheduled for 8am pickup. Gave Jillian update. Forms on chart.

## 2024-04-24 NOTE — BEHAVIORAL HEALTH CRISIS PROGRESS NOTE
04/24/2024 @ 07:00 -  received a written and verbal patient handoff from the patient's overnight [facility] ED [facility]'s [facility] Tomas. This patient presented to the [facility] ED reporting symptoms of psychosis. She has a history of bipolar I disorder. She has signed her 201 and transport forms and has been accepted to [facility] with an ETA through [facility] of 08:00 under Trip# [id number]. Her completed transport packet has been placed on her ED clipboard.
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
## 2024-06-15 ENCOUNTER — HOSPITAL ENCOUNTER (EMERGENCY)
Facility: HOSPITAL | Age: 49
Discharge: HOME | End: 2024-06-15
Attending: EMERGENCY MEDICINE | Admitting: EMERGENCY MEDICINE
Payer: COMMERCIAL

## 2024-06-15 VITALS
BODY MASS INDEX: 42.75 KG/M2 | WEIGHT: 266 LBS | DIASTOLIC BLOOD PRESSURE: 69 MMHG | TEMPERATURE: 98.3 F | RESPIRATION RATE: 18 BRPM | HEIGHT: 66 IN | HEART RATE: 92 BPM | SYSTOLIC BLOOD PRESSURE: 139 MMHG | OXYGEN SATURATION: 96 %

## 2024-06-15 DIAGNOSIS — R09.82 POST-NASAL DRIP: ICD-10-CM

## 2024-06-15 DIAGNOSIS — R06.02 SHORTNESS OF BREATH: Primary | ICD-10-CM

## 2024-06-15 LAB
ALBUMIN SERPL-MCNC: 3.9 G/DL (ref 3.5–5.7)
ALP SERPL-CCNC: 69 IU/L (ref 34–125)
ALT SERPL-CCNC: 19 IU/L (ref 7–52)
ANION GAP SERPL CALC-SCNC: 5 MEQ/L (ref 3–15)
AST SERPL-CCNC: 35 IU/L (ref 13–39)
BASOPHILS # BLD: 0.03 K/UL (ref 0.01–0.1)
BASOPHILS NFR BLD: 0.4 %
BILIRUB SERPL-MCNC: 0.4 MG/DL (ref 0.3–1.2)
BUN SERPL-MCNC: 9 MG/DL (ref 7–25)
CALCIUM SERPL-MCNC: 9.4 MG/DL (ref 8.6–10.3)
CHLORIDE SERPL-SCNC: 105 MEQ/L (ref 98–107)
CO2 SERPL-SCNC: 28 MEQ/L (ref 21–31)
CREAT SERPL-MCNC: 0.7 MG/DL (ref 0.6–1.2)
DIFFERENTIAL METHOD BLD: NORMAL
EGFRCR SERPLBLD CKD-EPI 2021: >60 ML/MIN/1.73M*2
EOSINOPHIL # BLD: 0.23 K/UL (ref 0.04–0.36)
EOSINOPHIL NFR BLD: 3.4 %
ERYTHROCYTE [DISTWIDTH] IN BLOOD BY AUTOMATED COUNT: 13.3 % (ref 11.7–14.4)
GLUCOSE SERPL-MCNC: 145 MG/DL (ref 70–99)
HCT VFR BLD AUTO: 43.2 % (ref 35–45)
HGB BLD-MCNC: 14 G/DL (ref 11.8–15.7)
IMM GRANULOCYTES # BLD AUTO: 0.05 K/UL (ref 0–0.08)
IMM GRANULOCYTES NFR BLD AUTO: 0.7 %
LYMPHOCYTES # BLD: 1.5 K/UL (ref 1.2–3.5)
LYMPHOCYTES NFR BLD: 22.2 %
MCH RBC QN AUTO: 30.1 PG (ref 28–33.2)
MCHC RBC AUTO-ENTMCNC: 32.4 G/DL (ref 32.2–35.5)
MCV RBC AUTO: 92.9 FL (ref 83–98)
MONOCYTES # BLD: 0.75 K/UL (ref 0.28–0.8)
MONOCYTES NFR BLD: 11.1 %
NEUTROPHILS # BLD: 4.2 K/UL (ref 1.7–7)
NEUTS SEG NFR BLD: 62.2 %
NRBC BLD-RTO: 0 %
PDW BLD AUTO: 9.7 FL (ref 9.4–12.3)
PLATELET # BLD AUTO: 209 K/UL (ref 150–369)
POTASSIUM SERPL-SCNC: 5 MEQ/L (ref 3.5–5.1)
PROT SERPL-MCNC: 6.7 G/DL (ref 6–8.2)
RBC # BLD AUTO: 4.65 M/UL (ref 3.93–5.22)
SODIUM SERPL-SCNC: 138 MEQ/L (ref 136–145)
TROPONIN I SERPL HS-MCNC: <2 PG/ML
WBC # BLD AUTO: 6.76 K/UL (ref 3.8–10.5)

## 2024-06-15 PROCEDURE — 85025 COMPLETE CBC W/AUTO DIFF WBC: CPT | Performed by: EMERGENCY MEDICINE

## 2024-06-15 PROCEDURE — 84484 ASSAY OF TROPONIN QUANT: CPT | Performed by: EMERGENCY MEDICINE

## 2024-06-15 PROCEDURE — 93005 ELECTROCARDIOGRAM TRACING: CPT

## 2024-06-15 PROCEDURE — 80053 COMPREHEN METABOLIC PANEL: CPT | Performed by: EMERGENCY MEDICINE

## 2024-06-15 PROCEDURE — 36415 COLL VENOUS BLD VENIPUNCTURE: CPT

## 2024-06-15 PROCEDURE — 93005 ELECTROCARDIOGRAM TRACING: CPT | Performed by: EMERGENCY MEDICINE

## 2024-06-15 PROCEDURE — 99283 EMERGENCY DEPT VISIT LOW MDM: CPT

## 2024-06-15 RX ORDER — CETIRIZINE HYDROCHLORIDE 10 MG/1
10 TABLET ORAL DAILY
Qty: 30 TABLET | Refills: 0 | Status: SHIPPED | OUTPATIENT
Start: 2024-06-15 | End: 2025-01-20

## 2024-06-15 ASSESSMENT — ENCOUNTER SYMPTOMS: SHORTNESS OF BREATH: 1

## 2024-06-15 NOTE — ED PROVIDER NOTES
Emergency Medicine Note  HPI   HISTORY OF PRESENT ILLNESS     48-year-old female with history of paroxysmal A-fib, schizoaffective disorder, bipolar disorder presents with shortness of breath that has been intermittent for past several weeks.  She feels like when she breathes she gets something stuck in her throat.  Denies cough, fevers, CP.          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          VITALS     ED Vitals      Date/Time Temp Pulse Resp BP SpO2 [facility]   06/15/24 2014 -- -- 18 139/69 96 % TLM   06/15/24 1851 36.8 °C (98.3 °F) 92 18 152/62 95 % FR          Pulse Ox %: 95 % (06/15/24 1905)  Pulse Ox Interpretation: Normal (06/15/24 1905)  Heart Rate: 90 (06/15/24 1921)  Rhythm Strip Interpretation: Normal Sinus Rhythm (06/15/24 1921)     Physical Exam   PHYSICAL EXAM     Physical Exam  Constitutional:       General: She is not in acute distress.  HENT:      Head: Normocephalic and atraumatic.      Mouth/Throat:      Mouth: Mucous membranes are moist.      Pharynx: Oropharynx is clear.   Neck:       Comments: No stridor    Cardiovascular:      Rate and Rhythm: Normal rate and regular rhythm.   Pulmonary:      Effort: Pulmonary effort is normal. No tachypnea or bradypnea.   Chest:      Chest wall: No tenderness.   Musculoskeletal:         General: Normal range of motion.      Cervical back: Normal range of motion.   Skin:     General: Skin is warm and dry.   Neurological:      General: No focal deficit present.      Mental Status: She is alert.   Psychiatric:         Mood and Affect: Affect is flat.         Speech: Speech normal.         Behavior: Behavior is cooperative.         Cognition and Memory: Cognition and memory normal.           PROCEDURES     Procedures     DATA     Results       Procedure Component Value Units Date/Time    HS Troponin (with 2 hour reflex) [[id number]]  (Normal) Collected: 06/15/24 1856    Specimen: Blood, Venous Updated: 06/15/24 1945     High Sens Troponin I <2.0 pg/mL     Comprehensive metabolic panel [[id number]]  (Abnormal) Collected: 06/15/24 1856    Specimen: Blood, Venous Updated: 06/15/24 1930     Sodium 138 mEQ/L      Comment: Moderate hemolysis, results may be affected.         Potassium 5.0 mEQ/L      Comment: Results obtained on plasma. Plasma Potassium values may be up to 0.4 mEQ/L less than serum values. The differences may be greater for patients with high platelet or white cell counts.  Moderate hemolysis, results may be affected.         Chloride 105 mEQ/L      CO2 28 mEQ/L      BUN 9 mg/dL      Creatinine 0.7 mg/dL      Glucose 145 mg/dL      Calcium 9.4 mg/dL      AST (SGOT) 35 IU/L      Comment: Moderate hemolysis, results may be affected.         ALT (SGPT) 19 IU/L      Alkaline Phosphatase 69 IU/L      Comment: Moderate hemolysis, results may be affected.         Total Protein 6.7 g/dL      Comment: Test performed on plasma which typically contains approximately 0.4 g/dL more protein than serum.        Albumin 3.9 g/dL      Comment: Moderate hemolysis, results  may be affected.         Bilirubin, Total 0.4 mg/dL      eGFR >60.0 mL/min/1.73m*2      Comment: Calculation based on the Chronic Kidney Disease Epidemiology Collaboration (CKD-EPI) equation refit without adjustment for race.        Anion Gap 5 mEQ/L     CBC and differential [[id number]] Collected: 06/15/24 1856    Specimen: Blood, Venous Updated: 06/15/24 1905     WBC 6.76 K/uL      RBC 4.65 M/uL      Hemoglobin 14.0 g/dL      Hematocrit 43.2 %      MCV 92.9 fL      MCH 30.1 pg      MCHC 32.4 g/dL      RDW 13.3 %      Platelets 209 K/uL      MPV 9.7 fL      Differential Type Auto     nRBC 0.0 %      Immature Granulocytes 0.7 %      Neutrophils 62.2 %      Lymphocytes 22.2 %      Monocytes 11.1 %      Eosinophils 3.4 %      Basophils 0.4 %      Immature Granulocytes, Absolute 0.05 K/uL      Neutrophils, Absolute 4.20 K/uL      Lymphocytes, Absolute 1.50 K/uL      Monocytes, Absolute 0.75 K/uL      Eosinophils, Absolute 0.23 K/uL      Basophils, Absolute 0.03 K/uL     [place] Draw Panel [[id number]] Collected: 06/15/24 1856    Specimen: Blood, Venous Updated: 06/15/24 1900    Narrative:      The following orders were created for panel order [place] Draw Panel.  Procedure                               Abnormality         Status                     ---------                               -----------         ------                     [employer] LT BLUE[[id number]]                                  In process                   Please view results for these tests on the individual orders.    [employer] BLUE [[id number]] Collected: 06/15/24 1856    Specimen: Blood, Venous Updated: 06/15/24 1900            Imaging Results    None         ECG 12 lead   Independent Interpretation by ED Provider   My independent EKG interpretation -   Normal sinus rhythm at 90 bpm, normal axis, normal intervals.  No ST elevations or depressions, no acute signs of ischemia.          Scoring tools                                  ED Course & MDM   MDM  / ED COURSE / CLINICAL IMPRESSION / DISPO     Medical Decision Making  Patient with shortness of breath, feeling of something stuck in his throat.  No stridor on exam.  No tachypnea.  Normal pulse ox.  Lungs are clear to auscultation.  Patient does endorse mild congestion and postnasal drip.  Sx may also be exacerbated by her anxiety.  She does use Flonase.  Recommend addition of daily antihistamine.  Outpatient follow-up with primary care as needed.  Patient declining chest x-ray in ED.  She appears stable, no indication for further workup at this time, anxious to leave.      Problems Addressed:  Post-nasal drip: chronic illness or injury with exacerbation, progression, or side effects of treatment  Shortness of breath: undiagnosed new problem with uncertain prognosis    Amount and/or Complexity of Data Reviewed  Labs: ordered.  ECG/medicine tests: ordered and independent interpretation performed.    Risk  OTC drugs.           Clinical Impression      Shortness of breath   Post-nasal drip     _________________       ED Disposition   Discharge                       Pelon Mayes MD  06/15/24 2027

## 2024-06-16 LAB
ATRIAL RATE: 90
P AXIS: 55
PR INTERVAL: 134
QRS DURATION: 90
QT INTERVAL: 364
QTC CALCULATION(BAZETT): 445
R AXIS: 49
T WAVE AXIS: 45
VENTRICULAR RATE: 90

## 2024-06-16 PROCEDURE — 93010 ELECTROCARDIOGRAM REPORT: CPT | Performed by: INTERNAL MEDICINE

## 2024-07-05 ENCOUNTER — APPOINTMENT (EMERGENCY)
Dept: RADIOLOGY | Facility: HOSPITAL | Age: 49
End: 2024-07-05
Attending: EMERGENCY MEDICINE
Payer: COMMERCIAL

## 2024-07-05 ENCOUNTER — HOSPITAL ENCOUNTER (EMERGENCY)
Facility: HOSPITAL | Age: 49
Discharge: HOME | End: 2024-07-05
Attending: EMERGENCY MEDICINE | Admitting: EMERGENCY MEDICINE
Payer: COMMERCIAL

## 2024-07-05 VITALS
SYSTOLIC BLOOD PRESSURE: 147 MMHG | OXYGEN SATURATION: 97 % | HEART RATE: 88 BPM | RESPIRATION RATE: 18 BRPM | TEMPERATURE: 98.2 F | DIASTOLIC BLOOD PRESSURE: 59 MMHG

## 2024-07-05 DIAGNOSIS — R53.1 WEAKNESS: Primary | ICD-10-CM

## 2024-07-05 LAB
ALBUMIN SERPL-MCNC: 4.1 G/DL (ref 3.5–5.7)
ALP SERPL-CCNC: 68 IU/L (ref 34–125)
ALT SERPL-CCNC: 26 IU/L (ref 7–52)
ANION GAP SERPL CALC-SCNC: 10 MEQ/L (ref 3–15)
AST SERPL-CCNC: 19 IU/L (ref 13–39)
ATRIAL RATE: 93
BASOPHILS # BLD: 0.06 K/UL (ref 0.01–0.1)
BASOPHILS NFR BLD: 0.7 %
BILIRUB SERPL-MCNC: 0.4 MG/DL (ref 0.3–1.2)
BUN SERPL-MCNC: 10 MG/DL (ref 7–25)
CALCIUM SERPL-MCNC: 10 MG/DL (ref 8.6–10.3)
CHLORIDE SERPL-SCNC: 104 MEQ/L (ref 98–107)
CO2 SERPL-SCNC: 26 MEQ/L (ref 21–31)
CREAT SERPL-MCNC: 0.8 MG/DL (ref 0.6–1.2)
DIFFERENTIAL METHOD BLD: ABNORMAL
EGFRCR SERPLBLD CKD-EPI 2021: >60 ML/MIN/1.73M*2
EOSINOPHIL # BLD: 0.17 K/UL (ref 0.04–0.36)
EOSINOPHIL NFR BLD: 2 %
ERYTHROCYTE [DISTWIDTH] IN BLOOD BY AUTOMATED COUNT: 12.7 % (ref 11.7–14.4)
GLUCOSE SERPL-MCNC: 106 MG/DL (ref 70–99)
HCT VFR BLD AUTO: 42.9 % (ref 35–45)
HGB BLD-MCNC: 14.2 G/DL (ref 11.8–15.7)
IMM GRANULOCYTES # BLD AUTO: 0.09 K/UL (ref 0–0.08)
IMM GRANULOCYTES NFR BLD AUTO: 1 %
LITHIUM SERPL-SCNC: 0.4 MEQ/L (ref 0.6–1.2)
LYMPHOCYTES # BLD: 1.74 K/UL (ref 1.2–3.5)
LYMPHOCYTES NFR BLD: 20 %
MCH RBC QN AUTO: 30.1 PG (ref 28–33.2)
MCHC RBC AUTO-ENTMCNC: 33.1 G/DL (ref 32.2–35.5)
MCV RBC AUTO: 91.1 FL (ref 83–98)
MONOCYTES # BLD: 1.05 K/UL (ref 0.28–0.8)
MONOCYTES NFR BLD: 12.1 %
NEUTROPHILS # BLD: 5.58 K/UL (ref 1.7–7)
NEUTS SEG NFR BLD: 64.2 %
NRBC BLD-RTO: 0 %
P AXIS: 77
PDW BLD AUTO: 9.8 FL (ref 9.4–12.3)
PLATELET # BLD AUTO: 195 K/UL (ref 150–369)
POTASSIUM SERPL-SCNC: 4.2 MEQ/L (ref 3.5–5.1)
PR INTERVAL: 154
PROT SERPL-MCNC: 7.2 G/DL (ref 6–8.2)
QRS DURATION: 90
QT INTERVAL: 378
QTC CALCULATION(BAZETT): 469
R AXIS: 52
RBC # BLD AUTO: 4.71 M/UL (ref 3.93–5.22)
SODIUM SERPL-SCNC: 140 MEQ/L (ref 136–145)
T WAVE AXIS: 67
VALPROATE SERPL-MCNC: 88.6 UG/ML (ref 50–100)
VENTRICULAR RATE: 93
WBC # BLD AUTO: 8.69 K/UL (ref 3.8–10.5)

## 2024-07-05 PROCEDURE — 80178 ASSAY OF LITHIUM: CPT

## 2024-07-05 PROCEDURE — 85025 COMPLETE CBC W/AUTO DIFF WBC: CPT

## 2024-07-05 PROCEDURE — 85025 COMPLETE CBC W/AUTO DIFF WBC: CPT | Performed by: EMERGENCY MEDICINE

## 2024-07-05 PROCEDURE — 80164 ASSAY DIPROPYLACETIC ACD TOT: CPT | Performed by: EMERGENCY MEDICINE

## 2024-07-05 PROCEDURE — 99284 EMERGENCY DEPT VISIT MOD MDM: CPT | Mod: 25

## 2024-07-05 PROCEDURE — 80053 COMPREHEN METABOLIC PANEL: CPT | Performed by: EMERGENCY MEDICINE

## 2024-07-05 PROCEDURE — 93010 ELECTROCARDIOGRAM REPORT: CPT | Performed by: INTERNAL MEDICINE

## 2024-07-05 PROCEDURE — 70450 CT HEAD/BRAIN W/O DYE: CPT

## 2024-07-05 PROCEDURE — 80053 COMPREHEN METABOLIC PANEL: CPT

## 2024-07-05 PROCEDURE — 36415 COLL VENOUS BLD VENIPUNCTURE: CPT

## 2024-07-05 PROCEDURE — 80178 ASSAY OF LITHIUM: CPT | Performed by: EMERGENCY MEDICINE

## 2024-07-05 PROCEDURE — 93005 ELECTROCARDIOGRAM TRACING: CPT | Performed by: EMERGENCY MEDICINE

## 2024-07-05 ASSESSMENT — ENCOUNTER SYMPTOMS
DIARRHEA: 0
CHILLS: 1
ABDOMINAL PAIN: 0
DIFFICULTY URINATING: 0
NUMBNESS: 0
WEAKNESS: 0
NECK PAIN: 0
SPEECH DIFFICULTY: 0
VOMITING: 0
COUGH: 0
HEADACHES: 0
NAUSEA: 1
BACK PAIN: 0
SHORTNESS OF BREATH: 0
FEVER: 0

## 2024-07-05 NOTE — ED PROVIDER NOTES
Emergency Medicine Note  HPI   HISTORY OF PRESENT ILLNESS     Pt presents to ED for visual sensation of light flashes with dropping of things from her hands yesterday and the day before. Pt states she has been feeling hot and cold. Pt thinks this is from her lithium medication and pt took decreased dose yesterday. Pt states she felt nauseous on ambulance ride over. Pt denies headache, chest pain, cough, sob, abd pain, vomiting, diarrhea, weakness, numbness.          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 Negative for fever.   HENT:  Negative for congestion.    Eyes:  Positive for visual disturbance.   Respiratory:  Negative for cough and shortness of breath.    Cardiovascular:  Negative for chest pain.   Gastrointestinal:  Positive for nausea. Negative for abdominal pain, diarrhea and vomiting.   Genitourinary:  Negative for difficulty urinating.   Musculoskeletal:  Negative for back pain and neck pain.   Skin:  Negative for rash.   Neurological:  Negative for speech difficulty, weakness, numbness and headaches.         VITALS     ED Vitals       Date/Time Temp Pulse Resp BP SpO2 [facility]   07/05/24 1234 36.7 °C (98 °F) 98 18 129/105 -- SH                         Physical Exam   PHYSICAL EXAM     Physical Exam  Vitals and nursing note reviewed.   Constitutional:       Appearance: Normal appearance.   HENT:      Head: Normocephalic and atraumatic.   Cardiovascular:      Rate and Rhythm: Normal rate and regular rhythm.   Pulmonary:      Breath sounds: Normal breath sounds.   Abdominal:      Palpations: Abdomen is soft.      Tenderness: There is no abdominal tenderness. There is no guarding or rebound.   Musculoskeletal:         General: No swelling or tenderness. Normal range of motion.      Cervical back: Normal range of motion and neck supple.   Skin:     General: Skin is warm and dry.      Capillary Refill: Capillary refill takes less than 2 seconds.   Neurological:      General: No focal deficit present.      Mental Status: She is alert and oriented to person, place, and time.      Cranial Nerves: No cranial nerve deficit.      Sensory: No sensory deficit.      Motor: No weakness.           PROCEDURES     Procedures     DATA     Results       Procedure Component Value Units Date/Time    Valproic acid [[id number]] Collected: 07/05/24 1246    Specimen: Blood, Venous Updated: 07/05/24 1600    Lithium [[id number]]  (Abnormal) Collected: 07/05/24 1246    Specimen: Blood, Venous Updated: 07/05/24 1349     Lithium Lvl 0.4 mEQ/L     Comprehensive metabolic panel [[id number]]  (Abnormal) Collected: 07/05/24 1246    Specimen: Blood, Venous Updated: 07/05/24 1315     Sodium 140 mEQ/L      Potassium 4.2 mEQ/L      Comment: Results obtained on plasma. Plasma Potassium values may be up to 0.4 mEQ/L less than serum values. The differences may be greater for patients with high platelet or white cell counts.        Chloride 104 mEQ/L      CO2 26 mEQ/L      BUN 10 mg/dL      Creatinine 0.8 mg/dL      Glucose 106 mg/dL      Calcium 10.0 mg/dL      AST (SGOT) 19 IU/L      ALT  (SGPT) 26 IU/L      Alkaline Phosphatase 68 IU/L      Total Protein 7.2 g/dL      Comment: Test performed on plasma which typically contains approximately 0.4 g/dL more protein than serum.        Albumin 4.1 g/dL      Bilirubin, Total 0.4 mg/dL      eGFR >60.0 mL/min/1.73m*2      Comment: Calculation based on the Chronic Kidney Disease Epidemiology Collaboration (CKD-EPI) equation refit without adjustment for race.        Anion Gap 10 mEQ/L     CBC and differential [[id number]]  (Abnormal) Collected: 07/05/24 1246    Specimen: Blood, Venous Updated: 07/05/24 1310     WBC 8.69 K/uL      RBC 4.71 M/uL      Hemoglobin 14.2 g/dL      Hematocrit 42.9 %      MCV 91.1 fL      MCH 30.1 pg      MCHC 33.1 g/dL      RDW 12.7 %      Platelets 195 K/uL      MPV 9.8 fL      Differential Type Auto     nRBC 0.0 %      Immature Granulocytes 1.0 %      Neutrophils 64.2 %      Lymphocytes 20.0 %      Monocytes 12.1 %      Eosinophils 2.0 %      Basophils 0.7 %      Immature Granulocytes, Absolute 0.09 K/uL      Neutrophils, Absolute 5.58 K/uL      Lymphocytes, Absolute 1.74 K/uL      Monocytes, Absolute 1.05 K/uL      Eosinophils, Absolute 0.17 K/uL      Basophils, Absolute 0.06 K/uL             Imaging Results    None         No orders to display       Scoring tools                                  ED Course & MDM   MDM / ED COURSE / CLINICAL IMPRESSION / DISPO     Medical Decision Making  Amount and/or Complexity of Data Reviewed  Labs: ordered. Decision-making details documented in ED Course.  Radiology: ordered. Decision-making details documented in ED Course.  ECG/medicine tests: ordered. Decision-making details documented in ED Course.        ED Course as of 07/05/24 1650   Fri Jul 05, 2024   1648 EKG: NSR @ 93 bpm, nml pr interval, nml axis, nml qrs interval, t wave inversion leads V1-V2 [EW]      ED Course User Index  [EW] Joe Haq MD     Clinical Impression      None                 Joe Haq MD  07/05/24 1950

## 2024-07-08 ENCOUNTER — APPOINTMENT (OUTPATIENT)
Dept: LAB | Facility: HOSPITAL | Age: 49
End: 2024-07-08
Attending: PSYCHIATRY & NEUROLOGY
Payer: COMMERCIAL

## 2024-07-08 ENCOUNTER — TRANSCRIBE ORDERS (OUTPATIENT)
Dept: REGISTRATION | Facility: HOSPITAL | Age: 49
End: 2024-07-08

## 2024-07-08 DIAGNOSIS — F25.9 SCHIZOAFFECTIVE DISORDER, UNSPECIFIED: Primary | ICD-10-CM

## 2024-07-08 DIAGNOSIS — F25.9 SCHIZOAFFECTIVE DISORDER, UNSPECIFIED: ICD-10-CM

## 2024-07-08 LAB — LITHIUM SERPL-SCNC: 0.3 MEQ/L (ref 0.6–1.2)

## 2024-07-08 PROCEDURE — 80178 ASSAY OF LITHIUM: CPT

## 2024-07-08 PROCEDURE — 36415 COLL VENOUS BLD VENIPUNCTURE: CPT

## 2024-07-23 NOTE — PROGRESS NOTES
"Psychiatry Progress Note      Chief Complaint/Reason for follow-up: Manic Episode w/ psychosis    Interval History: Refused CPAP. Mood remains labile. Food and medication compliant. Cooperative with staff. Ripped poster off the wall over the weekend. Required PRN ativan.     On exam, intrusive and poor boundaries. Waiting outside of treatment team meeting. States that she wants to \"open Dr. Hitchcock's door.\" I told her that I'm not sure what she is talking about and she says that she means Dr. Leon. Says that she put a letter under his door and wants to retrieve it. Focuses on how she had a good weekend \"I slept all night.\" Able to recall incident on Saturday when questioned about it saying \"I pulled the paper down because I got confused.\" States another patient was confusing her. Less religiously pre-occupied. Bright affect. Disorganized behavior/room.     Review of Systems:   Sleep:  Good, Appetite: Fair and GI: No complaints    Vital Signs for the last 24 hours:  Temp:  [36.4 °C (97.5 °F)-36.8 °C (98.3 °F)] 36.4 °C (97.5 °F)  Heart Rate:  [83-97] 95  Resp:  [16-18] 16  BP: ()/(56-72) 137/72      EKG 4/20/20: QTc 464, HR 85    Mental Status Exam:  Appearance: clean, odd attire and obese, slightly disheveled  Gait and Motor: normal gait, no abnormalities  Speech: NRRT  Mood: \"I slept all weekend\"  Affect: labile  Associations: illogical  Thought Process: tangential  Thought Content:  Less hyperreligiosity, delusions  Suicidality/Homicidality: denies  Judgement/Insight: poor;minimizes severity level of illness  Attention: distracted   Knowledge: normal  Language: normal        Assessment/Plan    43 y/o female pphx: bipolar disorder who was admitted through the ED in manic episode. Admitted on a 201, now on a 303 commitment. Outburst yesterday, punching walls and threatening staff. Required PRN medication over weekend for outburst caused by delusion of a patient being someone he was not. Bright affect and " Patient needs appointment   focused on discharge, would benefit from further medication changes.      Bipolar Disorder, current episode manic w/ psychosis  -involuntary on 303 with [facility] second opinion as of 4/7/20  -Continue Geodon to 80 mg BID with meals   -Increase Haldol to 15 mg BID  -Continue depakote to 150mg qam and continue 1500mg qhs. Level is 100.5 on 4/6/20. Recheck.   -Continue ativan 1 mg , T/C reducing evening ativan to 1 mg  to reduce risk of disinhibition  -Ativan PRN increased to 2 mg, will prioritize this over antipsychotics due to QTc  - EKG 4/20 QTc 464, HR 85  Appreciate cardiology input, will follow QTc Daily while changing antipsychotic regimen    LE Edema  -Bumex 1 mg BID  -appears to have plateaued, will monitor  -Management per medicine, appreciate recs    Disposition  -on a 22 day commitment from 4/3/20  -will work with family for ultimate disposition, independent at baseline  -would benefit from family involvement for support      Tobi Arreguin MD PGY-2  4/20/2020

## 2024-10-22 NOTE — PLAN OF CARE
Plan of Care Review  Plan of Care Reviewed With: patient, family  Patient Agreement with Plan of Care: agrees  Consent Given to Review Plan with: pt  Progress: improving  Intervention(s): family call; tx plan update  Outcome Evaluation: Jillian and treatment team to review and sign tx plan update.        1330: TRISTIN called Misty pt's sister to touch base and discuss next steps. TRISTIN left .       1437: TRISTIN received VM from Misty. TRISTIN called Misty, pt's sister to touch base and discuss next steps. TRISTIN left .    Home

## 2024-11-12 ENCOUNTER — TRANSCRIBE ORDERS (OUTPATIENT)
Dept: REGISTRATION | Facility: HOSPITAL | Age: 49
End: 2024-11-12

## 2024-11-12 ENCOUNTER — APPOINTMENT (OUTPATIENT)
Dept: LAB | Facility: HOSPITAL | Age: 49
End: 2024-11-12
Attending: INTERNAL MEDICINE
Payer: COMMERCIAL

## 2024-11-12 DIAGNOSIS — Z00.01 ENCOUNTER FOR GENERAL ADULT MEDICAL EXAMINATION WITH ABNORMAL FINDINGS: Primary | ICD-10-CM

## 2024-11-12 DIAGNOSIS — Z86.39 PERSONAL HISTORY OF OTHER ENDOCRINE, NUTRITIONAL AND METABOLIC DISEASE: ICD-10-CM

## 2024-11-12 DIAGNOSIS — M25.50 PAIN IN UNSPECIFIED JOINT: ICD-10-CM

## 2024-11-12 DIAGNOSIS — E55.9 VITAMIN D DEFICIENCY, UNSPECIFIED: ICD-10-CM

## 2024-11-12 DIAGNOSIS — E53.8 DEFICIENCY OF OTHER SPECIFIED B GROUP VITAMINS: ICD-10-CM

## 2024-11-12 DIAGNOSIS — Z00.01 ENCOUNTER FOR GENERAL ADULT MEDICAL EXAMINATION WITH ABNORMAL FINDINGS: ICD-10-CM

## 2024-11-12 LAB
25(OH)D3 SERPL-MCNC: 28 NG/ML (ref 30–100)
ALBUMIN SERPL-MCNC: 3.4 G/DL (ref 3.5–5.7)
ALP SERPL-CCNC: 57 IU/L (ref 34–125)
ALT SERPL-CCNC: 8 IU/L (ref 7–52)
ANION GAP SERPL CALC-SCNC: 4 MEQ/L (ref 3–15)
AST SERPL-CCNC: 8 IU/L (ref 13–39)
BACTERIA URNS QL MICRO: ABNORMAL /HPF
BASOPHILS # BLD: 0.04 K/UL (ref 0.01–0.1)
BASOPHILS NFR BLD: 0.7 %
BILIRUB SERPL-MCNC: 0.3 MG/DL (ref 0.3–1.2)
BILIRUB UR QL STRIP.AUTO: NEGATIVE MG/DL
BUN SERPL-MCNC: 10 MG/DL (ref 7–25)
CALCIUM SERPL-MCNC: 9.3 MG/DL (ref 8.6–10.3)
CHLORIDE SERPL-SCNC: 106 MEQ/L (ref 98–107)
CHOLEST SERPL-MCNC: 175 MG/DL
CLARITY UR REFRACT.AUTO: CLEAR
CO2 SERPL-SCNC: 31 MEQ/L (ref 21–31)
COLOR UR AUTO: YELLOW
CREAT SERPL-MCNC: 0.6 MG/DL (ref 0.6–1.2)
DIFFERENTIAL METHOD BLD: ABNORMAL
EGFRCR SERPLBLD CKD-EPI 2021: >60 ML/MIN/1.73M*2
EOSINOPHIL # BLD: 0.13 K/UL (ref 0.04–0.36)
EOSINOPHIL NFR BLD: 2.2 %
ERYTHROCYTE [DISTWIDTH] IN BLOOD BY AUTOMATED COUNT: 14.7 % (ref 11.7–14.4)
EST. AVERAGE GLUCOSE BLD GHB EST-MCNC: 97 MG/DL
FERRITIN SERPL-MCNC: 86 NG/ML (ref 11–250)
FOLATE SERPL-MCNC: 8.8 NG/ML
GLUCOSE SERPL-MCNC: 79 MG/DL (ref 70–99)
GLUCOSE UR STRIP.AUTO-MCNC: NEGATIVE MG/DL
HBA1C MFR BLD: 5 %
HCT VFR BLD AUTO: 39.3 % (ref 35–45)
HDLC SERPL-MCNC: 44 MG/DL
HDLC SERPL: 4 {RATIO}
HGB BLD-MCNC: 12.3 G/DL (ref 11.8–15.7)
HGB UR QL STRIP.AUTO: NEGATIVE
HYALINE CASTS #/AREA URNS LPF: ABNORMAL /LPF
IMM GRANULOCYTES # BLD AUTO: 0.08 K/UL (ref 0–0.08)
IMM GRANULOCYTES NFR BLD AUTO: 1.4 %
KETONES UR STRIP.AUTO-MCNC: NEGATIVE MG/DL
LDLC SERPL CALC-MCNC: 96 MG/DL
LEUKOCYTE ESTERASE UR QL STRIP.AUTO: NEGATIVE
LYMPHOCYTES # BLD: 1.37 K/UL (ref 1.2–3.5)
LYMPHOCYTES NFR BLD: 23.4 %
MCH RBC QN AUTO: 29.2 PG (ref 28–33.2)
MCHC RBC AUTO-ENTMCNC: 31.3 G/DL (ref 32.2–35.5)
MCV RBC AUTO: 93.3 FL (ref 83–98)
MONOCYTES # BLD: 0.63 K/UL (ref 0.28–0.8)
MONOCYTES NFR BLD: 10.8 %
MUCOUS THREADS URNS QL MICRO: 1 /LPF
NEUTROPHILS # BLD: 3.61 K/UL (ref 1.7–7)
NEUTS SEG NFR BLD: 61.5 %
NITRITE UR QL STRIP.AUTO: NEGATIVE
NONHDLC SERPL-MCNC: 131 MG/DL
NRBC BLD-RTO: 0 %
PH UR STRIP.AUTO: 6 [PH]
PLATELET # BLD AUTO: 174 K/UL (ref 150–369)
PMV BLD AUTO: 10.6 FL (ref 9.4–12.3)
POTASSIUM SERPL-SCNC: 4.5 MEQ/L (ref 3.5–5.1)
PROT SERPL-MCNC: 6 G/DL (ref 6–8.2)
PROT UR QL STRIP.AUTO: ABNORMAL
RBC # BLD AUTO: 4.21 M/UL (ref 3.93–5.22)
RBC #/AREA URNS HPF: ABNORMAL /HPF
SODIUM SERPL-SCNC: 141 MEQ/L (ref 136–145)
SP GR UR REFRACT.AUTO: 1.02
SQUAMOUS URNS QL MICRO: 1 /HPF
TRIGL SERPL-MCNC: 175 MG/DL
TSH SERPL DL<=0.05 MIU/L-ACNC: 4.42 MIU/L (ref 0.34–5.6)
UROBILINOGEN UR STRIP-ACNC: 0.2 EU/DL
VIT B12 SERPL-MCNC: 475 PG/ML (ref 180–914)
WBC # BLD AUTO: 5.86 K/UL (ref 3.8–10.5)
WBC #/AREA URNS HPF: ABNORMAL /HPF

## 2024-11-12 PROCEDURE — 84443 ASSAY THYROID STIM HORMONE: CPT

## 2024-11-12 PROCEDURE — 81001 URINALYSIS AUTO W/SCOPE: CPT

## 2024-11-12 PROCEDURE — 80061 LIPID PANEL: CPT

## 2024-11-12 PROCEDURE — 82607 VITAMIN B-12: CPT

## 2024-11-12 PROCEDURE — 82746 ASSAY OF FOLIC ACID SERUM: CPT

## 2024-11-12 PROCEDURE — 80053 COMPREHEN METABOLIC PANEL: CPT

## 2024-11-12 PROCEDURE — 86618 LYME DISEASE ANTIBODY: CPT

## 2024-11-12 PROCEDURE — 82306 VITAMIN D 25 HYDROXY: CPT

## 2024-11-12 PROCEDURE — 82728 ASSAY OF FERRITIN: CPT

## 2024-11-12 PROCEDURE — 86617 LYME DISEASE ANTIBODY: CPT

## 2024-11-12 PROCEDURE — 83036 HEMOGLOBIN GLYCOSYLATED A1C: CPT

## 2024-11-12 PROCEDURE — 36415 COLL VENOUS BLD VENIPUNCTURE: CPT

## 2024-11-12 PROCEDURE — 85025 COMPLETE CBC W/AUTO DIFF WBC: CPT

## 2024-11-12 PROCEDURE — 81003 URINALYSIS AUTO W/O SCOPE: CPT

## 2024-11-13 LAB — B BURGDOR AB SER IA-ACNC: 2.22 RATIO

## 2024-11-16 LAB
B BURGDOR IGG SER QL IB: NEGATIVE
B BURGDOR IGM SER QL IB: NEGATIVE
B BURGDOR18KD IGG SER QL IB: NORMAL
B BURGDOR23KD IGG SER QL IB: NORMAL
B BURGDOR23KD IGM SER QL IB: NORMAL
B BURGDOR28KD IGG SER QL IB: NORMAL
B BURGDOR30KD IGG SER QL IB: NORMAL
B BURGDOR39KD IGG SER QL IB: NORMAL
B BURGDOR39KD IGM SER QL IB: NORMAL
B BURGDOR41KD IGG SER QL IB: NORMAL
B BURGDOR41KD IGM SER QL IB: NORMAL
B BURGDOR45KD IGG SER QL IB: NORMAL
B BURGDOR58KD IGG SER QL IB: NORMAL
B BURGDOR66KD IGG SER QL IB: NORMAL
B BURGDOR93KD IGG SER QL IB: NORMAL

## 2024-11-20 ENCOUNTER — TELEPHONE (OUTPATIENT)
Dept: SURGERY | Facility: CLINIC | Age: 49
End: 2024-11-20
Payer: COMMERCIAL

## 2024-11-20 NOTE — TELEPHONE ENCOUNTER
"Jillian:  [phone]    Patient called the office stating that Dr Hahn told her that Dr Brooks or Dr Bill could remove a skin tag from her arm pit next week. I told the patient that Dr Brooks does not do surgery procedures other than endoscopies and colonoscopies.  She proceeded to yell at me that I am a  \"Liar\" and Dr Jimi Bill does not remove skin tags and would refer to a Dermatologist. She then yelled that I am a \"Dirty Liar\"       "

## 2024-12-16 ENCOUNTER — OFFICE VISIT (OUTPATIENT)
Dept: SURGERY | Facility: CLINIC | Age: 49
End: 2024-12-16
Payer: COMMERCIAL

## 2024-12-16 VITALS
OXYGEN SATURATION: 97 % | TEMPERATURE: 98.1 F | BODY MASS INDEX: 40.18 KG/M2 | HEIGHT: 66 IN | WEIGHT: 250 LBS | HEART RATE: 105 BPM

## 2024-12-16 DIAGNOSIS — R22.32 AXILLARY MASS, LEFT: Primary | ICD-10-CM

## 2024-12-16 PROCEDURE — 3008F BODY MASS INDEX DOCD: CPT | Performed by: SURGERY

## 2024-12-16 PROCEDURE — 99213 OFFICE O/P EST LOW 20 MIN: CPT | Performed by: SURGERY

## 2024-12-16 NOTE — LETTER
2024     Alan Davila DO  [address] [facility]  [place] [id number]    Patient: Jillian Acosta  YOB: 1975  Date of Visit: 2024      Dear Dr. Davila:    Thank you for referring Jillian Acosta to me for evaluation. Below are my notes for this consultation.    If you have questions, please do not hesitate to call me. I look forward to following your patient along with you.         Sincerely,        Rubén Emerson MD        CC: No Recipients    Rubén Emerson MD  2024 11:33 AM  Sign when Signing Visit  Patient ID: Jillian Acosta                              : 1975  MRN: [id number]                                            Visit Date: 2024  Encounter Provider: Rubén Emerson  Referring Provider: Alan Davila DO    Subjective  Chief Complaint: Left axillary mass      HPI: Jillian Acosta is a 49 y.o. old female with a past medical history as noted below who has had a cyst in the left axilla which was excised by Dr. Vaca about 4 years ago.  This has since reoccurred and on occasion becomes inflamed and enlarged.  She is not having any drainage at the present time.  She denies any fevers or chills.     Medications:  Vitamin C 500 mg  Aspirin 81 mg daily  Lipitor 40 mg  Zyrtec 10 mg  Vitamin D3 5000 units  Depakote 1750 mg nightly  Flonase 50  Lithium carbonate 600 mg nightly  Myrbetriq 25 mg  Seroquel 300 mg nightly    Allergies:  Azithromycin  Doxycycline  Penicillins  Amoxicillin    Social history  Non-smoker  Social accrues      Past Medical History:  has a past medical history of A-fib (CMS/[facility]), A-fib (CMS/HCC), Abscess (2023), Bipolar 1 disorder (CMS/[facility]), Hypertension, Schizoaffective disorder (CMS/[facility]), and Sleep apnea.  Past Surgical History:  has a past surgical history that includes Anal fissurectomy (2016); Cholecystectomy (2016); and Hysterectomy (2018).  Social History:   Social History     Tobacco Use   • Smoking status:  "Former     Current packs/day: 0.25     Types: Cigarettes   • Smokeless tobacco: Never   Vaping Use   • Vaping status: Never Used   Substance Use Topics   • Alcohol use: No   • Drug use: No      Family History: family history includes Diabetes in her biological mother; Heart disease in her biological father.   Allergies: is allergic to azithromycin, doxycycline, penicillins, and amoxicillin.     Review of Systems: A complete  review of systems is negative except as noted above  The following have been reviewed and updated as appropriate in this visit:          Objective  Vitals: Visit Vitals  Pulse (!) 105   Temp 36.7 °C (98.1 °F)   Ht 1.664 m (5' 5.5\")   Wt 113 kg (250 lb)   LMP  (LMP Unknown)   SpO2 97%   BMI 40.97 kg/m²     Physical Exam  Afebrile stable vital signs  Heart: Regular rate and rhythm with no murmurs, rubs, or gallops.  Normal S1, S2  Lungs: Clear to auscultation bilaterally with no wheezes, rales, or rhonchi.  Nonlabored respirations.      Skin: In the left axilla there is a scar tissue with a slightly fluctuant mass beneath it.  There is no erythema or evidence of infection.         Assessment: Subcutaneous mass left axilla    Plan: Management options discussed with care who does wish to have this mass excised and is aware the indications, risk, complications, alternatives to doing so, including doing nothing.  She does understand she is an increased risk for all surgical less medical complications given her comorbidities, morbid obesity, and nature of the mass    Rubén Emerson MD  "

## 2024-12-16 NOTE — PROGRESS NOTES
Patient ID: Jillian Acosta                              : 1975  MRN: [id number]                                            Visit Date: 2024  Encounter Provider: Rubén Emerson  Referring Provider: Alan Davila DO    Subjective   Chief Complaint: Left axillary mass      HPI: Jillian Acosta is a 49 y.o. old female with a past medical history as noted below who has had a cyst in the left axilla which was excised by Dr. Vaca about 4 years ago.  This has since reoccurred and on occasion becomes inflamed and enlarged.  She is not having any drainage at the present time.  She denies any fevers or chills.     Medications:  Vitamin C 500 mg  Aspirin 81 mg daily  Lipitor 40 mg  Zyrtec 10 mg  Vitamin D3 5000 units  Depakote 1750 mg nightly  Flonase 50  Lithium carbonate 600 mg nightly  Myrbetriq 25 mg  Seroquel 300 mg nightly    Allergies:  Azithromycin  Doxycycline  Penicillins  Amoxicillin    Social history  Non-smoker  Social accrues      Past Medical History:  has a past medical history of A-fib (CMS/[facility]), A-fib (CMS/[facility]), Abscess (2023), Bipolar 1 disorder (CMS/[facility]), Hypertension, Schizoaffective disorder (CMS/[facility]), and Sleep apnea.  Past Surgical History:  has a past surgical history that includes Anal fissurectomy (2016); Cholecystectomy (2016); and Hysterectomy ().  Social History:   Social History     Tobacco Use    Smoking status: Former     Current packs/day: 0.25     Types: Cigarettes    Smokeless tobacco: Never   Vaping Use    Vaping status: Never Used   Substance Use Topics    Alcohol use: No    Drug use: No      Family History: family history includes Diabetes in her biological mother; Heart disease in her biological father.   Allergies: is allergic to azithromycin, doxycycline, penicillins, and amoxicillin.     Review of Systems: A complete  review of systems is negative except as noted above  The following have been reviewed and updated as appropriate in this visit:       "    Objective   Vitals: Visit Vitals  Pulse (!) 105   Temp 36.7 °C (98.1 °F)   Ht 1.664 m (5' 5.5\")   Wt 113 kg (250 lb)   LMP  (LMP Unknown)   SpO2 97%   BMI 40.97 kg/m²     Physical Exam  Afebrile stable vital signs  Heart: Regular rate and rhythm with no murmurs, rubs, or gallops.  Normal S1, S2  Lungs: Clear to auscultation bilaterally with no wheezes, rales, or rhonchi.  Nonlabored respirations.      Skin: In the left axilla there is a scar tissue with a slightly fluctuant mass beneath it.  There is no erythema or evidence of infection.         Assessment: Subcutaneous mass left axilla    Plan: Management options discussed with care who does wish to have this mass excised and is aware the indications, risk, complications, alternatives to doing so, including doing nothing.  She does understand she is an increased risk for all surgical less medical complications given her comorbidities, morbid obesity, and nature of the mass    Rubén Emerson MD  "

## 2024-12-17 ENCOUNTER — TELEPHONE (OUTPATIENT)
Dept: SURGERY | Facility: CLINIC | Age: 49
End: 2024-12-17
Payer: COMMERCIAL

## 2024-12-17 NOTE — TELEPHONE ENCOUNTER
Per Automated system at [facility] insurance, No auth required for [id number].

## 2024-12-20 PROCEDURE — 11406 EXC TR-EXT B9+MARG >4.0 CM: CPT | Performed by: SURGERY

## 2024-12-27 ENCOUNTER — TELEPHONE (OUTPATIENT)
Dept: SURGERY | Facility: CLINIC | Age: 49
End: 2024-12-27
Payer: COMMERCIAL

## 2024-12-27 NOTE — TELEPHONE ENCOUNTER
"I did call Jillian  with the results of her benign pathology below.    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         The message on the voicemail on the listed mobile phone said \"this is Janice\"  I did not leave a message for this reason  Please try to contact her in the upcoming week to make sure she knows her pathology was benign  She should have a follow-up appointment scheduled  "

## 2025-01-08 ENCOUNTER — OFFICE VISIT (OUTPATIENT)
Dept: SURGERY | Facility: CLINIC | Age: 50
End: 2025-01-08
Payer: COMMERCIAL

## 2025-01-08 VITALS
HEIGHT: 66 IN | DIASTOLIC BLOOD PRESSURE: 78 MMHG | TEMPERATURE: 98.2 F | BODY MASS INDEX: 42.11 KG/M2 | OXYGEN SATURATION: 97 % | SYSTOLIC BLOOD PRESSURE: 124 MMHG | WEIGHT: 262 LBS | HEART RATE: 100 BPM

## 2025-01-08 DIAGNOSIS — Z09 POSTOP CHECK: Primary | ICD-10-CM

## 2025-01-08 PROCEDURE — 99024 POSTOP FOLLOW-UP VISIT: CPT | Performed by: SURGERY

## 2025-01-08 ASSESSMENT — PAIN SCALES - GENERAL: PAINLEVEL_OUTOF10: 0-NO PAIN

## 2025-01-08 NOTE — LETTER
2025     Alan Davila DO  [address] [address]    Patient: Jillian Acosta  YOB: 1975  Date of Visit: 2025      Dear Dr. Davila:    Thank you for referring Jillian Acosta to me for evaluation. Below are my notes for this consultation.    If you have questions, please do not hesitate to call me. I look forward to following your patient along with you.         Sincerely,        Rubén mEerson MD        CC: No Recipients    Rubén Emerson MD  2025 12:02 PM  Sign when Signing Visit    Patient ID: Jillian Acosta                              : 1975  MRN: [id number]                                            Visit Date: 2025  Encounter Provider: Rubén Emerson  Referring Provider: Alan Davila DO    Subjective   Chief Complaint: Status post excision subcutaneous mass left axilla 2024, follow-up exam    Jillian Acosta is a 49 y.o. old female with a past medical history as noted below who returns following excision of a subcutaneous mass from her left axilla done at the [facility] on 2024.  She reports no complaints regards to her incisional wound.  Specifically she denies any fevers, chills, or wound drainage.     Medications:   Current Outpatient Medications:   •  albuterol HFA (VENTOLIN HFA) 90 mcg/actuation inhaler, Inhale 2 puffs every 6 (six) hours as needed for wheezing or shortness of breath. (Patient not taking: Reported on 2023), Disp: 1 Inhaler, Rfl: 1  •  ascorbic acid (VITAMIN C) 500 mg tablet, Take 1 tablet (500 mg total) by mouth 2 (two) times a day. (Patient not taking: Reported on 2023), Disp: 60 tablet, Rfl: 0  •  aspirin 81 mg enteric coated tablet, Take 81 mg by mouth daily., Disp: , Rfl:   •  atorvastatin (LIPITOR) 40 mg tablet, , Disp: , Rfl:   •  cetirizine (ZyrTEC) 10 mg tablet, Take 1 tablet (10 mg total) by mouth daily., Disp: 30 tablet, Rfl: 0  •  cholecalciferol, vitamin D3,  5,000 unit (125 mcg) tablet, Take 5,000 Units by mouth daily., Disp: , Rfl:   •  divalproex (DEPAKOTE) 250 mg EC tablet, Take 7 tablets (1,750 mg total) by mouth nightly. (Patient taking differently: Take 1,500 mg by mouth nightly.), Disp: 210 tablet, Rfl: 0  •  fluticasone propionate (FLONASE) 50 mcg/actuation nasal spray, 2 sprays daily., Disp: , Rfl:   •  hydrOXYzine (ATARAX) 50 mg tablet, Take 0.5 tablets (25 mg total) by mouth every 6 (six) hours as needed for anxiety. (Patient not taking: Reported on 11/20/2023), Disp: 30 tablet, Rfl: 0  •  LITHIUM CARBONATE ORAL, Take 600 mg by mouth nightly., Disp: , Rfl:   •  mirabegron (MYRBETRIQ) 25 mg ER tablet, Take 25 mg by mouth daily., Disp: , Rfl:   •  QUEtiapine (SeroqueL) 300 mg tablet, Take 300 mg by mouth nightly., Disp: , Rfl:   •  QUEtiapine XR (SEROquel XR) 300 mg 24 hr tablet, Patient to take 700 mg of Seroquel QHS.  Take one 300 mg tablet with one 400 mg Tablet PO every night for a total daily dosage of 700 mg., Disp: 30 tablet, Rfl: 0    Past Medical History:  has a past medical history of A-fib (CMS/HCC), A-fib (CMS/HCC), Abscess (06/2023), Bipolar 1 disorder (CMS/HCC), Hypertension, Schizoaffective disorder (CMS/[facility]), and Sleep apnea.    Objective   Vitals: Visit Vitals  LMP  (LMP Unknown)     Physical Exam  On exam her incision is healing well with no erythema or evidence infection.  I did remove her sutures today in the office and applied Steri-Strips to the wound.  There was a slight gape centrally of 1 to 2 mm.    Pathology:  Final Diagnosis   Left axillary mass:               Florid giant cell reaction suggestive of ruptured cyst.       Assessment/plan: Stable postoperative course/exam following excision subcutaneous mass left axilla.  Postop restrictions and wound care instructions were again discussed with Jillian today in the office.  We also reviewed her pathology report.  She does not need to return to but will call with any problems or  questions.    Rubén Emerson MD

## 2025-01-08 NOTE — LETTER
2025     Alan Davila DO  [address] [address]    Patient: Jillian Acosta  YOB: 1975  Date of Visit: 2025      Dear Dr. Davila:    Thank you for referring Jillian Acosta to me for evaluation. Below are my notes for this consultation.    If you have questions, please do not hesitate to call me. I look forward to following your patient along with you.         Sincerely,        Rubén Emerson MD        CC: No Recipients    Rubén Emerson MD  2025 12:02 PM  Sign when Signing Visit    Patient ID: Jillian Acosta                              : 1975  MRN: [id number]                                            Visit Date: 2025  Encounter Provider: Rubén Emerson  Referring Provider: Alan Davila DO    Subjective   Chief Complaint: Status post excision subcutaneous mass left axilla 2024, follow-up exam    Jillian Acosta is a 49 y.o. old female with a past medical history as noted below who returns following excision of a subcutaneous mass from her left axilla done at the [facility] on 2024.  She reports no complaints regards to her incisional wound.  Specifically she denies any fevers, chills, or wound drainage.     Medications:   Current Outpatient Medications:   •  albuterol HFA (VENTOLIN HFA) 90 mcg/actuation inhaler, Inhale 2 puffs every 6 (six) hours as needed for wheezing or shortness of breath. (Patient not taking: Reported on 2023), Disp: 1 Inhaler, Rfl: 1  •  ascorbic acid (VITAMIN C) 500 mg tablet, Take 1 tablet (500 mg total) by mouth 2 (two) times a day. (Patient not taking: Reported on 2023), Disp: 60 tablet, Rfl: 0  •  aspirin 81 mg enteric coated tablet, Take 81 mg by mouth daily., Disp: , Rfl:   •  atorvastatin (LIPITOR) 40 mg tablet, , Disp: , Rfl:   •  cetirizine (ZyrTEC) 10 mg tablet, Take 1 tablet (10 mg total) by mouth daily., Disp: 30 tablet, Rfl: 0  •  cholecalciferol, vitamin D3,  5,000 unit (125 mcg) tablet, Take 5,000 Units by mouth daily., Disp: , Rfl:   •  divalproex (DEPAKOTE) 250 mg EC tablet, Take 7 tablets (1,750 mg total) by mouth nightly. (Patient taking differently: Take 1,500 mg by mouth nightly.), Disp: 210 tablet, Rfl: 0  •  fluticasone propionate (FLONASE) 50 mcg/actuation nasal spray, 2 sprays daily., Disp: , Rfl:   •  hydrOXYzine (ATARAX) 50 mg tablet, Take 0.5 tablets (25 mg total) by mouth every 6 (six) hours as needed for anxiety. (Patient not taking: Reported on 11/20/2023), Disp: 30 tablet, Rfl: 0  •  LITHIUM CARBONATE ORAL, Take 600 mg by mouth nightly., Disp: , Rfl:   •  mirabegron (MYRBETRIQ) 25 mg ER tablet, Take 25 mg by mouth daily., Disp: , Rfl:   •  QUEtiapine (SeroqueL) 300 mg tablet, Take 300 mg by mouth nightly., Disp: , Rfl:   •  QUEtiapine XR (SEROquel XR) 300 mg 24 hr tablet, Patient to take 700 mg of Seroquel QHS.  Take one 300 mg tablet with one 400 mg Tablet PO every night for a total daily dosage of 700 mg., Disp: 30 tablet, Rfl: 0    Past Medical History:  has a past medical history of A-fib (CMS/HCC), A-fib (CMS/HCC), Abscess (06/2023), Bipolar 1 disorder (CMS/HCC), Hypertension, Schizoaffective disorder (CMS/[facility]), and Sleep apnea.    Objective   Vitals: Visit Vitals  LMP  (LMP Unknown)     Physical Exam  On exam her incision is healing well with no erythema or evidence infection.  I did remove her sutures today in the office and applied Steri-Strips to the wound.  There was a slight gape centrally of 1 to 2 mm.    Pathology:  Final Diagnosis   Left axillary mass:               Florid giant cell reaction suggestive of ruptured cyst.       Assessment/plan: Stable postoperative course/exam following excision subcutaneous mass left axilla.  Postop restrictions and wound care instructions were again discussed with Jillian today in the office.  We also reviewed her pathology report.  She does not need to return to but will call with any problems or  questions.    Rubén Emerson MD

## 2025-01-08 NOTE — PROGRESS NOTES
Patient ID: Jillian Acosta                              : 1975  MRN: [id number]                                            Visit Date: 2025  Encounter Provider: Rubén Emerson  Referring Provider: Alan Davila DO    Subjective   Chief Complaint: Status post excision subcutaneous mass left axilla 2024, follow-up exam    Jillian Acosta is a 49 y.o. old female with a past medical history as noted below who returns following excision of a subcutaneous mass from her left axilla done at the [facility] on 2024.  She reports no complaints regards to her incisional wound.  Specifically she denies any fevers, chills, or wound drainage.     Medications:   Current Outpatient Medications:     albuterol HFA (VENTOLIN HFA) 90 mcg/actuation inhaler, Inhale 2 puffs every 6 (six) hours as needed for wheezing or shortness of breath. (Patient not taking: Reported on 2023), Disp: 1 Inhaler, Rfl: 1    ascorbic acid (VITAMIN C) 500 mg tablet, Take 1 tablet (500 mg total) by mouth 2 (two) times a day. (Patient not taking: Reported on 2023), Disp: 60 tablet, Rfl: 0    aspirin 81 mg enteric coated tablet, Take 81 mg by mouth daily., Disp: , Rfl:     atorvastatin (LIPITOR) 40 mg tablet, , Disp: , Rfl:     cetirizine (ZyrTEC) 10 mg tablet, Take 1 tablet (10 mg total) by mouth daily., Disp: 30 tablet, Rfl: 0    cholecalciferol, vitamin D3, 5,000 unit (125 mcg) tablet, Take 5,000 Units by mouth daily., Disp: , Rfl:     divalproex (DEPAKOTE) 250 mg EC tablet, Take 7 tablets (1,750 mg total) by mouth nightly. (Patient taking differently: Take 1,500 mg by mouth nightly.), Disp: 210 tablet, Rfl: 0    fluticasone propionate (FLONASE) 50 mcg/actuation nasal spray, 2 sprays daily., Disp: , Rfl:     hydrOXYzine (ATARAX) 50 mg tablet, Take 0.5 tablets (25 mg total) by mouth every 6 (six) hours as needed for anxiety. (Patient not taking: Reported on 2023), Disp: 30 tablet, Rfl: 0    LITHIUM  CARBONATE ORAL, Take 600 mg by mouth nightly., Disp: , Rfl:     mirabegron (MYRBETRIQ) 25 mg ER tablet, Take 25 mg by mouth daily., Disp: , Rfl:     QUEtiapine (SeroqueL) 300 mg tablet, Take 300 mg by mouth nightly., Disp: , Rfl:     QUEtiapine XR (SEROquel XR) 300 mg 24 hr tablet, Patient to take 700 mg of Seroquel QHS.  Take one 300 mg tablet with one 400 mg Tablet PO every night for a total daily dosage of 700 mg., Disp: 30 tablet, Rfl: 0    Past Medical History:  has a past medical history of A-fib (CMS/HCC), A-fib (CMS/HCC), Abscess (06/2023), Bipolar 1 disorder (CMS/HCC), Hypertension, Schizoaffective disorder (CMS/HCC), and Sleep apnea.    Objective   Vitals: Visit Vitals  LMP  (LMP Unknown)     Physical Exam  On exam her incision is healing well with no erythema or evidence infection.  I did remove her sutures today in the office and applied Steri-Strips to the wound.  There was a slight gape centrally of 1 to 2 mm.    Pathology:  Final Diagnosis   Left axillary mass:               Florid giant cell reaction suggestive of ruptured cyst.       Assessment/plan: Stable postoperative course/exam following excision subcutaneous mass left axilla.  Postop restrictions and wound care instructions were again discussed with Jillian today in the office.  We also reviewed her pathology report.  She does not need to return to but will call with any problems or questions.    Rubén Emerson MD

## 2025-01-15 ENCOUNTER — TELEPHONE (OUTPATIENT)
Dept: SURGERY | Facility: CLINIC | Age: 50
End: 2025-01-15
Payer: COMMERCIAL

## 2025-01-15 NOTE — TELEPHONE ENCOUNTER
Patient left voice mail at 1:02 pm today, had stiches taken out on 1/8/25 and area has a small slit that is still draining and smell bad. Can send pictures over if needed, please advise.

## 2025-01-15 NOTE — TELEPHONE ENCOUNTER
Called pt she has no fever and the drainage is clear to yellowish but the odor is foul. Pt states she has a new phone and her email is not working right and she can't upload any pictures.   Sent Message to .

## 2025-01-15 NOTE — TELEPHONE ENCOUNTER
Per  pt to do warm soaks and to come in the office next week or the week after for an appointment.     Called pt back aware of Drs recommendations and pt stated she already made an appt with  for Monday 1/20/25.

## 2025-01-20 ENCOUNTER — OFFICE VISIT (OUTPATIENT)
Dept: SURGERY | Facility: CLINIC | Age: 50
End: 2025-01-20
Payer: COMMERCIAL

## 2025-01-20 VITALS
SYSTOLIC BLOOD PRESSURE: 122 MMHG | OXYGEN SATURATION: 98 % | HEIGHT: 67 IN | HEART RATE: 102 BPM | BODY MASS INDEX: 40.81 KG/M2 | TEMPERATURE: 98.1 F | DIASTOLIC BLOOD PRESSURE: 70 MMHG | WEIGHT: 260 LBS

## 2025-01-20 DIAGNOSIS — Z09 POSTOP CHECK: Primary | ICD-10-CM

## 2025-01-20 PROCEDURE — 99024 POSTOP FOLLOW-UP VISIT: CPT | Performed by: SURGERY

## 2025-01-20 NOTE — PROGRESS NOTES
Patient ID: Jillian Acosta                              : 1975  MRN: [id number]                                            Visit Date: 2025  Encounter Provider: Rubén Emerson  Referring Provider: Alan Davila DO    Subjective   Chief Complaint: Status post excision subcutaneous mass left axilla 2024, follow-up exam       Jillian Acosta is a 49 y.o. old female with a past medical history as noted below underwent excision of subcutaneous mass from her left axilla at the [facility] on 2024.  She had a slight gape of 1 to 2 mm centrally when I removed her sutures and applied Steri-Strips in the office the time of her postoperative visit on 2025.    She returns today for wound check.  She denies any fevers or chills.     Medications:   Current Outpatient Medications:     albuterol HFA (VENTOLIN HFA) 90 mcg/actuation inhaler, Inhale 2 puffs every 6 (six) hours as needed for wheezing or shortness of breath., Disp: 1 Inhaler, Rfl: 1    ascorbic acid (VITAMIN C) 500 mg tablet, Take 1 tablet (500 mg total) by mouth 2 (two) times a day., Disp: 60 tablet, Rfl: 0    aspirin 81 mg enteric coated tablet, Take 81 mg by mouth daily., Disp: , Rfl:     atorvastatin (LIPITOR) 40 mg tablet, , Disp: , Rfl:     cetirizine (ZyrTEC) 10 mg tablet, Take 1 tablet (10 mg total) by mouth daily., Disp: 30 tablet, Rfl: 0    cholecalciferol, vitamin D3, 5,000 unit (125 mcg) tablet, Take 5,000 Units by mouth daily., Disp: , Rfl:     divalproex (DEPAKOTE) 250 mg EC tablet, Take 7 tablets (1,750 mg total) by mouth nightly. (Patient taking differently: Take 1,500 mg by mouth nightly.), Disp: 210 tablet, Rfl: 0    fluticasone propionate (FLONASE) 50 mcg/actuation nasal spray, 2 sprays daily., Disp: , Rfl:     hydrOXYzine (ATARAX) 50 mg tablet, Take 0.5 tablets (25 mg total) by mouth every 6 (six) hours as needed for anxiety., Disp: 30 tablet, Rfl: 0    LITHIUM CARBONATE ORAL, Take 600 mg by mouth  nightly., Disp: , Rfl:     mirabegron (MYRBETRIQ) 25 mg ER tablet, Take 25 mg by mouth daily., Disp: , Rfl:     QUEtiapine (SeroqueL) 300 mg tablet, Take 300 mg by mouth nightly., Disp: , Rfl:     QUEtiapine XR (SEROquel XR) 300 mg 24 hr tablet, Patient to take 700 mg of Seroquel QHS.  Take one 300 mg tablet with one 400 mg Tablet PO every night for a total daily dosage of 700 mg., Disp: 30 tablet, Rfl: 0    Past Medical History:  has a past medical history of A-fib (CMS/HCC), A-fib (CMS/HCC), Abscess (06/2023), Bipolar 1 disorder (CMS/HCC), Hypertension, Schizoaffective disorder (CMS/HCC), and Sleep apnea.    Objective   Vitals: Visit Vitals  LMP  (LMP Unknown)     Physical Exam  On exam her incision is gaped centrally somewhat but there is no erythema or evidence of infection.  I did take a photograph which is available under the media tab.    Pathology:  Final Diagnosis   Left axillary mass:               Florid giant cell reaction suggestive of ruptured cyst.         Assessment/plan: Stable postoperative course/exam following excision subcutaneous mass left axilla.  Care does understand the wound will heal by secondary intention and return here if it is not done so in the next 2 months.  Rubén Emerson MD

## 2025-01-20 NOTE — LETTER
2025     Alan Davila DO  [address] [facility]  [place] [id number]    Patient: Jillian Acosta  YOB: 1975  Date of Visit: 2025      Dear Dr. Davila:    Thank you for referring Jillian Acotsa to me for evaluation. Below are my notes for this consultation.    If you have questions, please do not hesitate to call me. I look forward to following your patient along with you.         Sincerely,        Rubén Emerson MD        CC: No Recipients    Rubén Emerson MD  2025  1:53 PM  Signed    Patient ID: Jillian Acosta                              : 1975  MRN: [id number]                                            Visit Date: 2025  Encounter Provider: Rubén Emerson  Referring Provider: Alan Davila DO    Subjective  Chief Complaint: Status post excision subcutaneous mass left axilla 2024, follow-up exam       Jillian Acosta is a 49 y.o. old female with a past medical history as noted below underwent excision of subcutaneous mass from her left axilla at the [facility] [place] on 2024.  She had a slight gape of 1 to 2 mm centrally when I removed her sutures and applied Steri-Strips in the office the time of her postoperative visit on 2025.    She returns today for wound check.  She denies any fevers or chills.     Medications:   Current Outpatient Medications:   •  albuterol HFA (VENTOLIN HFA) 90 mcg/actuation inhaler, Inhale 2 puffs every 6 (six) hours as needed for wheezing or shortness of breath., Disp: 1 Inhaler, Rfl: 1  •  ascorbic acid (VITAMIN C) 500 mg tablet, Take 1 tablet (500 mg total) by mouth 2 (two) times a day., Disp: 60 tablet, Rfl: 0  •  aspirin 81 mg enteric coated tablet, Take 81 mg by mouth daily., Disp: , Rfl:   •  atorvastatin (LIPITOR) 40 mg tablet, , Disp: , Rfl:   •  cetirizine (ZyrTEC) 10 mg tablet, Take 1 tablet (10 mg total) by mouth daily., Disp: 30 tablet, Rfl: 0  •  cholecalciferol, vitamin D3,  5,000 unit (125 mcg) tablet, Take 5,000 Units by mouth daily., Disp: , Rfl:   •  divalproex (DEPAKOTE) 250 mg EC tablet, Take 7 tablets (1,750 mg total) by mouth nightly. (Patient taking differently: Take 1,500 mg by mouth nightly.), Disp: 210 tablet, Rfl: 0  •  fluticasone propionate (FLONASE) 50 mcg/actuation nasal spray, 2 sprays daily., Disp: , Rfl:   •  hydrOXYzine (ATARAX) 50 mg tablet, Take 0.5 tablets (25 mg total) by mouth every 6 (six) hours as needed for anxiety., Disp: 30 tablet, Rfl: 0  •  LITHIUM CARBONATE ORAL, Take 600 mg by mouth nightly., Disp: , Rfl:   •  mirabegron (MYRBETRIQ) 25 mg ER tablet, Take 25 mg by mouth daily., Disp: , Rfl:   •  QUEtiapine (SeroqueL) 300 mg tablet, Take 300 mg by mouth nightly., Disp: , Rfl:   •  QUEtiapine XR (SEROquel XR) 300 mg 24 hr tablet, Patient to take 700 mg of Seroquel QHS.  Take one 300 mg tablet with one 400 mg Tablet PO every night for a total daily dosage of 700 mg., Disp: 30 tablet, Rfl: 0    Past Medical History:  has a past medical history of A-fib (CMS/HCC), A-fib (CMS/HCC), Abscess (06/2023), Bipolar 1 disorder (CMS/HCC), Hypertension, Schizoaffective disorder (CMS/HCC), and Sleep apnea.    Objective  Vitals: Visit Vitals  LMP  (LMP Unknown)     Physical Exam  On exam her incision is gaped centrally somewhat but there is no erythema or evidence of infection.  I did take a photograph which is available under the media tab.    Pathology:  Final Diagnosis   Left axillary mass:               Florid giant cell reaction suggestive of ruptured cyst.         Assessment/plan: Stable postoperative course/exam following excision subcutaneous mass left axilla.  Care does understand the wound will heal by secondary intention and return here if it is not done so in the next 2 months.  Rubén Emerson MD

## 2025-03-14 RX ORDER — PALIPERIDONE PALMITATE 156 MG/ML
INJECTION INTRAMUSCULAR
COMMUNITY
Start: 2025-01-07

## 2025-04-08 ENCOUNTER — APPOINTMENT (OUTPATIENT)
Dept: LAB | Facility: HOSPITAL | Age: 50
End: 2025-04-08
Attending: INTERNAL MEDICINE
Payer: COMMERCIAL

## 2025-04-08 ENCOUNTER — TRANSCRIBE ORDERS (OUTPATIENT)
Dept: SCHEDULING | Age: 50
End: 2025-04-08

## 2025-04-08 DIAGNOSIS — M25.50 PAIN IN JOINT, MULTIPLE SITES: ICD-10-CM

## 2025-04-08 DIAGNOSIS — E53.8 BIOTIN-(PROPIONYL-COA-CARBOXYLASE) LIGASE DEFICIENCY: ICD-10-CM

## 2025-04-08 DIAGNOSIS — Z00.01 ENCOUNTER FOR GENERAL ADULT MEDICAL EXAMINATION WITH ABNORMAL FINDINGS: Primary | ICD-10-CM

## 2025-04-08 DIAGNOSIS — E55.9 AVITAMINOSIS D: ICD-10-CM

## 2025-04-08 DIAGNOSIS — Z86.39 PERSONAL HISTORY OF NUTRITIONAL DEFICIENCY: ICD-10-CM

## 2025-04-08 DIAGNOSIS — Z00.01 ENCOUNTER FOR GENERAL ADULT MEDICAL EXAMINATION WITH ABNORMAL FINDINGS: ICD-10-CM

## 2025-04-08 LAB
25(OH)D3 SERPL-MCNC: 24 NG/ML (ref 30–100)
ALBUMIN SERPL-MCNC: 4.2 G/DL (ref 3.5–5.7)
ALP SERPL-CCNC: 74 IU/L (ref 34–125)
ALT SERPL-CCNC: 25 IU/L (ref 7–52)
ANION GAP SERPL CALC-SCNC: 10 MEQ/L (ref 3–15)
AST SERPL-CCNC: 17 IU/L (ref 13–39)
BACTERIA URNS QL MICRO: 2 /HPF
BASOPHILS # BLD: 0.03 K/UL (ref 0.01–0.1)
BASOPHILS NFR BLD: 0.5 %
BILIRUB SERPL-MCNC: 0.5 MG/DL (ref 0.3–1.2)
BILIRUB UR QL STRIP.AUTO: NEGATIVE MG/DL
BUN SERPL-MCNC: 13 MG/DL (ref 7–25)
CALCIUM SERPL-MCNC: 9.9 MG/DL (ref 8.6–10.3)
CHLORIDE SERPL-SCNC: 104 MEQ/L (ref 98–107)
CHOLEST SERPL-MCNC: 180 MG/DL
CLARITY UR REFRACT.AUTO: ABNORMAL
CO2 SERPL-SCNC: 25 MEQ/L (ref 21–31)
COLOR UR AUTO: YELLOW
CREAT SERPL-MCNC: 0.6 MG/DL (ref 0.6–1.2)
DIFFERENTIAL METHOD BLD: NORMAL
EGFRCR SERPLBLD CKD-EPI 2021: >60 ML/MIN/1.73M*2
EOSINOPHIL # BLD: 0.11 K/UL (ref 0.04–0.36)
EOSINOPHIL NFR BLD: 1.9 %
ERYTHROCYTE [DISTWIDTH] IN BLOOD BY AUTOMATED COUNT: 13.7 % (ref 11.7–14.4)
EST. AVERAGE GLUCOSE BLD GHB EST-MCNC: 111 MG/DL
FERRITIN SERPL-MCNC: 136 NG/ML (ref 11–250)
FOLATE SERPL-MCNC: 9.8 NG/ML
GLUCOSE SERPL-MCNC: 90 MG/DL (ref 70–99)
GLUCOSE UR STRIP.AUTO-MCNC: NEGATIVE MG/DL
HBA1C MFR BLD: 5.5 %
HCT VFR BLD AUTO: 43.2 % (ref 35–45)
HDLC SERPL-MCNC: 43 MG/DL
HDLC SERPL: 4.2 {RATIO}
HGB BLD-MCNC: 14.3 G/DL (ref 11.8–15.7)
HGB UR QL STRIP.AUTO: ABNORMAL
HYALINE CASTS #/AREA URNS LPF: ABNORMAL /LPF
IMM GRANULOCYTES # BLD AUTO: 0.04 K/UL (ref 0–0.08)
IMM GRANULOCYTES NFR BLD AUTO: 0.7 %
KETONES UR STRIP.AUTO-MCNC: 1 MG/DL
LDLC SERPL CALC-MCNC: 92 MG/DL
LEUKOCYTE ESTERASE UR QL STRIP.AUTO: ABNORMAL
LYMPHOCYTES # BLD: 1.57 K/UL (ref 1.2–3.5)
LYMPHOCYTES NFR BLD: 27.7 %
MAGNESIUM SERPL-MCNC: 1.8 MG/DL (ref 1.8–2.5)
MCH RBC QN AUTO: 29.3 PG (ref 28–33.2)
MCHC RBC AUTO-ENTMCNC: 33.1 G/DL (ref 32.2–35.5)
MCV RBC AUTO: 88.5 FL (ref 83–98)
MONOCYTES # BLD: 0.63 K/UL (ref 0.28–0.8)
MONOCYTES NFR BLD: 11.1 %
MUCOUS THREADS URNS QL MICRO: ABNORMAL /LPF
NEUTROPHILS # BLD: 3.28 K/UL (ref 1.7–7)
NEUTS SEG NFR BLD: 58.1 %
NITRITE UR QL STRIP.AUTO: NEGATIVE
NONHDLC SERPL-MCNC: 137 MG/DL
NRBC BLD-RTO: 0 %
PH UR STRIP.AUTO: 6 [PH]
PLATELET # BLD AUTO: 184 K/UL (ref 150–369)
PMV BLD AUTO: 9.5 FL (ref 9.4–12.3)
POTASSIUM SERPL-SCNC: 4.5 MEQ/L (ref 3.5–5.1)
PROT SERPL-MCNC: 6.6 G/DL (ref 6–8.2)
PROT UR QL STRIP.AUTO: 1
RBC # BLD AUTO: 4.88 M/UL (ref 3.93–5.22)
RBC #/AREA URNS HPF: ABNORMAL /HPF
SODIUM SERPL-SCNC: 139 MEQ/L (ref 136–145)
SP GR UR REFRACT.AUTO: 1.03
SQUAMOUS URNS QL MICRO: 2 /HPF
TRIGL SERPL-MCNC: 227 MG/DL
TSH SERPL DL<=0.05 MIU/L-ACNC: 3.77 MIU/L (ref 0.34–5.6)
UROBILINOGEN UR STRIP-ACNC: 0.2 EU/DL
VIT B12 SERPL-MCNC: 730 PG/ML (ref 180–914)
WBC # BLD AUTO: 5.66 K/UL (ref 3.8–10.5)
WBC #/AREA URNS HPF: ABNORMAL /HPF

## 2025-04-08 PROCEDURE — 82728 ASSAY OF FERRITIN: CPT

## 2025-04-08 PROCEDURE — 82746 ASSAY OF FOLIC ACID SERUM: CPT

## 2025-04-08 PROCEDURE — 87086 URINE CULTURE/COLONY COUNT: CPT

## 2025-04-08 PROCEDURE — 82306 VITAMIN D 25 HYDROXY: CPT

## 2025-04-08 PROCEDURE — 80053 COMPREHEN METABOLIC PANEL: CPT

## 2025-04-08 PROCEDURE — 83735 ASSAY OF MAGNESIUM: CPT

## 2025-04-08 PROCEDURE — 86618 LYME DISEASE ANTIBODY: CPT

## 2025-04-08 PROCEDURE — 83036 HEMOGLOBIN GLYCOSYLATED A1C: CPT

## 2025-04-08 PROCEDURE — 82607 VITAMIN B-12: CPT

## 2025-04-08 PROCEDURE — 80061 LIPID PANEL: CPT

## 2025-04-08 PROCEDURE — 81001 URINALYSIS AUTO W/SCOPE: CPT

## 2025-04-08 PROCEDURE — 84443 ASSAY THYROID STIM HORMONE: CPT

## 2025-04-08 PROCEDURE — 85025 COMPLETE CBC W/AUTO DIFF WBC: CPT

## 2025-04-08 PROCEDURE — 36415 COLL VENOUS BLD VENIPUNCTURE: CPT

## 2025-04-09 ENCOUNTER — OFFICE VISIT (OUTPATIENT)
Dept: UROGYNECOLOGY | Facility: CLINIC | Age: 50
End: 2025-04-09
Payer: COMMERCIAL

## 2025-04-09 VITALS
DIASTOLIC BLOOD PRESSURE: 82 MMHG | SYSTOLIC BLOOD PRESSURE: 128 MMHG | BODY MASS INDEX: 42.27 KG/M2 | HEIGHT: 66 IN | WEIGHT: 263 LBS

## 2025-04-09 DIAGNOSIS — N39.41 URGE INCONTINENCE: Primary | ICD-10-CM

## 2025-04-09 DIAGNOSIS — N20.0 NEPHROLITHIASIS: ICD-10-CM

## 2025-04-09 LAB
B BURGDOR AB SER IA-ACNC: 0.84 RATIO
BLOOD URINE, POC: NEGATIVE
EXPIRATION DATE: NORMAL
LEUKOCYTE EST, POC: NEGATIVE
Lab: NORMAL
NITRITE, POC: NEGATIVE
POCT MANUFACTURER: NORMAL

## 2025-04-09 PROCEDURE — 51701 INSERT BLADDER CATHETER: CPT | Mod: GC | Performed by: OBSTETRICS & GYNECOLOGY

## 2025-04-09 PROCEDURE — 81002 URINALYSIS NONAUTO W/O SCOPE: CPT | Mod: GC | Performed by: OBSTETRICS & GYNECOLOGY

## 2025-04-09 PROCEDURE — 3074F SYST BP LT 130 MM HG: CPT | Performed by: OBSTETRICS & GYNECOLOGY

## 2025-04-09 PROCEDURE — 99204 OFFICE O/P NEW MOD 45 MIN: CPT | Mod: 25,GC | Performed by: OBSTETRICS & GYNECOLOGY

## 2025-04-09 PROCEDURE — 3008F BODY MASS INDEX DOCD: CPT | Performed by: OBSTETRICS & GYNECOLOGY

## 2025-04-09 PROCEDURE — 99459 PELVIC EXAMINATION: CPT | Mod: GC | Performed by: OBSTETRICS & GYNECOLOGY

## 2025-04-09 PROCEDURE — 3079F DIAST BP 80-89 MM HG: CPT | Performed by: OBSTETRICS & GYNECOLOGY

## 2025-04-09 RX ORDER — HALOPERIDOL 5 MG/1
5 TABLET ORAL 4 TIMES DAILY
COMMUNITY

## 2025-04-09 ASSESSMENT — ENCOUNTER SYMPTOMS
SHORTNESS OF BREATH: 0
NAUSEA: 0
VOMITING: 0
CHILLS: 0
CONSTIPATION: 0
FEVER: 0
HEMATURIA: 0
DYSURIA: 0
FLANK PAIN: 1

## 2025-04-09 NOTE — ASSESSMENT & PLAN NOTE
Jillian reports bothersome symptoms of urinary frequency and urgency incontinence. I am reassured by her negative urine dipstick and normal PVR. She previously tried Vesicare which was stopped due to urinary retention, as well as Myrbetriq 50mg which helped her symptoms but lost efficacy over time. Given that Jillian has lower urinary tract symptoms that have been refractory to treatment with both an anti-muscarinic and a beta mimetic, we discussed third line treatment options such as neuromodulation, Botox, and surgical management. Recommend she undergo urodynamic testing to further evaluate her overactive bladder symptoms. Ms. Acosta was provided handouts from [employer] about overactive bladder and urodynamic testing for more information.

## 2025-04-09 NOTE — ASSESSMENT & PLAN NOTE
Jillian has a recent history of microscopic hematuria and was recommended for renal ultrasound, which she got done 3/29/25 and it demonstrated nonobstructive 6mm calculis in midportion of right kidney, no hydronephrosis. No signs of acute kidney dysfunction, reassured by recent Cr 0.6 4/8/25. Recommend she follow up with her urologist if symptoms worsen.

## 2025-04-09 NOTE — PATIENT INSTRUCTIONS
If you have any problems or concerns, call our office at [phone].  If you think you are having a medical emergency, please call [phone].    If you have access to [employer] (the online patient portal), results from tests ordered at your visit (such as urine tests or ultrasounds) will appear in your portal as soon as they are ready. Please understand that you may see these results faster than we do. Please do not call about the results immediately - we will review your results and contact you after we have had time to analyze your tests.     Please also understand that we frequently do not receive messages sent to us by patients through [employer]. If you have questions or concerns, please call our office.

## 2025-04-09 NOTE — PROGRESS NOTES
Ms. Acosta is seen in self-consultation regarding urinary urgency.    CC: urgency    HPI: This is a 49 y.o.  with a past medical history of schizoaffective disorder, hyperlipiedmia, atrial fibrillation who presents to discuss management of urgency incontinence. She previously used Mirabegron 50 mg and reports improvement. She was recently admitted to a psychiatric facility and reports she stopped receiving her Mirabegron while there. She last took Myrbetriq last 2024. She reports urinary frequency and has urgency incontinence 3x/week, which is bothersome. She also thinks that Mirabegron was losing efficacy, even before she discontinued its usage. She does not currently drink caffeinated beverages - she drinks decaf coffee. She does not drink alcohol. She does not reports episodes of leakage with cough or sneezing.     Urinary Symptoms: She doesn't use pads. She denies nocturnal enuresis and awakens 1 times per night to void.  She reports recurrent or frequent urinary tract infections, had 0 urinary tract infections in the last year, denies dysuria but reports discomfort, and denies hematuria.    Prolapse Symptoms: No bulge symptoms    Bowel Symptoms: Denies.  She denies splinting.  She denies anal incontinence.    Sexual Function: She is not currently sexually active.    On chart review, 24 CT CAP Demonstrates previous hysterectomy, bladder unremarkable, kidneys normal with no hydronephrosis. I am reassured by here recent Cr 0.6 from 25. She had a history of microscopic hematuria in the past and was recommended for renal ultrasound, which she got done 3/29/25 and it demonstrated nonobstructive 6mm calculis in midportion of right kidney, no hydronephrosis.     She  has a past medical history of A-fib (CMS/[facility]), A-fib (CMS/[facility]), Abscess (2023), Anxiety, Bipolar 1 disorder (CMS/[facility]), Depression, Schizoaffective disorder (CMS/[facility]), and Sleep apnea.    She has no past medical history of  Hypertension.    She  has a past surgical history that includes Anal fissurectomy (2016); Cholecystectomy (2016); Hysterectomy (2018); and Other surgical history.    Current Outpatient Medications   Medication Instructions    aspirin 81 mg, Daily    atorvastatin (LIPITOR) 40 mg tablet No dose, route, or frequency recorded.    cholecalciferol (vitamin D3) 5,000 Units, Daily    divalproex (DEPAKOTE) 1,750 mg, oral, Nightly    haloperidoL (HALDOL) 5 mg, 4 times daily    INVEGA SUSTENNA 156 mg/mL syringe        She is allergic to azithromycin, doxycycline, penicillins, and amoxicillin.    Her social history is not contributory.    Her family history is not contributory.    Review of Systems    Review of Systems   Constitutional:  Negative for chills and fever.   Respiratory:  Negative for shortness of breath.    Cardiovascular:  Negative for chest pain.   Gastrointestinal:  Negative for constipation, nausea and vomiting.   Genitourinary:  Positive for flank pain, nocturia and urgency. Negative for dysuria, enuresis and hematuria.       Physical Exam    Vitals:    04/09/25 1328   BP: 128/82     Weight: 119 kg (263 lb)   Body mass index is 43.1 kg/m².   Constitutional: She is oriented to person, place, and time and well-developed, well-nourished, and in no distress.  Psychiatric: Mood, memory, affect and judgment normal.   Neurological: Pleasant and oriented.   Neck: Normal in appearance.   Back: No tenderness over the spine or CVA tenderness.  Cardiovascular: No JVD.  No lower extremity edema.  Pulmonary/Chest: Effort normal. No respiratory distress.   Skin: Skin is warm and dry. No rash noted. No pallor.   Abd: Soft, non tender    Pelvic exam (with a female chaperone present):   Normal external genitalia, including clitoris, urethral meatus and perineal body.  Stress urinary incontinence was not demonstrable with cough in the lithotomy position, approximately 30 min after her last spontaneous void.    PROCEDURE NOTE FOR  POST-VOID RESIDUAL: ([id number])  The urethra was prepped, and a lubricated 12 [place] catheter was inserted to collect a post void residual.  The procedure was well tolerated by the patient  The post void residual amount is 40 mL.     A point-of-care urinalysis was ordered: negative for heme, negative for leukocyte esterase, negative for nitrites detected.    Bimanual exam: The uterus was absent, vaginal cuff intact.  There were no masses or tenderness in the pelvis/adnexal region.  Rectal exam: Normally-situated anus.  MARYSE deferred.    No evidence of prolapse noted, vaginal cuff noted to be well supported.      Assessment/Plan     Urge incontinence  Jillian reports bothersome symptoms of urinary frequency and urgency incontinence. I am reassured by her negative urine dipstick and normal PVR. She previously tried Vesicare which was stopped due to urinary retention, as well as Myrbetriq 50mg which helped her symptoms but lost efficacy over time. Given that Jillian has lower urinary tract symptoms that have been refractory to treatment with both an anti-muscarinic and a beta mimetic, we discussed third line treatment options such as neuromodulation, Botox, and surgical management. Recommend she undergo urodynamic testing to further evaluate her overactive bladder symptoms. Ms. Acosta was provided handouts from [facility] [[email or web address]] about overactive bladder and urodynamic testing for more information.        Nephrolithiasis  Jillian has a recent history of microscopic hematuria and was recommended for renal ultrasound, which she got done 3/29/25 and it demonstrated nonobstructive 6mm calculis in midportion of right kidney, no hydronephrosis. No signs of acute kidney dysfunction, reassured by recent Cr 0.6 4/8/25. Recommend she follow up with her urologist if symptoms worsen.       Return for Urodynamics.     Patient seen and examined with Dr. Jamie Beverly MD  PGY2  Obstetrics and Gynecology  [facility]    I performed a  history and physical examination of the patient and discussed the management with the Resident. I reviewed the Resident's note and agree with the documented findings and plan of care, except for my comments below or within the additional notes today.    Mohsen Ayala MD PhD

## 2025-04-10 LAB
BACTERIA UR CULT: NORMAL
BACTERIA UR CULT: NORMAL

## 2025-05-06 ENCOUNTER — TRANSCRIBE ORDERS (OUTPATIENT)
Dept: REGISTRATION | Facility: HOSPITAL | Age: 50
End: 2025-05-06

## 2025-05-06 DIAGNOSIS — R60.9 EDEMA: ICD-10-CM

## 2025-05-06 DIAGNOSIS — F31.9 BIPOLAR DISORDER, UNSPECIFIED (CMS/HCC): ICD-10-CM

## 2025-05-06 DIAGNOSIS — E78.2 MIXED HYPERLIPIDEMIA: Primary | ICD-10-CM

## 2025-05-06 DIAGNOSIS — G47.33 OBSTRUCTIVE SLEEP APNEA (ADULT) (PEDIATRIC): ICD-10-CM

## 2025-05-06 DIAGNOSIS — E66.01 MORBID OBESITY (CMS/HCC): ICD-10-CM

## 2025-05-06 DIAGNOSIS — Z86.79 PERSONAL HISTORY OF CARDIOVASCULAR DISORDER: ICD-10-CM

## 2025-05-07 ENCOUNTER — OFFICE VISIT (OUTPATIENT)
Dept: UROGYNECOLOGY | Facility: CLINIC | Age: 50
End: 2025-05-07
Payer: COMMERCIAL

## 2025-05-07 ENCOUNTER — TELEPHONE (OUTPATIENT)
Dept: UROGYNECOLOGY | Facility: CLINIC | Age: 50
End: 2025-05-07

## 2025-05-07 DIAGNOSIS — N39.41 URGE INCONTINENCE: Primary | ICD-10-CM

## 2025-05-07 PROCEDURE — 51729 CYSTOMETROGRAM W/VP&UP: CPT | Performed by: OBSTETRICS & GYNECOLOGY

## 2025-05-07 PROCEDURE — 99999 PR OFFICE/OUTPT VISIT,PROCEDURE ONLY: CPT | Performed by: OBSTETRICS & GYNECOLOGY

## 2025-05-07 PROCEDURE — 51797 INTRAABDOMINAL PRESSURE TEST: CPT | Performed by: OBSTETRICS & GYNECOLOGY

## 2025-05-07 NOTE — TELEPHONE ENCOUNTER
Pt called she has been having frequent urination went at least 8 times burning, painful some blood.

## 2025-05-08 NOTE — TELEPHONE ENCOUNTER
I spoke to Dr. Ayala he said pt would not have a uti this soon it is probably from the catheter if symptoms persist call the office

## 2025-05-14 ENCOUNTER — TELEPHONE (OUTPATIENT)
Dept: UROGYNECOLOGY | Facility: CLINIC | Age: 50
End: 2025-05-14
Payer: COMMERCIAL

## 2025-05-14 DIAGNOSIS — N39.41 URGE INCONTINENCE: Primary | ICD-10-CM

## 2025-05-14 RX ORDER — MIRABEGRON 50 MG/1
50 TABLET, FILM COATED, EXTENDED RELEASE ORAL DAILY
Qty: 30 TABLET | Refills: 0 | Status: SHIPPED | OUTPATIENT
Start: 2025-05-14

## 2025-05-21 ENCOUNTER — APPOINTMENT (OUTPATIENT)
Dept: LAB | Facility: HOSPITAL | Age: 50
End: 2025-05-21
Attending: INTERNAL MEDICINE
Payer: COMMERCIAL

## 2025-05-21 DIAGNOSIS — E78.2 MIXED HYPERLIPIDEMIA: ICD-10-CM

## 2025-05-21 DIAGNOSIS — G47.33 OBSTRUCTIVE SLEEP APNEA (ADULT) (PEDIATRIC): ICD-10-CM

## 2025-05-21 DIAGNOSIS — Z86.79 PERSONAL HISTORY OF CARDIOVASCULAR DISORDER: ICD-10-CM

## 2025-05-21 DIAGNOSIS — R60.9 EDEMA: ICD-10-CM

## 2025-05-21 DIAGNOSIS — E66.01 MORBID OBESITY (CMS/HCC): ICD-10-CM

## 2025-05-21 DIAGNOSIS — F31.9 BIPOLAR DISORDER, UNSPECIFIED (CMS/HCC): ICD-10-CM

## 2025-05-21 LAB
ALBUMIN SERPL-MCNC: 4 G/DL (ref 3.5–5.7)
ALP SERPL-CCNC: 72 IU/L (ref 34–125)
ALT SERPL-CCNC: 23 IU/L (ref 7–52)
AST SERPL-CCNC: 15 IU/L (ref 13–39)
BILIRUB DIRECT SERPL-MCNC: 0.1 MG/DL
BILIRUB SERPL-MCNC: 0.4 MG/DL (ref 0.3–1.2)
CHOLEST SERPL-MCNC: 174 MG/DL
HDLC SERPL-MCNC: 45 MG/DL
HDLC SERPL: 3.9 {RATIO}
LDLC SERPL CALC-MCNC: 90 MG/DL
NONHDLC SERPL-MCNC: 129 MG/DL
PROT SERPL-MCNC: 6.3 G/DL (ref 6–8.2)
TRIGL SERPL-MCNC: 194 MG/DL

## 2025-05-21 PROCEDURE — 80061 LIPID PANEL: CPT

## 2025-05-21 PROCEDURE — 80076 HEPATIC FUNCTION PANEL: CPT

## 2025-05-21 PROCEDURE — 36415 COLL VENOUS BLD VENIPUNCTURE: CPT

## 2025-05-28 ENCOUNTER — OFFICE VISIT (OUTPATIENT)
Dept: UROGYNECOLOGY | Facility: CLINIC | Age: 50
End: 2025-05-28
Payer: COMMERCIAL

## 2025-05-28 VITALS
WEIGHT: 264 LBS | HEIGHT: 66 IN | SYSTOLIC BLOOD PRESSURE: 90 MMHG | DIASTOLIC BLOOD PRESSURE: 60 MMHG | BODY MASS INDEX: 42.43 KG/M2

## 2025-05-28 DIAGNOSIS — N39.41 URGE INCONTINENCE: Primary | ICD-10-CM

## 2025-05-28 PROCEDURE — 64566 NEUROELTRD STIM POST TIBIAL: CPT | Performed by: OBSTETRICS & GYNECOLOGY

## 2025-05-28 PROCEDURE — 99213 OFFICE O/P EST LOW 20 MIN: CPT | Mod: 25 | Performed by: OBSTETRICS & GYNECOLOGY

## 2025-05-28 PROCEDURE — 3008F BODY MASS INDEX DOCD: CPT | Performed by: OBSTETRICS & GYNECOLOGY

## 2025-05-28 PROCEDURE — 3074F SYST BP LT 130 MM HG: CPT | Performed by: OBSTETRICS & GYNECOLOGY

## 2025-05-28 PROCEDURE — 3078F DIAST BP <80 MM HG: CPT | Performed by: OBSTETRICS & GYNECOLOGY

## 2025-06-04 ENCOUNTER — OFFICE VISIT (OUTPATIENT)
Dept: UROGYNECOLOGY | Facility: CLINIC | Age: 50
End: 2025-06-04
Payer: COMMERCIAL

## 2025-06-04 DIAGNOSIS — N39.41 URGE INCONTINENCE: Primary | ICD-10-CM

## 2025-06-04 PROCEDURE — 99999 PR OFFICE/OUTPT VISIT,PROCEDURE ONLY: CPT | Performed by: OBSTETRICS & GYNECOLOGY

## 2025-06-04 PROCEDURE — 64566 NEUROELTRD STIM POST TIBIAL: CPT | Performed by: OBSTETRICS & GYNECOLOGY

## 2025-06-04 NOTE — PROCEDURES
Jillian presents for PTNS #2/12 weekly visits. She reports that her sx are better compared to last week, in the beginning portion of the week after her first percutaneous tibial nerve stimulation session. The improvement then wore off. See procedure note for details.      PROCEDURE NOTE:  I reviewed the patients progress to date with PTNS.  The needle electrode was inserted above the right medial malleolus. Surface electrode was placed on the  heel.  Lead set was connected  to the stimulator and needle electrode.  A sensory response was elicited  at a treatment setting of 11.   30 minutes of stimulation was performed and well tolerated.  The lead set was removed.  There was no significant pain or bleeding at the insertion site.      IMP:  Continue weekly treatments   Metronidazole Counseling:  I discussed with the patient the risks of metronidazole including but not limited to seizures, nausea/vomiting, a metallic taste in the mouth, nausea/vomiting and severe allergy.

## 2025-06-04 NOTE — PATIENT INSTRUCTIONS
If you have any problems or concerns, call our office at [phone].  If you think you are having a medical emergency, please call [phone].    If you have access to [employer] (the online patient portal), results from tests ordered at your visit (such as urine tests or ultrasounds) will appear in your portal as soon as they are ready. Please understand that you may see these results faster than we do. Please do not call about the results immediately - we will review your results and contact you after we have had time to analyze your tests.     Please also understand that we frequently do not receive messages sent to us by patients through [employer]. If you have questions or concerns, please call our office.

## 2025-06-11 ENCOUNTER — OFFICE VISIT (OUTPATIENT)
Dept: UROGYNECOLOGY | Facility: CLINIC | Age: 50
End: 2025-06-11
Payer: COMMERCIAL

## 2025-06-11 DIAGNOSIS — N39.41 URGE INCONTINENCE: Primary | ICD-10-CM

## 2025-06-11 PROCEDURE — 64566 NEUROELTRD STIM POST TIBIAL: CPT | Performed by: OBSTETRICS & GYNECOLOGY

## 2025-06-11 PROCEDURE — 99999 PR OFFICE/OUTPT VISIT,PROCEDURE ONLY: CPT | Performed by: OBSTETRICS & GYNECOLOGY

## 2025-06-11 NOTE — PROCEDURES
Jillian presents for PTNS #3/12 weekly visits. She reports that her sx are stable or slightly better compared to last week. See procedure note for details.      PROCEDURE NOTE:  I reviewed the patients progress to date with PTNS.  The needle electrode was inserted above the right medial malleolus. Surface electrode was placed on the  heel.  Lead set was connected  to the stimulator and needle electrode.  A sensory and motor response was elicited  at a treatment setting of 4.   30 minutes of stimulation was performed and well tolerated.  The lead set was removed.  There was no significant pain or bleeding at the insertion site.      IMP:  Continue weekly treatments

## 2025-06-11 NOTE — PATIENT INSTRUCTIONS
If you have any problems or concerns, call our office at [phone].  If you think you are having a medical emergency, please call [phone].    If you have access to [employer] (the online patient portal), results from tests ordered at your visit (such as urine tests or ultrasounds) will appear in your portal as soon as they are ready. Please understand that you may see these results faster than we do. Please do not call about the results immediately - we will review your results and contact you after we have had time to analyze your tests.     Please also understand that we frequently do not receive messages sent to us by patients through [employer]. If you have questions or concerns, please call our office.

## 2025-06-25 ENCOUNTER — OFFICE VISIT (OUTPATIENT)
Dept: UROGYNECOLOGY | Facility: CLINIC | Age: 50
End: 2025-06-25
Payer: COMMERCIAL

## 2025-06-25 DIAGNOSIS — N39.41 URGE INCONTINENCE: Primary | ICD-10-CM

## 2025-06-25 PROCEDURE — 64566 NEUROELTRD STIM POST TIBIAL: CPT | Performed by: OBSTETRICS & GYNECOLOGY

## 2025-06-25 PROCEDURE — 99999 PR OFFICE/OUTPT VISIT,PROCEDURE ONLY: CPT | Performed by: OBSTETRICS & GYNECOLOGY

## 2025-06-25 RX ORDER — MIRABEGRON 50 MG/1
50 TABLET, FILM COATED, EXTENDED RELEASE ORAL DAILY
Qty: 90 TABLET | Refills: 1 | Status: SHIPPED | OUTPATIENT
Start: 2025-06-25

## 2025-06-25 NOTE — PATIENT INSTRUCTIONS
If you have any problems or concerns, call our office at [phone].  If you think you are having a medical emergency, please call [phone].    If you have access to [employer] (the online patient portal), results from tests ordered at your visit (such as urine tests or ultrasounds) will appear in your portal as soon as they are ready. Please understand that you may see these results faster than we do. Please do not call about the results immediately - we will review your results and contact you after we have had time to analyze your tests.     Please also understand that we frequently do not receive messages sent to us by patients through [employer]. If you have questions or concerns, please call our office.

## 2025-06-25 NOTE — PROCEDURES
Jillian presents for PTNS #4/12 weekly visits. She reports that her sx are stable  compared to last week. See procedure note for details.      PROCEDURE NOTE:  I reviewed the patients progress to date with PTNS.  The needle electrode was inserted above the right medial malleolus. Surface electrode was placed on the  heel.  Lead set was connected  to the stimulator and needle electrode.  A sensory and motor response was elicited  at a treatment setting of 7.   30 minutes of stimulation was performed and well tolerated.  The lead set was removed.  There was no significant pain or bleeding at the insertion site.      IMP:  Continue weekly treatments

## 2025-07-09 ENCOUNTER — OFFICE VISIT (OUTPATIENT)
Dept: UROGYNECOLOGY | Facility: CLINIC | Age: 50
End: 2025-07-09
Payer: COMMERCIAL

## 2025-07-09 DIAGNOSIS — N39.41 URGE INCONTINENCE: Primary | ICD-10-CM

## 2025-07-09 PROCEDURE — 64566 NEUROELTRD STIM POST TIBIAL: CPT | Performed by: OBSTETRICS & GYNECOLOGY

## 2025-07-09 PROCEDURE — 99999 PR OFFICE/OUTPT VISIT,PROCEDURE ONLY: CPT | Performed by: OBSTETRICS & GYNECOLOGY

## 2025-07-09 NOTE — PATIENT INSTRUCTIONS
If you have any problems or concerns, call our office at [phone].  If you think you are having a medical emergency, please call [phone].    If you have access to "[employer]" (the online patient portal), results from tests ordered at your visit (such as urine tests or ultrasounds) will appear in your portal as soon as they are ready. Please understand that you may see these results faster than we do. Please do not call about the results immediately - we will review your results and contact you after we have had time to analyze your tests.     Please also understand that we frequently do not receive messages sent to us by patients through "[employer]". If you have questions or concerns, please call our office.

## 2025-07-09 NOTE — PROCEDURES
Jillian presents for PTNS #5/12 weekly visits. She reports that her sx are better  compared to last week. See procedure note for details.      PROCEDURE NOTE:  I reviewed the patients progress to date with PTNS.  The needle electrode was inserted above the right medial malleolus. Surface electrode was placed on the  heel.  Lead set was connected  to the stimulator and needle electrode.  A sensory response was elicited  at a treatment setting of 2.   30 minutes of stimulation was performed and well tolerated.  The lead set was removed.  There was no significant pain or bleeding at the insertion site.      IMP:  Her OAB symptoms are continuing to improve with PTNS  Continue weekly treatments

## 2025-07-16 ENCOUNTER — OFFICE VISIT (OUTPATIENT)
Dept: UROGYNECOLOGY | Facility: CLINIC | Age: 50
End: 2025-07-16
Payer: COMMERCIAL

## 2025-07-16 VITALS
WEIGHT: 260 LBS | DIASTOLIC BLOOD PRESSURE: 80 MMHG | SYSTOLIC BLOOD PRESSURE: 120 MMHG | BODY MASS INDEX: 40.81 KG/M2 | HEIGHT: 67 IN

## 2025-07-16 DIAGNOSIS — N30.00 ACUTE CYSTITIS WITHOUT HEMATURIA: ICD-10-CM

## 2025-07-16 DIAGNOSIS — N39.41 URGE INCONTINENCE: Primary | ICD-10-CM

## 2025-07-16 LAB
BLOOD URINE, POC: ABNORMAL
EXPIRATION DATE: ABNORMAL
LEUKOCYTE EST, POC: NEGATIVE
Lab: ABNORMAL
NITRITE, POC: NEGATIVE
POCT MANUFACTURER: ABNORMAL

## 2025-07-16 PROCEDURE — 81002 URINALYSIS NONAUTO W/O SCOPE: CPT | Performed by: OBSTETRICS & GYNECOLOGY

## 2025-07-16 PROCEDURE — 3079F DIAST BP 80-89 MM HG: CPT | Performed by: OBSTETRICS & GYNECOLOGY

## 2025-07-16 PROCEDURE — 3074F SYST BP LT 130 MM HG: CPT | Performed by: OBSTETRICS & GYNECOLOGY

## 2025-07-16 PROCEDURE — 99214 OFFICE O/P EST MOD 30 MIN: CPT | Mod: 25 | Performed by: OBSTETRICS & GYNECOLOGY

## 2025-07-16 PROCEDURE — 64566 NEUROELTRD STIM POST TIBIAL: CPT | Performed by: OBSTETRICS & GYNECOLOGY

## 2025-07-16 PROCEDURE — 3008F BODY MASS INDEX DOCD: CPT | Performed by: OBSTETRICS & GYNECOLOGY

## 2025-07-16 RX ORDER — NITROFURANTOIN 25; 75 MG/1; MG/1
100 CAPSULE ORAL 2 TIMES DAILY
Qty: 10 CAPSULE | Refills: 0 | Status: SHIPPED | OUTPATIENT
Start: 2025-07-16 | End: 2025-07-21

## 2025-07-16 NOTE — PROCEDURES
Jillian presents for PTNS #6/12 weekly visits. She reports that her sx are better  compared to last week. See procedure note for details.      PROCEDURE NOTE:  I reviewed the patients progress to date with PTNS.  The needle electrode was inserted above the right medial malleolus. Surface electrode was placed on the  heel.  Lead set was connected  to the stimulator and needle electrode.  A sensory response was elicited  at a treatment setting of 9.   30 minutes of stimulation was performed and well tolerated.  The lead set was removed.  There was no significant pain or bleeding at the insertion site.      IMP:  Her OAB symptoms are continuing to improve with PTNS  Her improvement is at least 60-70% better  Continue weekly treatments

## 2025-07-16 NOTE — PROGRESS NOTES
"Jillian Acosta presents today for follow-up on lower urinary tract symptoms.    S: Ms. Acosta presents today to discuss her experience with percutaneous tibial nerve stimulation for urgency urinary incontinence.  She has now completed approximately half of the neuromodulation sessions.  She reports that PTNS is working to significantly improve her lower urinary tract symptoms.  She rates her overall improvement as 60-70%.  She would like to continue the treatment to complete a full 12 stimulation sessions.    She also reports some concern that she may have a urinary tract infection today. She denies dysuria, but reports increased urinary frequency.    O:   Vitals:    07/16/25 1140   BP: 120/80   Weight: 118 kg (260 lb)   Height: 1.689 m (5' 6.5\")      Gen: NAD  Ext: Warm, NT.  No abnormal rashes or skin lesions noted on ankle.    A/P: I again discussed with Jillian Acosta the procedure of PTNS including the anticipated length of therapy and anticipated outcome. She was given the opportunity to ask questions.  We will complete a full 12-session course of peripheral neuromodulation.    A point-of-care urinalysis on a clean-catch specimen was ordered: postivie heme, negative leukocyte esterase, negative nitrites detected   She was only able to void drops, so there was not enough urine to send for a culture. I empirically treated Jillian with MacroBID 100 mg PO BID x 5 days. Warnings about signs and symptoms of pyelonephritis and/or sepsis were provided, and I explained that she would need to have formal urine testing if her symptoms don't improve after finishing the course of antibiotics.    See Procedure Note for full details of the PTNS session from today.    Mohsen Ayala MD PhD  "

## 2025-07-30 ENCOUNTER — OFFICE VISIT (OUTPATIENT)
Dept: UROGYNECOLOGY | Facility: CLINIC | Age: 50
End: 2025-07-30
Payer: COMMERCIAL

## 2025-07-30 DIAGNOSIS — N39.41 URGE INCONTINENCE: Primary | ICD-10-CM

## 2025-07-30 LAB
BLOOD URINE, POC: NEGATIVE
EXPIRATION DATE: NORMAL
LEUKOCYTE EST, POC: NEGATIVE
Lab: NORMAL
NITRITE, POC: NEGATIVE
POCT MANUFACTURER: NORMAL

## 2025-07-30 PROCEDURE — 64566 NEUROELTRD STIM POST TIBIAL: CPT | Performed by: OBSTETRICS & GYNECOLOGY

## 2025-07-30 PROCEDURE — 81002 URINALYSIS NONAUTO W/O SCOPE: CPT | Performed by: OBSTETRICS & GYNECOLOGY

## 2025-07-30 PROCEDURE — 99999 PR OFFICE/OUTPT VISIT,PROCEDURE ONLY: CPT | Performed by: OBSTETRICS & GYNECOLOGY

## 2025-07-30 NOTE — PROCEDURES
Jillian presents for PTNS #7/12 weekly visits. She reports that her sx are stable compared to last week. See procedure note for details.      PROCEDURE NOTE:  I reviewed the patients progress to date with PTNS.  The needle electrode was inserted above the right medial malleolus. Surface electrode was placed on the  heel.  Lead set was connected  to the stimulator and needle electrode.  A sensory and motor response was elicited  at a treatment setting of 3.   30 minutes of stimulation was performed and well tolerated.  The lead set was removed.  There was no significant pain or bleeding at the insertion site.      IMP:  Her OAB symptoms are continuing to improve with PTNS  Continue weekly treatments

## 2025-07-30 NOTE — PATIENT INSTRUCTIONS
If you have any problems or concerns, call our office at [phone].  If you think you are having a medical emergency, please call [phone].    If you have access to [employer] (the online patient portal), results from tests ordered at your visit (such as urine tests or ultrasounds) will appear in your portal as soon as they are ready. Please understand that you may see these results faster than we do. Please do not call about the results immediately - we will review your results and contact you after we have had time to analyze your tests.     Please also understand that we frequently do not receive messages sent to us by patients through [employer]. If you have questions or concerns, please call our office.

## 2025-08-06 ENCOUNTER — OFFICE VISIT (OUTPATIENT)
Dept: UROGYNECOLOGY | Facility: CLINIC | Age: 50
End: 2025-08-06
Payer: COMMERCIAL

## 2025-08-06 DIAGNOSIS — N39.41 URGE INCONTINENCE: Primary | ICD-10-CM

## 2025-08-06 PROCEDURE — 64566 NEUROELTRD STIM POST TIBIAL: CPT | Performed by: OBSTETRICS & GYNECOLOGY

## 2025-08-06 PROCEDURE — 99999 PR OFFICE/OUTPT VISIT,PROCEDURE ONLY: CPT | Performed by: OBSTETRICS & GYNECOLOGY

## 2025-08-06 NOTE — PATIENT INSTRUCTIONS
If you have any problems or concerns, call our office at [phone].  If you think you are having a medical emergency, please call [phone].    If you have access to [employer] (the online patient portal), results from tests ordered at your visit (such as urine tests or ultrasounds) will appear in your portal as soon as they are ready. Please understand that you may see these results faster than we do. Please do not call about the results immediately - we will review your results and contact you after we have had time to analyze your tests.     Please also understand that we frequently do not receive messages sent to us by patients through [employer]. If you have questions or concerns, please call our office.

## 2025-08-06 NOTE — PROCEDURES
Jillian presents for PTNS #8/12 weekly visits. She reports that her sx are stable  compared to last week. See procedure note for details.      PROCEDURE NOTE:  I reviewed the patients progress to date with PTNS.  The needle electrode was inserted above the right medial malleolus. Surface electrode was placed on the  heel.  Lead set was connected  to the stimulator and needle electrode.  A sensory response was elicited  at a treatment setting of 16.   30 minutes of stimulation was performed and well tolerated.  The lead set was removed.  There was no significant pain or bleeding at the insertion site.      IMP:  Her OAB symptoms are continuing to improve with PTNS  Continue weekly treatments

## 2025-08-19 ENCOUNTER — OFFICE VISIT (OUTPATIENT)
Dept: UROGYNECOLOGY | Facility: CLINIC | Age: 50
End: 2025-08-19
Payer: COMMERCIAL

## 2025-08-19 DIAGNOSIS — N39.41 URGE INCONTINENCE: Primary | ICD-10-CM

## 2025-08-19 PROCEDURE — 99999 PR OFFICE/OUTPT VISIT,PROCEDURE ONLY: CPT | Performed by: PHYSICIAN ASSISTANT

## 2025-08-19 PROCEDURE — 64566 NEUROELTRD STIM POST TIBIAL: CPT | Performed by: PHYSICIAN ASSISTANT

## 2025-08-27 ENCOUNTER — OFFICE VISIT (OUTPATIENT)
Dept: UROGYNECOLOGY | Facility: CLINIC | Age: 50
End: 2025-08-27
Payer: COMMERCIAL

## 2025-08-27 VITALS
WEIGHT: 260 LBS | HEIGHT: 67 IN | DIASTOLIC BLOOD PRESSURE: 74 MMHG | SYSTOLIC BLOOD PRESSURE: 100 MMHG | BODY MASS INDEX: 40.81 KG/M2

## 2025-08-27 DIAGNOSIS — N39.41 URGE INCONTINENCE: Primary | ICD-10-CM

## 2025-08-27 DIAGNOSIS — N30.00 ACUTE CYSTITIS WITHOUT HEMATURIA: ICD-10-CM

## 2025-08-27 LAB
BLOOD URINE, POC: POSITIVE
EXPIRATION DATE: ABNORMAL
LEUKOCYTE EST, POC: ABNORMAL
Lab: ABNORMAL
NITRITE, POC: NEGATIVE
POCT MANUFACTURER: ABNORMAL

## 2025-08-27 PROCEDURE — 81002 URINALYSIS NONAUTO W/O SCOPE: CPT | Performed by: OBSTETRICS & GYNECOLOGY

## 2025-08-27 PROCEDURE — 3078F DIAST BP <80 MM HG: CPT | Performed by: OBSTETRICS & GYNECOLOGY

## 2025-08-27 PROCEDURE — 64566 NEUROELTRD STIM POST TIBIAL: CPT | Performed by: OBSTETRICS & GYNECOLOGY

## 2025-08-27 PROCEDURE — 3008F BODY MASS INDEX DOCD: CPT | Performed by: OBSTETRICS & GYNECOLOGY

## 2025-08-27 PROCEDURE — 87077 CULTURE AEROBIC IDENTIFY: CPT | Mod: 59 | Performed by: OBSTETRICS & GYNECOLOGY

## 2025-08-27 PROCEDURE — 99214 OFFICE O/P EST MOD 30 MIN: CPT | Mod: 25 | Performed by: OBSTETRICS & GYNECOLOGY

## 2025-08-27 PROCEDURE — 3074F SYST BP LT 130 MM HG: CPT | Performed by: OBSTETRICS & GYNECOLOGY

## 2025-08-27 RX ORDER — SULFAMETHOXAZOLE AND TRIMETHOPRIM 800; 160 MG/1; MG/1
1 TABLET ORAL 2 TIMES DAILY
Qty: 6 TABLET | Refills: 0 | Status: SHIPPED | OUTPATIENT
Start: 2025-08-27 | End: 2025-08-30

## 2025-08-29 LAB
BACTERIA UR CULT: ABNORMAL

## 2025-08-30 ENCOUNTER — RESULTS FOLLOW-UP (OUTPATIENT)
Dept: UROGYNECOLOGY | Facility: CLINIC | Age: 50
End: 2025-08-30
Payer: COMMERCIAL

## 2025-08-30 RX ORDER — LEVOFLOXACIN 500 MG/1
750 TABLET, FILM COATED ORAL DAILY
Qty: 15 TABLET | Refills: 0 | Status: SHIPPED | OUTPATIENT
Start: 2025-08-30 | End: 2025-09-09

## 2025-08-30 RX ORDER — NITROFURANTOIN 25; 75 MG/1; MG/1
100 CAPSULE ORAL 2 TIMES DAILY
Qty: 14 CAPSULE | Refills: 0 | Status: SHIPPED | OUTPATIENT
Start: 2025-08-30 | End: 2025-09-06

## 2025-09-03 ENCOUNTER — OFFICE VISIT (OUTPATIENT)
Dept: UROGYNECOLOGY | Facility: CLINIC | Age: 50
End: 2025-09-03
Payer: COMMERCIAL

## 2025-09-03 DIAGNOSIS — N39.41 URGE INCONTINENCE: Primary | ICD-10-CM

## 2025-09-03 PROCEDURE — 64566 NEUROELTRD STIM POST TIBIAL: CPT | Performed by: OBSTETRICS & GYNECOLOGY

## 2025-09-03 PROCEDURE — 99999 PR OFFICE/OUTPT VISIT,PROCEDURE ONLY: CPT | Performed by: OBSTETRICS & GYNECOLOGY
